# Patient Record
Sex: FEMALE | Race: WHITE | NOT HISPANIC OR LATINO | Employment: OTHER | URBAN - METROPOLITAN AREA
[De-identification: names, ages, dates, MRNs, and addresses within clinical notes are randomized per-mention and may not be internally consistent; named-entity substitution may affect disease eponyms.]

---

## 2017-01-03 ENCOUNTER — OFFICE VISIT (OUTPATIENT)
Dept: NUTRITION | Facility: HOSPITAL | Age: 62
End: 2017-01-03
Attending: ANESTHESIOLOGY

## 2017-01-03 VITALS — HEIGHT: 62 IN | BODY MASS INDEX: 34.6 KG/M2 | WEIGHT: 188 LBS

## 2017-01-03 PROCEDURE — 97803 MED NUTRITION INDIV SUBSEQ: CPT | Performed by: DIETITIAN, REGISTERED

## 2017-01-03 NOTE — PROGRESS NOTES
Adult Outpatient Nutrition  Assessment/PES    Patient Name:  Yi Garcia  YOB: 1955  MRN: [id number]    Assessment Date:  1/3/2017          General Info       01/03/17 3846    Today's Session    Person(s) attending today's session Patient     Services Used Today? No    General Information    How well do you speak English? very well    Do you speak a language other than English at home? no    Are you able to read and write English? Yes    Lives With spouse    Is patient pregnant? no            Physical Findings       01/03/17 1556    Physical Findings/Assessment    Additional Documentation Physical Appearance (Group)    Physical Appearance    Overall Physical Appearance obese              Nutritional Info/Activity       01/03/17 1554    Nutritional Information    Have you had weight changes? Yes    Describe weight changes States that her weight has increased to 194 lbs.    Have you tried to lose weight before? Yes    List programs tried, date, and success smaller portions    Do you have difficulty chewing food? No    Who Prepares Meals self    How often during the day do you find yourself snacking? fruit at night    How often do you eat out and where? couple times per week; fast food    What is the biggest challenge you have with your diet? Portions;Eating out;Poor choices;Food preperation    Enter everything you can remember eating in the last 24 hours (1 day) Breakfast: cereal with milk; Lunch: 6 inch cold cut [employer] sandwich, water, [employer] french fries; Snack: grapes, 2 tangerines; Dinner: 6 inch cold cut [employer] Gerald, [facility]    Eating Environment    Eating environment Family    Physical Activity    Are you currently involved in an activity/exercise program?  No    Reasons for Inactivity Other (comment)   pain    How would you rank exercise as an important health lifestyle practice? 9              Estimated/Assessed Needs       01/03/17 2540    Calculation Measurements  "   Weight Used For Calculations 88 kg (194 lb)    Height Used for Calculations 1.575 m (5' 2\")    Estimated/Assessed Energy Needs    Energy Need Method Liberty Narayanan    Age 61    RMR (HoltSt. Narayanan Equation) 1408.23    Activity Factors (Sarah Casarez)  Confined to bed  1.2    Total estimated needs (Holt St. Jeor) 1408 x 1.2 = 1689 - 500 = 1189 kcalories/day    Estimated Kcal Range  1200 kcalories                Problem/Interventions:        Problem 1       01/03/17 1601    Nutrition Diagnoses Problem 1    Problem 1 Overweight/Obesity    Etiology (related to) --   excessive kcalorie intake    Signs/Symptoms (evidenced by) BMI    BMI 30 - 34.9                    Intervention Goal       01/03/17 1602    Intervention Goal    General Provide information regarding MNT for treatment/condition   ongoing weight loss follow up    PO Meet estimated needs   1200 kcalories/day    Weight Appropriate weight loss              Comments:  Patient present for ongoing weight loss follow up and reassessment.  Attended initial weight loss session in March 2016.  States that she has gained weight since her last nutrition appointment and was up to 194 lbs.  States that she has not been able to exercise on a regular basis except for physical therapy and stretching.  She states that her portion sizes continue to be under control, however, she continues to skip breakfast on a regular basis.  Discussed tracking kcalories especially when eating at restaurants.  Per 24 hour recall, that patient is consuming about 0882-2632 kcalories per day.  Patient only needs 1200 kcalories per day to lose weight.  Demonstrated how to read labels as well as keeping a food diary.  Patient downloaded \"[employer]\" shar during session and able to retrieve restaurant kcalorie information.  Updated goals:    -Eat breakfast daily (apple with peanut butter).  -Add more vegetables.  -Track kcalories using a food journal.  Aim for 1200 " kcalories/day.  -Lose 0.5-1.0# of body weight per week.      Scheduled a continued follow up for 2-6-2017 at 12:45 p.m.  RD contact information given if needed.  Thank you again for this referral.      Electronically signed by:  Nila Blanco RD  01/03/17 4:04 PM

## 2017-01-03 NOTE — MR AVS SNAPSHOT
[facility]  [phone]                    Yi MONTES Radha   1/3/2017 2:45 PM   Appointment    Dept Phone:  [phone]   Encounter #:  [id number]    Provider:  Nila Blanco RD   Department:  [facility]                Your Full Care Plan              Your Updated Medication List          This list is accurate as of: 1/3/17  3:29 PM.  Always use your most recent med list.                ABILIFY 2 MG tablet   Generic drug:  ARIPiprazole       baclofen 10 MG tablet   Commonly known as:  LIORESAL   TAKE AS INSTRUCTED BY YOUR PRESCRIBER       Canagliflozin 300 MG tablet   Commonly known as:  INVOKANA   Take 300 mg by mouth Daily.       chlorthalidone 25 MG tablet   Commonly known as:  HYGROTON   Take 1 tablet by mouth daily.       Diclofenac Potassium 25 MG capsule   Commonly known as:  ZIPSOR   Take 1 capsule by mouth 4 (four) times a day as needed (for moderate to severe pain)       doxazosin 2 MG tablet   Commonly known as:  CARDURA   Take 1 1/2 tablets by mouth every night.       ESTRACE VAGINAL 0.1 MG/GM vaginal cream   Generic drug:  estradiol       FLUZONE QUADRIVALENT 0.5 ML suspension prefilled syringe injection   Generic drug:  influenza vac split quad       JANUVIA 100 MG tablet   Generic drug:  SITagliptin       lidocaine 5 %   Commonly known as:  LIDODERM       PRISTIQ 100 MG 24 hr tablet   Generic drug:  desvenlafaxine       SALONPAS pads       topiramate 50 MG tablet   Commonly known as:  TOPAMAX   TAKE ONE TABLET BY MOUTH TWICE DAILY       traMADol 50 MG tablet   Commonly known as:  ULTRAM   Take 1 tablet by mouth 2 (Two) Times a Day As Needed for moderate pain (4-6).       traZODone 50 MG tablet   Commonly known as:  DESYREL   TAKE 1 TO 2 TABLETS AT BEDTIME AS NEEDED       TYLENOL 500 MG tablet   Generic drug:  acetaminophen               Instructions     None    Patient Instructions History      Upcoming Appointments     Visit Type Date Time  Department    FOLLOW UP RD VISIT, 30 MIN 1/3/2017  2:45 PM [facility]    OFFICE VISIT 2017  1:15 PM [facility]    PHYSICAL 2017  9:45 AM [facility]    FOLLOW UP RD VISIT, 30 MIN 2017 12:45 PM [facility]      [employer] Signup     [facility] allows you to send messages to your doctor, view your test results, renew your prescriptions, schedule appointments, and more. To sign up, go to Enlighted and click on the Sign Up Now link in the New User? box. Enter your [facility] Activation Code exactly as it appears below along with the last four digits of your Social Security Number and your Date of Birth () to complete the sign-up process. If you do not sign up before the expiration date, you must request a new code.    [facility] Activation Code: [email or web address]  Expires: 2017  5:27 AM    If you have questions, you can email [email or web address] or call [phone] to talk to our [facility] staff. Remember, [facility] is NOT to be used for urgent needs. For medical emergencies, dial 911.               Other Info from Your Visit           Your Appointments     2017  1:15 PM EST   Office Visit with Rafael Velasquez MD   [facility] (--)    [address],  [address]  [facility] [address] [phone]           Arrive 15 minutes prior to appointment.            2017  9:45 AM EST   Physical with Charleen Carbajal MD   [facility] (--)    [address]  [facility] [address] [phone]           Arrive 15 minutes prior to appointment.            2017 12:45 PM EST   Follow up RD visit with Nila Blanco RD   [facility] ([place])    [address]  [facility] [address]   [phone]              Allergies     Ace Inhibitors  Cough    Amlodipine      Beta Adrenergic Blockers       Cyclobenzaprine      Hydrochlorothiazide      Hyzaar  [Losartan Potassium-hctz]      Latex      Losartan      Metformin      Penicillins      Pioglitazone      Spironolactone        Vital Signs     Smoking Status                   Never Smoker

## 2017-01-24 ENCOUNTER — OFFICE VISIT (OUTPATIENT)
Dept: PAIN MEDICINE | Facility: CLINIC | Age: 62
End: 2017-01-24

## 2017-01-24 VITALS
WEIGHT: 190 LBS | BODY MASS INDEX: 34.96 KG/M2 | DIASTOLIC BLOOD PRESSURE: 94 MMHG | HEART RATE: 102 BPM | HEIGHT: 62 IN | RESPIRATION RATE: 18 BRPM | TEMPERATURE: 96.7 F | SYSTOLIC BLOOD PRESSURE: 144 MMHG | OXYGEN SATURATION: 96 %

## 2017-01-24 DIAGNOSIS — G89.4 CHRONIC PAIN SYNDROME: ICD-10-CM

## 2017-01-24 DIAGNOSIS — M96.1 POSTLAMINECTOMY SYNDROME OF LUMBAR REGION: ICD-10-CM

## 2017-01-24 DIAGNOSIS — M79.18 MYOFASCIAL PAIN SYNDROME: ICD-10-CM

## 2017-01-24 DIAGNOSIS — E66.9 OBESITY WITH SLEEP APNEA: ICD-10-CM

## 2017-01-24 DIAGNOSIS — R53.81 PHYSICAL DECONDITIONING: ICD-10-CM

## 2017-01-24 DIAGNOSIS — Z86.69 HISTORY OF MIGRAINE HEADACHES: ICD-10-CM

## 2017-01-24 DIAGNOSIS — G47.30 OBESITY WITH SLEEP APNEA: ICD-10-CM

## 2017-01-24 DIAGNOSIS — M53.3 SACROILIAC JOINT DYSFUNCTION OF LEFT SIDE: ICD-10-CM

## 2017-01-24 DIAGNOSIS — M47.816 LUMBAR FACET ARTHROPATHY: ICD-10-CM

## 2017-01-24 DIAGNOSIS — F41.1 GENERALIZED ANXIETY DISORDER: ICD-10-CM

## 2017-01-24 DIAGNOSIS — G96.12: ICD-10-CM

## 2017-01-24 DIAGNOSIS — F32.89 OTHER DEPRESSION: ICD-10-CM

## 2017-01-24 DIAGNOSIS — E66.8 MODERATE OBESITY: ICD-10-CM

## 2017-01-24 DIAGNOSIS — G47.00 INSOMNIA, UNSPECIFIED TYPE: ICD-10-CM

## 2017-01-24 PROCEDURE — 99213 OFFICE O/P EST LOW 20 MIN: CPT | Performed by: ANESTHESIOLOGY

## 2017-01-24 PROCEDURE — 95972 ALYS CPLX SP/PN NPGT W/PRGRM: CPT | Performed by: ANESTHESIOLOGY

## 2017-01-24 RX ORDER — LURASIDONE HYDROCHLORIDE 20 MG/1
20 TABLET, FILM COATED ORAL DAILY
COMMUNITY
End: 2017-02-06

## 2017-01-24 NOTE — PROGRESS NOTES
"Chief Complain: \"I have been doing better with the stimulator.\"        Brief History: Ms. Garcia returns to the clinic for evaluation of left buttocks and left lower extremity pain down to the foot, and possible reprogramming of her [place] spinal cord stimulator device. Patient reports significant improvement of her chronic pain after her last reprogramming. A few weeks later, she reports that she felt that the pain was overriding the stimulation.  Pain level: 4/10  Average pain level last week: 4/10 (significantly decreased in comparison to last visit in December)   Pain level ranges from 1/10 to 5/10 (with the spinal cord stimulator on)  Quality of pain: Throbbing and burning in the lower extremities, and \"it just hurts across my lower back\"   Radiation of pain: Pain radiates from her lower back down to the buttocks, the posterior aspect of her thighs, lateral aspect of the left leg and into the left foot, the 2nd and 3rd toes. The right sided pain is intermittent and less frequent and radiates up to the heel.   Comorbid factors:   Depression \"seems to be getting better.\" She continues under the care of Dr. Oliveira. Patient continues on Pristiq, which was increased before her last visit, and now Latuda . She continues CBT with Savita Minor for counseling.   Deconditioning: She has resumed water therapy. Patient is not currently participating in physical therapy due to insurance issues.   Sleep disturbance: Patient has decided to move forward with a sleep study.   Obesity: She has been released by [facility].   Current analgesics: She uses ice and heat in the areas of her chronic pain. In terms of analgesics, she uses SalonPas, and baclofen, Tramadol, Zipsor, Lidocaine Patches and Tylenol without side effects. [facility] #[id number], is consistent with medication reconciliation.      Review of new diagnostic studies   UDS  on 12/06/2016, appropriate      Diagnostic Studies:  X-rays of the sacroiliac " "joints from 04/19/2016 revealed lumbosacral and SI arthropathy.  MRI of the lumbar spine from June 2013 revealed lumbar facet hypertrophy from L2-L3 through L5-S1. L4-L5 moderate disc bulge lateralizes towards the right. Moderate right and mild left neuroforaminal stenosis. L5-S1 moderate left foraminal disc protrusion combined with facet hypertrophy creating moderate left neuroforaminal stenosis with no significant canal stenosis.  X-ray of the pelvis on 10/07/2014, revealed mild sclerosis about the right sacroiliac joint.  X-ray of the thoracic spine on 09/09/2014, revealed spinal cord stimulator electrodes positioned at T7. The thoracic spine otherwise demonstrates mild degenerative change without acute abnormality.    X-rays of the sacroiliac joints from 04/19/2016 revealed lumbosacral and SI arthropathy.    The following portions of the patient's history were reviewed and updated as appropriate: problem list, past medical history, past surgery history, social history, family history, medications, and allergies     Review of Systems   Constitutional: Positive for fatigue.   Musculoskeletal: Positive for back pain.   All other systems reviewed and are negative.     Visit Vitals   • /94 (BP Location: Left arm, Patient Position: Sitting)   • Pulse 102   • Temp 96.7 °F (35.9 °C) (Temporal Artery )   • Resp 18   • Ht 62\" (157.5 cm)   • Wt 190 lb (86.2 kg)   • SpO2 96%   • BMI 34.75 kg/m2      Physical Exam   Neurologic Exam  Constitutional General appearance: No acute distress, well appearing and well nourished. Morbidly obese and well hydrated.   Head and Face Normal. Palpation of the face and sinuses: No sinus tenderness.   Eyes Conjunctiva and lids: No swelling, erythema or discharge. Pupils and irises: Equal, round, reactive to light.   Pulmonary Respiratory effort: No increased work of breathing or signs of respiratory distress. Auscultation of lungs: Clear to auscultation.   Cardiovascular Auscultation of " heart: Normal rate and rhythm, normal S1 and S2, no murmurs. Peripheral vascular exam: Normal. Examination of extremities for edema and/or varicosities: Normal.   Abdomen Non-tender, no masses.   Musculoskeletal Gait and station: Normal. Digits and nails: Normal without clubbing or cyanosis. The range of motion of the lumbar spine is improved. Lumbar facet joint loading maneuvers are negative. Krish and Gaenslen's tests are negative. Palpation of the left gluteal bursa does not reproduce pain at this time. The range of motion of the hip joints is full and without pain. Muscle tone is normal. Left piriformis maneuvers are negative at this time.   Muscle strength/tone: Normal.   Skin Skin and subcutaneous tissue: Normal without rashes or lesions. Her surgical wounds are well-healed without redness, drainage, or fluid accumulation. There is complete epithelization of her surgical wounds.   Neurologic   Cranial nerves: Cranial nerves II-XII intact.   Cortical function: Normal mental status.   Reflexes: 2+ and symmetric. Deep tendon reflexes: 2+ right biceps, 2+ left biceps, 2+ right patella, 2+ left patella, 2+ right ankle jerk and 2+ left ankle jerk. Superficial/Primitive Reflexes: primitive reflexes were absent. Straight leg raising test is positive on the left, with a positive contralateral SLR. Femoral stretch sign is negative. No clonus or Babinski. Romberg's is negative.   Sensation: No sensory loss. Sensory exam: intact to light touch, intact pain and temperature sensation, intact vibration sensation and normal proprioception.   Coordination: Normal finger to nose and heel to shin. Coordination: normal balance and negative Romberg's sign.   Psychiatric   Judgment and insight: Normal.   Orientation to person, place, and time: Normal.   Recent and remote memory: Intact.   Mood and affect: Normal.       PROCEDURE: Analysis of the spinal cord stimulator device with complex spinal cord stimulator reprogramming    Analysis of the spinal cord stimulator device reveals that the patient has used her stimulator device for a total of 1105 hours since last reprogramming, and 8792 lifetime hours (23 hours per day).   Analysis of impedance reveals normal impedance for all contacts.   Spinal cord stimulator device was reprogrammed under my supervision by adjusting electrode polarities, pulse width, pulse rate, amplitudes, BurstDR, by creating one program, as follows;  Program ONE  Electrode polarities: 1+, 2-, 3+, 4+, 5-, 6-  Amplitude: 1 to 1.5 mA   Pulse width: 1000 mcs  Pulse Rate: 40  Hz  IntraBurst rate: 500 Hz  Micro-dosing ratio: 5:25  Time spent reprogramming: 15 minutes.   A copy of the telemetry report will be scanned in the patient's chart      ASSESSMENT:   1. Postlaminectomy syndrome of lumbar region    2. Meningeal adhesions/lumbar arachnoiditis    3. Lumbar facet arthropathy/lumbar spondylosis without myelopathy    4. Sacroiliac joint dysfunction of left side    5. Myofascial pain syndrome    6. History of migraine headaches    7. Obesity with sleep apnea    8. Moderate obesity    9. Insomnia, unspecified type    10. Generalized anxiety disorder    11. Other depression    12. Chronic pain syndrome    13. Physical deconditioning      PLAN: Patient's chronic pain condition is improved. We will continue current management and any additional workup, referrals, and treatments as outlined in the following plan:  1. Follow-up in three weeks to assess response to new spinal cord stimulator program. If patient continues to struggle with pain, then, we will proceed with diagnostic/therapeutic right L4-L5 and left L5-S1 transforaminal epidural steroid injection. We may repeat another epidural depending on patient’s outcome.  2. Long-term rehabilitation efforts:   a. Resume comprehensive physical therapy program  b. Resume water therapy   c. [facility]up at [facility]  d. Follow-up with Dr. Berry Bradshaw for  cognitive behavioral therapy and biofeedback  e. Follow-up with Dr. Richmond for her sleep apnea  f.  Follow-up with Dr. Oliveira for her unrelenting anxiety, depression and insomnia  g. The patient does not have a history of falls. I did complete a risk assessment for falls. Patient has risks factors.   Fall precautions: Patient has been instructed regarding universal fall precautions, such as;   · Removing all area rugs and coffee tables to create a safe environment at home  · Ensure clean, dry floors  · Wearing supportive footwear and properly fitting clothing  · Ensure bed/chair is appropriate height and patient's feet can touch the floor  · Using a shower transfer bench  · Using walk-in shower and having shower safety bars installed  · Ensure proper lighting, minimize glare  · Have nightlights operational and in use  · Participation in an exercise program for gait training, balance training and strength  · Avoid carrying laundry up and down steps  · Ensure proper compliance and organization of medications to avoid errors   · Avoid use of over the counter sedatives and alcohol consumption  3. Pharmacological measures, as follows:  a. Continue topiramate 50 mg twice daily  b. Continue trazodone  mg each bedtime for insomnia  c. Continue Zipsor 25 mg 4 times a day when necessary for mild to moderate acute breakthrough pain. I have instructed the patient not to take any other NSAIDs  d. Continue lidocaine patches  e. Continue baclofen 10 mg 1/2 to 1 tablet 1-2 times a day as needed muscle spasms  f.  Continue Pristiq and Latuda as prescribed by Dr. Oliveira  g. Tramadol 50 mg twice a day as needed for breakthrough pain. Patient has completed a SOAPP questionnaire and ORT questionnaire (revealing low risk). Bernabe reports have been reviewed and appropriate.  4. The patient has been instructed to contact my office with any questions or difficulties. The patient understands the plan and agrees to proceed  accordingly.    Patient Care Team:  Charleen Carbajal MD as PCP - General  Rafael Velasquez MD as Consulting Physician (Pain Medicine)  Von Croft MD as Surgeon (Neurosurgery)  Emmie Stallworth MD as Consulting Physician (Internal Medicine)     New Medications Ordered This Visit   Medications   • Lurasidone HCl (LATUDA) 20 MG tablet tablet     Sig: Take 20 mg by mouth Daily.         Future Appointments  Date Time Provider Department Center   2/6/2017 9:45 AM Charleen Carbajal MD [facility] None   2/6/2017 12:45 PM Nila Blanco RD [facility]         Rafael Velasquez MD

## 2017-01-24 NOTE — LETTER
"January 24, 2017     Charleen Carbajal MD  [address]  [facility] [address]    Patient: Yi Garcia   YOB: 1955   Date of Visit: 1/24/2017       Dear Dr. Raven MD:    Thank you for referring Yi Garcia to me for evaluation. Below are the relevant portions of my assessment and plan of care.    If you have questions, please do not hesitate to call me. I look forward to following Yi along with you.         Sincerely,        Rafael Velasquez MD        CC: MD Emmie Jordan MD Luis A. Vascello, MD  1/24/2017  3:09 PM  Signed  Chief Complain: \"I have been doing better with the stimulator.\"        Brief History: Ms. Garcia returns to the clinic for evaluation of left buttocks and left lower extremity pain down to the foot, and possible reprogramming of her [place] spinal cord stimulator device. Patient reports significant improvement of her chronic pain after her last reprogramming. A few weeks later, she reports that she felt that the pain was overriding the stimulation.  Pain level: 4/10  Average pain level last week: 4/10 (significantly decreased in comparison to last visit in December)   Pain level ranges from 1/10 to 5/10 (with the spinal cord stimulator on)  Quality of pain: Throbbing and burning in the lower extremities, and \"it just hurts across my lower back\"   Radiation of pain: Pain radiates from her lower back down to the buttocks, the posterior aspect of her thighs, lateral aspect of the left leg and into the left foot, the 2nd and 3rd toes. The right sided pain is intermittent and less frequent and radiates up to the heel.   Comorbid factors:   Depression \"seems to be getting better.\" She continues under the care of Dr. Oliveira. Patient continues on Pristiq, which was increased before her last visit, and now Latuda . She continues CBT with Savita Minor for counseling.   Deconditioning: She has resumed water therapy. Patient is not currently " "participating in physical therapy due to insurance issues.   Sleep disturbance: Patient has decided to move forward with a sleep study.   Obesity: She has been released by [facility].   Current analgesics: She uses ice and heat in the areas of her chronic pain. In terms of analgesics, she uses SalonPas, and baclofen, Tramadol, Zipsor, Lidocaine Patches and Tylenol without side effects. JEAN-CLAUDE #[id number], is consistent with medication reconciliation.      Review of new diagnostic studies   UDS  on 12/06/2016, appropriate      Diagnostic Studies:  X-rays of the sacroiliac joints from 04/19/2016 revealed lumbosacral and SI arthropathy.  MRI of the lumbar spine from June 2013 revealed lumbar facet hypertrophy from L2-L3 through L5-S1. L4-L5 moderate disc bulge lateralizes towards the right. Moderate right and mild left neuroforaminal stenosis. L5-S1 moderate left foraminal disc protrusion combined with facet hypertrophy creating moderate left neuroforaminal stenosis with no significant canal stenosis.  X-ray of the pelvis on 10/07/2014, revealed mild sclerosis about the right sacroiliac joint.  X-ray of the thoracic spine on 09/09/2014, revealed spinal cord stimulator electrodes positioned at T7. The thoracic spine otherwise demonstrates mild degenerative change without acute abnormality.    X-rays of the sacroiliac joints from 04/19/2016 revealed lumbosacral and SI arthropathy.    The following portions of the patient's history were reviewed and updated as appropriate: problem list, past medical history, past surgery history, social history, family history, medications, and allergies     Review of Systems   Constitutional: Positive for fatigue.   Musculoskeletal: Positive for back pain.   All other systems reviewed and are negative.     Visit Vitals   • /94 (BP Location: Left arm, Patient Position: Sitting)   • Pulse 102   • Temp 96.7 °F (35.9 °C) (Temporal Artery )   • Resp 18   • Ht 62\" (157.5 " cm)   • Wt 190 lb (86.2 kg)   • SpO2 96%   • BMI 34.75 kg/m2      Physical Exam   Neurologic Exam  Constitutional General appearance: No acute distress, well appearing and well nourished. Morbidly obese and well hydrated.   Head and Face Normal. Palpation of the face and sinuses: No sinus tenderness.   Eyes Conjunctiva and lids: No swelling, erythema or discharge. Pupils and irises: Equal, round, reactive to light.   Pulmonary Respiratory effort: No increased work of breathing or signs of respiratory distress. Auscultation of lungs: Clear to auscultation.   Cardiovascular Auscultation of heart: Normal rate and rhythm, normal S1 and S2, no murmurs. Peripheral vascular exam: Normal. Examination of extremities for edema and/or varicosities: Normal.   Abdomen Non-tender, no masses.   Musculoskeletal Gait and station: Normal. Digits and nails: Normal without clubbing or cyanosis. The range of motion of the lumbar spine is improved. Lumbar facet joint loading maneuvers are negative. Krish and Gaenslen's tests are negative. Palpation of the left gluteal bursa does not reproduce pain at this time. The range of motion of the hip joints is full and without pain. Muscle tone is normal. Left piriformis maneuvers are negative at this time.   Muscle strength/tone: Normal.   Skin Skin and subcutaneous tissue: Normal without rashes or lesions. Her surgical wounds are well-healed without redness, drainage, or fluid accumulation. There is complete epithelization of her surgical wounds.   Neurologic   Cranial nerves: Cranial nerves II-XII intact.   Cortical function: Normal mental status.   Reflexes: 2+ and symmetric. Deep tendon reflexes: 2+ right biceps, 2+ left biceps, 2+ right patella, 2+ left patella, 2+ right ankle jerk and 2+ left ankle jerk. Superficial/Primitive Reflexes: primitive reflexes were absent. Straight leg raising test is positive on the left, with a positive contralateral SLR. Femoral stretch sign is negative. No  clonus or Babinski. Romberg's is negative.   Sensation: No sensory loss. Sensory exam: intact to light touch, intact pain and temperature sensation, intact vibration sensation and normal proprioception.   Coordination: Normal finger to nose and heel to shin. Coordination: normal balance and negative Romberg's sign.   Psychiatric   Judgment and insight: Normal.   Orientation to person, place, and time: Normal.   Recent and remote memory: Intact.   Mood and affect: Normal.       PROCEDURE: Analysis of the spinal cord stimulator device with complex spinal cord stimulator reprogramming   Analysis of the spinal cord stimulator device reveals that the patient has used her stimulator device for a total of 1105 hours since last reprogramming, and 8792 lifetime hours (23 hours per day).   Analysis of impedance reveals normal impedance for all contacts.   Spinal cord stimulator device was reprogrammed under my supervision by adjusting electrode polarities, pulse width, pulse rate, amplitudes, BurstDR, by creating one program, as follows;  Program ONE  Electrode polarities: 1+, 2-, 3+, 4+, 5-, 6-  Amplitude: 1 to 1.5 mA   Pulse width: 1000 mcs  Pulse Rate: 40  Hz  IntraBurst rate: 500 Hz  Micro-dosing ratio: 5:25  Time spent reprogramming: 15 minutes.   A copy of the telemetry report will be scanned in the patient's chart      ASSESSMENT:   1. Postlaminectomy syndrome of lumbar region    2. Meningeal adhesions/lumbar arachnoiditis    3. Lumbar facet arthropathy/lumbar spondylosis without myelopathy    4. Sacroiliac joint dysfunction of left side    5. Myofascial pain syndrome    6. History of migraine headaches    7. Obesity with sleep apnea    8. Moderate obesity    9. Insomnia, unspecified type    10. Generalized anxiety disorder    11. Other depression    12. Chronic pain syndrome    13. Physical deconditioning      PLAN: Patient's chronic pain condition is improved. We will continue current management and any additional  workup, referrals, and treatments as outlined in the following plan:  1. Follow-up in three weeks to assess response to new spinal cord stimulator program. If patient continues to struggle with pain, then, we will proceed with diagnostic/therapeutic right L4-L5 and left L5-S1 transforaminal epidural steroid injection. We may repeat another epidural depending on patient’s outcome.  2. Long-term rehabilitation efforts:   a. Resume comprehensive physical therapy program  b. Resume water therapy   c. Follow-up at [facility]  d. Follow-up with Dr. Berry Bradshaw for cognitive behavioral therapy and biofeedback  e. Follow-up with Dr. Richmond for her sleep apnea  f.  Follow-up with Dr. Oliveira for her unrelenting anxiety, depression and insomnia  g. The patient does not have a history of falls. I did complete a risk assessment for falls. Patient has risks factors.   Fall precautions: Patient has been instructed regarding universal fall precautions, such as;   · Removing all area rugs and coffee tables to create a safe environment at home  · Ensure clean, dry floors  · Wearing supportive footwear and properly fitting clothing  · Ensure bed/chair is appropriate height and patient's feet can touch the floor  · Using a shower transfer bench  · Using walk-in shower and having shower safety bars installed  · Ensure proper lighting, minimize glare  · Have nightlights operational and in use  · Participation in an exercise program for gait training, balance training and strength  · Avoid carrying laundry up and down steps  · Ensure proper compliance and organization of medications to avoid errors   · Avoid use of over the counter sedatives and alcohol consumption  3. Pharmacological measures, as follows:  a. Continue topiramate 50 mg twice daily  b. Continue trazodone  mg each bedtime for insomnia  c. Continue Zipsor 25 mg 4 times a day when necessary for mild to moderate acute breakthrough pain. I have  instructed the patient not to take any other NSAIDs  d. Continue lidocaine patches  e. Continue baclofen 10 mg 1/2 to 1 tablet 1-2 times a day as needed muscle spasms  f.  Continue Pristiq and Latuda as prescribed by Dr. Oliveira  g. Tramadol 50 mg twice a day as needed for breakthrough pain. Patient has completed a SOAPP questionnaire and ORT questionnaire (revealing low risk). Bernabe reports have been reviewed and appropriate.  4. The patient has been instructed to contact my office with any questions or difficulties. The patient understands the plan and agrees to proceed accordingly.    Patient Care Team:  Charleen Carbajal MD as PCP - General  Rafael Velasquez MD as Consulting Physician (Pain Medicine)  Von Croft MD as Surgeon (Neurosurgery)  Emmie Stallworth MD as Consulting Physician (Internal Medicine)     New Medications Ordered This Visit   Medications   • Lurasidone HCl (LATUDA) 20 MG tablet tablet     Sig: Take 20 mg by mouth Daily.         Future Appointments  Date Time Provider Department Center   2/6/2017 9:45 AM Charleen Carbajal MD E  [facility] None   2/6/2017 12:45 PM Nila Blanco RD [facility]         Rafael Velasquez MD

## 2017-01-24 NOTE — MR AVS SNAPSHOT
Yi Garcia   1/24/2017 1:15 PM   Office Visit    Dept Phone:  [phone]   Encounter #:  [id number]    Provider:  Rafael Velasquez MD   Department:  [facility] PA[facility]NAGEMENT                Your Full Care Plan              Today's Medication Changes          These changes are accurate as of: 1/24/17  3:16 PM.  If you have any questions, ask your nurse or doctor.               Stop taking medication(s)listed here:     ABILIFY 2 MG tablet   Generic drug:  ARIPiprazole   Stopped by:  Rafael Velasquez MD           JANUVIA 100 MG tablet   Generic drug:  SITagliptin   Stopped by:  Rafael Velasquez MD                      Your Updated Medication List          This list is accurate as of: 1/24/17  3:16 PM.  Always use your most recent med list.                baclofen 10 MG tablet   Commonly known as:  LIORESAL   TAKE AS INSTRUCTED BY YOUR PRESCRIBER       Canagliflozin 300 MG tablet   Commonly known as:  INVOKANA   Take 300 mg by mouth Daily.       chlorthalidone 25 MG tablet   Commonly known as:  HYGROTON   Take 1 tablet by mouth daily.       Diclofenac Potassium 25 MG capsule   Commonly known as:  ZIPSOR   Take 1 capsule by mouth 4 (four) times a day as needed (for moderate to severe pain)       doxazosin 2 MG tablet   Commonly known as:  CARDURA   Take 1 1/2 tablets by mouth every night.       ESTRACE VAGINAL 0.1 MG/GM vaginal cream   Generic drug:  estradiol       FLUZONE QUADRIVALENT 0.5 ML suspension prefilled syringe injection   Generic drug:  influenza vac split quad       LATUDA 20 MG tablet tablet   Generic drug:  Lurasidone HCl       lidocaine 5 %   Commonly known as:  LIDODERM       PRISTIQ 100 MG 24 hr tablet   Generic drug:  desvenlafaxine       SALONPAS pads       topiramate 50 MG tablet   Commonly known as:  TOPAMAX   TAKE ONE TABLET BY MOUTH TWICE DAILY       traMADol 50 MG tablet   Commonly known as:  ULTRAM   Take 1 tablet by mouth 2 (Two) Times a Day As  Needed for moderate pain (4-6).       traZODone 50 MG tablet   Commonly known as:  DESYREL   TAKE 1 TO 2 TABLETS AT BEDTIME AS NEEDED       TYLENOL 500 MG tablet   Generic drug:  acetaminophen               We Performed the Following     SCANNED - TELEMETRY         You Were Diagnosed With        Codes Comments    Postlaminectomy syndrome of lumbar region     ICD-10-CM: M96.1  ICD-9-CM: 722.83     Meningeal adhesions     ICD-10-CM: G96.12  ICD-9-CM: 349.2     Lumbar facet arthropathy     ICD-10-CM: M12.88  ICD-9-CM: 721.3     Sacroiliac joint dysfunction of left side     ICD-10-CM: M53.3  ICD-9-CM: 724.6     Myofascial pain syndrome     ICD-10-CM: M79.1  ICD-9-CM: 729.1     History of migraine headaches     ICD-10-CM: Z86.69  ICD-9-CM: V12.49     Obesity with sleep apnea     ICD-10-CM: G47.30, E66.9  ICD-9-CM: 780.57, 278.00     Moderate obesity     ICD-10-CM: E66.8  ICD-9-CM: 278.00     Insomnia, unspecified type     ICD-10-CM: G47.00  ICD-9-CM: 780.52     Generalized anxiety disorder     ICD-10-CM: F41.1  ICD-9-CM: 300.02     Other depression     ICD-10-CM: F32.89     Chronic pain syndrome     ICD-10-CM: G89.4  ICD-9-CM: 338.4     Physical deconditioning     ICD-10-CM: R53.81  ICD-9-CM: 799.3       Instructions     None    Patient Instructions History      Upcoming Appointments     Visit Type Date Time Department    OFFICE VISIT 1/24/2017  1:15 PM [facility]    PHYSICAL 2/6/2017  9:45 AM [facility]    FOLLOW UP RD VISIT, 30 MIN 2/6/2017 12:45 PM [facility]    OFFICE VISIT 2/14/2017 11:00 AM [facility]      MyChart Signup     [facility] [employer] allows you to send messages to your doctor, view your test results, renew your prescriptions, schedule appointments, and more. To sign up, go to [employer] and click on the Sign Up Now link in the New User? box. Enter your [employer] Activation Code exactly as it appears below along with the last four digits of your Social Security  "Number and your Date of Birth () to complete the sign-up process. If you do not sign up before the expiration date, you must request a new code.    [email or web address] Activation Code: [email or web address]  Expires: 2017  3:16 PM    If you have questions, you can email [email or web address] or call [phone] to talk to our [employer] staff. Remember, [employer] is NOT to be used for urgent needs. For medical emergencies, dial 911.               Other Info from Your Visit           Your Appointments     2017  9:45 AM EST   Physical with Charleen Carbajal MD   [facility] [place] (--)    [address]  [facility] [address]   [phone]           Arrive 15 minutes prior to appointment.            2017 12:45 PM EST   Follow up RD visit with Nila Blanco RD   [facility]    [address]  [address]  [place] [id number]   [phone]            2017 11:00 AM EST   Office Visit with Rafael Velasquez MD   [facility] (--)    [phone]  [facility] [[email or web address] [address]  [facility] [address] [phone]           Arrive 15 minutes prior to appointment.              Allergies     Ace Inhibitors  Cough    Amlodipine      Beta Adrenergic Blockers      Cyclobenzaprine      Hydrochlorothiazide      Hyzaar  [Losartan Potassium-hctz]      Latex      Losartan      Metformin      Penicillins      Pioglitazone      Spironolactone        Reason for Visit     Follow-up [facility]    Back Pain           Vital Signs     Blood Pressure Pulse Temperature Respirations Height Weight    144/94 (BP Location: Left arm, Patient Position: Sitting) 102 96.7 °F (35.9 °C) (Temporal Artery ) 18 62\" (157.5 cm) 190 lb (86.2 kg)    Oxygen Saturation Body Mass Index Smoking Status             96% 34.75 kg/m2 Never Smoker         Problems and Diagnoses Noted     Chronic pain    Depression    Generalized anxiety " disorder    History of migraine headaches    Difficulty falling or staying asleep    Joint disorder    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

## 2017-02-06 ENCOUNTER — OFFICE VISIT (OUTPATIENT)
Dept: INTERNAL MEDICINE | Facility: CLINIC | Age: 62
End: 2017-02-06

## 2017-02-06 VITALS
DIASTOLIC BLOOD PRESSURE: 92 MMHG | TEMPERATURE: 98.9 F | RESPIRATION RATE: 18 BRPM | HEART RATE: 98 BPM | OXYGEN SATURATION: 99 % | BODY MASS INDEX: 35.01 KG/M2 | WEIGHT: 191.4 LBS | SYSTOLIC BLOOD PRESSURE: 144 MMHG

## 2017-02-06 DIAGNOSIS — E11.9 TYPE 2 DIABETES MELLITUS WITHOUT COMPLICATION, WITHOUT LONG-TERM CURRENT USE OF INSULIN (HCC): Primary | ICD-10-CM

## 2017-02-06 DIAGNOSIS — E11.9 TYPE 2 DIABETES MELLITUS WITHOUT COMPLICATION (HCC): ICD-10-CM

## 2017-02-06 DIAGNOSIS — N95.2 VAGINAL ATROPHY: ICD-10-CM

## 2017-02-06 DIAGNOSIS — E78.2 MIXED HYPERLIPIDEMIA: Primary | ICD-10-CM

## 2017-02-06 DIAGNOSIS — Z15.89 MTHFR MUTATION (METHYLENETETRAHYDROFOLATE REDUCTASE): ICD-10-CM

## 2017-02-06 DIAGNOSIS — K59.04 CHRONIC IDIOPATHIC CONSTIPATION: ICD-10-CM

## 2017-02-06 LAB
ALBUMIN SERPL-MCNC: 4.4 G/DL (ref 3.2–4.8)
ALBUMIN/GLOB SERPL: 1.5 G/DL (ref 1.5–2.5)
ALP SERPL-CCNC: 115 U/L (ref 25–100)
ALT SERPL W P-5'-P-CCNC: 67 U/L (ref 7–40)
ANION GAP SERPL CALCULATED.3IONS-SCNC: 7 MMOL/L (ref 3–11)
ARTICHOKE IGE QN: 200 MG/DL (ref 0–130)
AST SERPL-CCNC: 38 U/L (ref 0–33)
BILIRUB SERPL-MCNC: 0.5 MG/DL (ref 0.3–1.2)
BUN BLD-MCNC: 18 MG/DL (ref 9–23)
BUN/CREAT SERPL: 30 (ref 7–25)
CALCIUM SPEC-SCNC: 10.5 MG/DL (ref 8.7–10.4)
CHLORIDE SERPL-SCNC: 100 MMOL/L (ref 99–109)
CHOLEST SERPL-MCNC: 290 MG/DL (ref 0–200)
CO2 SERPL-SCNC: 35 MMOL/L (ref 20–31)
CREAT BLD-MCNC: 0.6 MG/DL (ref 0.6–1.3)
DEPRECATED RDW RBC AUTO: 44.9 FL (ref 37–54)
ERYTHROCYTE [DISTWIDTH] IN BLOOD BY AUTOMATED COUNT: 13.3 % (ref 11.3–14.5)
FOLATE SERPL-MCNC: >24 NG/ML (ref 3.2–20)
GFR SERPL CREATININE-BSD FRML MDRD: 102 ML/MIN/1.73
GLOBULIN UR ELPH-MCNC: 2.9 GM/DL
GLUCOSE BLD-MCNC: 129 MG/DL (ref 70–100)
HCT VFR BLD AUTO: 47 % (ref 34.5–44)
HCYS SERPL-MCNC: 10.4 UMOL/L (ref 5–13.9)
HDLC SERPL-MCNC: 60 MG/DL (ref 40–60)
HGB BLD-MCNC: 15.5 G/DL (ref 11.5–15.5)
MCH RBC QN AUTO: 30.3 PG (ref 27–31)
MCHC RBC AUTO-ENTMCNC: 33 G/DL (ref 32–36)
MCV RBC AUTO: 92 FL (ref 80–99)
PLATELET # BLD AUTO: 229 10*3/MM3 (ref 150–450)
PMV BLD AUTO: 10.9 FL (ref 6–12)
POTASSIUM BLD-SCNC: 4.1 MMOL/L (ref 3.5–5.5)
PROT SERPL-MCNC: 7.3 G/DL (ref 5.7–8.2)
RBC # BLD AUTO: 5.11 10*6/MM3 (ref 3.89–5.14)
SODIUM BLD-SCNC: 142 MMOL/L (ref 132–146)
TRIGL SERPL-MCNC: 232 MG/DL (ref 0–150)
TSH SERPL DL<=0.05 MIU/L-ACNC: 2.51 MIU/ML (ref 0.35–5.35)
WBC NRBC COR # BLD: 5.09 10*3/MM3 (ref 3.5–10.8)

## 2017-02-06 PROCEDURE — 90471 IMMUNIZATION ADMIN: CPT | Performed by: HOSPITALIST

## 2017-02-06 PROCEDURE — 82746 ASSAY OF FOLIC ACID SERUM: CPT | Performed by: HOSPITALIST

## 2017-02-06 PROCEDURE — 85027 COMPLETE CBC AUTOMATED: CPT | Performed by: HOSPITALIST

## 2017-02-06 PROCEDURE — 99213 OFFICE O/P EST LOW 20 MIN: CPT | Performed by: HOSPITALIST

## 2017-02-06 PROCEDURE — 90732 PPSV23 VACC 2 YRS+ SUBQ/IM: CPT | Performed by: HOSPITALIST

## 2017-02-06 PROCEDURE — 83090 ASSAY OF HOMOCYSTEINE: CPT | Performed by: HOSPITALIST

## 2017-02-06 PROCEDURE — 80053 COMPREHEN METABOLIC PANEL: CPT | Performed by: HOSPITALIST

## 2017-02-06 PROCEDURE — 80061 LIPID PANEL: CPT | Performed by: HOSPITALIST

## 2017-02-06 PROCEDURE — 99396 PREV VISIT EST AGE 40-64: CPT | Performed by: HOSPITALIST

## 2017-02-06 PROCEDURE — 84443 ASSAY THYROID STIM HORMONE: CPT | Performed by: HOSPITALIST

## 2017-02-06 RX ORDER — ICOSAPENT ETHYL 1000 MG/1
2 CAPSULE ORAL 2 TIMES DAILY WITH MEALS
Qty: 360 CAPSULE | Refills: 0 | Status: SHIPPED | OUTPATIENT
Start: 2017-02-06 | End: 2017-05-10

## 2017-02-06 RX ORDER — ESTRADIOL 0.1 MG/G
2 CREAM VAGINAL WEEKLY
Qty: 42.5 G | Refills: 5 | Status: SHIPPED | OUTPATIENT
Start: 2017-02-06 | End: 2017-02-09 | Stop reason: SDUPTHER

## 2017-02-06 RX ORDER — NATEGLINIDE 120 MG/1
120 TABLET ORAL
Qty: 90 TABLET | Refills: 3 | Status: SHIPPED | OUTPATIENT
Start: 2017-02-06 | End: 2017-02-10 | Stop reason: SDUPTHER

## 2017-02-06 RX ORDER — ZIPRASIDONE HYDROCHLORIDE 40 MG/1
CAPSULE ORAL
COMMUNITY
Start: 2017-01-26 | End: 2017-05-10

## 2017-02-06 NOTE — PROGRESS NOTES
Patient Care Team:  Charleen Carbajal MD as PCP - General  Rafael Velasquez MD as Consulting Physician (Pain Medicine)  Von Croft MD as Surgeon (Neurosurgery)  Emmie Stallworth MD as Consulting Physician (Internal Medicine)    Yi Garcia 61 y.o. female who presents for an Annual Physical.  Yi Garcia has a history of   Patient Active Problem List   Diagnosis   • Chronic pain   • Depression   • Diabetes mellitus   • Fatigue   • Hyperlipidemia   • Herniated lumbar intervertebral disc   • Leukocytes in urine   • Vaginal atrophy   • Postlaminectomy syndrome of lumbar region   • Meningeal adhesions/lumbar arachnoiditis   • Lumbar facet arthropathy/lumbar spondylosis without myelopathy   • Sacroiliac joint dysfunction of left side   • Obesity with sleep apnea   • Physical deconditioning   • Generalized anxiety disorder   • Moderate obesity   • Insomnia   • History of migraine headaches   • Benign essential hypertension   • Migraine headache   • Myofascial pain syndrome   • Palpitations   .  Yi Garcia has been doing well without new interval problems. Plan to update vaccines if needed today. She was seen By Dr. Oliveira who dis some genetic testing , She pos for MTHFR gene mutation.  He prescribed Deplin which is a folic acid supplement but it is too expensive. She has had a mammo  [facility].     Subjective   She constipated and has been trying colace and miralax and probiotics and she still has a bowel movement every three days and they are not well formed.    Review of Systems   Pertinent items are noted in HPI, all other systems reviewed and negative    History  Past Medical History   Diagnosis Date   • Anxiety    • Benign essential hypertension    • Cervical disc disorder    • Chronic pain disorder    • Colon polyps    • Complication of device    • Decreased libido    • Dizziness    • Encounter for long-term (current) use of medications    • Extremity pain    • Fatigue    • Hip pain     • History of alopecia    • History of backache    • History of colonoscopy      Fiberoptic   • History of diabetes mellitus    • History of insomnia    • History of mastoiditis    • History of migraine headaches    • History of urinary stone    • Infection of kidney    • Insomnia    • Joint pain    • Kidney infection    • Low back pain    • Lumbar radiculopathy    • Lumbar radiculopathy    • Lumbosacral disc disease    • Migraine    • Myofascial pain syndrome    • Neck pain    • Palpitations    • Spinal cord stimulator status    • Stress reaction, emotional    • Urinary incontinence    • Urinary tract infection    • Visit for screening mammogram      Description: 08/28/2009   • Vitamin D deficiency        Objective     Vital Signs  Vitals:    02/06/17 1017   BP: 144/92   Pulse: 98   Resp: 18   Temp: 98.9 °F (37.2 °C)   SpO2: 99%         Physical Exam:      General Appearance:    Alert, cooperative, in no acute distress   Head:    Normocephalic, without obvious abnormality, atraumatic   Eyes:            Lids and lashes normal, conjunctivae and sclerae normal, no   icterus, no pallor, corneas clear, PERRLA   Ears:    Ears appear intact with no abnormalities noted   Throat:   No oral lesions, no thrush, oral mucosa moist   Neck:   No adenopathy, supple, trachea midline, no thyromegaly, no   carotid bruit, no JVD   Back:     No kyphosis present, no scoliosis present, no skin lesions,      erythema or scars, no tenderness to percussion or                   palpation,   range of motion normal   Lungs:     Clear to auscultation,respirations regular, even and                  unlabored    Heart:    Regular rhythm and normal rate, normal S1 and S2, no            murmur, no gallop, no rub, no click   Chest Wall:    No abnormalities observed   Abdomen:     Normal bowel sounds, no masses, no organomegaly, soft        non-tender, non-distended, no guarding, no rebound                tenderness   Rectal:     Deferred    Extremities:   Moves all extremities well, no edema, no cyanosis, no             redness   Pulses:   Pulses palpable and equal bilaterally   Skin:   No bleeding, bruising or rash   Lymph nodes:   No palpable adenopathy   Neurologic:   Cranial nerves 2 - 12 grossly intact, sensation intact, DTR       present and equal bilaterally     Current Outpatient Prescriptions:   •  acetaminophen (TYLENOL) 500 MG tablet, Take 1 tablet by mouth. Every 4 to 6 hours as needed, Disp: , Rfl:   •  baclofen (LIORESAL) 10 MG tablet, TAKE AS INSTRUCTED BY YOUR PRESCRIBER, Disp: 270 tablet, Rfl: 1  •  Canagliflozin (INVOKANA) 300 MG tablet, Take 300 mg by mouth Daily., Disp: 90 tablet, Rfl: 1  •  chlorthalidone (HYGROTEN) 25 MG tablet, Take 1 tablet by mouth daily., Disp: 90 tablet, Rfl: 1  •  desvenlafaxine (PRISTIQ) 100 MG 24 hr tablet, Take 1 tablet by mouth daily., Disp: , Rfl:   •  Diclofenac Potassium (ZIPSOR) 25 MG capsule, Take 1 capsule by mouth 4 (four) times a day as needed (for moderate to severe pain), Disp: 120 capsule, Rfl: 2  •  doxazosin (CARDURA) 2 MG tablet, Take 1 1/2 tablets by mouth every night., Disp: 60 tablet, Rfl: 2  •  estradiol (ESTRACE VAGINAL) 0.1 MG/GM vaginal cream, Insert  into the vagina. 1 Applicatorfull at bedtime for one week, then 1 applicator 3 nights a week for a week, then once weekly, Disp: , Rfl:   •  lidocaine (LIDODERM) 5 %, Apply 1 patch topically. To the affected area and leave in place for 12 hours, then remove and leave off for 12 hours, Disp: , Rfl:   •  Liniments (SALONPAS) pads, Apply  topically. prn, Disp: , Rfl:   •  topiramate (TOPAMAX) 50 MG tablet, TAKE ONE TABLET BY MOUTH TWICE DAILY, Disp: 180 tablet, Rfl: 0  •  traMADol (ULTRAM) 50 MG tablet, Take 1 tablet by mouth 2 (Two) Times a Day As Needed for moderate pain (4-6)., Disp: 60 tablet, Rfl: 1  •  traZODone (DESYREL) 50 MG tablet, TAKE 1 TO 2 TABLETS AT BEDTIME AS NEEDED, Disp: 180 tablet, Rfl: 2  •  ziprasidone (GEODON) 40 MG  capsule, , Disp: , Rfl:      Results Review:    I reviewed the patient's new clinical results:  Lab Results (most recent)     None            Yi was seen today for follow-up and annual exam.    Diagnoses and all orders for this visit:    Type 2 diabetes mellitus without complication, without long-term current use of insulin  -     nateglinide (STARLIX) 120 MG tablet; Take 1 tablet by mouth 3 (Three) Times a Day Before Meals.  -     pneumococcal polysaccharide 23-valent (PNEUMOVAX-23) vaccine 0.5 mL; Inject 0.5 mL into the shoulder, thigh, or buttocks During Hospitalization for immunization.  -     Cancel: Comprehensive Metabolic Panel; Future  -     Lipid Panel  -     Comprehensive Metabolic Panel    MTHFR mutation (methylenetetrahydrofolate reductase)  -     Homocysteine, serum  -     Folate  -     Cancel: CBC (No Diff)  -     CBC (No Diff)    Vaginal atrophy  -     estradiol (ESTRACE VAGINAL) 0.1 MG/GM vaginal cream; Insert 2 g into the vagina 1 (One) Time Per Week.    Chronic idiopathic constipation  -     TSH  -     Linaclotide 145 MCG capsule; Take 145 mcg by mouth Daily.            I discussed the patients findings and my recommendations with patient.

## 2017-02-09 DIAGNOSIS — N95.2 VAGINAL ATROPHY: ICD-10-CM

## 2017-02-09 DIAGNOSIS — E11.9 TYPE 2 DIABETES MELLITUS WITHOUT COMPLICATION, WITHOUT LONG-TERM CURRENT USE OF INSULIN (HCC): ICD-10-CM

## 2017-02-09 RX ORDER — NATEGLINIDE 120 MG/1
120 TABLET ORAL
Qty: 90 TABLET | Refills: 3 | Status: CANCELLED | OUTPATIENT
Start: 2017-02-09

## 2017-02-10 ENCOUNTER — TELEPHONE (OUTPATIENT)
Dept: INTERNAL MEDICINE | Facility: CLINIC | Age: 62
End: 2017-02-10

## 2017-02-10 DIAGNOSIS — E11.9 TYPE 2 DIABETES MELLITUS WITHOUT COMPLICATION, WITHOUT LONG-TERM CURRENT USE OF INSULIN (HCC): ICD-10-CM

## 2017-02-10 DIAGNOSIS — K59.04 CHRONIC IDIOPATHIC CONSTIPATION: ICD-10-CM

## 2017-02-10 RX ORDER — ESTRADIOL 0.1 MG/G
2 CREAM VAGINAL WEEKLY
Qty: 42.5 G | Refills: 5 | Status: SHIPPED | OUTPATIENT
Start: 2017-02-10 | End: 2020-01-21 | Stop reason: SDUPTHER

## 2017-02-10 RX ORDER — NATEGLINIDE 120 MG/1
120 TABLET ORAL
Qty: 270 TABLET | Refills: 1 | Status: SHIPPED | OUTPATIENT
Start: 2017-02-10 | End: 2017-06-12 | Stop reason: SINTOL

## 2017-02-10 NOTE — TELEPHONE ENCOUNTER
Received call form Reina with [facility] pharmacy requesting a 90 day supply sent in for Starlix 120 MCG and Linaclotide 145 MCG, sent in a 90 day supply electronically pt is within protocol.

## 2017-02-14 ENCOUNTER — OFFICE VISIT (OUTPATIENT)
Dept: PAIN MEDICINE | Facility: CLINIC | Age: 62
End: 2017-02-14

## 2017-02-14 VITALS
HEIGHT: 62 IN | HEART RATE: 111 BPM | TEMPERATURE: 96.8 F | WEIGHT: 188 LBS | BODY MASS INDEX: 34.6 KG/M2 | RESPIRATION RATE: 18 BRPM | OXYGEN SATURATION: 97 % | SYSTOLIC BLOOD PRESSURE: 150 MMHG | DIASTOLIC BLOOD PRESSURE: 93 MMHG

## 2017-02-14 DIAGNOSIS — R53.81 PHYSICAL DECONDITIONING: ICD-10-CM

## 2017-02-14 DIAGNOSIS — F41.1 GENERALIZED ANXIETY DISORDER: ICD-10-CM

## 2017-02-14 DIAGNOSIS — G96.12: ICD-10-CM

## 2017-02-14 DIAGNOSIS — E66.9 OBESITY WITH SLEEP APNEA: ICD-10-CM

## 2017-02-14 DIAGNOSIS — M79.18 MYOFASCIAL PAIN SYNDROME: ICD-10-CM

## 2017-02-14 DIAGNOSIS — G47.00 INSOMNIA, UNSPECIFIED TYPE: ICD-10-CM

## 2017-02-14 DIAGNOSIS — M51.26 HERNIATED LUMBAR INTERVERTEBRAL DISC: ICD-10-CM

## 2017-02-14 DIAGNOSIS — Z86.69 HISTORY OF MIGRAINE HEADACHES: ICD-10-CM

## 2017-02-14 DIAGNOSIS — M96.1 POSTLAMINECTOMY SYNDROME OF LUMBAR REGION: ICD-10-CM

## 2017-02-14 DIAGNOSIS — M47.816 LUMBAR FACET ARTHROPATHY: ICD-10-CM

## 2017-02-14 DIAGNOSIS — E66.8 MODERATE OBESITY: ICD-10-CM

## 2017-02-14 DIAGNOSIS — F32.89 OTHER DEPRESSION: ICD-10-CM

## 2017-02-14 DIAGNOSIS — M53.3 SACROILIAC JOINT DYSFUNCTION OF LEFT SIDE: ICD-10-CM

## 2017-02-14 DIAGNOSIS — G47.30 OBESITY WITH SLEEP APNEA: ICD-10-CM

## 2017-02-14 PROCEDURE — 95972 ALYS CPLX SP/PN NPGT W/PRGRM: CPT | Performed by: ANESTHESIOLOGY

## 2017-02-14 PROCEDURE — 99214 OFFICE O/P EST MOD 30 MIN: CPT | Performed by: ANESTHESIOLOGY

## 2017-02-14 RX ORDER — TRAMADOL HYDROCHLORIDE 50 MG/1
50 TABLET ORAL 2 TIMES DAILY PRN
Qty: 60 TABLET | Refills: 3 | Status: SHIPPED | OUTPATIENT
Start: 2017-02-14 | End: 2017-04-18 | Stop reason: SDUPTHER

## 2017-02-14 RX ORDER — QUETIAPINE FUMARATE 50 MG/1
TABLET, FILM COATED ORAL AS NEEDED
COMMUNITY
Start: 2017-02-09 | End: 2018-02-20 | Stop reason: ALTCHOICE

## 2017-02-14 NOTE — PROGRESS NOTES
"Chief Complain: \"I have been doing much better with the stimulator since my last reprogramming.\"        Brief History: Ms. Garcia returns to the clinic for evaluation of her chronic pain, particularly involving her left gluteal region and left lower extremity down to the foot. Patient reports significant improvement of these pains after her last reprogramming.   Spinal cord stimulator device system: Patient underwent implantation of her spinal cord stimulator device system on October 1, 2015 with Dr. Von Croft.  [employer] Penta lead with the top electrodes projecting at the level of the superior endplate of the T7 vertebral body.  IPG: Protégé  Pain level: 3/10 (decreased)  Average pain level last week: 3/10 (decreased in comparison to last visit)   Pain level ranges from 1/10 to 3/10 with the spinal cord stimulator on (decreased in comparison to last visit)   Quality of pain: Throbbing and burning in the left lower extremity  Radiation of pain: Pain radiates from her lower back down to the left gluteal region, the posterior aspect of her thigh, the lateral aspect of the left leg and into the left foot, the 2nd and 3rd toes. The right sided pain is intermittent and less frequent and radiates up to the heel.   Comorbid factors:   Depression: Patient continues struggling. She continues under the care of Dr. Oliveira. Patient continues on Pristiq, which has been increased. She was prescribed Latuda, but due to high copays has been change to a different medication. She continues CBT with Savita Minor for counseling.   Deconditioning: She has resumed water therapy. Patient does not participate in physical therapy due to insurance issues.   Sleep disturbance: Patient has agreed to proceed with a sleep study.   Obesity: Patient will follow up with [facility].   Diabetes: NIDDM Diet controlled  Current analgesics: ice and heat in the areas of her chronic pain, SalonPas, baclofen, Tramadol, Zipsor, " "Lidocaine Patches and Tylenol without side effects.   I have reviewed [facility] #[id number], is consistent with medication reconciliation.      Review of diagnostic studies   UDS  on 12/06/2016, appropriate  X-rays of the sacroiliac joints from 04/19/2016 revealed lumbosacral and SI arthropathy.  MRI of the lumbar spine from June 2013 revealed lumbar facet hypertrophy from L2-L3 through L5-S1. L4-L5 moderate disc bulge lateralizes towards the right. Moderate right and mild left neuroforaminal stenosis. L5-S1 moderate left foraminal disc protrusion combined with facet hypertrophy creating moderate left neuroforaminal stenosis with no significant canal stenosis.  X-ray of the pelvis on 10/07/2014, revealed mild sclerosis about the right sacroiliac joint.  X-ray of the thoracic spine on 09/09/2014, revealed spinal cord stimulator electrodes positioned at T7. The thoracic spine otherwise demonstrates mild degenerative change without acute abnormality.    X-rays of the sacroiliac joints from 04/19/2016 revealed lumbosacral and SI arthropathy.    The following portions of the patient's history were reviewed and updated as appropriate: problem list, past medical history, past surgery history, social history, family history, medications, and allergies     Review of Systems   Constitutional: Positive for fatigue.   HENT: Positive for postnasal drip.    Psychiatric/Behavioral: The patient is nervous/anxious.    All other systems reviewed and are negative.     Visit Vitals   • /93 (BP Location: Left arm, Patient Position: Sitting)   • Pulse 111   • Temp 96.8 °F (36 °C) (Temporal Artery )   • Resp 18   • Ht 62\" (157.5 cm)   • Wt 188 lb (85.3 kg)   • SpO2 97%   • BMI 34.39 kg/m2      Physical Exam   Neurologic Exam  Constitutional General appearance: No acute distress, well appearing. Obese and well hydrated.   Head and Face Normal.   Eyes: Conjunctiva and lids: No swelling, erythema or discharge. Pupils: Equal, round, reactive " to light.   Pulmonary Respiratory effort: No increased work of breathing or signs of respiratory distress. Auscultation of lungs: Clear to auscultation.   Cardiovascular Auscultation of heart: Normal rate and rhythm, normal S1 and S2, no murmurs. Peripheral vascular exam: Normal. Examination of extremities for edema and/or varicosities: Normal.   Digits and nails: Normal without clubbing or cyanosis.  Musculoskeletal Gait and station: Normal.  The range of motion of the lumbar spine is improved. Lumbar facet joint loading maneuvers are negative. Krish and Gaenslen's tests are negative. Palpation of the left gluteal bursa does not reproduce pain at this time. The range of motion of the hip joints is full and without pain. Muscle tone is normal. Left piriformis maneuvers are negative at this time.   Muscle strength/tone: Normal.   Skin and subcutaneous tissue: Normal without rashes or lesions. There is complete epithelization of her surgical wounds, without erythema, drainage, or fluid accumulation.   Neurologic   Cranial nerves: Cranial nerves II-XII intact.   Cortical function: Normal mental status.   Reflexes: 2+ and symmetric. Deep tendon reflexes: 2+ right biceps, 2+ left biceps, 2+ right patella, 2+ left patella, 2+ right ankle jerk and 2+ left ankle jerk. Superficial/Primitive Reflexes: primitive reflexes were absent. Straight leg raising test is positive on the left, with a positive contralateral SLR. Femoral stretch sign is negative. No clonus or Babinski. Romberg's is negative.   Sensation: No sensory loss. Sensory exam: intact to light touch, intact pain and temperature sensation, intact vibration sensation and normal proprioception.   Coordination: Normal finger to nose and heel to shin. Coordination: normal balance and negative Romberg's sign.   Psychiatric   Judgment and insight: Normal.   Orientation to person, place, and time: Normal.   Recent and remote memory: Intact.   Mood and affect: Normal.        PROCEDURE: Analysis of the spinal cord stimulator device with complex spinal cord stimulator reprogramming   Analysis of the spinal cord stimulator device reveals that the patient has used her stimulator device for a total of 156 hours since last reprogramming, and 8948 lifetime hours (24 hours per day).   Analysis of impedance reveals normal impedance for all contacts.   Spinal cord stimulator device was reprogrammed under my supervision by adjusting electrode polarities, pulse width, pulse rate, amplitudes, BurstDR, by recreating one program, as follows;  Program ONE  Electrode polarities: 1+, 2-, 4+, 6-  Amplitude: 1.15 to 1.5 mA   Pulse width: 1000 mcs  Pulse Rate: 40 Hz  IntraBurst rate: 500 Hz  Micro-dosing ratio: 5:25    Time spent reprogrammin minutes.   A copy of the telemetry report will be scanned in the patient's chart      ASSESSMENT:   1. Postlaminectomy syndrome of lumbar region    2. Meningeal adhesions/lumbar arachnoiditis    3. Lumbar facet arthropathy/lumbar spondylosis without myelopathy    4. Herniated lumbar intervertebral disc    5. Sacroiliac joint dysfunction of left side    6. Myofascial pain syndrome    7. History of migraine headaches    8. Moderate obesity    9. Obesity with sleep apnea    10. Insomnia, unspecified type    11. Generalized anxiety disorder    12. Other depression    13. Physical deconditioning         PLAN: Patient's chronic pain condition continues to improve with adjustments of her spinal cord stimulator device. We will continue current management and any additional workup, referrals, and treatments as outlined in the following plan:  1. Follow-up in eight weeks to assess response to new spinal cord stimulator program. If patient continues to struggle with pain, then, we may proceed with diagnostic and therapeutic right L4-L5 and left L5-S1 transforaminal epidural steroid injection. We may repeat another epidural depending on patient’s outcome.  2. Long-term  rehabilitation efforts:   a. Resume comprehensive physical therapy program  b. Continue water therapy   c. [facility]  d. Follow-up with Dr. Berry Bradshaw for cognitive behavioral therapy and biofeedback  e. Follow-up with Dr. Richmond for her sleep apnea  f.  Follow-up with Dr. Oliveira for her unrelenting anxiety, depression and insomnia  g. The patient does not have a history of falls. I did complete a risk assessment for falls. Patient has risks factors.   Fall precautions: Patient has been instructed regarding universal fall precautions, such as;   · Removing all area rugs and coffee tables to create a safe environment at home  · Ensure clean, dry floors  · Wearing supportive footwear and properly fitting clothing  · Ensure bed/chair is appropriate height and patient's feet can touch the floor  · Using a shower transfer bench  · Using walk-in shower and having shower safety bars installed  · Ensure proper lighting, minimize glare  · Have nightlights operational and in use  · Participation in an exercise program for gait training, balance training and strength  · Avoid carrying laundry up and down steps  · Ensure proper compliance and organization of medications to avoid errors   · Avoid use of over the counter sedatives and alcohol consumption  3. Pharmacological measures, as follows:  a. Continue topiramate 50 mg twice daily  b. Continue trazodone  mg each bedtime for insomnia  c. Continue Zipsor 25 mg 4 times a day when necessary for mild to moderate acute breakthrough pain. I have instructed the patient not to take any other NSAIDs  d. Continue lidocaine patches  e. Continue baclofen 10 mg 1/2 to 1 tablet 1-2 times a day as needed muscle spasms  f.  Continue Pristiq, as prescribed by Dr. Oliveira  g. Tramadol 50 mg twice a day as needed for breakthrough pain. Patient has completed a SOAPP questionnaire and ORT questionnaire (revealing low risk). Bernabe reports have been  reviewed and appropriate.  4. The patient has been instructed to contact my office with any questions or difficulties. The patient understands the plan and agrees to proceed accordingly.    Patient Care Team:  Charleen Carbajal MD as PCP - General  Rafael Velasquez MD as Consulting Physician (Pain Medicine)  Von Croft MD as Surgeon (Neurosurgery)  Emmie Stallworth MD as Consulting Physician (Internal Medicine)     No orders of the defined types were placed in this encounter.        Future Appointments  Date Time Provider Department Center   4/13/2017 11:45 AM MD MILTON Rae [facility] None   5/4/2017 10:30 AM Charleen Carbajal MD [facility] None         Rafael Velasquez MD

## 2017-02-21 ENCOUNTER — OFFICE VISIT (OUTPATIENT)
Dept: PAIN MEDICINE | Facility: CLINIC | Age: 62
End: 2017-02-21

## 2017-02-21 VITALS
WEIGHT: 188 LBS | OXYGEN SATURATION: 96 % | TEMPERATURE: 97 F | SYSTOLIC BLOOD PRESSURE: 181 MMHG | HEIGHT: 62 IN | DIASTOLIC BLOOD PRESSURE: 99 MMHG | RESPIRATION RATE: 20 BRPM | BODY MASS INDEX: 34.6 KG/M2 | HEART RATE: 98 BPM

## 2017-02-21 DIAGNOSIS — G47.00 INSOMNIA, UNSPECIFIED TYPE: ICD-10-CM

## 2017-02-21 DIAGNOSIS — Z86.69 HISTORY OF MIGRAINE HEADACHES: ICD-10-CM

## 2017-02-21 DIAGNOSIS — G96.12: ICD-10-CM

## 2017-02-21 DIAGNOSIS — E66.9 DIABETES MELLITUS TYPE 2 IN OBESE (HCC): ICD-10-CM

## 2017-02-21 DIAGNOSIS — M47.816 LUMBAR FACET ARTHROPATHY: ICD-10-CM

## 2017-02-21 DIAGNOSIS — M96.1 POSTLAMINECTOMY SYNDROME OF LUMBAR REGION: ICD-10-CM

## 2017-02-21 DIAGNOSIS — R53.81 PHYSICAL DECONDITIONING: ICD-10-CM

## 2017-02-21 DIAGNOSIS — M79.18 MYOFASCIAL PAIN SYNDROME: ICD-10-CM

## 2017-02-21 DIAGNOSIS — M53.3 SACROILIAC JOINT DYSFUNCTION OF LEFT SIDE: ICD-10-CM

## 2017-02-21 DIAGNOSIS — G47.30 OBESITY WITH SLEEP APNEA: ICD-10-CM

## 2017-02-21 DIAGNOSIS — I10 BENIGN ESSENTIAL HYPERTENSION: ICD-10-CM

## 2017-02-21 DIAGNOSIS — F41.1 GENERALIZED ANXIETY DISORDER: ICD-10-CM

## 2017-02-21 DIAGNOSIS — M51.26 HERNIATED LUMBAR INTERVERTEBRAL DISC: ICD-10-CM

## 2017-02-21 DIAGNOSIS — F41.9 ANXIETY AND DEPRESSION: ICD-10-CM

## 2017-02-21 DIAGNOSIS — E66.8 MODERATE OBESITY: ICD-10-CM

## 2017-02-21 DIAGNOSIS — F32.A ANXIETY AND DEPRESSION: ICD-10-CM

## 2017-02-21 DIAGNOSIS — E66.9 OBESITY WITH SLEEP APNEA: ICD-10-CM

## 2017-02-21 DIAGNOSIS — E11.69 DIABETES MELLITUS TYPE 2 IN OBESE (HCC): ICD-10-CM

## 2017-02-21 PROCEDURE — 99214 OFFICE O/P EST MOD 30 MIN: CPT | Performed by: ANESTHESIOLOGY

## 2017-02-21 PROCEDURE — 95972 ALYS CPLX SP/PN NPGT W/PRGRM: CPT | Performed by: ANESTHESIOLOGY

## 2017-02-21 NOTE — PROGRESS NOTES
"Chief Complain: \"I did well with the stimulator after my last reprogramming. I started having pain two days ago.\"        Brief History: Ms. Garcia returns to the clinic for evaluation of her chronic pain and possible reprogramming.   Spinal cord stimulator device system: Patient underwent implantation of her spinal cord stimulator device system on October 1, 2015 with Dr. Von Croft.  [employer] Penta lead with the top electrodes projecting at the level of the superior endplate of the T7 vertebral body.  IPG: Protégé  Pain level: 3/10   Pain level ranges from 1/10 to 8/10 with the spinal cord stimulator on.  Pain location: Left gluteal region and posterior left lower extremity down to the plantar aspect of the left foot.   Quality of pain: Throbbing and burning   Radiation of pain: The left gluteal pain radiates down to the posterior aspect of her thigh, the left calf and into the plantar aspect of the left foot, the 2nd and 3rd toes. The lower back pain and right sided lower extremity pain have resolved since last reprogramming.   Current analgesics: ice and heat in the areas of her chronic pain, SalonPas, baclofen, Tramadol, Zipsor, Lidocaine Patches and Tylenol without side effects. I have reviewed [facility] #[id number], is consistent with medication reconciliation.      Comorbid factors:   Depression and anxiety: Patient continues struggling with depression and anxiety. She continues under the care of Dr. Oliveira. Patient continues on Pristiq, Geodon, trazodone, Seroquel. She continues CBT with Savita Minor.   Physical deconditioning: She has not resumed water therapy or physical therapy   Sleep disturbance: Patient has agreed to proceed with a sleep study.   Obesity: Patient will follow up with [facility].   Diabetes: NIDDM, not controlled. No meds.    Review of diagnostic studies   UDS  on 12/06/2016, appropriate  X-rays of the sacroiliac joints from 04/19/2016 revealed lumbosacral and SI " "arthropathy.  MRI of the lumbar spine from June 2013 revealed lumbar facet hypertrophy from L2-L3 through L5-S1. L4-L5 moderate disc bulge lateralizes towards the right. Moderate right and mild left neuroforaminal stenosis. L5-S1 moderate left foraminal disc protrusion combined with facet hypertrophy creating moderate left neuroforaminal stenosis with no significant canal stenosis.  X-ray of the pelvis on 10/07/2014, revealed mild sclerosis about the right sacroiliac joint.  X-ray of the thoracic spine on 09/09/2014, revealed spinal cord stimulator electrodes positioned at T7. The thoracic spine otherwise demonstrates mild degenerative change without acute abnormality.    X-rays of the sacroiliac joints from 04/19/2016 revealed lumbosacral and SI arthropathy.    The following portions of the patient's history were reviewed and updated as appropriate: problem list, past medical history, past surgery history, social history, family history, medications, and allergies     Review of Systems   Musculoskeletal: Positive for back pain.   Psychiatric/Behavioral: Positive for sleep disturbance. The patient is nervous/anxious.    All other systems reviewed and are negative.     Visit Vitals   • BP (!) 181/99   • Pulse 98   • Temp 97 °F (36.1 °C) (Temporal Artery )   • Resp 20   • Ht 62\" (157.5 cm)   • Wt 188 lb (85.3 kg)   • SpO2 96%   • BMI 34.39 kg/m2      Physical Exam   Neurologic Exam  Constitutional General appearance: No acute distress, well appearing. Obese and well hydrated.   Head and Face Normal.   Eyes: Conjunctiva and lids: No swelling, erythema or discharge. Pupils: Equal, round, reactive to light.   Pulmonary Respiratory effort: No increased work of breathing or signs of respiratory distress. Auscultation of lungs: Clear to auscultation.   Cardiovascular Auscultation of heart: Normal rate and rhythm, normal S1 and S2, no murmurs. Peripheral vascular exam: Normal. Examination of extremities for edema and/or " varicosities: Normal.   Digits and nails: Normal without clubbing or cyanosis.  Musculoskeletal Gait and station: Normal.  The range of motion of the lumbar spine is improved. Lumbar facet joint loading maneuvers are negative. Krish and Gaenslen's tests are negative. Palpation of the left gluteal bursa does not reproduce pain at this time. The range of motion of the hip joints is full and without pain. Muscle tone is normal. Left piriformis maneuvers are negative at this time.   Muscle strength/tone: Normal.   Skin and subcutaneous tissue: Normal without rashes or lesions. There is complete epithelization of her surgical wounds, without erythema, drainage, or fluid accumulation.   Neurologic   Cranial nerves: Cranial nerves II-XII intact.   Cortical function: Normal mental status.   Reflexes: 2+ and symmetric. Deep tendon reflexes: 2+ right biceps, 2+ left biceps, 2+ right patella, 2+ left patella, 2+ right ankle jerk and 2+ left ankle jerk. Superficial/Primitive Reflexes: primitive reflexes were absent. Straight leg raising test is positive on the left, with a positive contralateral SLR. Femoral stretch sign is negative. No clonus or Babinski. Romberg's is negative.   Sensation: No sensory loss. Sensory exam: intact to light touch, intact pain and temperature sensation, intact vibration sensation and normal proprioception.   Coordination: Normal finger to nose and heel to shin. Coordination: normal balance and negative Romberg's sign.   Psychiatric   Judgment and insight: Normal.   Orientation to person, place, and time: Normal.   Recent and remote memory: Intact.   Mood and affect: Normal.       PROCEDURE: Analysis of the spinal cord stimulator device with complex spinal cord stimulator reprogramming   Analysis of the spinal cord stimulator device reveals that the patient has used her stimulator device for a total of 49 hours since last reprogramming, and 8997 lifetime hours (24 hours per day). Analysis of  impedance reveals normal impedance for all contacts.   Spinal cord stimulator device was reprogrammed under my supervision by adjusting electrode polarities, pulse width, pulse rate, amplitudes, BurstDR, by recreating one program and adding a second program, as follows;  Program TWO (best program)  Electrode polarities: 1+, 2-, 6-  Amplitude: 1.2 to 1.5 mA   Pulse width: 1000 mcs  Pulse Rate: 40 Hz  IntraBurst rate: 500 Hz  Micro-dosing ratio: 5:25    Time spent reprogrammin minutes.   A copy of the telemetry report will be scanned in the patient's chart      ASSESSMENT:   1. Postlaminectomy syndrome of lumbar region    2. Meningeal adhesions/lumbar arachnoiditis    3. Lumbar facet arthropathy/lumbar spondylosis without myelopathy    4. Herniated lumbar intervertebral disc    5. Sacroiliac joint dysfunction of left side    6. Myofascial pain syndrome    7. History of migraine headaches    8. Moderate obesity    9. Obesity with sleep apnea    10. Benign essential hypertension    11. Diabetes mellitus type 2 in obese    12. Generalized anxiety disorder    13. Insomnia, unspecified type    14. Anxiety and depression    15. Physical deconditioning         PLAN: Patient's chronic pain condition continues to improve with adjustments of her spinal cord stimulator device. Patient continues to struggle with anxiety, depression and insomnia. Continue current management and any additional workup, referrals, and treatments as outlined in the following plan:  1. Follow-up in eight weeks to assess response to new spinal cord stimulator programs. If patient continues to struggle with pain, then, we may proceed with diagnostic and therapeutic left L5-S1 transforaminal epidural steroid injection. We may repeat another epidural depending on patient’s outcome.  2. Long-term rehabilitation efforts:   a. Resume comprehensive physical therapy program  b. Resume water therapy   c. Follow-up at [facility]  d.  Follow-up with Dr. Berry Bradshaw for cognitive behavioral therapy and biofeedback  e. Follow-up with Dr. Richmond for her sleep apnea  f.  Follow-up with Dr. Oliveira for her unrelenting anxiety, depression and insomnia  g. The patient does not have a history of falls. I did complete a risk assessment for falls. Patient has risks factors.   Fall precautions: Patient has been instructed regarding universal fall precautions, such as;   · Removing all area rugs and coffee tables to create a safe environment at home  · Ensure clean, dry floors  · Wearing supportive footwear and properly fitting clothing  · Ensure bed/chair is appropriate height and patient's feet can touch the floor  · Using a shower transfer bench  · Using walk-in shower and having shower safety bars installed  · Ensure proper lighting, minimize glare  · Have nightlights operational and in use  · Participation in an exercise program for gait training, balance training and strength  · Avoid carrying laundry up and down steps  · Ensure proper compliance and organization of medications to avoid errors   · Avoid use of over the counter sedatives and alcohol consumption  3. Pharmacological measures, as follows:  a. Continue topiramate 50 mg twice daily  b. Continue trazodone  mg each bedtime for insomnia  c. Trial with diclofenac 50 mg two times a day when necessary for mild to moderate acute breakthrough pain. I have instructed the patient not to take any other NSAIDs  d. Continue lidocaine patches  e. Continue baclofen 10 mg 1/2 to 1 tablet 1-2 times a day as needed muscle spasms  f.  Tramadol 50 mg twice a day as needed for breakthrough pain. Patient has completed a SOAPP questionnaire and ORT questionnaire (revealing low risk). Bernabe reports have been reviewed and appropriate.  4. The patient has been instructed to contact my office with any questions or difficulties. The patient understands the plan and agrees to proceed accordingly.    Patient Care  Team:  Charleen Carbajal MD as PCP - General  Rafael Velasquez MD as Consulting Physician (Pain Medicine)  Von Croft MD as Surgeon (Neurosurgery)  Emmie Stallworth MD as Consulting Physician (Internal Medicine)     No orders of the defined types were placed in this encounter.        Future Appointments  Date Time Provider Department Center   5/4/2017 10:30 AM [facility]al [facility]         Rafael Velasquez MD

## 2017-03-24 RX ORDER — CHLORTHALIDONE 25 MG/1
TABLET ORAL
Qty: 90 TABLET | Refills: 0 | Status: SHIPPED | OUTPATIENT
Start: 2017-03-24 | End: 2017-06-22 | Stop reason: SDUPTHER

## 2017-04-18 ENCOUNTER — OFFICE VISIT (OUTPATIENT)
Dept: PAIN MEDICINE | Facility: CLINIC | Age: 62
End: 2017-04-18

## 2017-04-18 ENCOUNTER — APPOINTMENT (OUTPATIENT)
Dept: LAB | Facility: HOSPITAL | Age: 62
End: 2017-04-18

## 2017-04-18 VITALS
BODY MASS INDEX: 34.41 KG/M2 | HEIGHT: 62 IN | WEIGHT: 187 LBS | RESPIRATION RATE: 18 BRPM | DIASTOLIC BLOOD PRESSURE: 95 MMHG | HEART RATE: 113 BPM | OXYGEN SATURATION: 96 % | SYSTOLIC BLOOD PRESSURE: 161 MMHG | TEMPERATURE: 96.8 F

## 2017-04-18 DIAGNOSIS — Z86.69 HISTORY OF MIGRAINE HEADACHES: ICD-10-CM

## 2017-04-18 DIAGNOSIS — M96.1 POSTLAMINECTOMY SYNDROME OF LUMBAR REGION: ICD-10-CM

## 2017-04-18 DIAGNOSIS — M47.816 LUMBAR FACET ARTHROPATHY: ICD-10-CM

## 2017-04-18 DIAGNOSIS — E66.9 DIABETES MELLITUS TYPE 2 IN OBESE (HCC): ICD-10-CM

## 2017-04-18 DIAGNOSIS — G47.30 OBESITY WITH SLEEP APNEA: ICD-10-CM

## 2017-04-18 DIAGNOSIS — F32.A ANXIETY AND DEPRESSION: ICD-10-CM

## 2017-04-18 DIAGNOSIS — E66.9 OBESITY WITH SLEEP APNEA: ICD-10-CM

## 2017-04-18 DIAGNOSIS — F41.9 ANXIETY AND DEPRESSION: ICD-10-CM

## 2017-04-18 DIAGNOSIS — G47.00 INSOMNIA, UNSPECIFIED TYPE: ICD-10-CM

## 2017-04-18 DIAGNOSIS — E66.8 MODERATE OBESITY: ICD-10-CM

## 2017-04-18 DIAGNOSIS — M53.3 SACROILIAC JOINT DYSFUNCTION OF LEFT SIDE: ICD-10-CM

## 2017-04-18 DIAGNOSIS — I10 BENIGN ESSENTIAL HYPERTENSION: ICD-10-CM

## 2017-04-18 DIAGNOSIS — Z02.89 PAIN MEDICATION AGREEMENT: Primary | ICD-10-CM

## 2017-04-18 DIAGNOSIS — R53.81 PHYSICAL DECONDITIONING: ICD-10-CM

## 2017-04-18 DIAGNOSIS — G96.12: ICD-10-CM

## 2017-04-18 DIAGNOSIS — M51.26 HERNIATED LUMBAR INTERVERTEBRAL DISC: ICD-10-CM

## 2017-04-18 DIAGNOSIS — E11.69 DIABETES MELLITUS TYPE 2 IN OBESE (HCC): ICD-10-CM

## 2017-04-18 DIAGNOSIS — M79.18 MYOFASCIAL PAIN SYNDROME: ICD-10-CM

## 2017-04-18 LAB
AMPHET+METHAMPHET UR QL: NEGATIVE
AMPHETAMINES UR QL: NEGATIVE
BARBITURATES UR QL SCN: NEGATIVE
BENZODIAZ UR QL SCN: NEGATIVE
BUPRENORPHINE SERPL-MCNC: NEGATIVE NG/ML
CANNABINOIDS SERPL QL: NEGATIVE
COCAINE UR QL: NEGATIVE
METHADONE UR QL SCN: NEGATIVE
OPIATES UR QL: NEGATIVE
OXYCODONE UR QL SCN: NEGATIVE
PCP UR QL SCN: NEGATIVE
PROPOXYPH UR QL: NEGATIVE
TRICYCLICS UR QL SCN: NEGATIVE

## 2017-04-18 PROCEDURE — 80306 DRUG TEST PRSMV INSTRMNT: CPT | Performed by: ANESTHESIOLOGY

## 2017-04-18 PROCEDURE — 99214 OFFICE O/P EST MOD 30 MIN: CPT | Performed by: ANESTHESIOLOGY

## 2017-04-18 PROCEDURE — 95972 ALYS CPLX SP/PN NPGT W/PRGRM: CPT | Performed by: ANESTHESIOLOGY

## 2017-04-18 RX ORDER — TRAMADOL HYDROCHLORIDE 50 MG/1
TABLET ORAL
Qty: 90 TABLET | Refills: 3 | Status: SHIPPED | OUTPATIENT
Start: 2017-04-18 | End: 2017-06-05 | Stop reason: SDUPTHER

## 2017-04-18 NOTE — PROGRESS NOTES
"Chief Complain: \"I did very well after the last reprogramming with my SCS. I started having pain in my legs two weeks ago and swelling in my ankles.\"        Brief History: Ms. Garcia returns to the clinic for evaluation of her chronic pain and possible reprogramming.   Spinal cord stimulator device system: Patient underwent implantation of her spinal cord stimulator device system on October 1, 2015 with Dr. Von Croft.  [employer] Penta lead with the top electrodes projecting at the level of the superior endplate of the T7 vertebral body.  IPG: Protégé  Pain level: 4/10   Pain level ranges from 1/10 to 8/10 with the spinal cord stimulator on with certain activities and during the night.  Pain location: both legs from knees down.   Quality of pain: Throbbing and burning   Radiation of pain: There is some gluteal pain that radiates down to the posterior aspect of her thigh, the calves and into the plantar aspect of her feet.   Current analgesics: ice and heat in the areas of her chronic pain, SalonPas, baclofen, tramadol, diclofenac, Lidocaine Patches and Tylenol without side effects.   I have reviewed [facility] #[id number], is consistent with medication reconciliation.      Comorbid factors:   Depression and anxiety: Patient continues struggling with depression and anxiety. She continues under the care of Dr. Oliveira. Patient continues on Pristiq, Geodon, trazodone, Seroquel. She continues CBT with Savita Minor.   Physical deconditioning and noncompliance: She has not resumed water therapy or physical therapy   Sleep disturbance: Patient has been scheduled for a sleep study 04/29/2017.   Obesity: Patient will follow up with [facility].   Diabetes: NIDDM, not controlled. No meds. No compliance.    Diagnostic studies   UDS on 12/06/2016, appropriate  X-rays of the sacroiliac joints from 04/19/2016 revealed lumbosacral and SI arthropathy.  MRI of the lumbar spine from June 2013 revealed lumbar facet " "hypertrophy from L2-L3 through L5-S1. L4-L5 moderate disc bulge lateralizes towards the right. Moderate right and mild left neuroforaminal stenosis. L5-S1 moderate left foraminal disc protrusion combined with facet hypertrophy creating moderate left neuroforaminal stenosis with no significant canal stenosis.  X-ray of the pelvis on 10/07/2014, revealed mild sclerosis about the right sacroiliac joint.  X-ray of the thoracic spine on 09/09/2014, revealed spinal cord stimulator electrodes positioned at T7. The thoracic spine otherwise demonstrates mild degenerative change without acute abnormality.    X-rays of the sacroiliac joints from 04/19/2016 revealed lumbosacral and SI arthropathy.    The following portions of the patient's history were reviewed and updated as appropriate: problem list, past medical history, past surgery history, social history, family history, medications, and allergies     Review of Systems   Musculoskeletal: Positive for back pain.   Psychiatric/Behavioral: Positive for sleep disturbance. The patient is nervous/anxious.    All other systems reviewed and are negative.     /95 (BP Location: Left arm, Patient Position: Sitting)  Pulse 113  Temp 96.8 °F (36 °C) (Temporal Artery )   Resp 18  Ht 62\" (157.5 cm)  Wt 187 lb (84.8 kg)  SpO2 96%  BMI 34.2 kg/m2     Physical Exam   Neurologic Exam  Constitutional General appearance: No acute distress, well appearing. Obese and well hydrated.   Head and Face Normal.   Eyes: Conjunctiva and lids: No swelling, erythema or discharge. Pupils: Equal, round, reactive to light.   Pulmonary Respiratory effort: No increased work of breathing or signs of respiratory distress. Auscultation of lungs: Clear to auscultation.   Cardiovascular Auscultation of heart: Normal rate and rhythm, normal S1 and S2, no murmurs. Peripheral vascular exam: Normal. Examination of extremities for edema and/or varicosities: Normal.   Digits and nails: Normal without " clubbing or cyanosis.  Musculoskeletal Gait and station: Normal.  The range of motion of the lumbar spine is improved. Lumbar facet joint loading maneuvers are negative. Krish and Gaenslen's tests are negative. Palpation of the left gluteal bursa does not reproduce pain at this time. The range of motion of the hip joints is full and without pain. Muscle tone is normal. Left piriformis maneuvers are negative at this time.   Muscle strength/tone: Normal.   Skin and subcutaneous tissue: Normal without rashes or lesions. There is complete epithelization of her surgical wounds, without erythema, drainage, or fluid accumulation.   Neurologic   Cranial nerves: Cranial nerves II-XII intact.   Cortical function: Normal mental status.   Reflexes: 2+ and symmetric. Deep tendon reflexes: 2+ right biceps, 2+ left biceps, 2+ right patella, 2+ left patella, 2+ right ankle jerk and 2+ left ankle jerk. Superficial/Primitive Reflexes: primitive reflexes were absent. Straight leg raising test is positive on the left, with a positive contralateral SLR. Femoral stretch sign is negative. No clonus or Babinski. Romberg's is negative.   Sensation: No sensory loss. Sensory exam: intact to light touch, intact pain and temperature sensation, intact vibration sensation and normal proprioception.   Coordination: Normal finger to nose and heel to shin. Coordination: normal balance and negative Romberg's sign.   Psychiatric   Judgment and insight: Normal.   Orientation to person, place, and time: Normal.   Recent and remote memory: Intact.   Mood and affect: Normal.       PROCEDURE: Analysis of the spinal cord stimulator device with complex spinal cord stimulator reprogramming   Analysis of the spinal cord stimulator device reveals that the patient has used her stimulator device for a total of 433 hours since last reprogramming, and 9430 lifetime hours (24 hours per day). Analysis of impedance reveals normal impedance for all contacts. The  spinal cord stimulator device was reprogrammed under my supervision by adjusting electrode polarities, pulse width, pulse rate, amplitudes, BurstDR, by recreating two programs and adding two new programs for a total of four, as follows;  Program FOUR (best program)  Electrode polarities: 1+, 2-, 5-, 6-  Amplitude: 1.2-2 mA   Pulse width: 1000 mcs  Pulse Rate: 40 Hz  IntraBurst rate: 500 Hz  Micro-dosing ratio: 5:25    Time spent reprogrammin minutes.   A copy of the telemetry report will be scanned in the patient's chart      ASSESSMENT:   1. Postlaminectomy syndrome of lumbar region    2. Meningeal adhesions/lumbar arachnoiditis    3. Lumbar facet arthropathy/lumbar spondylosis without myelopathy    4. Herniated lumbar intervertebral disc    5. Sacroiliac joint dysfunction of left side    6. Myofascial pain syndrome    7. History of migraine headaches    8. Moderate obesity    9. Obesity with sleep apnea    10. Benign essential hypertension    11. Diabetes mellitus type 2 in obese    12. Insomnia, unspecified type    13. Anxiety and depression    14. Physical deconditioning       PLAN: Patient's chronic pain condition continues to improve with adjustments of her spinal cord stimulator device. Patient continues to struggle with anxiety, depression and insomnia. Continue current management and any additional workup, referrals, and treatments as outlined in the following plan:  1. Follow-up in twenty weeks to assess response to new spinal cord stimulator programs. If patient continues to struggle with pain, then, we may proceed with diagnostic and therapeutic bilateral L5-S1 transforaminal epidural steroid injection.   2. Long-term rehabilitation efforts:   a. Resume comprehensive physical therapy program  b. Resume water therapy   c. Follow-up at [facility]  d. Follow-up with Dr. Berry Bradshaw for cognitive behavioral therapy and biofeedback  e. Follow-up with Dr. Richmond for her sleep  apnea  f.  Follow-up with Dr. Oliveira for her unrelenting anxiety, depression and insomnia  g. The patient does not have a history of falls. I did complete a risk assessment for falls. Patient has risks factors.   Fall precautions: Patient has been instructed regarding universal fall precautions, such as;   · Removing all area rugs and coffee tables to create a safe environment at home  · Ensure clean, dry floors  · Wearing supportive footwear and properly fitting clothing  · Ensure bed/chair is appropriate height and patient's feet can touch the floor  · Using a shower transfer bench  · Using walk-in shower and having shower safety bars installed  · Ensure proper lighting, minimize glare  · Have nightlights operational and in use  · Participation in an exercise program for gait training, balance training and strength  · Avoid carrying laundry up and down steps  · Ensure proper compliance and organization of medications to avoid errors   · Avoid use of over the counter sedatives and alcohol consumption  3. Pharmacological measures, as follows:  a. Continue topiramate 50 mg twice daily  b. Continue trazodone  mg each bedtime for insomnia  c. Trial with diclofenac 50 mg two times a day when necessary for mild to moderate acute breakthrough pain. I have instructed the patient not to take any other NSAIDs  d. Continue lidocaine patches  e. Continue baclofen 10 mg 1/2 to 1 tablet 1-2 times a day as needed muscle spasms  f.  Tramadol 50 mg 2-3 times a day as needed for severe breakthrough pain.   4. Screening and compliance with controlled substances: Patient has completed a SOAPP questionnaire and ORT questionnaires (revealing low risk).  Bernabe reports have been reviewed at each visit, and at least every three months and appropriate. Random urine drug screenings have been obtained and appropriate. Patient has signed a consent for treatment with controlled substances after reviewing the document and verbalizing full  understanding of the risks, benefits, alternatives, and consequences of compliance and adherence with treatment and comprehensive plan of care. Patient received in hand a copy of this document.  5. Diagnostics: Random UDS  6. The patient has been instructed to contact my office with any questions or difficulties. The patient understands the plan and agrees to proceed accordingly.    Patient Care Team:  Cahrleen Carbajal MD as PCP - General  Rafael Velasquez MD as Consulting Physician (Pain Medicine)  Von Croft MD as Surgeon (Neurosurgery)  Emmie Stallworth MD as Consulting Physician (Internal Medicine)     No orders of the defined types were placed in this encounter.        Future Appointments  Date Time Provider Department Center   4/27/2017 1:30 PM MD [place] [place] [facility] None   5/4/2017 10:30 AM MD [facility] None         Rafael Velasquez MD

## 2017-04-27 ENCOUNTER — OFFICE VISIT (OUTPATIENT)
Dept: SLEEP MEDICINE | Facility: HOSPITAL | Age: 62
End: 2017-04-27

## 2017-04-27 ENCOUNTER — DOCUMENTATION (OUTPATIENT)
Dept: SLEEP MEDICINE | Facility: HOSPITAL | Age: 62
End: 2017-04-27

## 2017-04-27 VITALS
SYSTOLIC BLOOD PRESSURE: 198 MMHG | DIASTOLIC BLOOD PRESSURE: 92 MMHG | WEIGHT: 190 LBS | HEART RATE: 108 BPM | BODY MASS INDEX: 34.96 KG/M2 | HEIGHT: 62 IN | OXYGEN SATURATION: 94 %

## 2017-04-27 DIAGNOSIS — G47.33 OBSTRUCTIVE SLEEP APNEA, ADULT: Primary | ICD-10-CM

## 2017-04-27 PROCEDURE — 99214 OFFICE O/P EST MOD 30 MIN: CPT | Performed by: INTERNAL MEDICINE

## 2017-04-27 NOTE — PROGRESS NOTES
"Subjective   Yi Garcia is a 61 y.o. female is here today for follow-up.  She is followed here with obstructive sleep apnea.  Her primary care physician is Dr. Carbajal.  She saw also seen by Dr. Velasquez    History of Present Illness  Patient was last seen November 10, 2015.  She has mild obstructive sleep apnea seen on her previous sleep study with an AHI of 14.7.  She showed significant positional effect.  She has hypertension chronic pain and obesity.  When we saw her last she was to work on weight loss and lateral position sleep.  She has not been able to lose weight and has actually gained slightly.  She says she's not resting well she awakens with pain in her legs and lower back.  She says she sleeps mostly on her side.  If she sleeps on her back she has problems with nightmares.  She is willing now to consider using CPAP.  The following portions of the patient's history were reviewed and updated as appropriate: allergies, current medications and problem list.    Review of Systems   Constitutional: Positive for diaphoresis.   HENT: Positive for sinus pressure.    Eyes: Positive for visual disturbance.   Respiratory: Positive for cough.    Cardiovascular: Negative.    Gastrointestinal: Positive for constipation.   Endocrine: Positive for polydipsia and polyuria.   Genitourinary: Negative.    Musculoskeletal: Positive for arthralgias, back pain and joint swelling.   Skin: Negative.    Allergic/Immunologic: Positive for environmental allergies.   Neurological: Negative.    Hematological: Negative.    Psychiatric/Behavioral: Positive for dysphoric mood.   [place] scores only 2/24    Objective  BP (!) 198/92  Pulse 108  Ht 62\" (157.5 cm)  Wt 190 lb (86.2 kg)  SpO2 94%  BMI 34.75 kg/m2    Physical Exam   Constitutional: She is oriented to person, place, and time. She appears well-developed and well-nourished.   She is obese.   HENT:   Head: Normocephalic and atraumatic.   She has nasal airway narrowing and " Mallampati class III anatomy   Eyes: EOM are normal. Pupils are equal, round, and reactive to light.   Neck: Normal range of motion. Neck supple.   Cardiovascular: Normal rate, regular rhythm and normal heart sounds.    Pulmonary/Chest: Effort normal and breath sounds normal.   Abdominal: Soft. Bowel sounds are normal.   Musculoskeletal: Normal range of motion. She exhibits no edema.   Neurological: She is alert and oriented to person, place, and time.   Skin: Skin is warm and dry.   Psychiatric: She has a normal mood and affect. Her behavior is normal.         Assessment/Plan   Yi was seen today for follow-up.    Diagnoses and all orders for this visit:    Obstructive sleep apnea, adult  -     Detailed AutoPAP Order    Patient previously seen to have mild obstructive sleep apnea.  She did show significant positional effect.  She is willing now to consider CPAP.  We will try her on an auto bilevel.  She is to try nasal mask.  She is continued encouraged to lose weight and to practice lateral position sleep.  We will see her in 2 months and download her machine see if it's being effective in controlling her respiratory events.  She is to contact us earlier symptoms worsen.    Chris Richmond MD [facility]  Sleep Medicine  Pulmonary and Critical Care Medicine

## 2017-04-27 NOTE — PROGRESS NOTES
Patient was in office and Dr. Richmond wrote an order for her to be set up on pap therapy. Patient filled out DME preference sheet and would like to be sent to [facility]. Complete fax to [facility].

## 2017-04-27 NOTE — PATIENT INSTRUCTIONS
Sleep Apnea  Sleep apnea is a condition in which breathing pauses or becomes shallow during sleep. Episodes of sleep apnea usually last 10 seconds or longer, and they may occur as many as 20 times an hour. Sleep apnea disrupts your sleep and keeps your body from getting the rest that it needs. This condition can increase your risk of certain health problems, including:  · Heart attack.  · Stroke.  · Obesity.  · Diabetes.  · Heart failure.  · Irregular heartbeat.  There are three kinds of sleep apnea:  · Obstructive sleep apnea. This kind is caused by a blocked or collapsed airway.  · Central sleep apnea. This kind happens when the part of the brain that controls breathing does not send the correct signals to the muscles that control breathing.  · Mixed sleep apnea. This is a combination of obstructive and central sleep apnea.  CAUSES  The most common cause of this condition is a collapsed or blocked airway. An airway can collapse or become blocked if:  · Your throat muscles are abnormally relaxed.  · Your tongue and tonsils are larger than normal.  · You are overweight.  · Your airway is smaller than normal.  RISK FACTORS  This condition is more likely to develop in people who:  · Are overweight.  · Smoke.  · Have a smaller than normal airway.  · Are elderly.  · Are male.  · Drink alcohol.  · Take sedatives or tranquilizers.  · Have a family history of sleep apnea.  SYMPTOMS  Symptoms of this condition include:  · Trouble staying asleep.  · Daytime sleepiness and tiredness.  · Irritability.  · Loud snoring.  · Morning headaches.  · Trouble concentrating.  · Forgetfulness.  · Decreased interest in sex.  · Unexplained sleepiness.  · Mood swings.  · Personality changes.  · Feelings of depression.  · Waking up often during the night to urinate.  · Dry mouth.  · Sore throat.  DIAGNOSIS  This condition may be diagnosed with:  · A medical history.  · A physical exam.  · A series of tests that are done while you are  sleeping (sleep study). These tests are usually done in a sleep lab, but they may also be done at home.  TREATMENT  Treatment for this condition aims to restore normal breathing and to ease symptoms during sleep. It may involve managing health issues that can affect breathing, such as high blood pressure or obesity. Treatment may include:  · Sleeping on your side.  · Using a decongestant if you have nasal congestion.  · Avoiding the use of depressants, including alcohol, sedatives, and narcotics.  · Losing weight if you are overweight.  · Making changes to your diet.  · Quitting smoking.  · Using a device to open your airway while you sleep, such as:    An oral appliance. This is a custom-made mouthpiece that shifts your lower jaw forward.    A continuous positive airway pressure (CPAP) device. This device delivers oxygen to your airway through a mask.    A nasal expiratory positive airway pressure (EPAP) device. This device has valves that you put into each nostril.    A bi-level positive airway pressure (BPAP) device. This device delivers oxygen to your airway through a mask.  · Surgery if other treatments do not work. During surgery, excess tissue is removed to create a wider airway.  It is important to get treatment for sleep apnea. Without treatment, this condition can lead to:  · High blood pressure.  · Coronary artery disease.  · (Men) An inability to achieve or maintain an erection (impotence).  · Reduced thinking abilities.  HOME CARE INSTRUCTIONS  · Make any lifestyle changes that your health care provider recommends.  · Eat a healthy, well-balanced diet.  · Take over-the-counter and prescription medicines only as told by your health care provider.  · Avoid using depressants, including alcohol, sedatives, and narcotics.  · Take steps to lose weight if you are overweight.  · If you were given a device to open your airway while you sleep, use it only as told by your health care provider.  · Do not use any  tobacco products, such as cigarettes, chewing tobacco, and e-cigarettes. If you need help quitting, ask your health care provider.  · Keep all follow-up visits as told by your health care provider. This is important.  SEEK MEDICAL CARE IF:  · The device that you received to open your airway during sleep is uncomfortable or does not seem to be working.  · Your symptoms do not improve.  · Your symptoms get worse.  SEEK IMMEDIATE MEDICAL CARE IF:  · You develop chest pain.  · You develop shortness of breath.  · You develop discomfort in your back, arms, or stomach.  · You have trouble speaking.  · You have weakness on one side of your body.  · You have drooping in your face.  These symptoms may represent a serious problem that is an emergency. Do not wait to see if the symptoms will go away. Get medical help right away. Call your local emergency services (911 in the [place] Do not drive yourself to the hospital.     This information is not intended to replace advice given to you by your health care provider. Make sure you discuss any questions you have with your health care provider.     Document Released: 12/08/2003 Document Revised: 01/13/2017 Document Reviewed: 09/26/2016  [employer] Interactive Patient Education ©2016 [employer]

## 2017-05-10 ENCOUNTER — OFFICE VISIT (OUTPATIENT)
Dept: INTERNAL MEDICINE | Facility: CLINIC | Age: 62
End: 2017-05-10

## 2017-05-10 VITALS
BODY MASS INDEX: 34.41 KG/M2 | WEIGHT: 187 LBS | HEIGHT: 62 IN | SYSTOLIC BLOOD PRESSURE: 162 MMHG | HEART RATE: 109 BPM | OXYGEN SATURATION: 98 % | DIASTOLIC BLOOD PRESSURE: 82 MMHG

## 2017-05-10 DIAGNOSIS — E66.9 DIABETES MELLITUS TYPE 2 IN OBESE (HCC): Primary | ICD-10-CM

## 2017-05-10 DIAGNOSIS — I10 ESSENTIAL HYPERTENSION: ICD-10-CM

## 2017-05-10 DIAGNOSIS — E11.69 DIABETES MELLITUS TYPE 2 IN OBESE (HCC): Primary | ICD-10-CM

## 2017-05-10 LAB
GLUCOSE BLDC GLUCOMTR-MCNC: 261 MG/DL (ref 70–130)
HBA1C MFR BLD: 7.9 %

## 2017-05-10 PROCEDURE — 83036 HEMOGLOBIN GLYCOSYLATED A1C: CPT | Performed by: HOSPITALIST

## 2017-05-10 PROCEDURE — 82947 ASSAY GLUCOSE BLOOD QUANT: CPT | Performed by: HOSPITALIST

## 2017-05-10 PROCEDURE — 99214 OFFICE O/P EST MOD 30 MIN: CPT | Performed by: HOSPITALIST

## 2017-05-10 RX ORDER — DESVENLAFAXINE SUCCINATE 50 MG/1
1 TABLET, EXTENDED RELEASE ORAL DAILY
COMMUNITY
Start: 2017-04-21 | End: 2019-02-11 | Stop reason: HOSPADM

## 2017-05-10 RX ORDER — HYDRALAZINE HYDROCHLORIDE 10 MG/1
10 TABLET, FILM COATED ORAL 3 TIMES DAILY
Qty: 90 TABLET | Refills: 1 | Status: SHIPPED | OUTPATIENT
Start: 2017-05-10 | End: 2017-08-21 | Stop reason: DRUGHIGH

## 2017-05-18 ENCOUNTER — OFFICE VISIT (OUTPATIENT)
Dept: PAIN MEDICINE | Facility: CLINIC | Age: 62
End: 2017-05-18

## 2017-05-18 VITALS
BODY MASS INDEX: 33.86 KG/M2 | HEIGHT: 62 IN | SYSTOLIC BLOOD PRESSURE: 154 MMHG | HEART RATE: 99 BPM | OXYGEN SATURATION: 98 % | TEMPERATURE: 97.8 F | DIASTOLIC BLOOD PRESSURE: 89 MMHG | RESPIRATION RATE: 20 BRPM | WEIGHT: 184 LBS

## 2017-05-18 DIAGNOSIS — M53.3 SACROILIAC JOINT DYSFUNCTION OF LEFT SIDE: ICD-10-CM

## 2017-05-18 DIAGNOSIS — E66.9 DIABETES MELLITUS TYPE 2 IN OBESE (HCC): ICD-10-CM

## 2017-05-18 DIAGNOSIS — Z86.69 HISTORY OF MIGRAINE HEADACHES: ICD-10-CM

## 2017-05-18 DIAGNOSIS — G47.00 INSOMNIA, UNSPECIFIED TYPE: ICD-10-CM

## 2017-05-18 DIAGNOSIS — M47.816 LUMBAR FACET ARTHROPATHY: ICD-10-CM

## 2017-05-18 DIAGNOSIS — E66.8 MODERATE OBESITY: ICD-10-CM

## 2017-05-18 DIAGNOSIS — M51.26 HERNIATED LUMBAR INTERVERTEBRAL DISC: ICD-10-CM

## 2017-05-18 DIAGNOSIS — I10 BENIGN ESSENTIAL HYPERTENSION: ICD-10-CM

## 2017-05-18 DIAGNOSIS — G96.12: ICD-10-CM

## 2017-05-18 DIAGNOSIS — E11.69 DIABETES MELLITUS TYPE 2 IN OBESE (HCC): ICD-10-CM

## 2017-05-18 DIAGNOSIS — M96.1 POSTLAMINECTOMY SYNDROME OF LUMBAR REGION: ICD-10-CM

## 2017-05-18 DIAGNOSIS — M79.18 MYOFASCIAL PAIN SYNDROME: ICD-10-CM

## 2017-05-18 DIAGNOSIS — G47.33 OBSTRUCTIVE SLEEP APNEA, ADULT: ICD-10-CM

## 2017-05-18 DIAGNOSIS — F41.1 GENERALIZED ANXIETY DISORDER: ICD-10-CM

## 2017-05-18 DIAGNOSIS — R53.81 PHYSICAL DECONDITIONING: ICD-10-CM

## 2017-05-18 PROCEDURE — 95972 ALYS CPLX SP/PN NPGT W/PRGRM: CPT | Performed by: ANESTHESIOLOGY

## 2017-05-18 PROCEDURE — 99213 OFFICE O/P EST LOW 20 MIN: CPT | Performed by: ANESTHESIOLOGY

## 2017-06-05 RX ORDER — TRAMADOL HYDROCHLORIDE 50 MG/1
TABLET ORAL
Qty: 90 TABLET | Refills: 0 | OUTPATIENT
Start: 2017-06-05 | End: 2017-07-10 | Stop reason: SDUPTHER

## 2017-06-12 ENCOUNTER — OFFICE VISIT (OUTPATIENT)
Dept: INTERNAL MEDICINE | Facility: CLINIC | Age: 62
End: 2017-06-12

## 2017-06-12 VITALS
BODY MASS INDEX: 34.41 KG/M2 | SYSTOLIC BLOOD PRESSURE: 154 MMHG | WEIGHT: 187 LBS | HEIGHT: 62 IN | DIASTOLIC BLOOD PRESSURE: 72 MMHG | OXYGEN SATURATION: 97 % | HEART RATE: 74 BPM

## 2017-06-12 DIAGNOSIS — L91.8 SKIN TAGS, MULTIPLE ACQUIRED: ICD-10-CM

## 2017-06-12 DIAGNOSIS — E11.9 TYPE 2 DIABETES MELLITUS WITHOUT COMPLICATION, WITHOUT LONG-TERM CURRENT USE OF INSULIN (HCC): Primary | ICD-10-CM

## 2017-06-12 PROCEDURE — 11200 RMVL SKIN TAGS UP TO&INC 15: CPT | Performed by: HOSPITALIST

## 2017-06-12 PROCEDURE — 99213 OFFICE O/P EST LOW 20 MIN: CPT | Performed by: HOSPITALIST

## 2017-06-22 RX ORDER — CHLORTHALIDONE 25 MG/1
25 TABLET ORAL DAILY
Qty: 90 TABLET | Refills: 0 | Status: SHIPPED | OUTPATIENT
Start: 2017-06-22 | End: 2017-12-21

## 2017-07-13 RX ORDER — TRAMADOL HYDROCHLORIDE 50 MG/1
TABLET ORAL
Qty: 90 TABLET | Refills: 0 | OUTPATIENT
Start: 2017-07-13 | End: 2017-08-14 | Stop reason: SDUPTHER

## 2017-07-19 ENCOUNTER — OFFICE VISIT (OUTPATIENT)
Dept: SLEEP MEDICINE | Facility: HOSPITAL | Age: 62
End: 2017-07-19

## 2017-07-19 VITALS
BODY MASS INDEX: 34.41 KG/M2 | HEIGHT: 62 IN | DIASTOLIC BLOOD PRESSURE: 70 MMHG | SYSTOLIC BLOOD PRESSURE: 146 MMHG | HEART RATE: 105 BPM | WEIGHT: 187 LBS | OXYGEN SATURATION: 93 %

## 2017-07-19 DIAGNOSIS — G47.33 OBSTRUCTIVE SLEEP APNEA, ADULT: Primary | ICD-10-CM

## 2017-07-19 PROCEDURE — 99214 OFFICE O/P EST MOD 30 MIN: CPT | Performed by: INTERNAL MEDICINE

## 2017-07-19 NOTE — PATIENT INSTRUCTIONS
Sleep Apnea  Sleep apnea is a condition in which breathing pauses or becomes shallow during sleep. Episodes of sleep apnea usually last 10 seconds or longer, and they may occur as many as 20 times an hour. Sleep apnea disrupts your sleep and keeps your body from getting the rest that it needs. This condition can increase your risk of certain health problems, including:  · Heart attack.  · Stroke.  · Obesity.  · Diabetes.  · Heart failure.  · Irregular heartbeat.  There are three kinds of sleep apnea:  · Obstructive sleep apnea. This kind is caused by a blocked or collapsed airway.  · Central sleep apnea. This kind happens when the part of the brain that controls breathing does not send the correct signals to the muscles that control breathing.  · Mixed sleep apnea. This is a combination of obstructive and central sleep apnea.  CAUSES  The most common cause of this condition is a collapsed or blocked airway. An airway can collapse or become blocked if:  · Your throat muscles are abnormally relaxed.  · Your tongue and tonsils are larger than normal.  · You are overweight.  · Your airway is smaller than normal.  RISK FACTORS  This condition is more likely to develop in people who:  · Are overweight.  · Smoke.  · Have a smaller than normal airway.  · Are elderly.  · Are male.  · Drink alcohol.  · Take sedatives or tranquilizers.  · Have a family history of sleep apnea.  SYMPTOMS  Symptoms of this condition include:  · Trouble staying asleep.  · Daytime sleepiness and tiredness.  · Irritability.  · Loud snoring.  · Morning headaches.  · Trouble concentrating.  · Forgetfulness.  · Decreased interest in sex.  · Unexplained sleepiness.  · Mood swings.  · Personality changes.  · Feelings of depression.  · Waking up often during the night to urinate.  · Dry mouth.  · Sore throat.  DIAGNOSIS  This condition may be diagnosed with:  · A medical history.  · A physical exam.  · A series of tests that are done while you are  sleeping (sleep study). These tests are usually done in a sleep lab, but they may also be done at home.  TREATMENT  Treatment for this condition aims to restore normal breathing and to ease symptoms during sleep. It may involve managing health issues that can affect breathing, such as high blood pressure or obesity. Treatment may include:  · Sleeping on your side.  · Using a decongestant if you have nasal congestion.  · Avoiding the use of depressants, including alcohol, sedatives, and narcotics.  · Losing weight if you are overweight.  · Making changes to your diet.  · Quitting smoking.  · Using a device to open your airway while you sleep, such as:    An oral appliance. This is a custom-made mouthpiece that shifts your lower jaw forward.    A continuous positive airway pressure (CPAP) device. This device delivers oxygen to your airway through a mask.    A nasal expiratory positive airway pressure (EPAP) device. This device has valves that you put into each nostril.    A bi-level positive airway pressure (BPAP) device. This device delivers oxygen to your airway through a mask.  · Surgery if other treatments do not work. During surgery, excess tissue is removed to create a wider airway.  It is important to get treatment for sleep apnea. Without treatment, this condition can lead to:  · High blood pressure.  · Coronary artery disease.  · (Men) An inability to achieve or maintain an erection (impotence).  · Reduced thinking abilities.  HOME CARE INSTRUCTIONS  · Make any lifestyle changes that your health care provider recommends.  · Eat a healthy, well-balanced diet.  · Take over-the-counter and prescription medicines only as told by your health care provider.  · Avoid using depressants, including alcohol, sedatives, and narcotics.  · Take steps to lose weight if you are overweight.  · If you were given a device to open your airway while you sleep, use it only as told by your health care provider.  · Do not use any  tobacco products, such as cigarettes, chewing tobacco, and e-cigarettes. If you need help quitting, ask your health care provider.  · Keep all follow-up visits as told by your health care provider. This is important.  SEEK MEDICAL CARE IF:  · The device that you received to open your airway during sleep is uncomfortable or does not seem to be working.  · Your symptoms do not improve.  · Your symptoms get worse.  SEEK IMMEDIATE MEDICAL CARE IF:  · You develop chest pain.  · You develop shortness of breath.  · You develop discomfort in your back, arms, or stomach.  · You have trouble speaking.  · You have weakness on one side of your body.  · You have drooping in your face.  These symptoms may represent a serious problem that is an emergency. Do not wait to see if the symptoms will go away. Get medical help right away. Call your local emergency services (911 in the [place] Do not drive yourself to the hospital.     This information is not intended to replace advice given to you by your health care provider. Make sure you discuss any questions you have with your health care provider.     Document Released: 12/08/2003 Document Revised: 04/10/2017 Document Reviewed: 09/26/2016  [employer] Interactive Patient Education ©2017 [employer]

## 2017-07-19 NOTE — PROGRESS NOTES
"Subjective   Yi Garcia is a 61 y.o. female is here today for follow-up.  She is followed here with obstructive sleep apnea.  Her primary care physician is Dr. Carbajal.  She is also seen by Dr. Velasquez.    History of Present Illness  Patient was last seen April 27.  She has mild obstructive sleep apnea with a AHI of 14.7.  She has history of hypertension chronic pain and obesity.  She's been trying to use CPAP.  She's had problems using it.  She complains occasionally wakens sensation of \"smothering\".  She is had the nasal pillows and is had issues with leaking.  She is also complaining have her pain bothering her.  She sometimes has her nose stopped up.  SHe is interested in trying to continue with the CPAP.  She was referred for follow-up because her insurance company was going to stop paying for her CPAP machine because of her limited usage  Past Medical History:   Diagnosis Date   • Anxiety    • Benign essential hypertension    • Cervical disc disorder    • Chronic pain disorder    • Colon polyps    • Complication of device    • Decreased libido    • Dizziness    • Encounter for long-term (current) use of medications    • Extremity pain    • Fatigue    • Hip pain    • History of alopecia    • History of backache    • History of colonoscopy     Fiberoptic   • History of diabetes mellitus    • History of insomnia    • History of mastoiditis    • History of migraine headaches    • History of urinary stone    • Infection of kidney    • Insomnia    • Joint pain    • Kidney infection    • Low back pain    • Lumbar radiculopathy    • Lumbar radiculopathy    • Lumbosacral disc disease    • Migraine    • Myofascial pain syndrome    • Neck pain    • Palpitations    • Sleep apnea    • Spinal cord stimulator status    • Stress reaction, emotional    • Urinary incontinence    • Urinary tract infection    • Visit for screening mammogram     Description: 08/28/2009   • Vitamin D deficiency        Past Surgical History: "   Procedure Laterality Date   • BACK SURGERY      Lower Back   • BLADDER SURGERY     • COLONOSCOPY      Complete Colonoscopy   • HYSTERECTOMY      Total Abdominal Hysterectomy (Description: BSO)   • OTHER SURGICAL HISTORY      Elbow Surgery   • OTHER SURGICAL HISTORY      Spinal Sterotaxis Stimulation Of Cord   • SPINAL CORD STIMULATOR IMPLANT  2015    replacement           Current Outpatient Prescriptions:   •  acetaminophen (TYLENOL) 500 MG tablet, Take 1 tablet by mouth. Every 4 to 6 hours as needed, Disp: , Rfl:   •  baclofen (LIORESAL) 10 MG tablet, TAKE AS INSTRUCTED BY YOUR PRESCRIBER, Disp: 270 tablet, Rfl: 1  •  chlorthalidone (HYGROTON) 25 MG tablet, Take 1 tablet by mouth Daily. 1 po qd-APPT NEEDED FOR FURTHER REFILLS, THANK YOU, Disp: 90 tablet, Rfl: 0  •  desvenlafaxine (PRISTIQ) 50 MG 24 hr tablet, Take 1 tablet by mouth Daily., Disp: , Rfl:   •  diclofenac (VOLTAREN) 50 MG EC tablet, Take 1 tablet two times a day when necessary for mild to moderate acute breakthrough pain. Do not to take any other NSAIDs, Disp: 60 tablet, Rfl: 3  •  estradiol (ESTRACE VAGINAL) 0.1 MG/GM vaginal cream, Insert 2 g into the vagina 1 (One) Time Per Week., Disp: 42.5 g, Rfl: 5  •  hydrALAZINE (APRESOLINE) 10 MG tablet, Take 1 tablet by mouth 3 (Three) Times a Day., Disp: 90 tablet, Rfl: 1  •  lidocaine (LIDODERM) 5 %, Apply 1 patch topically. To the affected area and leave in place for 12 hours, then remove and leave off for 12 hours, Disp: , Rfl:   •  Linaclotide 145 MCG capsule, Take 145 mcg by mouth Daily., Disp: 90 capsule, Rfl: 1  •  Liniments (SALONPAS) pads, Apply  topically. prn, Disp: , Rfl:   •  QUEtiapine (SEROquel) 50 MG tablet, , Disp: , Rfl:   •  SITagliptin (JANUVIA) 100 MG tablet, Take 1 tablet by mouth Daily., Disp: 90 tablet, Rfl: 1  •  topiramate (TOPAMAX) 50 MG tablet, TAKE ONE TABLET BY MOUTH TWICE DAILY, Disp: 180 tablet, Rfl: 0  •  traMADol (ULTRAM) 50 MG tablet, TAKE ONE TABLET BY MOUTH TWICE DAILY  "TO THREE TIMES DAILY AS NEEDED FOR  SEVERE  BREAKTHROUGH  PAIN, Disp: 90 tablet, Rfl: 0  •  traZODone (DESYREL) 50 MG tablet, TAKE 1 TO 2 TABLETS AT BEDTIME AS NEEDED, Disp: 180 tablet, Rfl: 2    Allergies   Allergen Reactions   • Ace Inhibitors Cough   • Amlodipine    • Beta Adrenergic Blockers    • Cyclobenzaprine    • Hydrochlorothiazide    • Hyzaar  [Losartan Potassium-Hctz]    • Latex    • Losartan    • Metformin    • Penicillins    • Pioglitazone    • Spironolactone        The following portions of the patient's history were reviewed and updated as appropriate: allergies, current medications and problem list.    Review of Systems   Constitutional: Negative.    HENT: Positive for congestion, postnasal drip and sinus pressure.    Eyes: Negative.    Respiratory: Negative.    Cardiovascular: Positive for palpitations.   Gastrointestinal: Negative.    Endocrine: Positive for polydipsia and polyuria.   Genitourinary: Negative.    Musculoskeletal: Positive for arthralgias, back pain and joint swelling.   Skin: Negative.    Allergic/Immunologic: Positive for environmental allergies.   Neurological: Positive for headaches.   Hematological: Negative.    Psychiatric/Behavioral: Positive for dysphoric mood. The patient is nervous/anxious.    [place] scores 12/24    Objective     /70  Pulse 105  Ht 62\" (157.5 cm)  Wt 187 lb (84.8 kg)  SpO2 93%  BMI 34.2 kg/m2    Physical Exam   Constitutional: She is oriented to person, place, and time. She appears well-developed and well-nourished.   She is obese.   HENT:   Head: Normocephalic and atraumatic.   She hasn't nasal airway narrowing and Mallampati class II anatomy   Eyes: EOM are normal. Pupils are equal, round, and reactive to light.   Neck: Normal range of motion. Neck supple.   Cardiovascular: Normal rate, regular rhythm and normal heart sounds.    Pulmonary/Chest: Effort normal and breath sounds normal.   Abdominal: Soft. Bowel sounds are normal. "   Musculoskeletal: Normal range of motion. She exhibits no edema.   Neurological: She is alert and oriented to person, place, and time.   Skin: Skin is warm and dry.   Psychiatric: She has a normal mood and affect. Her behavior is normal.     Download from her machine shows for the past 79 days she is only used it 46% the days.  She is averaging 4 hours 13 minutes per day.  Her 90% pressure is 9.7.  Her AHI is 2.7 which is normal.    Assessment/Plan   Yi was seen today for follow-up.    Diagnoses and all orders for this visit:    Obstructive sleep apnea, adult  -     CPAP Therapy    Patient has not been able to use machine very frequently.  She isn't she didn't having continuing.  We will write new orders for supplies.  We will continue on her current pressure settings.  I think she may well benefit her she could try a fullface mask instead of just the nasal pillows.  She is encouraged to lose weight.  She is encouraged to avoid alcohol and sedatives close to bedtime.  She is encouraged practice lateral position sleep.  We will plan to see her back in 3 months.  She is to contact us earlier symptoms worsen.             Chris Richmond MD [facility]  Sleep Medicine  Pulmonary and Critical Care Medicine      07/19/17  2:01 PM

## 2017-08-01 ENCOUNTER — OFFICE VISIT (OUTPATIENT)
Dept: PAIN MEDICINE | Facility: CLINIC | Age: 62
End: 2017-08-01

## 2017-08-01 VITALS
BODY MASS INDEX: 34.41 KG/M2 | TEMPERATURE: 97.2 F | HEIGHT: 62 IN | DIASTOLIC BLOOD PRESSURE: 110 MMHG | SYSTOLIC BLOOD PRESSURE: 177 MMHG | RESPIRATION RATE: 18 BRPM | OXYGEN SATURATION: 97 % | WEIGHT: 187 LBS | HEART RATE: 85 BPM

## 2017-08-01 DIAGNOSIS — G96.12: ICD-10-CM

## 2017-08-01 DIAGNOSIS — M79.18 MYOFASCIAL PAIN SYNDROME: ICD-10-CM

## 2017-08-01 DIAGNOSIS — G47.33 OBSTRUCTIVE SLEEP APNEA, ADULT: ICD-10-CM

## 2017-08-01 DIAGNOSIS — M53.3 SACROILIAC JOINT DYSFUNCTION OF LEFT SIDE: ICD-10-CM

## 2017-08-01 DIAGNOSIS — E11.69 DIABETES MELLITUS TYPE 2 IN OBESE (HCC): ICD-10-CM

## 2017-08-01 DIAGNOSIS — E66.8 MODERATE OBESITY: ICD-10-CM

## 2017-08-01 DIAGNOSIS — M51.26 HERNIATED LUMBAR INTERVERTEBRAL DISC: ICD-10-CM

## 2017-08-01 DIAGNOSIS — G47.00 INSOMNIA, UNSPECIFIED TYPE: ICD-10-CM

## 2017-08-01 DIAGNOSIS — Z86.69 HISTORY OF MIGRAINE HEADACHES: ICD-10-CM

## 2017-08-01 DIAGNOSIS — E66.9 DIABETES MELLITUS TYPE 2 IN OBESE (HCC): ICD-10-CM

## 2017-08-01 DIAGNOSIS — M96.1 POSTLAMINECTOMY SYNDROME OF LUMBAR REGION: ICD-10-CM

## 2017-08-01 DIAGNOSIS — R53.81 PHYSICAL DECONDITIONING: ICD-10-CM

## 2017-08-01 DIAGNOSIS — M47.816 LUMBAR FACET ARTHROPATHY: ICD-10-CM

## 2017-08-01 DIAGNOSIS — F41.1 GENERALIZED ANXIETY DISORDER: ICD-10-CM

## 2017-08-01 PROCEDURE — 99213 OFFICE O/P EST LOW 20 MIN: CPT | Performed by: ANESTHESIOLOGY

## 2017-08-01 PROCEDURE — 95972 ALYS CPLX SP/PN NPGT W/PRGRM: CPT | Performed by: ANESTHESIOLOGY

## 2017-08-01 NOTE — PROGRESS NOTES
"Chief Complain: \"I did well after the last reprogramming until about 1-2 weeks ago.\"      Brief History: Ms. Garcia returns to the clinic for evaluation of her chronic lower back pain and possible SCS reprogramming.   Spinal cord stimulator device system: Patient underwent implantation of her spinal cord stimulator device system on October 1, 2015 with Dr. Von Croft.    [place] Penta lead with the top electrodes projecting at the level of the superior endplate of the T7 vertebral body.    IPG: Protégé  Pain level: 6/10 (before reprogramming) down to 1-2/10 (after reprogramming)  Pain level ranges from 1/10 to 6/10 (down in comparison to last visit) with the spinal cord stimulator on with certain activities and during the night.  Pain location: Left lower back and left lower extremity to her left foot.  Quality of pain: Aching  Radiation of pain: From the left gluteal region to the posterior aspect of her left thigh, left calf and into the plantar aspect of her left foot  Current analgesics: ice and heat in the areas of her chronic pain, SalonPas, baclofen, tramadol, diclofenac, Lidocaine Patches and Tylenol without side effects.   I have reviewed [facility] #[id number], is consistent with medication reconciliation.      Comorbid factors:   Depression and anxiety: Patient reports worsening of her depression and anxiety. She continues under the care of Dr. Oliveira. Patient continues on Pristiq, Geodon, trazodone, Seroquel. She continues CBT with Savita Minor.   Physical deconditioning and noncompliance: She has not yet resumed water therapy or physical therapy   Sleep disturbance: Patient underwent a sleep study 04/29/2017. Patient is using a CPAP machine.  Obesity: Patient has been scheduled for follow up with [facility].   Diabetes: NIDDM, not controlled. No meds. No compliance.     Diagnostic studies:  UDS on 04/27/2017, appropriate  UDS on 12/06/2016, appropriate  X-rays of the sacroiliac joints " "from 04/19/2016 revealed lumbosacral and SI arthropathy.  MRI of the lumbar spine from June 2013 revealed lumbar facet hypertrophy from L2-L3 through L5-S1. L4-L5 moderate disc bulge lateralizes towards the right. Moderate right and mild left neuroforaminal stenosis. L5-S1 moderate left foraminal disc protrusion combined with facet hypertrophy creating moderate left neuroforaminal stenosis with no significant canal stenosis.  X-ray of the pelvis on 10/07/2014, revealed mild sclerosis about the right sacroiliac joint.  X-ray of the thoracic spine on 09/09/2014, revealed spinal cord stimulator electrodes positioned at T7. The thoracic spine otherwise demonstrates mild degenerative change without acute abnormality.    X-rays of the sacroiliac joints from 04/19/2016 revealed lumbosacral and SI arthropathy.    The following portions of the patient's history were reviewed and updated as appropriate: problem list, past medical history, past surgery history, social history, family history, medications, and allergies     Review of Systems   Respiratory: Positive for apnea.    Endocrine: Positive for cold intolerance and heat intolerance.   Genitourinary: Positive for pelvic pain.   Musculoskeletal: Positive for arthralgias, back pain and myalgias.   Neurological: Positive for headaches.   Psychiatric/Behavioral: Positive for agitation, decreased concentration and sleep disturbance. The patient is nervous/anxious.    All other systems reviewed and are negative.     BP (!) 177/110 (BP Location: Left arm, Patient Position: Sitting)  Pulse 85  Temp 97.2 °F (36.2 °C) (Temporal Artery )   Resp 18  Ht 62\" (157.5 cm)  Wt 187 lb (84.8 kg)  SpO2 97%  BMI 34.2 kg/m2     Physical Exam   Neurologic Exam  Constitutional General appearance: No acute distress, well appearing. Obese and well hydrated.   Head and Face Normal.   Eyes: Conjunctiva and lids: No swelling, erythema or discharge. Pupils: Equal, round, reactive to light. "   Pulmonary Respiratory effort: No increased work of breathing or signs of respiratory distress. Auscultation of lungs: Clear to auscultation.   Cardiovascular Auscultation of heart: Normal rate and rhythm, normal S1 and S2, no murmurs. Peripheral vascular exam: Normal. Examination of extremities for edema and/or varicosities: Normal.   Digits and nails: Normal without clubbing or cyanosis.  Musculoskeletal Gait and station: Normal.  The range of motion of the lumbar spine is improved. Lumbar facet joint loading maneuvers are negative. Krish and Gaenslen's tests are negative. Palpation of the left gluteal bursa does not reproduce pain at this time. The range of motion of the hip joints is full and without pain. Muscle tone is normal. Left piriformis maneuvers are negative at this time.   Muscle strength/tone: Normal.   Skin and subcutaneous tissue: Normal without rashes or lesions. There is complete epithelization of her surgical wounds, without erythema, drainage, or fluid accumulation.   Neurologic   Cranial nerves: Cranial nerves II-XII intact.   Cortical function: Normal mental status.   Reflexes: 2+ and symmetric. Deep tendon reflexes: 2+ right biceps, 2+ left biceps, 2+ right patella, 2+ left patella, 2+ right ankle jerk and 2+ left ankle jerk. Superficial/Primitive Reflexes: primitive reflexes were absent. Straight leg raising test is positive on the left, with a positive contralateral SLR. Femoral stretch sign is negative. No clonus or Babinski. Romberg's is negative.   Sensation: No sensory loss. Sensory exam: intact to light touch, intact pain and temperature sensation, intact vibration sensation and normal proprioception.   Coordination: Normal finger to nose and heel to shin. Coordination: normal balance and negative Romberg's sign.   Psychiatric: Judgment and insight: Normal. Orientation to person, place, and time: Normal. Recent and remote memory: Intact. Mood and affect: Normal.       PROCEDURE:  Analysis of the spinal cord stimulator device with complex spinal cord stimulator reprogramming   Analysis of the spinal cord stimulator device reveals that the patient has used her stimulator device for a total of 1538 hours since last reprogramming, and 11,188 lifetime hours (24 hours per day). Analysis of impedance reveals normal impedance for all contacts.   The spinal cord stimulator device was reprogrammed under my supervision by adjusting electrode polarities, pulse width, pulse rate, amplitudes, BurstDR, by recreating programs 3 and 4, for a total of five programs, as follows;  Program FOUR (best program)  Electrode polarities: 1+, 2-, 3+, 4-  Amplitude: 1.1-1.5 mA   Pulse width: 1000 mcs  Intraburst rate: 500 Hz  Pulse Rate: 40 Hz  Micro-dosing: 15:90  Time spent reprogrammin minutes.   A copy of the telemetry report will be scanned in the patient's chart      ASSESSMENT:   1. Postlaminectomy syndrome of lumbar region    2. Meningeal adhesions/lumbar arachnoiditis    3. Lumbar facet arthropathy/lumbar spondylosis without myelopathy    4. Herniated lumbar intervertebral disc    5. Sacroiliac joint dysfunction of left side    6. Myofascial pain syndrome    7. History of migraine headaches    8. Moderate obesity    9. Obstructive sleep apnea, adult    10. Diabetes mellitus type 2 in obese    11. Insomnia, unspecified type    12. Generalized anxiety disorder    13. Physical deconditioning       PLAN: Patient's chronic pain condition has improved with the use of her spinal cord stimulator device. Patient continues to struggle with anxiety, depression, insomnia, and JOHN (no compliance with CPAP). Continue current management and additional workups, referrals, and treatments as outlined in the following plan:  1. Follow-up on an as needed basis. If patient continues to struggle with pain, then, we may proceed with diagnostic and therapeutic left L5-S1 and left S1 transforaminal epidural steroid injection.   2.  Long-term rehabilitation efforts:   a. Resume comprehensive physical therapy program  b. Resume water therapy   c. Follow-up at [facility]  d. Follow-up with Dr. Berry Bradshaw for cognitive behavioral therapy, biofeedback, and CPAP compliance  e. Follow-up with Dr. Richmond for her sleep apnea  f.  Follow-up with Dr. Oliveira for her unrelenting anxiety, depression and insomnia  g. Referral to Dr. Maury Douglas for mouth appliance for Tx of sleep apnea (patient does not tolerate CPAP).  h. The patient does not have a history of falls. I did complete a risk assessment for falls. Patient has risks factors.   Fall precautions: Patient has been instructed regarding universal fall precautions, such as;   · Removing all area rugs and coffee tables to create a safe environment at home  · Ensure clean, dry floors  · Wearing supportive footwear and properly fitting clothing  · Ensure bed/chair is appropriate height and patient's feet can touch the floor  · Using a shower transfer bench  · Using walk-in shower and having shower safety bars installed  · Ensure proper lighting, minimize glare  · Have nightlights operational and in use  · Participation in an exercise program for gait training, balance training and strength  · Avoid carrying laundry up and down steps  · Ensure proper compliance and organization of medications to avoid errors   · Avoid use of over the counter sedatives and alcohol consumption  3. Pharmacological measures, as follows:  a. Continue topiramate 50 mg twice daily  b. Continue trazodone  mg each bedtime for insomnia  c. Trial with diclofenac 50 mg two times a day when necessary for mild to moderate acute breakthrough pain. I have instructed the patient not to take any other NSAIDs  d. Continue lidocaine patches  e. Continue baclofen 10 mg 1/2 to 1 tablet 1-2 times a day as needed muscle spasms  f.  Tramadol 50 mg 2-3 times a day as needed for severe breakthrough pain. Screening and  compliance with controlled substances: Patient has completed a SOAPP questionnaire and ORT questionnaires (revealing low risk).  Bernabe reports have been reviewed at each visit, and at least every three months and appropriate. Random urine drug screenings have been obtained and appropriate. Patient has signed a consent for treatment with controlled substances after reviewing the document and verbalizing full understanding of the risks, benefits, alternatives, and consequences of compliance and adherence with treatment and comprehensive plan of care. Patient received in hand a copy of this document.  4. The patient has been instructed to contact my office with any questions or difficulties. The patient understands the plan and agrees to proceed accordingly.      Patient Care Team:  Charleen Carbajal MD as PCP - General  Rafael Velasquez MD as Consulting Physician (Pain Medicine)  Von Croft MD as Surgeon (Neurosurgery)  Emmie Stallworth MD as Consulting Physician (Internal Medicine)     No orders of the defined types were placed in this encounter.        Future Appointments  Date Time Provider Department Center   9/5/2017 11:00 AM OCTAVIANO [facility] None   10/20/2017 2:45 PM MD MILTON [facility] [facility] None         Rafael Velasquez MD

## 2017-08-16 RX ORDER — TRAMADOL HYDROCHLORIDE 50 MG/1
TABLET ORAL
Qty: 90 TABLET | Refills: 0 | OUTPATIENT
Start: 2017-08-16 | End: 2018-02-05 | Stop reason: SDUPTHER

## 2017-08-21 ENCOUNTER — OFFICE VISIT (OUTPATIENT)
Dept: INTERNAL MEDICINE | Facility: CLINIC | Age: 62
End: 2017-08-21

## 2017-08-21 VITALS
DIASTOLIC BLOOD PRESSURE: 98 MMHG | SYSTOLIC BLOOD PRESSURE: 170 MMHG | HEART RATE: 82 BPM | OXYGEN SATURATION: 98 % | TEMPERATURE: 97.8 F

## 2017-08-21 DIAGNOSIS — I10 BENIGN ESSENTIAL HYPERTENSION: Primary | ICD-10-CM

## 2017-08-21 DIAGNOSIS — R10.2 PELVIC PRESSURE IN FEMALE: ICD-10-CM

## 2017-08-21 PROCEDURE — 99213 OFFICE O/P EST LOW 20 MIN: CPT | Performed by: NURSE PRACTITIONER

## 2017-08-21 RX ORDER — HYDRALAZINE HYDROCHLORIDE 25 MG/1
25 TABLET, FILM COATED ORAL 3 TIMES DAILY
Qty: 270 TABLET | Refills: 3 | Status: SHIPPED | OUTPATIENT
Start: 2017-08-21 | End: 2017-12-21

## 2017-08-21 NOTE — ASSESSMENT & PLAN NOTE
Hypertension is unchanged.  Medication changes per orders.  Blood pressure will be reassessed in 4 weeks.

## 2017-08-21 NOTE — PROGRESS NOTES
Subjective  Hypertension        Yi Garcia is a 61 y.o. female.   Allergies   Allergen Reactions   • Ace Inhibitors Cough   • Amlodipine    • Beta Adrenergic Blockers    • Cyclobenzaprine    • Hydrochlorothiazide    • Hyzaar  [Losartan Potassium-Hctz]    • Latex    • Losartan    • Metformin    • Penicillins    • Pioglitazone    • Spironolactone      History of Present Illness      Has been checking b/p at home usually 170's/aoo's, no cp, no soa     2) had mesh implant in 2012 , having bottom pressure and along pelvis   The following portions of the patient's history were reviewed and updated as appropriate: allergies, current medications, past family history, past medical history, past social history, past surgical history and problem list.    Review of Systems   Genitourinary:        Groin pressure   All other systems reviewed and are negative.      Objective   Physical Exam  /98 (BP Location: Left arm)  Pulse 82  Temp 97.8 °F (36.6 °C)  LMP  (LMP Unknown)  SpO2 98%    Assessment/Plan     Problem List Items Addressed This Visit        Cardiovascular and Mediastinum    Benign essential hypertension - Primary     Hypertension is unchanged.  Medication changes per orders.  Blood pressure will be reassessed in 4 weeks.         Relevant Medications    hydrALAZINE (APRESOLINE) 25 MG tablet      Other Visit Diagnoses     Pelvic pressure in female        Relevant Orders    Ambulatory Referral to Urology      rtc 4 weeks

## 2017-08-22 ENCOUNTER — TELEPHONE (OUTPATIENT)
Dept: INTERNAL MEDICINE | Facility: CLINIC | Age: 62
End: 2017-08-22

## 2017-08-23 ENCOUNTER — TELEPHONE (OUTPATIENT)
Dept: INTERNAL MEDICINE | Facility: CLINIC | Age: 62
End: 2017-08-23

## 2017-08-23 NOTE — TELEPHONE ENCOUNTER
PHARMACY IS NEEDING CLARIFICATION ON THE DOSAGE AND DIRECTIONS OF PATIENTS FARXIGA MEDICATION. YOU CAN REACH THEM BACK [phone]. REFERENCE NUMBER [id number]

## 2017-08-23 NOTE — TELEPHONE ENCOUNTER
Was supposed to go to [facility] in [place].     Escribed to [facility], canceled at [facility].

## 2017-08-24 ENCOUNTER — TELEPHONE (OUTPATIENT)
Dept: INTERNAL MEDICINE | Facility: CLINIC | Age: 62
End: 2017-08-24

## 2017-08-24 NOTE — TELEPHONE ENCOUNTER
FARXIGA  10 MG TABLET  NEEDS USAGE DIRECTIONS CLARIFIED    [facility] PHARMACIST FROM [facility]  [phone]   REFERENCE NUMBER [id number]

## 2017-08-24 NOTE — TELEPHONE ENCOUNTER
See telephone encounter dated 8/23/17.     Farxiga was sent to [facility] by mistake. It was supposed to go to [facility].

## 2017-09-08 ENCOUNTER — OFFICE VISIT (OUTPATIENT)
Dept: INTERNAL MEDICINE | Facility: CLINIC | Age: 62
End: 2017-09-08

## 2017-09-08 VITALS
HEART RATE: 95 BPM | WEIGHT: 189.6 LBS | SYSTOLIC BLOOD PRESSURE: 168 MMHG | HEIGHT: 62 IN | OXYGEN SATURATION: 97 % | BODY MASS INDEX: 34.89 KG/M2 | DIASTOLIC BLOOD PRESSURE: 88 MMHG | RESPIRATION RATE: 16 BRPM

## 2017-09-08 DIAGNOSIS — M54.6 CHRONIC RIGHT-SIDED THORACIC BACK PAIN: Primary | ICD-10-CM

## 2017-09-08 DIAGNOSIS — G89.29 CHRONIC RIGHT-SIDED THORACIC BACK PAIN: Primary | ICD-10-CM

## 2017-09-08 DIAGNOSIS — I10 ESSENTIAL HYPERTENSION: ICD-10-CM

## 2017-09-08 PROCEDURE — 99213 OFFICE O/P EST LOW 20 MIN: CPT | Performed by: NURSE PRACTITIONER

## 2017-09-08 PROCEDURE — 90471 IMMUNIZATION ADMIN: CPT | Performed by: NURSE PRACTITIONER

## 2017-09-08 PROCEDURE — 90656 IIV3 VACC NO PRSV 0.5 ML IM: CPT | Performed by: NURSE PRACTITIONER

## 2017-09-08 RX ORDER — CLONIDINE HYDROCHLORIDE 0.1 MG/1
0.1 TABLET ORAL 2 TIMES DAILY
Qty: 60 TABLET | Refills: 1 | Status: SHIPPED | OUTPATIENT
Start: 2017-09-08 | End: 2017-12-21

## 2017-09-08 RX ORDER — CLONIDINE HYDROCHLORIDE 0.1 MG/1
0.1 TABLET ORAL 2 TIMES DAILY
Qty: 60 TABLET | Refills: 1 | Status: SHIPPED | OUTPATIENT
Start: 2017-09-08 | End: 2017-09-08 | Stop reason: SDUPTHER

## 2017-09-08 NOTE — PROGRESS NOTES
"Subjective  Fall (pt fell last Thursday, has back stimulator and needs referral for CT scan to check on it.)      Yi Garcia is a 61 y.o. female.   Allergies   Allergen Reactions   • Ace Inhibitors Cough   • Amlodipine    • Beta Adrenergic Blockers    • Cyclobenzaprine    • Hydrochlorothiazide    • Hyzaar  [Losartan Potassium-Hctz]    • Latex    • Losartan    • Metformin    • Penicillins    • Pioglitazone    • Spironolactone      History of Present Illness      Called her pain mg't DR Velasquez who reportedly told her she needs ct scan pain is , pain is right midback at bra line, is constant, worse if lays on side or back     Pt has not had dm med for 8 weeks , needs called in today, is not exercising but trying to eat healthier    No cp, no soa, no vision problems, has noticed b/p has been high at home \" bottom number usually 88\"  The following portions of the patient's history were reviewed and updated as appropriate: allergies, current medications, past family history, past medical history, past social history, past surgical history and problem list.    Review of Systems   Constitutional: Negative for appetite change, fatigue, fever and unexpected weight change.   HENT: Negative.    Eyes: Negative for pain and visual disturbance.   Respiratory: Negative for apnea, chest tightness, shortness of breath and wheezing.    Cardiovascular: Negative for chest pain, palpitations and leg swelling.   Gastrointestinal: Negative for abdominal pain, blood in stool, diarrhea, nausea and vomiting.   Endocrine: Negative.    Genitourinary: Negative for difficulty urinating, flank pain, frequency and urgency.   Musculoskeletal: Positive for back pain. Negative for joint swelling.   Skin: Negative for color change and rash.   Neurological: Negative for tremors and weakness.   Hematological: Negative for adenopathy.   Psychiatric/Behavioral: Negative for confusion and decreased concentration. The patient is not nervous/anxious.  " "  All other systems reviewed and are negative.      Objective   Physical Exam   Constitutional: She is oriented to person, place, and time. She appears well-developed.   HENT:   Head: Normocephalic.   Eyes: Conjunctivae are normal.   Neck: Neck supple. No thyromegaly present.   Cardiovascular: Normal rate and regular rhythm.    Pulmonary/Chest: Effort normal and breath sounds normal.   Musculoskeletal:        Thoracic back: She exhibits tenderness, pain and spasm.   Lymphadenopathy:     She has no cervical adenopathy.   Neurological: She is alert and oriented to person, place, and time.   Skin: Skin is warm and dry.   Psychiatric: She has a normal mood and affect. Her behavior is normal. Judgment and thought content normal.     /88  Pulse 95  Resp 16  Ht 62\" (157.5 cm)  Wt 189 lb 9.5 oz (86 kg)  LMP  (LMP Unknown)  SpO2 97%  BMI 34.68 kg/m2    Assessment/Plan     Problem List Items Addressed This Visit     None      Visit Diagnoses     Chronic right-sided thoracic back pain    -  Primary    Relevant Orders    CT thoracic spine wo contrast    Essential hypertension        Relevant Medications    CloNIDine (CATAPRES) 0.1 MG tablet        She will get her meds from [facility] for her dm, I will see her in 4 mos then to check       "

## 2017-09-12 ENCOUNTER — HOSPITAL ENCOUNTER (EMERGENCY)
Dept: HOSPITAL 22 - ER | Age: 62
Discharge: STILL ADMITTED OR ADMINISTRATIVE | End: 2017-09-12
Payer: COMMERCIAL

## 2017-09-12 VITALS — WEIGHT: 185 LBS | BODY MASS INDEX: 32.78 KG/M2 | HEIGHT: 63 IN

## 2017-09-12 VITALS — DIASTOLIC BLOOD PRESSURE: 85 MMHG | SYSTOLIC BLOOD PRESSURE: 170 MMHG

## 2017-09-12 DIAGNOSIS — Z79.82: ICD-10-CM

## 2017-09-12 DIAGNOSIS — Z91.040: ICD-10-CM

## 2017-09-12 DIAGNOSIS — I10: Primary | ICD-10-CM

## 2017-09-12 DIAGNOSIS — Z88.8: ICD-10-CM

## 2017-09-12 DIAGNOSIS — Z79.899: ICD-10-CM

## 2017-09-12 DIAGNOSIS — E11.9: ICD-10-CM

## 2017-09-12 DIAGNOSIS — E78.5: ICD-10-CM

## 2017-09-12 DIAGNOSIS — Z88.0: ICD-10-CM

## 2017-09-12 LAB
ARTERIAL ABE: 1.1 MMOL/L
ARTERIAL PATENCY WRIST A: (no result)
ARTERIAL PO2: 73 MMHG (ref 80–100)
ARTERIAL TCO2: 25.1 MMOL/L (ref 23–27)
BASOPHILS # BLD AUTO: 1.4 K/MM3 (ref 0.7–4.5)
BUN: 15 MG/DL (ref 7–18)
EOSINOPHIL NFR BLD AUTO: 24.8 % (ref 10–50)
GFR SERPLBLD BASED ON 1.73 SQ M-ARVRAT: 86 ML/MIN (ref 59–?)
HCT VFR BLD CALC: 15.4 G/DL (ref 12.2–16.2)

## 2017-09-12 NOTE — CONSULT NOTE
Standard Demographics
 
Patient Demo
Date of Consultation: 09/12/17
Referring Provider: Dr. Gutierrez
Reason for Consultation: HTN
PRIMARY DIAGNOSIS: SOA, HTN
 
Problem list
Problem list:
1. HTN
2. Multiple medication allergies/intolerances
3. DM
4. HLD
 
History of present illness:
History of present illness:
60 yo WF with hypertension was seen in the ER today for SOA after starting new 
medication (clonidine)  yesterday. Pt with intermittent headaches and SOA. 
Recently seen by her PCP with addition of clonidine which she started yesterday.
Due to SOA she came into ER today for evaluation. She did not take her 
hydralazine this AM but was given an increased dose in ER today. EKG is sinus 
without acute changes.
 
Past Medical History:
General:
   Hypertension Yes
   CVA No
   Seizures No
   TB No
   COPD No
   Asthma No
   Diabetes Yes
   Insulin Dependent No
   Insulin Pump No
   Angina No
   MI No
   Hyperlipidemia Yes
   Cancer No
   MRSA No
   GB Disease No
Past Surgical HX:
Previous Surgery?Y  Hysterectomy-Total   ELBOW SURGERY   Back Surgery   
              
            
 
Allergies
Coded Allergies:
Ace Inhibitors (Intermediate, 09/12/17)
Penicillins (Intermediate, 09/12/17)
amlodipine (Intermediate, 09/12/17)
cyclobenzaprine (Intermediate, 09/12/17)
hydrochlorothiazide (Intermediate, 09/12/17)
latex (Intermediate, 09/12/17)
losartan (Intermediate, 09/12/17)
metformin (Intermediate, loss of vision 09/12/17)
pioglitazone (From ACTOS) (Intermediate, itching 09/12/17)
spironolactone (Intermediate, 09/12/17)
Uncoded Allergies:
beta adrenergic blockers (Intermediate, 09/12/17)
beta blockers (Intermediate, 09/12/17)
 
Home medications:
Reported Medications
CLONIDINE HCL (Clonidine 0.1MG) 0.1 MG PO BID 
     #120 
Tramadol Hcl (Tramadol 50MG*****) 50 MG PO TID 
     #90 
Aspirin (Aspirin EC) 81 MG PO DAILY 
HYDRALAZINE HCL (Hydralazine HCl) 25 MG PO TID 
     #90 
Topiramate 50 MG PO BID 
     #60 
Baclofen 5 MG PO QID 
     #270 
Chlorthalidone 25 MG PO DAILY 
     #90 
Diclofenac Sodium 50 MG PO BID 
     #60 
Trazodone Hcl (Trazodone HCl) 50 MG PO QHS 
     #180 
 
 
Immunization HX
   DT/Tetanus Unknown
 
Family history
Family HX
   Family Hx Insignificant No
 
Social Hx:
Smoking HX
   Tobacco No
Alcohol
   Alcohol: No
Hx of Drug Use
   Drug Use? No
 
Review of systems:
Constitutional
No: no symptoms reported. 
Respiratory
shortness of breath. 
Cardiovascular
No no symptoms reported
Gastrointestinal/Abdominal
No no symptoms reported
Genitourinary
No: no symptoms reported. 
Musculoskeletal
No: no symptoms reported. 
Neurological
Yes: see HPI. 
 
Exam:
Admission Vital Signs:
1ST Vital Signs
 
 
 Result Date Time
 
Pulse Ox 97 09/12 1054
 
B/P 192/90 09/12 1054
 
Temp 98.6 09/12 1054
 
Pulse 109 09/12 1054
 
Resp 22 09/12 1054
 
 
 
Last Vital Signs:
Vital Signs
 
 
 Result Date Time
 
Pulse Ox 93 09/12 1155
 
B/P 176/105 09/12 1155
 
Pulse 96 09/12 1155
 
Resp 16 09/12 1155
 
Temp 98.6 09/12 1054
 
 
 
Exam
   General appearance: alert, awake, no acute distress
   Neck: no carotid bruit, no JVD
   Cardiovascular: regular rate & rhythm, no murmur
   Respiratory: clear to auscultation
   Extremities: moves all, no peripheral edema
   Neuro: alert, intact, oriented
 
Plan:
Assessment:
1. HTN, uncontrolled
2. DM
3. Multiple medication allergies/intolerances 
Recommendations:
1. Recommend discontinue clonidine, hydralazine, chlorthalidone
2. Start Tekturna 300 mg daily, RX and coupon given for reduced cost. Pt to get 
a dose today before leaving ER. 
3. Start Diltiazem  mg daily, RX written. Pt to get a dose today before 
leaving ER.
4. Follow up in our clinic in 1 wk or with PCP
5. Pt seen with Dr. Juares
 
Electronically Signed by Adeel Astorga on 09/12/17 at 1220

## 2017-09-12 NOTE — EMERGENCY ROOM REPORT
History of Present Illness
Time Seen by MD 105Erna
Presenting Problem in Triage
Pt arrived:Walked    
Presenting Problem:patient reports that she was started on a new medication 
yesterday,  clonidine, and since then has felt short of breath since. also 
states  that when sleeping last night, she felt like she was going so deep into 
her sleep that she felt like she would stop breathing. 
Onset of symptoms date/time:09/11/17/0830 or onset unknown for:
Treatment Prior to Arrival:    PTA Provided by:
 
Sepsis Risk Assessment: 
Temp: 98.6 B/P: 192/90 MAP: 124 Pulse: 109 Resp: 22
Recent fever? N Clinical Suspician of Infection? N 
Mental Status: 1 - Regular (Normal Baseline)   Sepsis Risk:Possible Sepsis Risk 
 
Have you (or family members/close friends) recently traveled outside the [place]? N
If Yes, where/when: 
Have you had exposure to infectious disease within the past month?  TB? 
Other?  Specify: 
 
 
61 years old white female who presented to the ED with a chief complaint of 
shortness of breath since he started clonidine 0.1 mg yesterday for blood 
pressure. 
 
This is 61 years old white female with multiple drug ALLERGIES beta blockers 
that was being used for her hypertension and migraine. She was seen by her 
primary care physician earlier last week and her blood pressure was 180/80mmhg. 
so she started clonidine that she fell on 09/8/17. She started the medicine on 
09/11/17 and since then she has been experiencing slowly shallow breathing and 
drowziness. Her  has to wake her up frequently to check on her. She 
called her primary care physician this morning with multiple to the ED and be 
checked. Upon arrival she was alert and oriented 3 looks in no respiratory 
distress, she provided the above history in details. 
 
There was a strong smoke smell in the room and her BP is 180/108 mmhg, she 
denied having headache neck rigidity chest pain palpitations nausea or vomiting.
She denies wheezing fever or chills. 
 
She admits for congestion and lack of energy to breathe. She received a flu shot
during her visit last week.
 
I obtained her records from [facility] reviewed her past 
medical history including her list of ALLERGY. 
 
After seeing her list of ALLERGIES I contacted the on-call cardiologist Dr. Cody Juares who presented to the ED for recommendations to hep with Ms. Sharma ongoing BP problem and extensive list of allergies and side effects to BP
medications. 
Source patient, RN notes reviewed, family, old records, obtained records from 
[facility] primary care
Exam Limitations no limitations
ALLERGIES
Coded Allergies:
Ace Inhibitors (Intermediate, 09/12/17)
Penicillins (Intermediate, 09/12/17)
amlodipine (Intermediate, 09/12/17)
cyclobenzaprine (Intermediate, 09/12/17)
hydrochlorothiazide (Intermediate, 09/12/17)
latex (Intermediate, 09/12/17)
losartan (Intermediate, 09/12/17)
metformin (Intermediate, loss of vision 09/12/17)
pioglitazone (From ACTOS) (Intermediate, itching 09/12/17)
spironolactone (Intermediate, 09/12/17)
Uncoded Allergies:
beta adrenergic blockers (Intermediate, 09/12/17)
beta blockers (Intermediate, 09/12/17)
 
Home Medications
Reported Medications
CLONIDINE HCL (Clonidine 0.1MG) 0.1 MG PO BID 
     #120 
Tramadol Hcl (Tramadol 50MG*****) 50 MG PO TID 
     #90 
Aspirin (Aspirin EC) 81 MG PO DAILY 
HYDRALAZINE HCL (Hydralazine HCl) 25 MG PO TID 
     #90 
Topiramate 50 MG PO BID 
     #60 
Baclofen 5 MG PO QID 
     #270 
Chlorthalidone 25 MG PO DAILY 
     #90 
Diclofenac Sodium 50 MG PO BID 
     #60 
Trazodone Hcl (Trazodone HCl) 50 MG PO QHS 
     #180 
 
 
 
History
 
Medical History
General
   CAD? No
   Angina: No
   MI: No
   Hypertension? Yes
   Hyperlipidemia? Yes
   CHF? No
   DVT? No
   PE? No
   COPD? No
   Asthma? No
   Anemia? No
   GERD? No
   Gastric ulcers? No
   GI Bleed? No
   Hernia? No
   Thyroid Problems? No
   Hypothyroidism? No
   CVA? No
   Seizures? No
   Diabetes? Yes
   Insulin Dependent: No
   Insulin Pump: No
   Home FSBS? Yes
   Renal Insuffiency? No
   End Stage Renal Disease? No
   UTI? No
   Stones? No
   BPH? No
   GB Disease: No
   Nephritic Syndrome? No
   Asplenia? No
   Hepatitis? No
   Sickle Cell Disease? No
   Arthritis? No
   Migraines? No
   Cataracts? No
   Glaucoma? No
   MRSA? No
   HIV? No
   TB? No
   Anxiety? No
   Depression? No
   Cancer? No
Immunization Hx
   DT/Tetanus Unknown
Surgical Hx
Previous Surgery?Y  Hysterectomy-Total   ELBOW SURGERY   Back Surgery   
              
            
 
GYN Hx
   LMP N/A
 
Social History
Smoking Hx
   Smoker: Never Smoker
   Tobacco: No
Alcohol
   Alcohol: No
 
Review of Systems
All Other Systems Reviewed and Negative
Constitutional
no symptoms reported
Eyes
no symptoms reported
ENT
no symptoms reported. 
Respiratory
see HPI, shortness of breath
Cardiovascular
no symptoms reported
Gastrointestinal
no symptoms reported
Genitourinary
no symptoms reported. 
Musculoskeletal
no symptoms reported
Skin
no symptoms reported
Psychiatric/Neurological
no symptoms reported
 
Physical Exam
Vital Signs
 Vital Signs
 
 
Date Time Temp Pulse Resp B/P Pulse O2 O2 Flow FiO2
 
     Ox Delivery Rate 
 
09/12 1236 97.8 107 20 179/107 96   
 
09/12 1155  96 16 176/105 93   
 
09/12 1054 98.6 109 22 192/90 97   
 
 
General Appearance normal appearance, WD/WN
Eye Exam
   -
   bilateral eye normal exam, bilateral eye PERRL, bilateral eye EOMI
Ear, Nose, Throat hearing grossly normal, normal ENT inspection
Neck normal inspection, non-tender, supple, full range of motion
Respiratory Status
Yes: trachea midline, chest symmetrical, non tender chest.  No: respiratory 
distress. 
Lung Sounds
bilateral: normal breath sounds, lungs clear. 
Cardiovascular normal exam, regular rate/rhythm, no peripheral edema, no gallop,
no JVD, no murmur, no rub, normal peripheral pulses
Peripheral Pulses
   Pulses normal Yes
Gastrointestinal normal bowel sounds, normal exam, non tender, soft, no 
organomegaly
Back normal inspection, no CVA tenderness, no vertebral tenderness
Neurologic alert, CNs II-XII nml as tested, normal exam, oriented x 3
Reflexes
   Reflexes normal Yes
Skin intact, normal color, warm/dry
Lymphatic no adenopathy
 
Medical Decision Making
 
LABS/Meds/Orders
Pt receiving controlled substance in ED? No
Results/Orders
 Laboratory Tests
 
 
09/12/17 1222:
Sodium 140, Potassium 4.0, Chloride 102, Carbon Dioxide 33  H, BUN 15, 
Creatinine 0.7, Estimated Creat Clear 115, Estimated GFR (MDRD) 86, Glucose 215 
H, Calcium 10.1, Total Bilirubin 0.4, AST 53  H, ALT 97  H, Alkaline Phosphatase
143  H, Creatine Kinase 82, CK-MB (CK-2) Rel Index 0.6, CK and CKMB Interp 0.5, 
Troponin I < 0.02, B-Natriuretic Peptide < 5, Total Protein 7.9, Albumin 3.8, 
Globulin 4.1  H, Albumin/Globulin Ratio 0.9  L, D-Dimer Pending, WBC 5.4, RBC 
5.12, Hgb 15.4, Hct 45.7, MCV 89.1, RDW 13.0, Plt Count 229, MPV 8.8, Gran % 
67.5, Gran # 3.7, Lymphocytes % 24.8, Monocytes % 4.3, Eosinophils % 2.6, 
Basophils % 0.8, Lymphocytes # 1.4, Monocytes # 0.2, Eosinophils # 0.1, 
Basophils # 0.0, PUBS MCHC 33.6, MCH 30.0
 
09/12/17 1215:
ABG pH 7.41, ABG pCO2 (Temp Corrct 36.8, ABG pO2 (Temp Correct 73.0  L, ABG HCO3
26.2  H, ABG Total CO2 25.1, ABG O2 Sat (Calculated) 94.9, ABG Base Excess 1.1, 
Rob Test ACCEPTABLE, Blood Gas Comments LEFT BRACHIAL
 Orders
 
 
Procedure Date/time Status
 
ELECTROCARDIOGRAM REQUEST 09/12 1129 Active
 
ARTERIAL BLOOD GAS REQUEST 09/12 1129 Active
 
D-DIMER 09/12 1129 Active
 
CBC WITH AUTO DIFF 09/12 1129 Complete
 
CARDIAC ENZYMES 09/12 1129 Complete
 
CHEM 12 PROFILE 09/12 1129 Complete
 
BRAIN NATRIURETIC PEPTIDE 09/12 1129 Complete
 
12 LEAD EKG-ZULEMA (INITIAL) 09/12  UNK Active
 
 
 
CM/EKG
CM/EKG
   EKG normal sinus is 96/m LEFT axis deviation and LEFT atrial enlargement and 
nonspecific ST segment changes due to baseline artifact. No acute findings 
normal [place] and QRS durations.
 
XRAY/CT/US
XRAY/CT/US
   XRAY chest
   XR interpretation by reviewed by me, discussed w/radiologist
   Xray Results normal/NAD
   Comment
no acute findings on the chest x ray. 
 
Departure
 
Departure
Time of Disposition 1123
Disposition Still a Patient
Clinical Impression
Primary Impression: Hypertension
Secondary Impressions: Chronic hypertension, Hypertension, diastolic
Condition STABLE
Referrals
Cody Juares MD
 
Additional Instructions
 
The patient remained asymptomatic without any chest pain or palpitations  She 
was given her morning medication, her blood pressure was decreased to 170 mmhg/
107. I consulted Dr. Cody Juares who presented to the ED and reviewed her 
records from the primary care physician including her list of extensive 
ALLERGIES.
 
Dr. Juares suggested that she start some Cardizem  and pectoris 300 mg 
daily, and he'll provide her with coupons for financial assistance, so she can 
start her medications immediately and feel better. 
 
Dr. Juares will see her in the office for follow-up. 
Discharge Counseling
   Counseled pt/family regarding diagnosis, test results, medications/RX, home 
care, follow up needs
ED Critical Care
   Critical Care No
**
** If Critical Care minutes are documented, the time involved in the performance
of seperately reportable procedures was not counted toward critical care time 
documented. 
 
   I directly delivered medical care to this critically ill and/or injured 
patient. Timely evaluation and treatment was necessary to address the 
significant organ system(s) dysfunction present in this patient. 
 
 
Electronically Signed by Jelena Gutierrez MD on 09/12/17 at 0845

## 2017-09-12 NOTE — EMERGENCY ROOM REPORT
History of Present Illness
Time Seen by MD 105Erna
Presenting Problem in Triage
Pt arrived:Walked    
Presenting Problem:patient reports that she was started on a new medication 
yesterday,  clonidine, and since then has felt short of breath since. also 
states  that when sleeping last night, she felt like she was going so deep into 
her sleep that she felt like she would stop breathing. 
Onset of symptoms date/time:09/11/17/0830 or onset unknown for:
Treatment Prior to Arrival:    PTA Provided by:
 
Sepsis Risk Assessment: 
Temp: 98.6 B/P: 192/90 MAP: 124 Pulse: 109 Resp: 22
Recent fever? N Clinical Suspician of Infection? N 
Mental Status: 1 - Regular (Normal Baseline)   Sepsis Risk:Possible Sepsis Risk 
 
Have you (or family members/close friends) recently traveled outside the [place]? N
If Yes, where/when: 
Have you had exposure to infectious disease within the past month?  TB? 
Other?  Specify: 
 
 
61 years old white female who presented to the ED with a chief complaint of 
shortness of breath since he started clonidine 0.1 mg yesterday for blood 
pressure. 
 
This is 61 years old white female with multiple drug ALLERGIES beta blockers 
that was being used for her hypertension and migraine. She was seen by her 
primary care physician earlier last week and her blood pressure was 180/80mmhg. 
so she started clonidine that she fell on 09/8/17. She started the medicine on 
09/11/17 and since then she has been experiencing slowly shallow breathing and 
drowziness. Her  has to wake her up frequently to check on her. She 
called her primary care physician this morning with multiple to the ED and be 
checked. Upon arrival she was alert and oriented 3 looks in no respiratory 
distress, she provided the above history in details. 
 
There was a strong smoke smell in the room and her BP is 180/108 mmhg, she 
denied having headache neck rigidity chest pain palpitations nausea or vomiting.
She denies wheezing fever or chills. 
 
She admits for congestion and lack of energy to breathe. She received a flu shot
during her visit last week.
 
I obtained her records from [facility] reviewed her past 
medical history including her list of ALLERGY. 
 
After seeing her list of ALLERGIES I contacted the on-call cardiologist Dr. Cody Juares who presented to the ED for recommendations to hep with Ms. Sharma ongoing BP problem and extensive list of allergies and side effects to BP
medications. 
Source patient, RN notes reviewed, family, old records, obtained records from 
[facility] primary care
Exam Limitations no limitations
ALLERGIES
Coded Allergies:
Ace Inhibitors (Intermediate, 09/12/17)
Penicillins (Intermediate, 09/12/17)
amlodipine (Intermediate, 09/12/17)
cyclobenzaprine (Intermediate, 09/12/17)
hydrochlorothiazide (Intermediate, 09/12/17)
latex (Intermediate, 09/12/17)
losartan (Intermediate, 09/12/17)
metformin (Intermediate, loss of vision 09/12/17)
pioglitazone (From ACTOS) (Intermediate, itching 09/12/17)
spironolactone (Intermediate, 09/12/17)
Uncoded Allergies:
beta adrenergic blockers (Intermediate, 09/12/17)
beta blockers (Intermediate, 09/12/17)
 
Home Medications
Reported Medications
CLONIDINE HCL (Clonidine 0.1MG) 0.1 MG PO BID 
     #120 
Tramadol Hcl (Tramadol 50MG*****) 50 MG PO TID 
     #90 
Aspirin (Aspirin EC) 81 MG PO DAILY 
HYDRALAZINE HCL (Hydralazine HCl) 25 MG PO TID 
     #90 
Topiramate 50 MG PO BID 
     #60 
Baclofen 5 MG PO QID 
     #270 
Chlorthalidone 25 MG PO DAILY 
     #90 
Diclofenac Sodium 50 MG PO BID 
     #60 
Trazodone Hcl (Trazodone HCl) 50 MG PO QHS 
     #180 
 
 
 
History
 
Medical History
General
   CAD? No
   Angina: No
   MI: No
   Hypertension? Yes
   Hyperlipidemia? Yes
   CHF? No
   DVT? No
   PE? No
   COPD? No
   Asthma? No
   Anemia? No
   GERD? No
   Gastric ulcers? No
   GI Bleed? No
   Hernia? No
   Thyroid Problems? No
   Hypothyroidism? No
   CVA? No
   Seizures? No
   Diabetes? Yes
   Insulin Dependent: No
   Insulin Pump: No
   Home FSBS? Yes
   Renal Insuffiency? No
   End Stage Renal Disease? No
   UTI? No
   Stones? No
   BPH? No
   GB Disease: No
   Nephritic Syndrome? No
   Asplenia? No
   Hepatitis? No
   Sickle Cell Disease? No
   Arthritis? No
   Migraines? No
   Cataracts? No
   Glaucoma? No
   MRSA? No
   HIV? No
   TB? No
   Anxiety? No
   Depression? No
   Cancer? No
Immunization Hx
   DT/Tetanus Unknown
Surgical Hx
Previous Surgery?Y  Hysterectomy-Total   ELBOW SURGERY   Back Surgery   
              
            
 
GYN Hx
   LMP N/A
 
Social History
Smoking Hx
   Smoker: Never Smoker
   Tobacco: No
Alcohol
   Alcohol: No
 
Review of Systems
All Other Systems Reviewed and Negative
Constitutional
no symptoms reported
Eyes
no symptoms reported
ENT
no symptoms reported. 
Respiratory
see HPI, shortness of breath
Cardiovascular
no symptoms reported
Gastrointestinal
no symptoms reported
Genitourinary
no symptoms reported. 
Musculoskeletal
no symptoms reported
Skin
no symptoms reported
Psychiatric/Neurological
no symptoms reported
 
Physical Exam
Vital Signs
 Vital Signs
 
 
Date Time Temp Pulse Resp B/P Pulse O2 O2 Flow FiO2
 
     Ox Delivery Rate 
 
09/12 1236 97.8 107 20 179/107 96   
 
09/12 1155  96 16 176/105 93   
 
09/12 1054 98.6 109 22 192/90 97   
 
 
General Appearance normal appearance, WD/WN
Eye Exam
   -
   bilateral eye normal exam, bilateral eye PERRL, bilateral eye EOMI
Ear, Nose, Throat hearing grossly normal, normal ENT inspection
Neck normal inspection, non-tender, supple, full range of motion
Respiratory Status
Yes: trachea midline, chest symmetrical, non tender chest.  No: respiratory 
distress. 
Lung Sounds
bilateral: normal breath sounds, lungs clear. 
Cardiovascular normal exam, regular rate/rhythm, no peripheral edema, no gallop,
no JVD, no murmur, no rub, normal peripheral pulses
Peripheral Pulses
   Pulses normal Yes
Gastrointestinal normal bowel sounds, normal exam, non tender, soft, no 
organomegaly
Back normal inspection, no CVA tenderness, no vertebral tenderness
Neurologic alert, CNs II-XII nml as tested, normal exam, oriented x 3
Reflexes
   Reflexes normal Yes
Skin intact, normal color, warm/dry
Lymphatic no adenopathy
 
Medical Decision Making
 
LABS/Meds/Orders
Pt receiving controlled substance in ED? No
Results/Orders
 Laboratory Tests
 
 
09/12/17 1222:
Sodium 140, Potassium 4.0, Chloride 102, Carbon Dioxide 33  H, BUN 15, 
Creatinine 0.7, Estimated Creat Clear 115, Estimated GFR (MDRD) 86, Glucose 215 
H, Calcium 10.1, Total Bilirubin 0.4, AST 53  H, ALT 97  H, Alkaline Phosphatase
143  H, Creatine Kinase 82, CK-MB (CK-2) Rel Index 0.6, CK and CKMB Interp 0.5, 
Troponin I < 0.02, B-Natriuretic Peptide < 5, Total Protein 7.9, Albumin 3.8, 
Globulin 4.1  H, Albumin/Globulin Ratio 0.9  L, D-Dimer Pending, WBC 5.4, RBC 
5.12, Hgb 15.4, Hct 45.7, MCV 89.1, RDW 13.0, Plt Count 229, MPV 8.8, Gran % 
67.5, Gran # 3.7, Lymphocytes % 24.8, Monocytes % 4.3, Eosinophils % 2.6, 
Basophils % 0.8, Lymphocytes # 1.4, Monocytes # 0.2, Eosinophils # 0.1, 
Basophils # 0.0, PUBS MCHC 33.6, MCH 30.0
 
09/12/17 1215:
ABG pH 7.41, ABG pCO2 (Temp Corrct 36.8, ABG pO2 (Temp Correct 73.0  L, ABG HCO3
26.2  H, ABG Total CO2 25.1, ABG O2 Sat (Calculated) 94.9, ABG Base Excess 1.1, 
Rob Test ACCEPTABLE, Blood Gas Comments LEFT BRACHIAL
 Orders
 
 
Procedure Date/time Status
 
ELECTROCARDIOGRAM REQUEST 09/12 1129 Active
 
ARTERIAL BLOOD GAS REQUEST 09/12 1129 Active
 
D-DIMER 09/12 1129 Active
 
CBC WITH AUTO DIFF 09/12 1129 Complete
 
CARDIAC ENZYMES 09/12 1129 Complete
 
CHEM 12 PROFILE 09/12 1129 Complete
 
BRAIN NATRIURETIC PEPTIDE 09/12 1129 Complete
 
12 LEAD EKG-ZULEMA (INITIAL) 09/12  UNK Active
 
 
 
CM/EKG
CM/EKG
   EKG normal sinus is 96/m LEFT axis deviation and LEFT atrial enlargement and 
nonspecific ST segment changes due to baseline artifact. No acute findings 
normal [place] and QRS durations.
 
XRAY/CT/US
XRAY/CT/US
   XRAY chest
   XR interpretation by reviewed by me, discussed w/radiologist
   Xray Results normal/NAD
   Comment
no acute findings on the chest x ray. 
 
Departure
 
Departure
Time of Disposition 1123
Disposition Still a Patient
Clinical Impression
Primary Impression: Hypertension
Secondary Impressions: Chronic hypertension, Hypertension, diastolic
Condition STABLE
Referrals
Cody Juares MD
 
Additional Instructions
 
The patient remained asymptomatic without any chest pain or palpitations  She 
was given her morning medication, her blood pressure was decreased to 170 mmhg/
107. I consulted Dr. Cody Juares who presented to the ED and reviewed her 
records from the primary care physician including her list of extensive 
ALLERGIES.
 
Dr. Juares suggested that she start some Cardizem  and pectoris 300 mg 
daily, and he'll provide her with coupons for financial assistance, so she can 
start her medications immediately and feel better. 
 
Dr. Juares will see her in the office for follow-up. 
Discharge Counseling
   Counseled pt/family regarding diagnosis, test results, medications/RX, home 
care, follow up needs
ED Critical Care
   Critical Care No
**
** If Critical Care minutes are documented, the time involved in the performance
of seperately reportable procedures was not counted toward critical care time 
documented. 
 
   I directly delivered medical care to this critically ill and/or injured 
patient. Timely evaluation and treatment was necessary to address the 
significant organ system(s) dysfunction present in this patient. 
 
 
Electronically Signed by Jelena Gutierrez MD on 09/12/17 at 1061

## 2017-09-12 NOTE — RADIOLOGY REPORT PS360
CHEST(2 VIEWS-NOT PORTABLE)***
 
HISTORY: Shortness of air
soa
ORDERING PHYSICIAN: Jelena Gutierrez MD
PATIENT AGE:  61 years
 
 
COMPARISON: None available
 
FINDINGS:
The cardiomediastinal silhouette and pulmonary vascularity are within normal
limits. 
The lungs are clear without infiltrates, suspicious nodules, or pleural
effusions. 
No acute bony abnormalities. 
Intrathecal stimulation device noted overlying the mid thoracic spine
 
IMPRESSION:
 
No acute finding

## 2017-09-13 ENCOUNTER — HOSPITAL ENCOUNTER (OUTPATIENT)
Dept: CT IMAGING | Facility: HOSPITAL | Age: 62
Discharge: HOME | End: 2017-09-13
Admitting: NURSE PRACTITIONER

## 2017-09-13 DIAGNOSIS — G89.29 CHRONIC RIGHT-SIDED THORACIC BACK PAIN: ICD-10-CM

## 2017-09-13 DIAGNOSIS — M54.6 CHRONIC RIGHT-SIDED THORACIC BACK PAIN: ICD-10-CM

## 2017-09-13 PROCEDURE — 72128 CT CHEST SPINE W/O DYE: CPT

## 2017-09-20 ENCOUNTER — TELEPHONE (OUTPATIENT)
Dept: INTERNAL MEDICINE | Facility: CLINIC | Age: 62
End: 2017-09-20

## 2017-09-20 NOTE — TELEPHONE ENCOUNTER
PATIENT WOULD LIKE A COPY OF THE MEDS LIST THAT SHE IS ALLERGIC TO.  I DO NOT HAVE ACCESS TO PRINT THIS OFF.  PATIENT ASKED FOR THIS TO BE MAILED.  SHE IS GOING TO COME INTO OUR OFFICE AND SIGN A RELEASE OF INFORMATION.

## 2017-10-05 ENCOUNTER — OFFICE VISIT (OUTPATIENT)
Dept: INTERNAL MEDICINE | Facility: CLINIC | Age: 62
End: 2017-10-05

## 2017-10-05 VITALS
OXYGEN SATURATION: 98 % | HEART RATE: 79 BPM | BODY MASS INDEX: 34.02 KG/M2 | SYSTOLIC BLOOD PRESSURE: 146 MMHG | WEIGHT: 186 LBS | DIASTOLIC BLOOD PRESSURE: 84 MMHG

## 2017-10-05 DIAGNOSIS — G25.81 RLS (RESTLESS LEGS SYNDROME): ICD-10-CM

## 2017-10-05 DIAGNOSIS — E66.9 DIABETES MELLITUS TYPE 2 IN OBESE (HCC): ICD-10-CM

## 2017-10-05 DIAGNOSIS — E11.69 DIABETES MELLITUS TYPE 2 IN OBESE (HCC): ICD-10-CM

## 2017-10-05 DIAGNOSIS — R25.2 NOCTURNAL MUSCLE CRAMPS: ICD-10-CM

## 2017-10-05 DIAGNOSIS — I10 BENIGN ESSENTIAL HYPERTENSION: Primary | ICD-10-CM

## 2017-10-05 LAB
ANION GAP SERPL CALCULATED.3IONS-SCNC: 8 MMOL/L (ref 3–11)
BUN BLD-MCNC: 21 MG/DL (ref 9–23)
BUN/CREAT SERPL: 35 (ref 7–25)
CALCIUM SPEC-SCNC: 9.7 MG/DL (ref 8.7–10.4)
CHLORIDE SERPL-SCNC: 104 MMOL/L (ref 99–109)
CO2 SERPL-SCNC: 27 MMOL/L (ref 20–31)
CREAT BLD-MCNC: 0.6 MG/DL (ref 0.6–1.3)
GFR SERPL CREATININE-BSD FRML MDRD: 102 ML/MIN/1.73
GLUCOSE BLD-MCNC: 158 MG/DL (ref 70–100)
GLUCOSE BLDC GLUCOMTR-MCNC: 140 MG/DL (ref 70–130)
HBA1C MFR BLD: 7.9 %
MAGNESIUM SERPL-MCNC: 2.2 MG/DL (ref 1.3–2.7)
POTASSIUM BLD-SCNC: 4.1 MMOL/L (ref 3.5–5.5)
SODIUM BLD-SCNC: 139 MMOL/L (ref 132–146)

## 2017-10-05 PROCEDURE — 83036 HEMOGLOBIN GLYCOSYLATED A1C: CPT | Performed by: NURSE PRACTITIONER

## 2017-10-05 PROCEDURE — 99214 OFFICE O/P EST MOD 30 MIN: CPT | Performed by: NURSE PRACTITIONER

## 2017-10-05 PROCEDURE — 82947 ASSAY GLUCOSE BLOOD QUANT: CPT | Performed by: NURSE PRACTITIONER

## 2017-10-05 PROCEDURE — 80048 BASIC METABOLIC PNL TOTAL CA: CPT | Performed by: NURSE PRACTITIONER

## 2017-10-05 PROCEDURE — 83735 ASSAY OF MAGNESIUM: CPT | Performed by: NURSE PRACTITIONER

## 2017-10-05 RX ORDER — DILTIAZEM HYDROCHLORIDE 240 MG/1
1 CAPSULE, EXTENDED RELEASE ORAL DAILY
COMMUNITY
Start: 2017-09-12 | End: 2018-02-07 | Stop reason: ALTCHOICE

## 2017-10-05 RX ORDER — ROPINIROLE 0.25 MG/1
0.25 TABLET, FILM COATED ORAL NIGHTLY
Qty: 30 TABLET | Refills: 3 | Status: SHIPPED | OUTPATIENT
Start: 2017-10-05 | End: 2019-01-12 | Stop reason: SDUPTHER

## 2017-10-05 NOTE — PROGRESS NOTES
Subjective  Follow-up (discuss diabetes meds, elevated blood pressure)      Yi Garcia is a 62 y.o. female.   Allergies   Allergen Reactions   • Ace Inhibitors Cough   • Amlodipine    • Beta Adrenergic Blockers    • Cyclobenzaprine    • Hydrochlorothiazide    • Hyzaar  [Losartan Potassium-Hctz]    • Latex    • Losartan    • Metformin    • Penicillins    • Pioglitazone    • Spironolactone      History of Present Illness      B/p has been running high, went to cardiologist and he changed her medications, he is doing an echo this week     Glucose has been 157-171, has been taking meds as ordered , checking glucose at home once a day    Pt c/o halie horses , estevan if stretches her feet , goes up to calves of legs, brings her out of the bed every night, does take K w/o relief , x 1 month , has tried muscle relaxers w/o relief , legs jerk on the inside and won't settle down   The following portions of the patient's history were reviewed and updated as appropriate: allergies, current medications and past social history.    Review of Systems   Constitutional: Negative for fatigue.   Respiratory: Negative for apnea, chest tightness and shortness of breath.    Cardiovascular: Negative for chest pain, palpitations and leg swelling.   Musculoskeletal: Positive for myalgias.   Skin: Negative for color change.   Psychiatric/Behavioral: The patient is not nervous/anxious.    All other systems reviewed and are negative.      Objective   Physical Exam   Constitutional: She is oriented to person, place, and time. She appears well-developed and well-nourished.   HENT:   Head: Normocephalic and atraumatic.   Eyes: Conjunctivae are normal.   Cardiovascular: Normal rate and regular rhythm.    Pulmonary/Chest: Effort normal and breath sounds normal.   Neurological: She is alert and oriented to person, place, and time.   Skin: Skin is warm and dry.   Psychiatric: She has a normal mood and affect. Her behavior is normal. Judgment and  thought content normal.   Nursing note and vitals reviewed.    /84  Pulse 79  Wt 186 lb (84.4 kg)  LMP  (LMP Unknown)  SpO2 98%  BMI 34.02 kg/m2    Assessment/Plan     Problem List Items Addressed This Visit        Cardiovascular and Mediastinum    Benign essential hypertension - Primary    Relevant Medications    CARTIA  MG 24 hr capsule       Endocrine    Diabetes mellitus type 2 in obese    Relevant Orders    POC Glycosylated Hemoglobin (Hb A1C) (Completed)    POCT Glucose (Completed)      Other Visit Diagnoses     RLS (restless legs syndrome)        Relevant Medications    rOPINIRole (REQUIP) 0.25 MG tablet    Nocturnal muscle cramps        Relevant Orders    Magnesium    Basic metabolic panel          Results for orders placed or performed in visit on 10/05/17   POC Glycosylated Hemoglobin (Hb A1C)   Result Value Ref Range    Hemoglobin A1C 7.9 %   POCT Glucose   Result Value Ref Range    Glucose 140 (A) 70 - 130 mg/dL        labs today, id all normal we will do trial of requip, keep all appts with cardiology for htn and echo

## 2017-10-06 ENCOUNTER — HOSPITAL ENCOUNTER (OUTPATIENT)
Dept: HOSPITAL 22 - RT | Age: 62
End: 2017-10-06
Attending: INTERNAL MEDICINE
Payer: COMMERCIAL

## 2017-10-06 DIAGNOSIS — I10: ICD-10-CM

## 2017-10-06 DIAGNOSIS — E11.9: ICD-10-CM

## 2017-10-06 DIAGNOSIS — R06.02: Primary | ICD-10-CM

## 2017-10-06 NOTE — RADIOLOGY REPORT PS360
PROCEDURE: 2-D M-mode and color Doppler study
 
INDICATIONS FOR THE TEST:
Chest pain    COPD    Heart Murmur    Tobacco Smoking    Palpitations   
Fatigue    Syncope    Edema    Hypertension+Diabetes Mellitus+
Rheumatic Fever    SOB   [place]   Obesity   Hyperlipidemia+
Family History HD   
 
Additional History    
 
PATIENT INFORMATION
HEIGHT: 64                                                           WEIGHT:186
GENDER:  Female                                B/P:189/107
 
2-D/M-MODE INTERPRETATION:
              
              2-D MEASUREMENTS                                   OBSERVED       
                                                                                
                              
                                                                                
         VALUES IN CMS          
                                                                                
               
Right Ventricular Dimension (RVDd)                              2.1             
                                                                                
                           
Interventricular Septum (Thickness)(IVsd)                      1.1              
                               
Left Ventricular Internal Dimensions(LVIDd)                   4.9               
                           
Left Ventricular Posterior Wall (Thickness)(LVPWd)      1.0                     
          
Aortic Root                                                                     
  2.7                                                                           
      
Aortic Cusp Separation                                                     1.6  
                                                                            
Left Atrial Dimensions (LAD)                                            4.0     
                                                            
 
 
2D
1. Left atrium is mildly enlarged, left ventricle is normal size, there is mild
concentric left ventricular hypertrophy present, visually estimated ejection
fraction 55% with no obvious regional wall motion abnormality.
2. The right atrium and right ventricle are normal size and contractility.
3. The aortic valve is minimally thickened and fibrosed.
4. The mitral and tricuspid valve leaflets are minimally thickened.
5. Pulmonic valve is poorly visualized.
6. No significant pericardial effusion noted.
DOPPLER INTERROGATION:
Doppler interrogation of the aortic, mitral and tricuspid valvular presence of
mild mitral and tricuspid regurgitation, tricuspid and jet velocity is
insufficient for calculation of the right ventricular systolic pressure, grade 1
diastolic dysfunction seen with tissue Doppler evidence of raised left atrial
pressure.
 
CONCLUSION:
1. Mildly enlarged left atrium, normal left ventricular size, mild concentric
left ventricular hypertrophy, visually estimated ejection fraction 55% with no
obvious regional wall motion abnormality, grade 1 diastolic dysfunction seen
with tissue Doppler evidence of raised left atrial pressure.
2. Mild mitral and tricuspid regurgitation.
3. No significant pericardial effusion noted.

## 2017-10-25 ENCOUNTER — HOSPITAL ENCOUNTER (OUTPATIENT)
Dept: HOSPITAL 22 - RAD | Age: 62
End: 2017-10-25
Attending: INTERNAL MEDICINE
Payer: COMMERCIAL

## 2017-10-25 DIAGNOSIS — I10: ICD-10-CM

## 2017-10-25 DIAGNOSIS — G47.33: ICD-10-CM

## 2017-10-25 DIAGNOSIS — E78.5: ICD-10-CM

## 2017-10-25 DIAGNOSIS — R06.00: Primary | ICD-10-CM

## 2017-10-25 DIAGNOSIS — E11.9: ICD-10-CM

## 2017-10-25 PROCEDURE — A9502 TC99M TETROFOSMIN: HCPCS

## 2017-10-26 NOTE — RADIOLOGY REPORT PS360
History and Indications:  Hypertension, diabetes, hyperlipidemia, shortness of
breath, syncope and fatigue
 
Procedure:  Patient received a 0.4 mg of Lexiscan, resting heart rate was 66
bpm, resting blood pressure 1 72/87, with Lexiscan maximum heart rate achieved
was 103 bpm which is less than 85% of the maximum predicted heart rate, and the
blood pressure was 192/101. With Lexiscan patient complained of shortness of
breath and discomfort.
 
Electrocardiogram: Resting electrocardiogram showed sinus rhythm atrial
abnormality, nonspecific ST-T changes, with Lexiscan there is less than 1.5 mm
ST segment depression noted from the baseline EKG. The EKG portion of the
Lexiscan Myoview is nondiagnostic. 
 
 
Cardiac stress and resting SPECT images: Cardiac stress and rest SPECT images
were obtained using technetium 99 Myoview 10.8 mCi at rest and 31.3 mCi at
stress. Gated SPECT further analysis of segmental wall motion and calculation of
the ejection fraction also done. Cardiac stress and rest SPECT images show
uniform myocardial activity without any segmental perfusion abnormality,
computer derived ejection fraction is over 65% with no obvious regional wall
motion abnormality. Right ventricle is mildly enlarged with normal
contractility.
 
Conclusion:  
1. The EKG portion of the Lexiscan Myoview is nondiagnostic.
2. No obvious scintigraphic evidence of reversible ischemia seen, visually there
are ejection fraction is over 65% with no obvious regional wall motion
abnormality, right ventricle is mildly enlarged with normal contractility.

## 2017-11-06 NOTE — TELEPHONE ENCOUNTER
RECEIVED REFILL REQUEST FOR JANUVIA 100MG TABS TAKE 1 DAILY ORAL FROM [employer].   MED LIST NOTED TO HAVE JANUVIA 50 MG TAKE 1 DAILY.  PLEASE ADVISE CORRECT DOSE.

## 2017-11-07 NOTE — TELEPHONE ENCOUNTER
PT SAYS CURRENTLY TAKING SAMPLES OF INVOKANA BUT INSURANCE WILL NOT COVER.  BEFORE WAS ON JANUVIA 50MG. REQUESTING JANUVIA 100MG DAILY.

## 2017-11-27 RX ORDER — TRAZODONE HYDROCHLORIDE 50 MG/1
TABLET ORAL
Qty: 180 TABLET | Refills: 2 | Status: SHIPPED | OUTPATIENT
Start: 2017-11-27 | End: 2018-10-31 | Stop reason: SDUPTHER

## 2017-12-13 RX ORDER — BACLOFEN 10 MG/1
TABLET ORAL
Qty: 270 TABLET | Refills: 3 | Status: SHIPPED | OUTPATIENT
Start: 2017-12-13 | End: 2018-10-31 | Stop reason: SDUPTHER

## 2017-12-20 ENCOUNTER — TELEPHONE (OUTPATIENT)
Dept: PAIN MEDICINE | Facility: CLINIC | Age: 62
End: 2017-12-20

## 2017-12-21 ENCOUNTER — HOSPITAL ENCOUNTER (OUTPATIENT)
Dept: GENERAL RADIOLOGY | Facility: HOSPITAL | Age: 62
Discharge: HOME | End: 2017-12-21
Attending: ANESTHESIOLOGY | Admitting: ANESTHESIOLOGY

## 2017-12-21 ENCOUNTER — OFFICE VISIT (OUTPATIENT)
Dept: PAIN MEDICINE | Facility: CLINIC | Age: 62
End: 2017-12-21

## 2017-12-21 VITALS
RESPIRATION RATE: 16 BRPM | HEIGHT: 62 IN | TEMPERATURE: 97.2 F | HEART RATE: 79 BPM | OXYGEN SATURATION: 95 % | DIASTOLIC BLOOD PRESSURE: 86 MMHG | WEIGHT: 188 LBS | SYSTOLIC BLOOD PRESSURE: 135 MMHG | BODY MASS INDEX: 34.6 KG/M2

## 2017-12-21 DIAGNOSIS — G96.12: ICD-10-CM

## 2017-12-21 DIAGNOSIS — M47.816 LUMBAR FACET ARTHROPATHY: ICD-10-CM

## 2017-12-21 DIAGNOSIS — G47.33 OBSTRUCTIVE SLEEP APNEA, ADULT: ICD-10-CM

## 2017-12-21 DIAGNOSIS — F32.A ANXIETY AND DEPRESSION: ICD-10-CM

## 2017-12-21 DIAGNOSIS — Z96.89 SPINAL CORD STIMULATOR STATUS: Primary | ICD-10-CM

## 2017-12-21 DIAGNOSIS — G47.00 INSOMNIA, UNSPECIFIED TYPE: ICD-10-CM

## 2017-12-21 DIAGNOSIS — M53.3 SACROILIAC JOINT DYSFUNCTION OF LEFT SIDE: ICD-10-CM

## 2017-12-21 DIAGNOSIS — Z86.69 HISTORY OF MIGRAINE HEADACHES: ICD-10-CM

## 2017-12-21 DIAGNOSIS — M96.1 POSTLAMINECTOMY SYNDROME OF LUMBAR REGION: ICD-10-CM

## 2017-12-21 DIAGNOSIS — E11.69 DIABETES MELLITUS TYPE 2 IN OBESE (HCC): ICD-10-CM

## 2017-12-21 DIAGNOSIS — E66.8 MODERATE OBESITY: ICD-10-CM

## 2017-12-21 DIAGNOSIS — M79.18 MYOFASCIAL PAIN SYNDROME: ICD-10-CM

## 2017-12-21 DIAGNOSIS — E66.9 DIABETES MELLITUS TYPE 2 IN OBESE (HCC): ICD-10-CM

## 2017-12-21 DIAGNOSIS — M51.26 HERNIATED LUMBAR INTERVERTEBRAL DISC: ICD-10-CM

## 2017-12-21 DIAGNOSIS — F41.9 ANXIETY AND DEPRESSION: ICD-10-CM

## 2017-12-21 DIAGNOSIS — R53.81 PHYSICAL DECONDITIONING: ICD-10-CM

## 2017-12-21 PROCEDURE — 95972 ALYS CPLX SP/PN NPGT W/PRGRM: CPT | Performed by: ANESTHESIOLOGY

## 2017-12-21 PROCEDURE — 99214 OFFICE O/P EST MOD 30 MIN: CPT | Performed by: ANESTHESIOLOGY

## 2017-12-21 PROCEDURE — 72020 X-RAY EXAM OF SPINE 1 VIEW: CPT

## 2017-12-21 RX ORDER — NIFEDIPINE 30 MG/1
60 TABLET, EXTENDED RELEASE ORAL DAILY
COMMUNITY
End: 2018-10-31 | Stop reason: SDUPTHER

## 2017-12-21 NOTE — PROGRESS NOTES
"Chief Complain: \"I have pain in my buttocks and into my left leg down to my left foot.\"      Brief History: Ms. Garcia returns to the clinic for evaluation of her chronic lower back and left lower extremity pain and possible SCS reprogramming.   Spinal cord stimulator device system: Patient underwent implantation of her spinal cord stimulator device system on October 1, 2015 with Dr. Von Croft.    [place] Penta lead with the top electrodes projecting at the level of the superior endplate of the T7 vertebral body.    IPG: Protégé  Pain level: 4/10 (before reprogramming) down to 0-1/10 (after reprogramming)  Pain level ranges from 1/10 to 10/10 with the use of her spinal cord stimulator with certain activities and during the night.  Pain location: Left lower back, buttocks, and left lower extremity to her left foot (ankle and dorsal aspect of foot).  Quality of pain: deep ache and burning  Radiation of pain: From the left gluteal region to the posterior aspect of her left thigh, left calf and into the dorsal aspect of her left foot  Current analgesics: ice and heat in the areas of her chronic pain, SalonPas, baclofen, tramadol, diclofenac, Lidocaine Patches and Tylenol without side effects.   I have reviewed [facility] #[id number] consistent with medication reconciliation.      Comorbid factors:   Depression and anxiety: Patient reports improvement of her depression and anxiety. She is dealing with more anxiety.  She continues under the psychiatric care of Dr. Lon Oliveira.   Patient continues on Pristiq, Geodon, trazodone, Seroquel.   She continues CBT with Savita Minor.   Physical deconditioning and noncompliance: She has not yet resumed water therapy or physical therapy   Sleep disturbance: Patient underwent a sleep study 04/29/2017. Patient stopped using her CPAP machine. She uses a mouth guard  Obesity: Patient has not followed up with [facility] despite medical advice.   Diabetes: NIDDM, not " controlled. No meds. No compliance.     Review of New Diagnostic Studies:  CT SCAN OF THE THORACIC SPINE WO CONTRAST-09/13/2017: CT scan of the thoracic spine was performed in the axial plane at 2 mm intervals. Images were acquired in the axial plane with images displayed in the axial as well as reconstructed sagittal and coronal projections. There are findings of laminectomy at T9 and T10 where there is insertion of a spinal cord stimulator which extends to the level of T7. There is no spinal stenosis. There are  degenerative changes primarily manifest by anterior osteophytes in the mid and lower thoracic region. There is no paraspinous mass. There is no compression fracture. There is no blastic or lytic process.  IMPRESSION: There is evidence of laminectomy at T9 and T10 where there  is insertion of a spinal cord stimulator which is placed at the level of T7. There are mild diffuse osteoarthritic changes. There is no compression fracture or subluxation. There is no spinal stenosis.      Previous Diagnostic studies:  UDS on 04/27/2017, appropriate  UDS on 12/06/2016, appropriate  X-rays of the sacroiliac joints from 04/19/2016 revealed lumbosacral and SI arthropathy.  MRI of the lumbar spine from June 2013 revealed lumbar facet hypertrophy from L2-L3 through L5-S1. L4-L5 moderate disc bulge lateralizes towards the right. Moderate right and mild left neuroforaminal stenosis. L5-S1 moderate left foraminal disc protrusion combined with facet hypertrophy creating moderate left neuroforaminal stenosis with no significant canal stenosis.  X-ray of the pelvis on 10/07/2014, revealed mild sclerosis about the right sacroiliac joint.  X-ray of the thoracic spine on 09/09/2014, revealed spinal cord stimulator electrodes positioned at T7. The thoracic spine otherwise demonstrates mild degenerative change without acute abnormality.    X-rays of the sacroiliac joints from 04/19/2016 revealed lumbosacral and SI  "arthropathy.    The following portions of the patient's history were reviewed and updated as appropriate: problem list, past medical history, past surgery history, social history, family history, medications, and allergies     Review of Systems   Respiratory: Positive for apnea.    Genitourinary: Positive for pelvic pain.   Musculoskeletal: Positive for back pain, myalgias, neck pain and neck stiffness.   Neurological: Positive for headaches.   Psychiatric/Behavioral: Positive for agitation, decreased concentration and sleep disturbance. The patient is nervous/anxious.    All other systems reviewed and are negative.     /86 (BP Location: Right arm, Patient Position: Sitting, Cuff Size: Large Adult)  Pulse 79  Temp 97.2 °F (36.2 °C) (Temporal Artery )   Resp 16  Ht 157.5 cm (62\")  Wt 85.3 kg (188 lb)  LMP  (LMP Unknown)  SpO2 95%  BMI 34.39 kg/m2     Physical Exam   Neurologic Exam  Constitutional General appearance: No acute distress, well appearing. Obese and well hydrated.   Head and Face Normal.   Eyes: Conjunctiva and lids: No swelling, erythema or discharge. Pupils: Equal, round, reactive to light.   Pulmonary Respiratory effort: No increased work of breathing or signs of respiratory distress. Auscultation of lungs: Clear to auscultation.   Cardiovascular Auscultation of heart: Normal rate and rhythm, normal S1 and S2, no murmurs. Peripheral vascular exam: Normal. Examination of extremities for edema and/or varicosities: Normal.   Digits and nails: Normal without clubbing or cyanosis.  Musculoskeletal Gait and station: Normal.  The range of motion of the lumbar spine is improved. Lumbar facet joint loading maneuvers are negative. Krish and Gaenslen's tests are negative. Palpation of the left gluteal bursa does not reproduce pain at this time. The range of motion of the hip joints is full and without pain. Muscle tone is normal. Left piriformis maneuvers are negative at this time.   Muscle " strength/tone: Normal.   Skin and subcutaneous tissue: Normal without rashes or lesions. There is complete epithelization of her surgical wounds, without erythema, drainage, or fluid accumulation.   Neurologic   Cranial nerves: Cranial nerves II-XII intact.   Cortical function: Normal mental status.   Reflexes: 2+ and symmetric. Deep tendon reflexes: 2+ right biceps, 2+ left biceps, 2+ right patella, 2+ left patella, 2+ right ankle jerk and 2+ left ankle jerk. Superficial/Primitive Reflexes: primitive reflexes were absent. Straight leg raising test is positive on the left, with a positive contralateral SLR. Femoral stretch sign is negative. No clonus or Babinski. Romberg's is negative.   Sensation: No sensory loss. Sensory exam: intact to light touch, intact pain and temperature sensation, intact vibration sensation and normal proprioception.   Coordination: Normal finger to nose and heel to shin. Coordination: normal balance and negative Romberg's sign.   Psychiatric: Judgment and insight: Normal. Orientation to person, place, and time: Normal. Recent and remote memory: Intact. Mood and affect: Normal.       PROCEDURE: Analysis of the spinal cord stimulator device with complex spinal cord stimulator reprogramming   Analysis of the spinal cord stimulator device reveals that the patient has used her stimulator device for a total of 1033 hours since last reprogramming, and 12,221 lifetime hours (24 hours per day). Analysis of impedance reveals normal impedance for all contacts.   The spinal cord stimulator device was reprogrammed under my supervision by adjusting electrode polarities, pulse width, pulse rate, amplitudes, BurstDR, and all five programs were adjusted, as follows;  Program THREE (best program)  Electrode polarities: 1+, 2+, 6-  Amplitude: 1.2-2 mA   Pulse width: 1000 mcs  Intraburst rate: 500 Hz  Pulse Rate: 40 Hz  Micro-dosin:90  Time spent reprogrammin minutes.   A copy of the telemetry  report will be scanned in the patient's chart      ASSESSMENT:   1. Meningeal adhesions/lumbar arachnoiditis    2. Postlaminectomy syndrome of lumbar region    3. Herniated lumbar intervertebral disc    4. Lumbar facet arthropathy/lumbar spondylosis without myelopathy    5. Sacroiliac joint dysfunction of left side    6. Myofascial pain syndrome    7. Obstructive sleep apnea, adult    8. Moderate obesity    9. Diabetes mellitus type 2 in obese    10. History of migraine headaches    11. Anxiety and depression    12. Insomnia, unspecified type    13. Physical deconditioning       PLAN: Patient's chronic pain condition has improved with the use of her spinal cord stimulator device. Patient continues to struggle with anxiety, depression, insomnia, JOHN (no compliance with CPAP), and reoccurring left lower extremity pain. Therefore, I have proposed the following plan:  1. Follow-up on an as needed basis. If patient continues to struggle with pain, then, we may proceed with diagnostic and therapeutic left L4-L5 and left L5-S1 transforaminal epidural steroid injection. Also, we could consider revision of her SCS lead versus adding a percutaneous lead on the left side of the posterior epidural space depending on X-ray findings.  2. Diagnostics: AP X-ray thoracic spine for lead assessment  3. Long-term rehabilitation efforts:   a. Resume comprehensive physical therapy program  b. Resume water therapy   c. Follow-up at [facility]  d. Follow-up with Dr. Berry Bradshaw for cognitive behavioral therapy, biofeedback, and CPAP compliance  e. Follow-up with Dr. Oliveira for her unrelenting anxiety, depression and insomnia  f.  The patient does not have a history of falls. I did complete a risk assessment for falls. Patient has risks factors. Fall precautions: Patient has been instructed regarding universal fall precautions, such as;   · Removing all area rugs and coffee tables to create a safe environment at  home  · Ensure clean, dry floors  · Wearing supportive footwear and properly fitting clothing  · Ensure bed/chair is appropriate height and patient's feet can touch the floor  · Using a shower transfer bench  · Using walk-in shower and having shower safety bars installed  · Ensure proper lighting, minimize glare  · Have nightlights operational and in use  · Participation in an exercise program for gait training, balance training and strength  · Avoid carrying laundry up and down steps  · Ensure proper compliance and organization of medications to avoid errors   · Avoid use of over the counter sedatives and alcohol consumption  4. Pharmacological measures, as follows:  a. Continue topiramate 50 mg twice daily  b. Continue trazodone  mg each bedtime for insomnia  c. Continue diclofenac 50 mg two times a day when necessary for mild to moderate acute breakthrough pain. I have instructed the patient not to take any other NSAIDs  d. Continue lidocaine patches  e. Continue baclofen 10 mg 1/2 to 1 tablet 1-2 times a day as needed muscle spasms  f.  Tramadol 50 mg 2-3 times a day as needed for severe breakthrough pain. Screening and compliance with controlled substances: Patient has completed a SOAPP questionnaire and ORT questionnaires (revealing low risk).  Bernabe reports have been reviewed at each visit, and at least every three months and appropriate. Random urine drug screenings have been obtained and appropriate. Patient has signed a consent for treatment with controlled substances after reviewing the document and verbalizing full understanding of the risks, benefits, alternatives, and consequences of compliance and adherence with treatment and comprehensive plan of care. Patient received in hand a copy of this document.  5. The patient has been instructed to contact my office with any questions or difficulties. The patient understands the plan and agrees to proceed accordingly.    I spent 30  minutes  face-to-face with the patient, of which 20 minutes were spent counseling regarding evaluation, diagnosis, prognosis, diagnostic testing, potential referrals, treatment options for chronic pain condition and overall rehabilitation, implications of compliance with medical care, coordination of care with other providers involved in patient's care, long-term management of concurrent comorbidities affecting effective pain control, risk and benefits of different interventions, alternative therapies, a comprehensive plan of care to address physical deconditioning, strategies to prevent fall injuries due to high risk for falls and long-term management and functional goals of spinal cord stimulation     Patient Care Team:  OCTAVIANO Swartz as PCP - General (Internal Medicine)  Rafael Velasquez MD as Consulting Physician (Pain Medicine)  Von Croft MD as Surgeon (Neurosurgery)  Emmie Stallworth MD as Consulting Physician (Internal Medicine)     New Medications Ordered This Visit   Medications   • NIFEdipine XL (PROCARDIA XL) 30 MG 24 hr tablet     Sig: Take 30 mg by mouth Daily.         Future Appointments  Date Time Provider Department Center   1/5/2018 11:00 AM OCTAVIANO Swartz [facility] None         Rafael Velasquez MD

## 2017-12-27 ENCOUNTER — OFFICE VISIT (OUTPATIENT)
Dept: INTERNAL MEDICINE | Facility: CLINIC | Age: 62
End: 2017-12-27

## 2017-12-27 VITALS
HEART RATE: 81 BPM | DIASTOLIC BLOOD PRESSURE: 74 MMHG | WEIGHT: 191 LBS | HEIGHT: 62 IN | SYSTOLIC BLOOD PRESSURE: 148 MMHG | TEMPERATURE: 98.5 F | BODY MASS INDEX: 35.15 KG/M2 | OXYGEN SATURATION: 98 %

## 2017-12-27 DIAGNOSIS — J40 BRONCHITIS: ICD-10-CM

## 2017-12-27 DIAGNOSIS — J01.10 ACUTE FRONTAL SINUSITIS, RECURRENCE NOT SPECIFIED: Primary | ICD-10-CM

## 2017-12-27 PROCEDURE — 99213 OFFICE O/P EST LOW 20 MIN: CPT | Performed by: NURSE PRACTITIONER

## 2017-12-27 RX ORDER — DOXYCYCLINE HYCLATE 100 MG/1
100 CAPSULE ORAL 2 TIMES DAILY
Qty: 14 CAPSULE | Refills: 0 | Status: SHIPPED | OUTPATIENT
Start: 2017-12-27 | End: 2018-01-03

## 2017-12-27 RX ORDER — PREDNISONE 20 MG/1
20 TABLET ORAL 2 TIMES DAILY
Qty: 8 TABLET | Refills: 0 | Status: SHIPPED | OUTPATIENT
Start: 2017-12-27 | End: 2017-12-31

## 2017-12-27 RX ORDER — ALBUTEROL SULFATE 90 UG/1
2 AEROSOL, METERED RESPIRATORY (INHALATION) EVERY 6 HOURS PRN
Qty: 1 INHALER | Refills: 0 | Status: SHIPPED | OUTPATIENT
Start: 2017-12-27 | End: 2020-01-21 | Stop reason: SDUPTHER

## 2017-12-27 RX ORDER — DEXTROMETHORPHAN HYDROBROMIDE AND PROMETHAZINE HYDROCHLORIDE 15; 6.25 MG/5ML; MG/5ML
5 SYRUP ORAL 4 TIMES DAILY PRN
Qty: 118 ML | Refills: 0 | Status: SHIPPED | OUTPATIENT
Start: 2017-12-27 | End: 2018-06-13

## 2017-12-27 NOTE — PATIENT INSTRUCTIONS
Acute Bronchitis  Bronchitis is inflammation of the airways that extend from the windpipe into the lungs (bronchi). The inflammation often causes mucus to develop. This leads to a cough, which is the most common symptom of bronchitis.   In acute bronchitis, the condition usually develops suddenly and goes away over time, usually in a couple weeks. Smoking, allergies, and asthma can make bronchitis worse. Repeated episodes of bronchitis may cause further lung problems.   CAUSES  Acute bronchitis is most often caused by the same virus that causes a cold. The virus can spread from person to person (contagious) through coughing, sneezing, and touching contaminated objects.  SIGNS AND SYMPTOMS   · Cough.    · Fever.    · Coughing up mucus.    · Body aches.    · Chest congestion.    · Chills.    · Shortness of breath.    · Sore throat.    DIAGNOSIS   Acute bronchitis is usually diagnosed through a physical exam. Your health care provider will also ask you questions about your medical history. Tests, such as chest X-rays, are sometimes done to rule out other conditions.   TREATMENT   Acute bronchitis usually goes away in a couple weeks. Oftentimes, no medical treatment is necessary. Medicines are sometimes given for relief of fever or cough. Antibiotic medicines are usually not needed but may be prescribed in certain situations. In some cases, an inhaler may be recommended to help reduce shortness of breath and control the cough. A cool mist vaporizer may also be used to help thin bronchial secretions and make it easier to clear the chest.   HOME CARE INSTRUCTIONS  · Get plenty of rest.    · Drink enough fluids to keep your urine clear or pale yellow (unless you have a medical condition that requires fluid restriction). Increasing fluids may help thin your respiratory secretions (sputum) and reduce chest congestion, and it will prevent dehydration.    · Take medicines only as directed by your health care provider.  · If  you were prescribed an antibiotic medicine, finish it all even if you start to feel better.  · Avoid smoking and secondhand smoke. Exposure to cigarette smoke or irritating chemicals will make bronchitis worse. If you are a smoker, consider using nicotine gum or skin patches to help control withdrawal symptoms. Quitting smoking will help your lungs heal faster.    · Reduce the chances of another bout of acute bronchitis by washing your hands frequently, avoiding people with cold symptoms, and trying not to touch your hands to your mouth, nose, or eyes.    · Keep all follow-up visits as directed by your health care provider.    SEEK MEDICAL CARE IF:  Your symptoms do not improve after 1 week of treatment.   SEEK IMMEDIATE MEDICAL CARE IF:  · You develop an increased fever or chills.    · You have chest pain.    · You have severe shortness of breath.  · You have bloody sputum.    · You develop dehydration.  · You faint or repeatedly feel like you are going to pass out.  · You develop repeated vomiting.  · You develop a severe headache.  MAKE SURE YOU:   · Understand these instructions.  · Will watch your condition.  · Will get help right away if you are not doing well or get worse.     This information is not intended to replace advice given to you by your health care provider. Make sure you discuss any questions you have with your health care provider.     Document Released: 01/25/2006 Document Revised: 01/08/2016 Document Reviewed: 06/10/2014  [employer] Interactive Patient Education ©2017 [employer]  Sinusitis, Adult  Sinusitis is soreness and inflammation of your sinuses. Sinuses are hollow spaces in the bones around your face. They are located:  · Around your eyes.  · In the middle of your forehead.  · Behind your nose.  · In your cheekbones.  Your sinuses and nasal passages are lined with a stringy fluid (mucus). Mucus normally drains out of your sinuses. When your nasal tissues get inflamed or swollen, the  mucus can get trapped or blocked so air cannot flow through your sinuses. This lets bacteria, viruses, and funguses grow, and that leads to infection.  HOME CARE  Medicines  · Take, use, or apply over-the-counter and prescription medicines only as told by your doctor. These may include nasal sprays.  · If you were prescribed an antibiotic medicine, take it as told by your doctor. Do not stop taking the antibiotic even if you start to feel better.  Hydrate and Humidify  · Drink enough water to keep your pee (urine) clear or pale yellow.  · Use a cool mist humidifier to keep the humidity level in your home above 50%.  · Breathe in steam for 10-15 minutes, 3-4 times a day or as told by your doctor. You can do this in the bathroom while a hot shower is running.  · Try not to spend time in cool or dry air.  Rest  · Rest as much as possible.    · Sleep with your head raised (elevated).  · Make sure to get enough sleep each night.  General Instructions  · Put a warm, moist washcloth on your face 3-4 times a day or as told by your doctor. This will help with discomfort.  · Wash your hands often with soap and water. If there is no soap and water, use hand .  · Do not smoke. Avoid being around people who are smoking (secondhand smoke).  · Keep all follow-up visits as told by your doctor. This is important.  GET HELP IF:  · You have a fever.  · Your symptoms get worse.  · Your symptoms do not get better within 10 days.  GET HELP RIGHT AWAY IF:  · You have a very bad headache.  · You cannot stop throwing up (vomiting).  · You have pain or swelling around your face or eyes.  · You have trouble seeing.  · You feel confused.  · Your neck is stiff.  · You have trouble breathing.     This information is not intended to replace advice given to you by your health care provider. Make sure you discuss any questions you have with your health care provider.     Document Released: 06/05/2009 Document Revised: 04/10/2017 Document  Reviewed: 10/12/2016  [employer] Interactive Patient Education ©2017 [employer]

## 2017-12-27 NOTE — PROGRESS NOTES
Chief Complaint   Patient presents with   • URI     Cough, fever. sx started 3 days.        HPI  Yi Garcia is a 62 y.o. female who presents with left sinus, ear, neck pain: fever, and deep barky cough for 3 days. Wheezing at night  Has taken mucinex and mucinex DM    PMSFH  The following portions of the patient's history were reviewed and updated as appropriate: allergies, current medications, past family history, past medical history, past social history, past surgical history and problem list.    Past Medical History:   Diagnosis Date   • Anxiety    • Benign essential hypertension    • Cervical disc disorder    • Chronic pain disorder    • Colon polyps    • Complication of device    • Decreased libido    • Dizziness    • Encounter for long-term (current) use of medications    • Extremity pain    • Fatigue    • Hip pain    • History of alopecia    • History of backache    • History of colonoscopy     Fiberoptic   • History of diabetes mellitus    • History of insomnia    • History of mastoiditis    • History of migraine headaches    • History of urinary stone    • Infection of kidney    • Insomnia    • Joint pain    • Kidney infection    • Low back pain    • Lumbar radiculopathy    • Lumbar radiculopathy    • Lumbosacral disc disease    • Migraine    • Myofascial pain syndrome    • Neck pain    • Palpitations    • Sleep apnea    • Spinal cord stimulator status    • Stress reaction, emotional    • Urinary incontinence    • Urinary tract infection    • Visit for screening mammogram     Description: 08/28/2009   • Vitamin D deficiency      Past Surgical History:   Procedure Laterality Date   • BACK SURGERY      Lower Back   • BLADDER SURGERY     • COLONOSCOPY      Complete Colonoscopy   • HYSTERECTOMY      Total Abdominal Hysterectomy (Description: BSO)   • OTHER SURGICAL HISTORY      Elbow Surgery   • OTHER SURGICAL HISTORY      Spinal Sterotaxis Stimulation Of Cord   • SPINAL CORD STIMULATOR IMPLANT  2015     replacement     Patient Active Problem List    Diagnosis   • Obstructive sleep apnea, adult [G47.33]   • Diabetes mellitus type 2 in obese [E11.69, E66.9]   • Benign essential hypertension [I10]   • Myofascial pain syndrome [M79.1]   • Postlaminectomy syndrome of lumbar region [M96.1]   • 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]   • Lumbar facet arthropathy/lumbar spondylosis without myelopathy [M12.88]   • Sacroiliac joint dysfunction of left side [M53.3]   • Obesity with sleep apnea [G47.30, E66.9]   • Physical deconditioning [R53.81]   • Generalized anxiety disorder [F41.1]   • Moderate obesity [E66.8]   • Insomnia [G47.00]   • History of migraine headaches [Z86.69]   • Anxiety and depression [F41.8]   • Fatigue [R53.83]   • Hyperlipidemia [E78.5]   • Herniated lumbar intervertebral disc [M51.26]   • Leukocytes in urine [R82.99]   • Vaginal atrophy [N95.2]     Social History   Substance Use Topics   • Smoking status: Never Smoker   • Smokeless tobacco: Never Used   • Alcohol use No     Current Outpatient Prescriptions on File Prior to Visit   Medication Sig Dispense Refill   • acetaminophen (TYLENOL) 500 MG tablet Take 1 tablet by mouth 2 (Two) Times a Day. Every 4 to 6 hours as needed     • baclofen (LIORESAL) 10 MG tablet TAKE AS INSTRUCTED BY YOUR PRESCRIBER 270 tablet 3   • CARTIA  MG 24 hr capsule Take 1 tablet by mouth Daily.     • Dapagliflozin Propanediol 10 MG tablet Take 1 tablet by mouth Daily. 30 tablet 5   • desvenlafaxine (PRISTIQ) 50 MG 24 hr tablet Take 1 tablet by mouth Daily.     • diclofenac (VOLTAREN) 50 MG EC tablet Take 1 tablet two times a day when necessary for mild to moderate acute breakthrough pain. Do not to take any other NSAIDs 60 tablet 3   • estradiol (ESTRACE VAGINAL) 0.1 MG/GM vaginal cream Insert 2 g into the vagina 1 (One) Time Per Week. 42.5 g 5   • lidocaine (LIDODERM) 5 % Apply 1 patch topically. To the affected area and leave in place for 12 hours, then  "remove and leave off for 12 hours     • Liniments (SALONPAS) pads Apply  topically. prn     • NIFEdipine XL (PROCARDIA XL) 30 MG 24 hr tablet Take 30 mg by mouth Daily.     • QUEtiapine (SEROquel) 50 MG tablet As Needed.     • rOPINIRole (REQUIP) 0.25 MG tablet Take 1 tablet by mouth Every Night. Take 1 hour before bedtime. (Patient taking differently: Take 0.5 mg by mouth Every Night. Take 1 hour before bedtime.) 30 tablet 3   • SITagliptin (JANUVIA) 100 MG tablet Take 1 tablet by mouth Daily. 90 tablet 3   • topiramate (TOPAMAX) 50 MG tablet TAKE ONE TABLET BY MOUTH TWICE DAILY 180 tablet 0   • traMADol (ULTRAM) 50 MG tablet TAKE ONE TABLET BY MOUTH TWICE TO THREE TIMES DAILY AS NEEDED FOR SEVERE BREAKTHROUGH PAIN 90 tablet 0   • traZODone (DESYREL) 50 MG tablet TAKE 1 TO 2 TABLETS AT BEDTIME AS NEEDED 180 tablet 2     No current facility-administered medications on file prior to visit.          Review of Systems   Constitutional: Positive for fatigue and fever.   HENT: Positive for congestion, ear pain (pressure), facial swelling, postnasal drip, rhinorrhea, sinus pressure and sore throat.    Respiratory: Positive for cough. Negative for shortness of breath and wheezing.    Cardiovascular: Negative.    Gastrointestinal: Negative.    Skin: Negative.    Neurological: Negative.    All other systems reviewed and are negative.          Objective   Blood pressure 148/74, pulse 81, temperature 98.5 °F (36.9 °C), temperature source Oral, height 157.5 cm (62\"), weight 86.6 kg (191 lb), SpO2 98 %.  Body mass index is 34.93 kg/(m^2).      Physical Exam   Constitutional: She is oriented to person, place, and time. She appears well-developed and well-nourished. She appears ill (mildly). No distress.   HENT:   Head: Normocephalic and atraumatic.   Right Ear: Tympanic membrane and ear canal normal. Tympanic membrane is not erythematous.   Left Ear: Tympanic membrane and ear canal normal. Tympanic membrane is not erythematous. "   Nose: Mucosal edema and sinus tenderness present. Right sinus exhibits maxillary sinus tenderness. Left sinus exhibits maxillary sinus tenderness.   Mouth/Throat: Uvula is midline and mucous membranes are normal. Posterior oropharyngeal erythema present. No oropharyngeal exudate or posterior oropharyngeal edema.   Eyes: Conjunctivae are normal. Pupils are equal, round, and reactive to light.   Neck: Normal range of motion.   Cardiovascular: Normal rate, regular rhythm and normal heart sounds.    Pulmonary/Chest: Effort normal. No respiratory distress. She has no wheezes. She has rhonchi.   Lymphadenopathy:     She has cervical adenopathy.   Neurological: She is alert and oriented to person, place, and time.   Skin: Skin is warm and dry. No rash noted.   Psychiatric: She has a normal mood and affect. Her speech is normal and behavior is normal. Thought content normal. Cognition and memory are normal.             ASSESSMENT/PLAN  1. Acute frontal sinusitis, recurrence not specified    - doxycycline (VIBRAMYCIN) 100 MG capsule; Take 1 capsule by mouth 2 (Two) Times a Day for 7 days.  Dispense: 14 capsule; Refill: 0    2. Bronchitis    - predniSONE (DELTASONE) 20 MG tablet; Take 1 tablet by mouth 2 (Two) Times a Day for 4 days.  Dispense: 8 tablet; Refill: 0  - albuterol (PROVENTIL HFA;VENTOLIN HFA) 108 (90 Base) MCG/ACT inhaler; Inhale 2 puffs Every 6 (Six) Hours As Needed for Wheezing.  Dispense: 1 inhaler; Refill: 0  - promethazine-dextromethorphan (PROMETHAZINE-DM) 6.25-15 MG/5ML syrup; Take 5 mL by mouth 4 (Four) Times a Day As Needed for Cough.  Dispense: 118 mL; Refill: 0        Plan of care reviewed with patient at the conclusion of today's visit. Education was provided in regards to diagnosis, management and any prescribed or recommended OTC medications.  Patient verbalizes Understanding of and agreement with management plan.      FOLLOW-UP  Return if symptoms worsen or fail to improve.      Future  Appointments  Date Time Provider Department Center   1/12/2018 1:00 PM OCTAVIANO Swartz None         Electronically signed by:  OCTAVIANO Salgado  12/27/2017

## 2017-12-28 ENCOUNTER — TELEPHONE (OUTPATIENT)
Dept: INTERNAL MEDICINE | Facility: CLINIC | Age: 62
End: 2017-12-28

## 2018-02-05 ENCOUNTER — LAB (OUTPATIENT)
Dept: LAB | Facility: HOSPITAL | Age: 63
End: 2018-02-05
Attending: ANESTHESIOLOGY

## 2018-02-05 ENCOUNTER — OFFICE VISIT (OUTPATIENT)
Dept: PAIN MEDICINE | Facility: CLINIC | Age: 63
End: 2018-02-05

## 2018-02-05 VITALS
DIASTOLIC BLOOD PRESSURE: 103 MMHG | BODY MASS INDEX: 34.6 KG/M2 | HEART RATE: 96 BPM | HEIGHT: 62 IN | WEIGHT: 188 LBS | RESPIRATION RATE: 18 BRPM | TEMPERATURE: 97.8 F | OXYGEN SATURATION: 96 % | SYSTOLIC BLOOD PRESSURE: 185 MMHG

## 2018-02-05 DIAGNOSIS — F32.A ANXIETY AND DEPRESSION: ICD-10-CM

## 2018-02-05 DIAGNOSIS — G47.30 OBESITY WITH SLEEP APNEA: ICD-10-CM

## 2018-02-05 DIAGNOSIS — F41.1 GENERALIZED ANXIETY DISORDER: ICD-10-CM

## 2018-02-05 DIAGNOSIS — E66.9 OBESITY WITH SLEEP APNEA: ICD-10-CM

## 2018-02-05 DIAGNOSIS — R53.81 PHYSICAL DECONDITIONING: ICD-10-CM

## 2018-02-05 DIAGNOSIS — E66.8 MODERATE OBESITY: ICD-10-CM

## 2018-02-05 DIAGNOSIS — G47.00 INSOMNIA, UNSPECIFIED TYPE: ICD-10-CM

## 2018-02-05 DIAGNOSIS — M53.3 SACROILIAC JOINT DYSFUNCTION OF LEFT SIDE: ICD-10-CM

## 2018-02-05 DIAGNOSIS — M79.18 MYOFASCIAL PAIN SYNDROME: ICD-10-CM

## 2018-02-05 DIAGNOSIS — M96.1 POSTLAMINECTOMY SYNDROME OF LUMBAR REGION: ICD-10-CM

## 2018-02-05 DIAGNOSIS — G96.12: ICD-10-CM

## 2018-02-05 DIAGNOSIS — G89.29 OTHER CHRONIC PAIN: ICD-10-CM

## 2018-02-05 DIAGNOSIS — M47.816 LUMBAR FACET ARTHROPATHY: ICD-10-CM

## 2018-02-05 DIAGNOSIS — Z86.69 HISTORY OF MIGRAINE HEADACHES: ICD-10-CM

## 2018-02-05 DIAGNOSIS — F41.9 ANXIETY AND DEPRESSION: ICD-10-CM

## 2018-02-05 DIAGNOSIS — M51.26 HERNIATED LUMBAR INTERVERTEBRAL DISC: ICD-10-CM

## 2018-02-05 DIAGNOSIS — E11.69 DIABETES MELLITUS TYPE 2 IN OBESE (HCC): ICD-10-CM

## 2018-02-05 DIAGNOSIS — G89.29 OTHER CHRONIC PAIN: Primary | ICD-10-CM

## 2018-02-05 DIAGNOSIS — E66.9 DIABETES MELLITUS TYPE 2 IN OBESE (HCC): ICD-10-CM

## 2018-02-05 PROCEDURE — 80307 DRUG TEST PRSMV CHEM ANLYZR: CPT

## 2018-02-05 PROCEDURE — 99214 OFFICE O/P EST MOD 30 MIN: CPT | Performed by: ANESTHESIOLOGY

## 2018-02-05 PROCEDURE — 95972 ALYS CPLX SP/PN NPGT W/PRGRM: CPT | Performed by: ANESTHESIOLOGY

## 2018-02-05 RX ORDER — TRAMADOL HYDROCHLORIDE 50 MG/1
TABLET ORAL
Qty: 90 TABLET | Refills: 0 | Status: SHIPPED | OUTPATIENT
Start: 2018-02-05 | End: 2018-02-19 | Stop reason: SDUPTHER

## 2018-02-05 NOTE — PROGRESS NOTES
"Chief Complain: \"I have pain in my lower back, buttocks and into my left leg down to my left foot.\"      Brief History: Ms. Garcia returns to the clinic for evaluation of unrelenting chronic lower back and left lower extremity pain and possible SCS reprogramming. Patient was last seen on 12/21/2017 with similar complaints. Today, she appears more depressed. She reports that she experienced relief after her last reprogramming. An X-ray of her thoracic spine was obtained on her last visit and we will use it today to facilitate reprogramming. More recently, she reports that changing her programs did not control her pain. She has been working with Savita Sepulveda for coping skills. She feels like some of her medications for her depression need to be adjusted.  Spinal cord stimulator device system: Patient underwent implantation of her spinal cord stimulator device system on October 1, 2015 with Dr. Von Croft.    [employer] [place] lead with the top electrodes projecting at the level of the superior endplate of the T7 vertebral body.    IPG: Protégé  Pain level ranges from 1/10 to 10/10 with the use of her spinal cord stimulator with certain activities and during the night.  Pain level: 5-6/10 (before reprogramming) down to 2/10 (after reprogramming)  Pain location: Left lower back, buttocks, and left lower extremity to her left foot (ankle and dorsal aspect of foot).  Quality of pain: deep ache and burning  Radiation of pain: From the left gluteal region to the posterior aspect of her left thigh, left calf and into the dorsal aspect of her left foot  Current analgesics: ice and heat in the areas of her chronic pain, SalonPas, baclofen, tramadol, diclofenac, Lidocaine Patches and Tylenol without side effects.   I have reviewed [facility] #[id number] consistent with medication reconciliation.      Comorbid factors:   Depression and anxiety: Patient reports increasing depression and anxiety.   She continues under the psychiatric " care of Dr. Lon Oliveira.   Patient continues on Pristiq, Geodon, trazodone. She has stopped Seroquel as indicated by Dr. Oliveira because she had been sleeping most of the time.   She continues CBT with Savita Minor.   Physical deconditioning and noncompliance: She has not yet resumed water therapy or physical therapy   Sleep disturbance: Patient underwent a sleep study 04/29/2017. Patient stopped using her CPAP machine. She uses a mouth guard  Obesity: Patient has not followed up with [facility] despite medical advice.   Diabetes: NIDDM, not controlled. No meds. No compliance.     Review of New Diagnostic Studies:  XR SPINE, THORACIC, 1 VW-12/21/2017: No evidence for acute fracture, subluxation or dislocation of the thoracic spine with spinal cord stimulator terminating at the T7 superior endplate level.          Diagnostic Studies:  CT SCAN OF THE THORACIC SPINE WO CONTRAST-09/13/2017: CT scan of the thoracic spine was performed in the axial plane at 2 mm intervals. Images were acquired in the axial plane with images displayed in the axial as well as reconstructed sagittal and coronal projections. There are findings of laminectomy at T9 and T10 where there is insertion of a spinal cord stimulator which extends to the level of T7. There is no spinal stenosis. There are  degenerative changes primarily manifest by anterior osteophytes in the mid and lower thoracic region. There is no paraspinous mass. There is no compression fracture. There is no blastic or lytic process.  IMPRESSION: There is evidence of laminectomy at T9 and T10 where there  is insertion of a spinal cord stimulator which is placed at the level of T7. There are mild diffuse osteoarthritic changes. There is no compression fracture or subluxation. There is no spinal stenosis.  UDS on 04/27/2017, appropriate  UDS on 12/06/2016, appropriate  X-rays of the sacroiliac joints from 04/19/2016 revealed lumbosacral and SI arthropathy.  MRI of  "the lumbar spine from June 2013 revealed lumbar facet hypertrophy from L2-L3 through L5-S1. L4-L5 moderate disc bulge lateralizes towards the right. Moderate right and mild left neuroforaminal stenosis. L5-S1 moderate left foraminal disc protrusion combined with facet hypertrophy creating moderate left neuroforaminal stenosis with no significant canal stenosis.  X-ray of the pelvis on 10/07/2014, revealed mild sclerosis about the right sacroiliac joint.  X-ray of the thoracic spine on 09/09/2014, revealed spinal cord stimulator electrodes positioned at T7. The thoracic spine otherwise demonstrates mild degenerative change without acute abnormality.    X-rays of the sacroiliac joints from 04/19/2016 revealed lumbosacral and SI arthropathy.    The following portions of the patient's history were reviewed and updated as appropriate: problem list, past medical history, past surgery history, social history, family history, medications, and allergies     Review of Systems   Respiratory: Positive for apnea.    Genitourinary: Positive for pelvic pain.   Musculoskeletal: Positive for back pain, myalgias, neck pain and neck stiffness.   Neurological: Positive for headaches.   Psychiatric/Behavioral: Positive for agitation, decreased concentration and sleep disturbance. The patient is nervous/anxious.    All other systems reviewed and are negative.     BP (!) 185/103 (BP Location: Left arm, Patient Position: Sitting)  Pulse 96  Temp 97.8 °F (36.6 °C) (Temporal Artery )   Resp 18  Ht 157.5 cm (62\")  Wt 85.3 kg (188 lb)  LMP  (LMP Unknown)  SpO2 96%  BMI 34.39 kg/m2     Physical Exam   Neurologic Exam  Constitutional General appearance: No acute distress, well appearing. Obese and well hydrated.   Head and Face Normal.   Eyes: Conjunctiva and lids: No swelling, erythema or discharge. Pupils: Equal, round, reactive to light.   Pulmonary Respiratory effort: No increased work of breathing or signs of respiratory distress. " Auscultation of lungs: Clear to auscultation.   Cardiovascular Auscultation of heart: Normal rate and rhythm, normal S1 and S2, no murmurs. Peripheral vascular exam: Normal. Examination of extremities for edema and/or varicosities: Normal.   Digits and nails: Normal without clubbing or cyanosis.  Musculoskeletal Gait and station: Normal.  The range of motion of the lumbar spine is improved. Lumbar facet joint loading maneuvers are negative. Krish and Gaenslen's tests are negative. Palpation of the left gluteal bursa does not reproduce pain at this time. The range of motion of the hip joints is full and without pain. Muscle tone is normal. Left piriformis maneuvers are negative at this time.   Muscle strength/tone: Normal.   Skin and subcutaneous tissue: Normal without rashes or lesions. There is complete epithelization of her surgical wounds, without erythema, drainage, or fluid accumulation.   Neurologic   Cranial nerves: Cranial nerves II-XII intact.   Cortical function: Normal mental status.   Reflexes: 2+ and symmetric. Deep tendon reflexes: 2+ right biceps, 2+ left biceps, 2+ right patella, 2+ left patella, 2+ right ankle jerk and 2+ left ankle jerk. Superficial/Primitive Reflexes: primitive reflexes were absent. Straight leg raising test is positive on the left, with a positive contralateral SLR. Femoral stretch sign is negative. No clonus or Babinski. Romberg's is negative.   Sensation: No sensory loss. Sensory exam: intact to light touch, intact pain and temperature sensation, intact vibration sensation and normal proprioception.   Coordination: Normal finger to nose and heel to shin. Coordination: normal balance and negative Romberg's sign.   Psychiatric: Judgment and insight: Normal. Orientation to person, place, and time: Normal. Recent and remote memory: Intact. Mood and affect: Normal.       PROCEDURE: Analysis of the spinal cord stimulator device with complex spinal cord stimulator reprogramming    Analysis of the spinal cord stimulator device reveals that the patient has used her stimulator device for a total of 196 hours since last reprogramming, and 12,617 lifetime hours (24 hours per day). Analysis of impedance reveals normal impedance for all contacts.   The spinal cord stimulator device was reprogrammed under my supervision by adjusting electrode polarities, pulse width, pulse rate, amplitudes, BurstDR, and all eleven programs were adjusted, as follows;  Program TEN (best program)  Electrode polarities: 1+, 2+, 4-, 6-  Amplitude: 1.4-3 mA   Pulse width: 1000 mcs  Intraburst rate: 500 Hz  Pulse Rate: 40 Hz  Micro-dosin:90  Time spent reprogrammin minutes.   A copy of the telemetry report will be scanned in the patient's chart      ASSESSMENT:   1. Meningeal adhesions/lumbar arachnoiditis    2. Postlaminectomy syndrome of lumbar region    3. Herniated lumbar intervertebral disc    4. Lumbar facet arthropathy/lumbar spondylosis without myelopathy    5. Sacroiliac joint dysfunction of left side    6. Myofascial pain syndrome    7. History of migraine headaches    8. Diabetes mellitus type 2 in obese    9. Moderate obesity    10. Obesity with sleep apnea    11. Insomnia, unspecified type    12. Anxiety and depression    13. Generalized anxiety disorder    14. Physical deconditioning       PLAN: Patient continues to struggle with her left lower extremity pain, which had been well controlled with her SCS device until recently. Patient continues to struggle with anxiety, depression, insomnia, JOHN (no compliance with CPAP), and recurring left lower extremity pain. Therefore, I have proposed the following plan:  1. Patient will be scheduled for diagnostic and therapeutic left L4-L5 and left L5-S1 transforaminal epidural steroid injection. If she continues to struggle, then, I will refer her back to Dr. Croft for possible revision of her SCS lead (with movement of the lead towards the left side of the  epidural space and moving the lead down to the level of the superior aspect of the T8 vertebral body.  2. Diagnostics: Random UDS   3. Long-term rehabilitation efforts:   a. Resume comprehensive physical therapy program  b. Resume water therapy   c. Follow-up at [facility]  d. Follow-up with Dr. Berry Bradshaw for cognitive behavioral therapy, biofeedback, and CPAP compliance  e. Follow-up with Dr. Oliveira for unrelenting anxiety, depression and insomnia  f.  The patient does not have a history of falls. I did complete a risk assessment for falls. Patient has risks factors. Fall precautions: Patient has been instructed regarding universal fall precautions, such as;   · Removing all area rugs and coffee tables to create a safe environment at home  · Ensure clean, dry floors  · Wearing supportive footwear and properly fitting clothing  · Ensure bed/chair is appropriate height and patient's feet can touch the floor  · Using a shower transfer bench  · Using walk-in shower and having shower safety bars installed  · Ensure proper lighting, minimize glare  · Have nightlights operational and in use  · Participation in an exercise program for gait training, balance training and strength  · Avoid carrying laundry up and down steps  · Ensure proper compliance and organization of medications to avoid errors   · Avoid use of over the counter sedatives and alcohol consumption  4. Pharmacological measures, as follows:  a. Continue topiramate 50 mg twice daily  b. Continue trazodone  mg each bedtime for insomnia  c. Continue diclofenac 50 mg two times a day when necessary for mild to moderate acute breakthrough pain. I have instructed the patient not to take any other NSAIDs  d. Continue lidocaine patches  e. Continue baclofen 10 mg 1/2 to 1 tablet 1-2 times a day as needed muscle spasms  f.  Tramadol 50 mg 2-3 times a day as needed for severe breakthrough pain. Screening and compliance with controlled  substances: Patient has completed a SOAPP questionnaire and ORT questionnaires (revealing low risk).  Bernabe reports have been reviewed at each visit, and at least every three months and appropriate. Random urine drug screenings have been obtained and appropriate. Patient has signed a consent for treatment with controlled substances after reviewing the document and verbalizing full understanding of the risks, benefits, alternatives, and consequences of compliance and adherence with treatment and comprehensive plan of care. Patient received in hand a copy of this document.  5. The patient has been instructed to contact my office with any questions or difficulties. The patient understands the plan and agrees to proceed accordingly.    I spent 30 minutes face-to-face with the patient, of which 20 minutes were spent counseling regarding evaluation, diagnosis, prognosis, diagnostic testing, potential referrals, treatment options for chronic pain condition and overall rehabilitation, implications of compliance with medical care, coordination of care with other providers involved in patient's care, long-term management of concurrent comorbidities affecting effective pain control, risk and benefits of different interventions, alternative therapies, a comprehensive plan of care to address physical deconditioning, strategies to prevent fall injuries due to high risk for falls and long-term management and functional goals of spinal cord stimulation     Patient Care Team:  OCTAVIANO Swartz as PCP - General (Internal Medicine)  Rafael Velasquez MD as Consulting Physician (Pain Medicine)  Von Croft MD as Surgeon (Neurosurgery)  Emmie Stallworth MD as Consulting Physician (Internal Medicine)     New Medications Ordered This Visit   Medications   • traMADol (ULTRAM) 50 MG tablet     Sig: A tablet PO TID PRN severe breakthrough pain     Dispense:  90 tablet     Refill:  0         Future Appointments  Date Time Provider  Department Center   2/7/2018 11:00 AM OCTAVIANO Swartz [facility] None         Rafael Velasquez MD

## 2018-02-07 ENCOUNTER — OFFICE VISIT (OUTPATIENT)
Dept: INTERNAL MEDICINE | Facility: CLINIC | Age: 63
End: 2018-02-07

## 2018-02-07 VITALS
WEIGHT: 186 LBS | HEART RATE: 93 BPM | DIASTOLIC BLOOD PRESSURE: 98 MMHG | OXYGEN SATURATION: 98 % | BODY MASS INDEX: 34.02 KG/M2 | SYSTOLIC BLOOD PRESSURE: 148 MMHG

## 2018-02-07 DIAGNOSIS — E66.9 DIABETES MELLITUS TYPE 2 IN OBESE (HCC): Primary | ICD-10-CM

## 2018-02-07 DIAGNOSIS — E66.9 OBESITY WITH SLEEP APNEA: ICD-10-CM

## 2018-02-07 DIAGNOSIS — G47.30 OBESITY WITH SLEEP APNEA: ICD-10-CM

## 2018-02-07 DIAGNOSIS — E11.69 DIABETES MELLITUS TYPE 2 IN OBESE (HCC): Primary | ICD-10-CM

## 2018-02-07 DIAGNOSIS — I10 ESSENTIAL HYPERTENSION: ICD-10-CM

## 2018-02-07 LAB
GLUCOSE BLDC GLUCOMTR-MCNC: 174 MG/DL (ref 70–130)
HBA1C MFR BLD: 8.9 %

## 2018-02-07 PROCEDURE — 83036 HEMOGLOBIN GLYCOSYLATED A1C: CPT | Performed by: NURSE PRACTITIONER

## 2018-02-07 PROCEDURE — 99214 OFFICE O/P EST MOD 30 MIN: CPT | Performed by: NURSE PRACTITIONER

## 2018-02-07 PROCEDURE — 82947 ASSAY GLUCOSE BLOOD QUANT: CPT | Performed by: NURSE PRACTITIONER

## 2018-02-07 RX ORDER — ACARBOSE 100 MG/1
100 TABLET ORAL
Qty: 90 TABLET | Refills: 2 | Status: SHIPPED | OUTPATIENT
Start: 2018-02-07 | End: 2018-09-14

## 2018-02-07 RX ORDER — HYDRALAZINE HYDROCHLORIDE 25 MG/1
25 TABLET, FILM COATED ORAL 3 TIMES DAILY
Qty: 90 TABLET | Refills: 3 | Status: SHIPPED | OUTPATIENT
Start: 2018-02-07 | End: 2018-06-13 | Stop reason: ALTCHOICE

## 2018-02-07 NOTE — ASSESSMENT & PLAN NOTE
Does not wear mask, does wear bite guard she got from the dentist. We have talked about the relationship between untreated apnea and high b/p

## 2018-02-07 NOTE — PROGRESS NOTES
Subjective  Follow-up ($ week Follow up )      Yi Garcia is a 62 y.o. female.   Allergies   Allergen Reactions   • Ace Inhibitors Cough   • Amlodipine    • Beta Adrenergic Blockers    • Cyclobenzaprine    • Hydrochlorothiazide    • Hyzaar  [Losartan Potassium-Hctz]    • Latex    • Losartan    • Metformin    • Penicillins    • Pioglitazone    • Spironolactone      History of Present Illness      B/p continues to be high, pt taking meds as ordered, still dealing with pain mg't for back pain, nothing has worked so far so this is increasing b/p and she can not exercise with the pain , she does try to do low sodium diet , no open sores/lesions  The following portions of the patient's history were reviewed and updated as appropriate: allergies, past family history, past surgical history and problem list.    Review of Systems   Constitutional: Negative.    HENT: Negative.    Eyes: Negative.    Respiratory: Negative.    Cardiovascular: Negative.    Genitourinary: Negative.    Musculoskeletal: Positive for arthralgias.        Sees pain mg't   All other systems reviewed and are negative.      Objective   Physical Exam   Constitutional: She is oriented to person, place, and time. She appears well-developed and well-nourished.   HENT:   Head: Normocephalic and atraumatic.   Eyes: Conjunctivae are normal.   Cardiovascular: Normal rate and regular rhythm.    Pulmonary/Chest: Effort normal and breath sounds normal.   Neurological: She is alert and oriented to person, place, and time.   Skin: Skin is warm and dry.   Psychiatric: She has a normal mood and affect. Her behavior is normal. Judgment and thought content normal.   Nursing note and vitals reviewed.    /98  Pulse 93  Wt 84.4 kg (186 lb)  LMP  (LMP Unknown)  SpO2 98%  BMI 34.02 kg/m2    Assessment/Plan     Problem List Items Addressed This Visit        Digestive    Obesity with sleep apnea     Does not wear mask, does wear bite guard she got from the  dentist. We have talked about the relationship between untreated apnea and high b/p            Endocrine    Diabetes mellitus type 2 in obese - Primary    Relevant Medications    acarbose (PRECOSE) 100 MG tablet    Other Relevant Orders    POC Glycosylated Hemoglobin (Hb A1C) (Completed)    POCT Glucose (Completed)    Ambulatory Referral to Endocrinology      Other Visit Diagnoses     Essential hypertension        Relevant Medications    hydrALAZINE (APRESOLINE) 25 MG tablet        Results for orders placed or performed in visit on 02/07/18   POC Glycosylated Hemoglobin (Hb A1C)   Result Value Ref Range    Hemoglobin A1C 8.9 %   POCT Glucose   Result Value Ref Range    Glucose 174 (A) 70 - 130 mg/dL       Discussed better eating habits, low sodium diet, exercise    recheck b/p 5 weeks

## 2018-02-09 ENCOUNTER — TELEPHONE (OUTPATIENT)
Dept: INTERNAL MEDICINE | Facility: CLINIC | Age: 63
End: 2018-02-09

## 2018-02-09 NOTE — TELEPHONE ENCOUNTER
169/98  THE BLOOD PRESSURE MEDICATION PRESCRIBED IS CAUSING PT TO LOOSE BREATHE. SHE WAS ADVISED TO GO TO THE ER

## 2018-02-14 LAB
AMPHETAMINES UR QL SCN: NEGATIVE NG/ML
BARBITURATES UR QL SCN: NEGATIVE NG/ML
BENZODIAZ UR QL SCN: NEGATIVE NG/ML
BZE UR QL SCN: NEGATIVE NG/ML
CANNABINOIDS UR QL SCN: NEGATIVE NG/ML
CREAT 24H UR-MCNC: 85 MG/DL (ref 20–300)
FENTANYL+NORFENTANYL UR QL SCN: NEGATIVE PG/ML
Lab: ABNORMAL
MEPERIDINE UR CFM-MCNC: NEGATIVE NG/ML
METHADONE UR QL SCN: NEGATIVE NG/ML
OPIATES TESTED UR SCN: NEGATIVE NG/ML
OXYCODONE/OXYMORPHONE, URINE: NEGATIVE NG/ML
PCP UR QL: NEGATIVE NG/ML
PH UR STRIP.AUTO: 5.4 [PH] (ref 4.5–8.9)
PROPOXYPH UR QL SCN: NEGATIVE NG/ML
SP GR UR: 1.01
TRAMADOL UR QL SCN: POSITIVE

## 2018-02-19 DIAGNOSIS — G96.12: ICD-10-CM

## 2018-02-19 DIAGNOSIS — M96.1 POSTLAMINECTOMY SYNDROME OF LUMBAR REGION: ICD-10-CM

## 2018-02-19 DIAGNOSIS — M51.26 HERNIATED LUMBAR INTERVERTEBRAL DISC: ICD-10-CM

## 2018-02-19 RX ORDER — TRAMADOL HYDROCHLORIDE 50 MG/1
TABLET ORAL
Qty: 270 TABLET | Refills: 0 | OUTPATIENT
Start: 2018-02-19 | End: 2018-11-05 | Stop reason: SDUPTHER

## 2018-02-20 ENCOUNTER — OFFICE VISIT (OUTPATIENT)
Dept: ENDOCRINOLOGY | Facility: CLINIC | Age: 63
End: 2018-02-20

## 2018-02-20 VITALS
DIASTOLIC BLOOD PRESSURE: 92 MMHG | SYSTOLIC BLOOD PRESSURE: 150 MMHG | BODY MASS INDEX: 34.12 KG/M2 | OXYGEN SATURATION: 98 % | HEART RATE: 86 BPM | WEIGHT: 185.4 LBS | HEIGHT: 62 IN

## 2018-02-20 DIAGNOSIS — E11.65 UNCONTROLLED TYPE 2 DIABETES MELLITUS WITH HYPERGLYCEMIA, WITHOUT LONG-TERM CURRENT USE OF INSULIN (HCC): Primary | ICD-10-CM

## 2018-02-20 LAB
GLUCOSE BLDC GLUCOMTR-MCNC: 209 MG/DL (ref 70–130)
T4 FREE SERPL-MCNC: 1.3 NG/DL (ref 0.89–1.76)
TSH SERPL DL<=0.05 MIU/L-ACNC: 1.47 MIU/ML (ref 0.35–5.35)

## 2018-02-20 PROCEDURE — 99214 OFFICE O/P EST MOD 30 MIN: CPT | Performed by: PHYSICIAN ASSISTANT

## 2018-02-20 PROCEDURE — 82947 ASSAY GLUCOSE BLOOD QUANT: CPT | Performed by: PHYSICIAN ASSISTANT

## 2018-02-20 PROCEDURE — 84443 ASSAY THYROID STIM HORMONE: CPT | Performed by: PHYSICIAN ASSISTANT

## 2018-02-20 PROCEDURE — 82043 UR ALBUMIN QUANTITATIVE: CPT | Performed by: PHYSICIAN ASSISTANT

## 2018-02-20 PROCEDURE — 82570 ASSAY OF URINE CREATININE: CPT | Performed by: PHYSICIAN ASSISTANT

## 2018-02-20 PROCEDURE — 84439 ASSAY OF FREE THYROXINE: CPT | Performed by: PHYSICIAN ASSISTANT

## 2018-02-20 RX ORDER — LOSARTAN POTASSIUM AND HYDROCHLOROTHIAZIDE 12.5; 5 MG/1; MG/1
TABLET ORAL
COMMUNITY
Start: 2018-02-16 | End: 2018-06-13

## 2018-02-20 RX ORDER — NIFEDIPINE 90 MG/1
60 TABLET, FILM COATED, EXTENDED RELEASE ORAL DAILY
COMMUNITY
Start: 2018-02-16 | End: 2021-09-29 | Stop reason: DRUGHIGH

## 2018-02-20 NOTE — PROGRESS NOTES
Chief Complaint  Establish care for Diabetes Mellitus.    HPI   Yi Garcia is a 62 y.o. female with chronic back pain and HTN- on multiple antihypertensives followed by cardiology in [place], who is here today for evaluation of Diabetes Mellitus type 2. Patient was referred by Rupinder Mason APRN. The initial diagnosis of diabetes was made approximately 2016.    Today,   C/o fatigue, hair loss and thinning, feels like eyes protruding out more than usual. Shakiness/jittery if skips a meal. Palpitations, worse lately.   Will need a steroid shot soon.   Labs reviewed: 2/7/18- A1c 8.9; 10/2017- , A1c 7.9; 2/2017- TSH 2.5, ,     Diabetic complications: none  Eye exam current (within one year): yes- no retinopathy  Foot care and dental care: discussed    Current diabetic medications include:  Januvia 100mg qd  Acarbose 100mg TID- causing a lot of flatulence   Farxiga 10mg qd  ACEI/ARB: losartan-hctz  Statin: intolerant to statins    Past medications: metformin (???caused blurred vision, went away when stopped taking metformin), invokana (insurance didn't cover but thinks this controlled glucose better)    Diabetic Monitoring  - started checking sugar last 3-4 days once a day  Glucose is averaging- morning >200, mid 250s during the day  Hypoglycemia- no  Home blood sugar records: did not bring    Nutrition:     Current diet: improving diet last 2 weeks. Eats out on Sunday, but cooks rest of week. Breakfast is egg sandwich on flatbread. Likes fruit. No sugary drinks. Got rid of candy in the house. No cakes. Weakness is break, pasta, potatoes. Likes vegetables.  Current exercise: none  Seen RD in past: yes    The following portions of the patient's history were reviewed and updated by me as appropriate: allergies, current medications, past family history, past social history, past surgical history and problem list.    Past Medical History:   Diagnosis Date   • Anxiety    • Benign essential  hypertension    • Cervical disc disorder    • Chronic pain disorder    • Colon polyps    • Complication of device    • Decreased libido    • Dizziness    • Encounter for long-term (current) use of medications    • Extremity pain    • Fatigue    • Hip pain    • History of alopecia    • History of backache    • History of colonoscopy     Fiberoptic   • History of diabetes mellitus    • History of insomnia    • History of mastoiditis    • History of migraine headaches    • History of urinary stone    • Infection of kidney    • Insomnia    • Joint pain    • Kidney infection    • Low back pain    • Lumbar radiculopathy    • Lumbar radiculopathy    • Lumbosacral disc disease    • Migraine    • Myofascial pain syndrome    • Neck pain    • Palpitations    • Sleep apnea    • Spinal cord stimulator status    • Stress reaction, emotional    • Urinary incontinence    • Urinary tract infection    • Visit for screening mammogram     Description: 08/28/2009   • Vitamin D deficiency        Medications    Current Outpatient Prescriptions:   •  acarbose (PRECOSE) 100 MG tablet, Take 1 tablet by mouth 3 (Three) Times a Day With Meals., Disp: 90 tablet, Rfl: 2  •  acetaminophen (TYLENOL) 500 MG tablet, Take 1 tablet by mouth 2 (Two) Times a Day. Every 4 to 6 hours as needed, Disp: , Rfl:   •  albuterol (PROVENTIL HFA;VENTOLIN HFA) 108 (90 Base) MCG/ACT inhaler, Inhale 2 puffs Every 6 (Six) Hours As Needed for Wheezing., Disp: 1 inhaler, Rfl: 0  •  baclofen (LIORESAL) 10 MG tablet, TAKE AS INSTRUCTED BY YOUR PRESCRIBER, Disp: 270 tablet, Rfl: 3  •  desvenlafaxine (PRISTIQ) 50 MG 24 hr tablet, Take 1 tablet by mouth Daily., Disp: , Rfl:   •  diclofenac (VOLTAREN) 50 MG EC tablet, Take 1 tablet two times a day when necessary for mild to moderate acute breakthrough pain. Do not to take any other NSAIDs, Disp: 60 tablet, Rfl: 3  •  estradiol (ESTRACE VAGINAL) 0.1 MG/GM vaginal cream, Insert 2 g into the vagina 1 (One) Time Per Week., Disp:  42.5 g, Rfl: 5  •  hydrALAZINE (APRESOLINE) 25 MG tablet, Take 1 tablet by mouth 3 (Three) Times a Day., Disp: 90 tablet, Rfl: 3  •  lidocaine (LIDODERM) 5 %, Apply 1 patch topically. To the affected area and leave in place for 12 hours, then remove and leave off for 12 hours, Disp: , Rfl:   •  Liniments (SALONPAS) pads, Apply  topically. prn, Disp: , Rfl:   •  losartan-hydrochlorothiazide (HYZAAR) 50-12.5 MG per tablet, , Disp: , Rfl:   •  NIFEdipine CC (ADALAT CC) 90 MG 24 hr tablet, , Disp: , Rfl:   •  NIFEdipine XL (PROCARDIA XL) 30 MG 24 hr tablet, Take 60 mg by mouth Daily., Disp: , Rfl:   •  promethazine-dextromethorphan (PROMETHAZINE-DM) 6.25-15 MG/5ML syrup, Take 5 mL by mouth 4 (Four) Times a Day As Needed for Cough., Disp: 118 mL, Rfl: 0  •  rOPINIRole (REQUIP) 0.25 MG tablet, Take 1 tablet by mouth Every Night. Take 1 hour before bedtime. (Patient taking differently: Take 0.5 mg by mouth Every Night. Take 1 hour before bedtime.), Disp: 30 tablet, Rfl: 3  •  SITagliptin (JANUVIA) 100 MG tablet, Take 1 tablet by mouth Daily., Disp: 90 tablet, Rfl: 3  •  topiramate (TOPAMAX) 50 MG tablet, TAKE ONE TABLET BY MOUTH TWICE DAILY, Disp: 180 tablet, Rfl: 0  •  traMADol (ULTRAM) 50 MG tablet, A tablet PO TID PRN severe breakthrough pain, Disp: 270 tablet, Rfl: 0  •  traZODone (DESYREL) 50 MG tablet, TAKE 1 TO 2 TABLETS AT BEDTIME AS NEEDED, Disp: 180 tablet, Rfl: 2  •  Canagliflozin (INVOKANA) 300 MG tablet, Take 300 mg by mouth Daily., Disp: 30 tablet, Rfl: 3  •  exenatide er (BYDUREON BCISE) 2 MG/0.85ML auto-injector injection, Inject 0.85 mL under the skin 1 (One) Time Per Week., Disp: 4 pen, Rfl: 3    Review of Systems  Review of Systems   Constitutional: Positive for fatigue. Negative for chills, fever and unexpected weight change.   HENT: Negative for ear pain, hearing loss, nosebleeds, rhinorrhea and sore throat.    Eyes: Positive for visual disturbance. Negative for pain, discharge, redness and itching.  "  Respiratory: Negative for cough, shortness of breath and wheezing.    Cardiovascular: Negative for chest pain, palpitations and leg swelling.   Gastrointestinal: Negative for abdominal pain, blood in stool, constipation and diarrhea.   Endocrine: Negative for cold intolerance, heat intolerance and polydipsia.   Genitourinary: Negative for dysuria, frequency, hematuria, pelvic pain, vaginal bleeding and vaginal discharge.   Musculoskeletal: Positive for back pain. Negative for arthralgias, gait problem, joint swelling and myalgias.   Skin: Negative for rash.   Allergic/Immunologic: Negative.    Neurological: Negative for dizziness, syncope, weakness, numbness and headaches.   Hematological: Negative for adenopathy. Does not bruise/bleed easily.   Psychiatric/Behavioral: Negative for sleep disturbance and suicidal ideas. The patient is not nervous/anxious.         Physical Exam    /92  Pulse 86  Ht 157.5 cm (62\")  Wt 84.1 kg (185 lb 6.4 oz)  LMP  (LMP Unknown)  SpO2 98%  BMI 33.91 kg/m2Body mass index is 33.91 kg/(m^2).  Physical Exam   Constitutional: She is oriented to person, place, and time. She appears well-developed. No distress.   HENT:   Head: Normocephalic.   Right Ear: External ear normal.   Left Ear: External ear normal.   Mouth/Throat: No oropharyngeal exudate.   Eyes: Conjunctivae and lids are normal. Right eye exhibits no discharge. Left eye exhibits no discharge. Right pupil is reactive. Left pupil is reactive.   Neck: No JVD present. No tracheal deviation present. No thyroid mass and no thyromegaly present.   Cardiovascular: Normal rate, regular rhythm, normal heart sounds and intact distal pulses.    No murmur heard.  Pulmonary/Chest: Effort normal and breath sounds normal. No respiratory distress. She has no wheezes.   Abdominal: Soft. Bowel sounds are normal. There is no tenderness.   Musculoskeletal: She exhibits no edema or tenderness.    Yi had a diabetic foot exam performed (no " ulcers or calluses) today.   During the foot exam she had a monofilament test performed (intact sensation to monofilament bilaterally).    Vascular Status -  Her exam exhibits right foot vasculature normal. Her exam exhibits no right foot edema. Her exam exhibits left foot vasculature normal. Her exam exhibits no left foot edema.   Skin Integrity  -  Her right foot skin is intact.     Yi 's left foot skin is intact. .  Lymphadenopathy:     She has no cervical adenopathy.   Neurological: She is alert and oriented to person, place, and time.   Skin: Skin is warm, dry and intact. No rash noted. She is not diaphoretic. No erythema.   Psychiatric: She has a normal mood and affect. Her speech is normal and behavior is normal. Thought content normal.       Labs and Imaging   Lab Results   Component Value Date    HGBA1C 8.9 02/07/2018    HGBA1C 7.9 10/05/2017    HGBA1C 7.9 05/10/2017     Office Visit on 02/07/2018   Component Date Value Ref Range Status   • Hemoglobin A1C 02/07/2018 8.9  % Final   • Glucose 02/07/2018 174* 70 - 130 mg/dL Final   Lab on 02/05/2018   Component Date Value Ref Range Status   • Amphetamine, Urine Qual 02/05/2018 Negative  Zwuhhe=3413 ng/mL Final   • Barbiturates Screen, Urine 02/05/2018 Negative  Smnuzj=689 ng/mL Final   • Benzodiazepine Screen, Urine 02/05/2018 Negative  Bkupyo=436 ng/mL Final   • THC Screen, Urine 02/05/2018 Negative  Cutoff=20 ng/mL Final   • Cocaine Screen, Urine 02/05/2018 Negative  Vvlmqs=729 ng/mL Final   • Opiate Screen, Urine 02/05/2018 Negative  Uucseh=303 ng/mL Final    Opiate test includes Codeine, Morphine, Hydromorphone, Hydrocodone.   • Oxycodone/Oxymorphone, Urine 02/05/2018 Negative  Zmlmyt=181 ng/mL Final    Test includes Oxycodone and Oxymorphone   • Phencyclidine (PCP), Urine 02/05/2018 Negative  Cutoff=25 ng/mL Final   • Methadone Screen, Urine 02/05/2018 Negative  Riyssm=223 ng/mL Final   • Propoxyphene Screen 02/05/2018 Negative  Mojmrw=448 ng/mL Final    • Meperidine, Urine 02/05/2018 Negative  Ihrwme=639 ng/mL Final    This test was developed and its performance characteristics  determined by [facility]. It has not been cleared or approved  by the Food and Drug Administration.   • Fentanyl, Urine 02/05/2018 Negative  Hfsbsh=7110 pg/mL Final    Test includes Fentanyl and Norfentanyl  This test was developed and its performance characteristics  determined by [facility]. It has not been cleared or approved  by the Food and Drug Administration.   • Tramadol Screen, Urine 02/05/2018 Positive* Ubfkmf=975 Final       Tramadol (GC/MS)         >3000              ng/mL Prgzjp=731       01  Tramadol detected; this finding can be consistent with use of  medications that include Ultram, Topalgic, Tradol, Zydol, or generic  formulations. Drugs listed are representative of common sources of the  compound detected and are not intended to include all possible  sources.   • Creatinine, Urine 02/05/2018 85.0  20.0 - 300.0 mg/dL Final   • Specific Gravity, UA 02/05/2018 1.012   Final   • pH, UA 02/05/2018 5.4  4.5 - 8.9 Final   • Please note 02/05/2018 Comment   Final    Drug-test results should be interpreted in the context of clinical  information. Patient metabolic variables, specific drug chemistry, and  specimen characteristics can affect test outcome. Technical  consultation is available if a test result is inconsistent with an  expected outcome. ([email or web address] or call toll-free  [phone])  Drug brands, if listed herein, are trademarks of their respective  owners.     No images are attached to the encounter or orders placed in the encounter.  Xr Spine Thoracic 1 View    Result Date: 12/21/2017  Spinal cord stimulator terminating at the T7 superior endplate level.  D:  12/21/2017 E:  12/21/2017  This report was finalized on 12/21/2017 5:12 PM by Dr. Kulwinder Cesar.        Assessment / Plan   Yi was seen today for diabetes.    Diagnoses and all orders for this  visit:    Uncontrolled type 2 diabetes mellitus with hyperglycemia, without long-term current use of insulin  -     Microalbumin / Creatinine Urine Ratio - Urine, Clean Catch  -     TSH  -     T4, Free  -     Canagliflozin (INVOKANA) 300 MG tablet; Take 300 mg by mouth Daily.  -     exenatide er (BYDUREON BCISE) 2 MG/0.85ML auto-injector injection; Inject 0.85 mL under the skin 1 (One) Time Per Week.        Diabetes Mellitus 2 is under inadequate control.  -A1c 8.9, average sugar 210  -dc acarbose secondary to flatulence  -change farxiga to invokana- feels like this helped her sugars better. Insurance may improve since she has now tried farxiga.  -start bydureon bcise 2mg sc weekly. No hx of pancreatitis. No personal or fam hx of thyroid ca or MEN2.  -continue farxiga, januvia  -intolerant to metformin  -intolerant to statins  -bring meter to f/u- check sugar fasting and 2h pp  -she was instructed on how to use bydureon. She administered her first dose during visit.  -provided coupon for invokana/bydureon and samples of invokana.    1.  Diet: 3-4 carb servings per meal for females, 4-5 carb servings per meal for males  Spread carb intake throughout the day  Increase lean protein and vegetable intake  Avoid sugary drinks and processed carbs including crackers, cookies, cakes  2.  Exercise: Recommend at least 30 minutes of exercise daily, at least 5 days per week. Increase exercise gradually.   3.  Blood Glucose Goal: Blood glucose goal <150 fasting, <180 2 hr postprandial  4.  Microalbumin due  5.  Education performed during this visit: long term diabetic complications discussed. , annual eye examinations at Ophthalmology discussed, dental hygiene discussed  and foot care reviewed., home glucose monitoring emphasized, all medications, side effects and compliance discussed carefully and Hypoglycemia management and prevention reviewed. Reviewed ‘ABCs’ of diabetes management (respective goals in parentheses):  A1C (<7),  blood pressure (<130/80), and cholesterol (LDL <100, if CVD <70).    Patient Instructions   Diabetes Mellitus and Food  It is important for you to manage your blood sugar (glucose) level. Your blood glucose level can be greatly affected by what you eat. Eating healthier foods in the appropriate amounts throughout the day at about the same time each day will help you control your blood glucose level. It can also help slow or prevent worsening of your diabetes mellitus. Healthy eating may even help you improve the level of your blood pressure and reach or maintain a healthy weight.  General recommendations for healthful eating and cooking habits include:  · Eating meals and snacks regularly. Avoid going long periods of time without eating to lose weight.  · Eating a diet that consists mainly of plant-based foods, such as fruits, vegetables, nuts, legumes, and whole grains.  · Using low-heat cooking methods, such as baking, instead of high-heat cooking methods, such as deep frying.  Work with your dietitian to make sure you understand how to use the Nutrition Facts information on food labels.  How can food affect me?  Carbohydrates   Carbohydrates affect your blood glucose level more than any other type of food. Your dietitian will help you determine how many carbohydrates to eat at each meal and teach you how to count carbohydrates. Counting carbohydrates is important to keep your blood glucose at a healthy level, especially if you are using insulin or taking certain medicines for diabetes mellitus.  Alcohol   Alcohol can cause sudden decreases in blood glucose (hypoglycemia), especially if you use insulin or take certain medicines for diabetes mellitus. Hypoglycemia can be a life-threatening condition. Symptoms of hypoglycemia (sleepiness, dizziness, and disorientation) are similar to symptoms of having too much alcohol.  If your health care provider has given you approval to drink alcohol, do so in moderation and  use the following guidelines:  · Women should not have more than one drink per day, and men should not have more than two drinks per day. One drink is equal to:  ¨ 12 oz of beer.  ¨ 5 oz of wine.  ¨ 1½ oz of hard liquor.  · Do not drink on an empty stomach.  · Keep yourself hydrated. Have water, diet soda, or unsweetened iced tea.  · Regular soda, juice, and other mixers might contain a lot of carbohydrates and should be counted.  What foods are not recommended?  As you make food choices, it is important to remember that all foods are not the same. Some foods have fewer nutrients per serving than other foods, even though they might have the same number of calories or carbohydrates. It is difficult to get your body what it needs when you eat foods with fewer nutrients. Examples of foods that you should avoid that are high in calories and carbohydrates but low in nutrients include:  · Trans fats (most processed foods list trans fats on the Nutrition Facts label).  · Regular soda.  · Juice.  · Candy.  · Sweets, such as cake, pie, doughnuts, and cookies.  · Fried foods.  What foods can I eat?  Eat nutrient-rich foods, which will nourish your body and keep you healthy. The food you should eat also will depend on several factors, including:  · The calories you need.  · The medicines you take.  · Your weight.  · Your blood glucose level.  · Your blood pressure level.  · Your cholesterol level.  You should eat a variety of foods, including:  · Protein.  ¨ Lean cuts of meat.  ¨ Proteins low in saturated fats, such as fish, egg whites, and beans. Avoid processed meats.  · Fruits and vegetables.  ¨ Fruits and vegetables that may help control blood glucose levels, such as apples, mangoes, and yams.  · Dairy products.  ¨ Choose fat-free or low-fat dairy products, such as milk, yogurt, and cheese.  · Grains, bread, pasta, and rice.  ¨ Choose whole grain products, such as multigrain bread, whole oats, and brown rice. These foods  may help control blood pressure.  · Fats.  ¨ Foods containing healthful fats, such as nuts, avocado, olive oil, canola oil, and fish.  Does everyone with diabetes mellitus have the same meal plan?  Because every person with diabetes mellitus is different, there is not one meal plan that works for everyone. It is very important that you meet with a dietitian who will help you create a meal plan that is just right for you.  This information is not intended to replace advice given to you by your health care provider. Make sure you discuss any questions you have with your health care provider.  Document Released: 09/14/2006 Document Revised: 05/25/2017 Document Reviewed: 11/14/2014  [employer] Patient Education © 2017 [employer]        Follow up: Return in about 3 months (around 5/20/2018).    Discussed the nature of the disease including, risks, complications, implications, management, safe and proper use of medications. Encouraged therapeutic lifestyle changes including low calorie diet with plenty of fruits and vegetables, daily exercise, medication compliance, and keeping scheduled follow up appointments with me and any other providers. Encouraged patient to have appointment for complete physical, fasting labs, appropriate screenings, and immunizations on an annual basis.    30 min  of 45 min face-to-face visit time spent for coordination of care and counselling regarding identified problems as outlined in the objective, assessment and discussion portions of the documentation.    Wero Hernandez PA-C

## 2018-02-21 DIAGNOSIS — E11.65 UNCONTROLLED TYPE 2 DIABETES MELLITUS WITH HYPERGLYCEMIA, WITHOUT LONG-TERM CURRENT USE OF INSULIN (HCC): ICD-10-CM

## 2018-02-22 ENCOUNTER — TELEPHONE (OUTPATIENT)
Dept: INTERNAL MEDICINE | Facility: CLINIC | Age: 63
End: 2018-02-22

## 2018-02-22 LAB
CREAT 24H UR-MCNC: 153.7 MG/DL
MICROALBUMIN UR-MCNC: 8 UG/ML
MICROALBUMIN/CREAT UR: 5.2 MG/G{CREAT} (ref 0–30)

## 2018-02-22 NOTE — TELEPHONE ENCOUNTER
PT WAS SEEN ON 2/20 AND WAS GIVEN A DISCOUNT CARD FOR HER INVOKANA.. IT IS STILL GOING TO COST HER AROUND $800 AFTER USING THE CARD AND SHE WANTS TO KNOW IF SHE CAN BE PUT ON SOMETHING ELSE THAT'S NOT AS EXPENSIVE. PLEASE ADVISE. THANKS.

## 2018-02-26 ENCOUNTER — TELEPHONE (OUTPATIENT)
Dept: INTERNAL MEDICINE | Facility: CLINIC | Age: 63
End: 2018-02-26

## 2018-02-26 NOTE — TELEPHONE ENCOUNTER
I contacted Pharmacy and she stated that she has No idea what RX will be covered unless we send it in.  > I then contacted patient and asked her to contact her Insurance and ask them WHAT RX they would cover and if they are requiring a PA to send it to us. I gave her our direct Fax number to give to them..  She stated that they would not cover new RX.  I advised her that that could not be true as as life goes on, there will or might be NEW rx that people will be on and they WILL need to cover them.  She stated she would contact her Insurance and give them the information and let us know if she has any additional ??

## 2018-02-26 NOTE — TELEPHONE ENCOUNTER
PT CALLED IN AND ASKED TO SPEAK WITH HER PHYSICIAN. STATES SHE WAS HERE FOR A RECENT DOCTORS APPT AND DR. PATEL PRESCRIBED HER AN INSURANCE CARD FOR HER MEDICATION THAT WAS PRESCRIBED. STATES THE TOTAL COST IS STILL OVER $500.00 AND CAN'T AFFORD THAT. WANTS TO KNOW IF THERE IS ANOTHER OPTION THAT WOULD COST LESS. RECEIVED A PHONE CALL FROM OUR OFFICE THIS AFTERNOON AND WAS RETURNING CALL.

## 2018-02-27 ENCOUNTER — TELEPHONE (OUTPATIENT)
Dept: INTERNAL MEDICINE | Facility: CLINIC | Age: 63
End: 2018-02-27

## 2018-02-27 NOTE — TELEPHONE ENCOUNTER
PATIENT SPOKE WITH HER INSURANCE COMPANY IN REGARDS TO HER DIABETIC MEDICATION AND WOULD LIKE TO GET A CALL BACK [phone]

## 2018-02-27 NOTE — TELEPHONE ENCOUNTER
Returned patient call, she was at [facility] and forgot her list, stated she would call back once she got home and let us know the names on the Medicaions that her Insurance WOULD cover!

## 2018-02-28 RX ORDER — GLIMEPIRIDE 4 MG/1
TABLET ORAL
Qty: 30 TABLET | Refills: 3 | Status: SHIPPED | OUTPATIENT
Start: 2018-02-28 | End: 2018-06-13 | Stop reason: SDUPTHER

## 2018-02-28 NOTE — TELEPHONE ENCOUNTER
Patient notified, asked that I send to [facility] to make sure she can take and have no issue with it.  E- Scripted with 3 rr Patient will notify when ok to send to Mail Order.

## 2018-02-28 NOTE — TELEPHONE ENCOUNTER
Please see the list of RX that the patient's Insurance will cover that she can afford.  I am happy to send in whatever you decried

## 2018-03-09 ENCOUNTER — HOSPITAL ENCOUNTER (OUTPATIENT)
Age: 63
End: 2018-03-09
Payer: COMMERCIAL

## 2018-03-09 DIAGNOSIS — I11.9: ICD-10-CM

## 2018-03-09 DIAGNOSIS — R00.2: Primary | ICD-10-CM

## 2018-03-09 DIAGNOSIS — G47.33: ICD-10-CM

## 2018-03-09 PROCEDURE — 93226 XTRNL ECG REC<48 HR SCAN A/R: CPT

## 2018-03-09 PROCEDURE — 93225 XTRNL ECG REC<48 HRS REC: CPT

## 2018-04-02 DIAGNOSIS — G96.12: ICD-10-CM

## 2018-04-02 DIAGNOSIS — M51.26 HERNIATED LUMBAR INTERVERTEBRAL DISC: ICD-10-CM

## 2018-04-02 DIAGNOSIS — M96.1 POSTLAMINECTOMY SYNDROME OF LUMBAR REGION: ICD-10-CM

## 2018-04-02 RX ORDER — TRAMADOL HYDROCHLORIDE 50 MG/1
TABLET ORAL
Qty: 90 TABLET | Refills: 0 | OUTPATIENT
Start: 2018-04-02

## 2018-04-02 NOTE — TELEPHONE ENCOUNTER
Patient had 30 day script filled at [facility] on 02/06/2018 and 90 day script filled at [facility] on 02/20/2018

## 2018-05-16 DIAGNOSIS — M96.1 POSTLAMINECTOMY SYNDROME OF LUMBAR REGION: ICD-10-CM

## 2018-05-16 DIAGNOSIS — G96.12: ICD-10-CM

## 2018-05-16 DIAGNOSIS — M51.26 HERNIATED LUMBAR INTERVERTEBRAL DISC: ICD-10-CM

## 2018-05-16 RX ORDER — TRAMADOL HYDROCHLORIDE 50 MG/1
TABLET ORAL
Qty: 90 TABLET | Refills: 0 | OUTPATIENT
Start: 2018-05-16

## 2018-06-13 ENCOUNTER — OFFICE VISIT (OUTPATIENT)
Dept: INTERNAL MEDICINE | Facility: CLINIC | Age: 63
End: 2018-06-13

## 2018-06-13 ENCOUNTER — OFFICE VISIT (OUTPATIENT)
Dept: ENDOCRINOLOGY | Facility: CLINIC | Age: 63
End: 2018-06-13

## 2018-06-13 VITALS
OXYGEN SATURATION: 95 % | HEIGHT: 62 IN | DIASTOLIC BLOOD PRESSURE: 64 MMHG | WEIGHT: 192 LBS | SYSTOLIC BLOOD PRESSURE: 124 MMHG | BODY MASS INDEX: 35.33 KG/M2

## 2018-06-13 VITALS
HEIGHT: 62 IN | DIASTOLIC BLOOD PRESSURE: 64 MMHG | SYSTOLIC BLOOD PRESSURE: 124 MMHG | HEART RATE: 70 BPM | BODY MASS INDEX: 35.33 KG/M2 | OXYGEN SATURATION: 95 % | WEIGHT: 192 LBS

## 2018-06-13 DIAGNOSIS — R25.2 LEG CRAMP: ICD-10-CM

## 2018-06-13 DIAGNOSIS — M96.1 POSTLAMINECTOMY SYNDROME OF LUMBAR REGION: ICD-10-CM

## 2018-06-13 DIAGNOSIS — E11.65 UNCONTROLLED TYPE 2 DIABETES MELLITUS WITH HYPERGLYCEMIA, WITHOUT LONG-TERM CURRENT USE OF INSULIN (HCC): ICD-10-CM

## 2018-06-13 DIAGNOSIS — E78.2 MIXED HYPERLIPIDEMIA: Primary | ICD-10-CM

## 2018-06-13 DIAGNOSIS — R60.9 PERIPHERAL EDEMA: Primary | ICD-10-CM

## 2018-06-13 DIAGNOSIS — F41.1 GENERALIZED ANXIETY DISORDER: ICD-10-CM

## 2018-06-13 DIAGNOSIS — E11.9 TYPE 2 DIABETES MELLITUS WITHOUT COMPLICATION, WITHOUT LONG-TERM CURRENT USE OF INSULIN (HCC): ICD-10-CM

## 2018-06-13 DIAGNOSIS — I10 BENIGN ESSENTIAL HYPERTENSION: ICD-10-CM

## 2018-06-13 LAB
ANION GAP SERPL CALCULATED.3IONS-SCNC: 9 MMOL/L (ref 3–11)
ARTICHOKE IGE QN: 206 MG/DL (ref 0–130)
BUN BLD-MCNC: 26 MG/DL (ref 9–23)
BUN/CREAT SERPL: 32.5 (ref 7–25)
CALCIUM SPEC-SCNC: 9.4 MG/DL (ref 8.7–10.4)
CHLORIDE SERPL-SCNC: 103 MMOL/L (ref 99–109)
CHOLEST SERPL-MCNC: 251 MG/DL (ref 0–200)
CO2 SERPL-SCNC: 28 MMOL/L (ref 20–31)
CREAT BLD-MCNC: 0.8 MG/DL (ref 0.6–1.3)
GFR SERPL CREATININE-BSD FRML MDRD: 73 ML/MIN/1.73
GLUCOSE BLD-MCNC: 202 MG/DL (ref 70–100)
HDLC SERPL-MCNC: 46 MG/DL (ref 40–60)
MAGNESIUM SERPL-MCNC: 2.1 MG/DL (ref 1.3–2.7)
POTASSIUM BLD-SCNC: 4 MMOL/L (ref 3.5–5.5)
SODIUM BLD-SCNC: 140 MMOL/L (ref 132–146)
TRIGL SERPL-MCNC: 288 MG/DL (ref 0–150)

## 2018-06-13 PROCEDURE — 99214 OFFICE O/P EST MOD 30 MIN: CPT | Performed by: PHYSICIAN ASSISTANT

## 2018-06-13 PROCEDURE — 83735 ASSAY OF MAGNESIUM: CPT | Performed by: NURSE PRACTITIONER

## 2018-06-13 PROCEDURE — 80048 BASIC METABOLIC PNL TOTAL CA: CPT | Performed by: NURSE PRACTITIONER

## 2018-06-13 PROCEDURE — 99214 OFFICE O/P EST MOD 30 MIN: CPT | Performed by: NURSE PRACTITIONER

## 2018-06-13 PROCEDURE — 80061 LIPID PANEL: CPT | Performed by: NURSE PRACTITIONER

## 2018-06-13 RX ORDER — HYDROCHLOROTHIAZIDE 25 MG/1
TABLET ORAL
Qty: 30 TABLET | Refills: 1 | Status: SHIPPED | OUTPATIENT
Start: 2018-06-13 | End: 2018-10-31

## 2018-06-13 RX ORDER — HYDROCHLOROTHIAZIDE 25 MG/1
TABLET ORAL
Qty: 30 TABLET | Refills: 1 | Status: SHIPPED | OUTPATIENT
Start: 2018-06-13 | End: 2018-06-13

## 2018-06-13 RX ORDER — GLIMEPIRIDE 4 MG/1
TABLET ORAL
Qty: 30 TABLET | Refills: 0 | Status: SHIPPED | OUTPATIENT
Start: 2018-06-13 | End: 2018-06-13

## 2018-06-13 NOTE — PROGRESS NOTES
CHIEF COMPLAINT  Diabetes (Follow Up, Rupinder pt.); Hypertension; and Med Refill (Pt needs updated Rx, some are under her pain mngt )      YUNIER Garcia is a 62 y.o. female is here today for follow-up of chronic health conditions    T2DM  Uncontrolled, currently taking Precose 100 mg TID w/meals and Bydureon  Bcise 2mg SC weekly; last A1C=8.9 in Feb 2018; does not watch carbs, sugars, and starches very closely; does not check glucose at home; is compliant with medication    HTN  Controlled, currently taking nifedipine XL 30 mg daily, nifedinpine CC 90 mg daily; followed by Dr. DENISE Elias, Cardiology in [place]; next visit 6/21/18; denies chest pain, dizziness, h/a, SOA    Edema  Does have increased swelling in BLE occ up to knee, but mostly in ankles/feet; occ increased SOA; no persistent cough or severe SOA; does not take a diuretic for BP or swelling    Anxiety/depression;HYACINTH  Followed by Dr. Oliveira, psychology, he prescribes Pristiq 50 mg daily and Trazodone 50 mg at bedtime; stable at this time    Chronic pain  Followed by Dr. Velasquez with [facility], prescribes tramadol 50 mg, baclofen 10 mg, and diclofenac 50 mg for her chronic back pain; also receives injections for pain     Leg cramps  C/o leg cramps at night; does have RLS and currently takes ropinirole 0.25 mg at night--this works well; cramps are different that restlessness--sudden cramping in calves and occ thigh muscles      Past Medical History:   Diagnosis Date   • Anxiety    • Benign essential hypertension    • Cervical disc disorder    • Chronic pain disorder    • Colon polyps    • Complication of device    • Decreased libido    • Dizziness    • Encounter for long-term (current) use of medications    • Extremity pain    • Fatigue    • Hip pain    • History of alopecia    • History of backache    • History of colonoscopy     Fiberoptic   • History of diabetes mellitus    • History of insomnia    • History of mastoiditis     • History of migraine headaches    • History of urinary stone    • Infection of kidney    • Insomnia    • Joint pain    • Kidney infection    • Low back pain    • Lumbar radiculopathy    • Lumbar radiculopathy    • Lumbosacral disc disease    • Migraine    • Myofascial pain syndrome    • Neck pain    • Palpitations    • Sleep apnea    • Spinal cord stimulator status    • Stress reaction, emotional    • Urinary incontinence    • Urinary tract infection    • Visit for screening mammogram     Description: 08/28/2009   • Vitamin D deficiency        Past Surgical History:   Procedure Laterality Date   • BACK SURGERY      Lower Back   • BLADDER SURGERY     • COLONOSCOPY      Complete Colonoscopy   • HYSTERECTOMY      Total Abdominal Hysterectomy (Description: BSO)   • OTHER SURGICAL HISTORY      Elbow Surgery   • OTHER SURGICAL HISTORY      Spinal Sterotaxis Stimulation Of Cord   • SPINAL CORD STIMULATOR IMPLANT  2015    replacement       Family History   Problem Relation Age of Onset   • Hypertension Mother    • Peripheral vascular disease Mother    • Lung disease Father    • Diabetes Other         type 2       Social History     Social History   • Marital status:      Spouse name: N/A   • Number of children: N/A   • Years of education: N/A     Occupational History   • Not on file.     Social History Main Topics   • Smoking status: Never Smoker   • Smokeless tobacco: Never Used   • Alcohol use No   • Drug use: No   • Sexual activity: Defer     Other Topics Concern   • Not on file     Social History Narrative   • No narrative on file       The following portions of the patient's history were reviewed and updated as appropriate: allergies, current medications, past family history, past medical history, past social history, past surgical history and problem list.    ROS  Review of Systems   Constitutional: Negative for activity change, appetite change and fatigue.   Respiratory: Negative for chest tightness,  "shortness of breath and wheezing.    Cardiovascular: Positive for leg swelling. Negative for chest pain and palpitations.   Endocrine: Negative for cold intolerance, heat intolerance, polydipsia, polyphagia and polyuria.   Musculoskeletal: Positive for myalgias.   Neurological: Negative for dizziness and headaches.   Psychiatric/Behavioral: The patient is nervous/anxious.    All other systems reviewed and are negative.      /64   Pulse 70   Ht 157.5 cm (62\")   Wt 87.1 kg (192 lb)   LMP  (LMP Unknown)   SpO2 95%   Breastfeeding? No   BMI 35.12 kg/m²     PHYSICAL EXAM  Physical Exam   Constitutional: She is oriented to person, place, and time. She appears well-developed and well-nourished.   HENT:   Head: Normocephalic and atraumatic.   Eyes: Conjunctivae are normal.   Neck: Trachea normal and normal range of motion. Neck supple. No JVD present. No thyromegaly present.   Cardiovascular: Normal rate, regular rhythm and normal heart sounds.    No murmur heard.  1-2+ pitting edema   Pulmonary/Chest: Effort normal and breath sounds normal.   Neurological: She is alert and oriented to person, place, and time.   Skin: Skin is warm, dry and intact.   Vitals reviewed.      ASSESSMENT/PLAN  1. Peripheral edema  -start HCTZ prn swelling in ankles/feet  - hydrochlorothiazide (HYDRODIURIL) 25 MG tablet; 1/2 tablet daily as needed  Dispense: 30 tablet; Refill: 1  - Basic metabolic panel--check electrolytes    2. Leg cramp  -low magnesium can cause muscle cramping  - Magnesium  -continue ropinirole 0.25 mg at bedtime for RLS    3. Uncontrolled type 2 diabetes mellitus with hyperglycemia, without long-term current use of insulin (CMS/[facility])  -continue Precose 100 mg TID with meals  -continue Bydureon Bcise 2 mg weekly  -should check glucose at home at least daily    4. Benign essential hypertension  -continue nifedipine XL 30 mg daily and nifedipine CC 90 mg daily  -keep scheduled f/u with cardiology    5. Generalized " anxiety disorder  -continue regular visits with Dr. Oliveira  -take Pristiq and Trazodone as prescribed    6. Postlaminectomy syndrome of lumbar region  -continue regular visits with Dr. Velasquez for pain management      Plan of care reviewed with patient at the conclusion of today's visit. Education was provided in regards to diagnosis, management and any prescribed or recommended OTC medications.  Patient verbalizes Understanding of and agreement with management plan.    FOLLOW-UP  3 month(s) PE with labs    RTC as needed      Ofelia Boone, APRN  06/13/2018

## 2018-06-13 NOTE — PROGRESS NOTES
Chief Complaint  F/u for Diabetes Mellitus.    HPI   Yi Garcia is a 62 y.o. female with chronic back pain and HTN- on multiple antihypertensives followed by cardiology in [place], who is here today for f/u of Diabetes Mellitus type 2. The initial diagnosis of diabetes was made approximately 2016.    Today,   C/o fatigue, hair loss and thinning, feels like eyes protruding out more than usual. Shakiness/jittery if skips a meal. Palpitations, worse lately.   Will need a steroid shot soon.   Labs reviewed: 2/7/18- A1c 8.9; 10/2017- , A1c 7.9; 2/2017- TSH 2.5, ,     Diabetic complications: none  Eye exam current (within one year): yes- no retinopathy  Foot care and dental care: discussed    Current diabetic medications include:  Januvia 100mg qd- may be causing swelling, didn't not take last night and swelling improved today  Glimepiride 4mg QD  Bydureon- stopped taking about 3 months ago as her sugars were getting too low  ACEI/ARB: losartan-hctz  Statin: intolerant to statins    Past medications: metformin (???caused blurred vision, went away when stopped taking metformin), invokana (insurance didn't cover but thinks this controlled glucose better)    Diabetic Monitoring  - no meter or log    Nutrition:     Current diet: improving diet last 2 weeks. Eats out on Sunday, but cooks rest of week. Breakfast is egg sandwich on flatbread. Likes fruit. No sugary drinks. Got rid of candy in the house. No cakes. Weakness is break, pasta, potatoes. Likes vegetables.  Current exercise: none  Seen RD in past: yes    The following portions of the patient's history were reviewed and updated by me as appropriate: allergies, current medications, past family history, past social history, past surgical history and problem list.    Past Medical History:   Diagnosis Date   • Anxiety    • Benign essential hypertension    • Cervical disc disorder    • Chronic pain disorder    • Colon polyps    • Complication  of device    • Decreased libido    • Dizziness    • Encounter for long-term (current) use of medications    • Extremity pain    • Fatigue    • Hip pain    • History of alopecia    • History of backache    • History of colonoscopy     Fiberoptic   • History of diabetes mellitus    • History of insomnia    • History of mastoiditis    • History of migraine headaches    • History of urinary stone    • Infection of kidney    • Insomnia    • Joint pain    • Kidney infection    • Low back pain    • Lumbar radiculopathy    • Lumbar radiculopathy    • Lumbosacral disc disease    • Migraine    • Myofascial pain syndrome    • Neck pain    • Palpitations    • Sleep apnea    • Spinal cord stimulator status    • Stress reaction, emotional    • Urinary incontinence    • Urinary tract infection    • Visit for screening mammogram     Description: 08/28/2009   • Vitamin D deficiency        Medications    Current Outpatient Prescriptions:   •  acarbose (PRECOSE) 100 MG tablet, Take 1 tablet by mouth 3 (Three) Times a Day With Meals., Disp: 90 tablet, Rfl: 2  •  acetaminophen (TYLENOL) 500 MG tablet, Take 1 tablet by mouth 2 (Two) Times a Day. Every 4 to 6 hours as needed, Disp: , Rfl:   •  albuterol (PROVENTIL HFA;VENTOLIN HFA) 108 (90 Base) MCG/ACT inhaler, Inhale 2 puffs Every 6 (Six) Hours As Needed for Wheezing., Disp: 1 inhaler, Rfl: 0  •  baclofen (LIORESAL) 10 MG tablet, TAKE AS INSTRUCTED BY YOUR PRESCRIBER, Disp: 270 tablet, Rfl: 3  •  desvenlafaxine (PRISTIQ) 50 MG 24 hr tablet, Take 1 tablet by mouth Daily., Disp: , Rfl:   •  diclofenac (VOLTAREN) 50 MG EC tablet, TAKE 1 TABLET TWICE A DAY WHEN NECESSARY FOR MILD TO MODERATE ACUTE BREAKTHROUGH PAIN, DO NOT TAKE ANY OTHER NSAIDS, Disp: 60 tablet, Rfl: 3  •  estradiol (ESTRACE VAGINAL) 0.1 MG/GM vaginal cream, Insert 2 g into the vagina 1 (One) Time Per Week., Disp: 42.5 g, Rfl: 5  •  exenatide er (BYDUREON BCISE) 2 MG/0.85ML auto-injector injection, Inject 0.85 mL under the  skin 1 (One) Time Per Week., Disp: 4 pen, Rfl: 6  •  hydrochlorothiazide (HYDRODIURIL) 25 MG tablet, 1/2 tablet daily as needed, Disp: 30 tablet, Rfl: 1  •  lidocaine (LIDODERM) 5 %, Apply 1 patch topically. To the affected area and leave in place for 12 hours, then remove and leave off for 12 hours, Disp: , Rfl:   •  Liniments (SALONPAS) pads, Apply  topically. prn, Disp: , Rfl:   •  NIFEdipine CC (ADALAT CC) 90 MG 24 hr tablet, , Disp: , Rfl:   •  NIFEdipine XL (PROCARDIA XL) 30 MG 24 hr tablet, Take 60 mg by mouth Daily., Disp: , Rfl:   •  rOPINIRole (REQUIP) 0.25 MG tablet, Take 1 tablet by mouth Every Night. Take 1 hour before bedtime. (Patient taking differently: Take 0.5 mg by mouth Every Night. Take 1 hour before bedtime.), Disp: 30 tablet, Rfl: 3  •  topiramate (TOPAMAX) 50 MG tablet, TAKE ONE TABLET BY MOUTH TWICE DAILY, Disp: 180 tablet, Rfl: 0  •  traMADol (ULTRAM) 50 MG tablet, A tablet PO TID PRN severe breakthrough pain, Disp: 270 tablet, Rfl: 0  •  traZODone (DESYREL) 50 MG tablet, TAKE 1 TO 2 TABLETS AT BEDTIME AS NEEDED, Disp: 180 tablet, Rfl: 2    Review of Systems  Review of Systems   Constitutional: Positive for fatigue. Negative for chills, fever and unexpected weight change.   HENT: Negative for ear pain, hearing loss, nosebleeds, rhinorrhea and sore throat.    Eyes: Positive for visual disturbance. Negative for pain, discharge, redness and itching.   Respiratory: Negative for cough, shortness of breath and wheezing.    Cardiovascular: Positive for leg swelling. Negative for chest pain and palpitations.   Gastrointestinal: Negative for abdominal pain, blood in stool, constipation and diarrhea.   Endocrine: Negative for cold intolerance, heat intolerance and polydipsia.   Genitourinary: Negative for dysuria, frequency, hematuria, pelvic pain, vaginal bleeding and vaginal discharge.   Musculoskeletal: Positive for back pain. Negative for arthralgias, gait problem, joint swelling and myalgias.  "  Skin: Negative for rash.   Allergic/Immunologic: Negative.    Neurological: Negative for dizziness, syncope, weakness, numbness and headaches.   Hematological: Negative for adenopathy. Does not bruise/bleed easily.   Psychiatric/Behavioral: Negative for sleep disturbance and suicidal ideas. The patient is not nervous/anxious.         Physical Exam    /64   Ht 157.5 cm (62\")   Wt 87.1 kg (192 lb)   LMP  (LMP Unknown)   SpO2 95%   BMI 35.12 kg/m² Body mass index is 35.12 kg/m².  Physical Exam   Constitutional: She is oriented to person, place, and time. She appears well-developed. No distress.   HENT:   Head: Normocephalic.   Right Ear: External ear normal.   Left Ear: External ear normal.   Mouth/Throat: No oropharyngeal exudate.   Eyes: Conjunctivae and lids are normal. Right eye exhibits no discharge. Left eye exhibits no discharge. Right pupil is reactive. Left pupil is reactive.   Neck: No JVD present. No tracheal deviation present. No thyroid mass and no thyromegaly present.   Cardiovascular: Normal rate, regular rhythm, normal heart sounds and intact distal pulses.    No murmur heard.  Pulmonary/Chest: Effort normal and breath sounds normal. No respiratory distress. She has no wheezes.   Abdominal: Soft. Bowel sounds are normal. There is no tenderness.   Musculoskeletal: She exhibits no edema or tenderness.     Vascular Status -  Her right foot exhibits no edema. Her left foot exhibits no edema.  Lymphadenopathy:     She has no cervical adenopathy.   Neurological: She is alert and oriented to person, place, and time.   Skin: Skin is warm, dry and intact. No rash noted. She is not diaphoretic. No erythema.   Psychiatric: She has a normal mood and affect. Her speech is normal and behavior is normal. Thought content normal.       Labs and Imaging   Lab Results   Component Value Date    HGBA1C 8.9 02/07/2018    HGBA1C 7.9 10/05/2017    HGBA1C 7.9 05/10/2017     No visits with results within 1 Month(s) " from this visit.   Latest known visit with results is:   Office Visit on 02/20/2018   Component Date Value Ref Range Status   • Creatinine, Urine 02/20/2018 153.7  Not Estab. mg/dL Final   • Microalbumin, Urine 02/20/2018 8.0  Not Estab. ug/mL Final   • Microalbumin/Creatinine Ratio 02/20/2018 5.2  0.0 - 30.0 Final   • TSH 02/20/2018 1.465  0.350 - 5.350 mIU/mL Final   • Free T4 02/20/2018 1.30  0.89 - 1.76 ng/dL Final   • Glucose 02/20/2018 209* 70 - 130 mg/dL Final     No images are attached to the encounter or orders placed in the encounter.  Xr Spine Thoracic 1 View    Result Date: 12/21/2017  Spinal cord stimulator terminating at the T7 superior endplate level.  D:  12/21/2017 E:  12/21/2017  This report was finalized on 12/21/2017 5:12 PM by Dr. Kulwinder Cesar.        Assessment / Plan   Yi was seen today for diabetes.    Diagnoses and all orders for this visit:    Mixed hyperlipidemia  -     Lipid Panel    Type 2 diabetes mellitus without complication, without long-term current use of insulin  -     exenatide er (BYDUREON BCISE) 2 MG/0.85ML auto-injector injection; Inject 0.85 mL under the skin 1 (One) Time Per Week.        Diabetes Mellitus 2 is under inadequate control.  -A1c 6.7, down from 8.9 2/2018  -resume bydureon bcise 2mg sc weekly. Reports hypoglycemia before she stopped taking, but suspect cause was glimepiride, not Bydureon. No hx of pancreatitis. No personal or fam hx of thyroid ca or MEN2.  -dc glimepiride, januvia  -intolerant to metformin  -intolerant to statins  -bring meter to f/u- check sugar fasting and 2h pp  -she was instructed on how to use bydureon. She administered her first dose during visit.    1.  Diet: 3-4 carb servings per meal for females, 4-5 carb servings per meal for males  Spread carb intake throughout the day  Increase lean protein and vegetable intake  Avoid sugary drinks and processed carbs including crackers, cookies, cakes  2.  Exercise: Recommend at least 30 minutes  of exercise daily, at least 5 days per week. Increase exercise gradually.   3.  Blood Glucose Goal: Blood glucose goal <150 fasting, <180 2 hr postprandial  4.  Microalbumin due 2/2019  5.  Education performed during this visit: long term diabetic complications discussed. , annual eye examinations at Ophthalmology discussed, dental hygiene discussed  and foot care reviewed., home glucose monitoring emphasized, all medications, side effects and compliance discussed carefully and Hypoglycemia management and prevention reviewed. Reviewed ‘ABCs’ of diabetes management (respective goals in parentheses):  A1C (<7), blood pressure (<130/80), and cholesterol (LDL <100, if CVD <70).    There are no Patient Instructions on file for this visit.    Follow up: Return in about 3 months (around 9/13/2018).    Discussed the nature of the disease including, risks, complications, implications, management, safe and proper use of medications. Encouraged therapeutic lifestyle changes including low calorie diet with plenty of fruits and vegetables, daily exercise, medication compliance, and keeping scheduled follow up appointments with me and any other providers. Encouraged patient to have appointment for complete physical, fasting labs, appropriate screenings, and immunizations on an annual basis.    20 min  of 30 min face-to-face visit time spent for coordination of care and counselling regarding identified problems as outlined in the objective, assessment and discussion portions of the documentation.    Wero Hernandez PA-C

## 2018-06-15 DIAGNOSIS — E78.01 FAMILIAL HYPERCHOLESTEREMIA: Primary | ICD-10-CM

## 2018-07-25 ENCOUNTER — TELEPHONE (OUTPATIENT)
Dept: INTERNAL MEDICINE | Facility: CLINIC | Age: 63
End: 2018-07-25

## 2018-07-25 NOTE — TELEPHONE ENCOUNTER
Left message to return my call in regards to a request that I got from [facility] that I do not see on her med list.

## 2018-07-26 ENCOUNTER — HOSPITAL ENCOUNTER (OUTPATIENT)
Age: 63
End: 2018-07-26
Payer: MEDICARE

## 2018-07-26 DIAGNOSIS — I10: ICD-10-CM

## 2018-07-26 DIAGNOSIS — E78.2: Primary | ICD-10-CM

## 2018-07-26 DIAGNOSIS — R06.09: ICD-10-CM

## 2018-07-26 LAB
ANION GAP SERPL CALC-SCNC: 11.9 MEQ/L (ref 5–15)
BUN SERPL-MCNC: 18 MG/DL (ref 7–18)
CALCIUM SPEC-MCNC: 9.6 MG/DL (ref 8.5–10.1)
CHLORIDE SPEC-SCNC: 105 MMOL/L (ref 98–107)
CO2 SERPL-SCNC: 30 MMOL/L (ref 21–32)
CREAT BLD-SCNC: 0.77 MG/DL (ref 0.55–1.02)
ESTIMATED GLOMERULAR FILT RATE: 76 ML/MIN (ref 60–?)
GFR (AFRICAN AMERICAN): 93 ML/MIN (ref 60–?)
GLUCOSE: 163 MG/DL (ref 74–106)
POTASSIUM: 4.9 MMOL/L (ref 3.5–5.1)
SODIUM SPEC-SCNC: 142 MMOL/L (ref 136–145)

## 2018-07-26 PROCEDURE — 36415 COLL VENOUS BLD VENIPUNCTURE: CPT

## 2018-07-26 PROCEDURE — 80048 BASIC METABOLIC PNL TOTAL CA: CPT

## 2018-07-26 PROCEDURE — 83880 ASSAY OF NATRIURETIC PEPTIDE: CPT

## 2018-08-07 DIAGNOSIS — E11.9 TYPE 2 DIABETES MELLITUS WITHOUT COMPLICATION, WITHOUT LONG-TERM CURRENT USE OF INSULIN (HCC): ICD-10-CM

## 2018-09-14 ENCOUNTER — OFFICE VISIT (OUTPATIENT)
Dept: ENDOCRINOLOGY | Facility: CLINIC | Age: 63
End: 2018-09-14

## 2018-09-14 ENCOUNTER — OFFICE VISIT (OUTPATIENT)
Dept: INTERNAL MEDICINE | Facility: CLINIC | Age: 63
End: 2018-09-14

## 2018-09-14 VITALS
BODY MASS INDEX: 35.15 KG/M2 | SYSTOLIC BLOOD PRESSURE: 128 MMHG | WEIGHT: 191 LBS | OXYGEN SATURATION: 97 % | HEIGHT: 62 IN | HEART RATE: 82 BPM | DIASTOLIC BLOOD PRESSURE: 72 MMHG

## 2018-09-14 VITALS
DIASTOLIC BLOOD PRESSURE: 72 MMHG | WEIGHT: 191.06 LBS | BODY MASS INDEX: 34.95 KG/M2 | OXYGEN SATURATION: 97 % | SYSTOLIC BLOOD PRESSURE: 132 MMHG | HEART RATE: 80 BPM

## 2018-09-14 DIAGNOSIS — R10.12 LEFT UPPER QUADRANT PAIN: ICD-10-CM

## 2018-09-14 DIAGNOSIS — R11.0 NAUSEA: ICD-10-CM

## 2018-09-14 DIAGNOSIS — E78.2 MIXED HYPERLIPIDEMIA: ICD-10-CM

## 2018-09-14 DIAGNOSIS — E11.65 UNCONTROLLED TYPE 2 DIABETES MELLITUS WITH HYPERGLYCEMIA, WITHOUT LONG-TERM CURRENT USE OF INSULIN (HCC): Primary | ICD-10-CM

## 2018-09-14 DIAGNOSIS — K21.9 GASTROESOPHAGEAL REFLUX DISEASE WITHOUT ESOPHAGITIS: Primary | ICD-10-CM

## 2018-09-14 LAB
ALBUMIN SERPL-MCNC: 4.34 G/DL (ref 3.2–4.8)
ALBUMIN/GLOB SERPL: 1.9 G/DL (ref 1.5–2.5)
ALP SERPL-CCNC: 149 U/L (ref 25–100)
ALT SERPL W P-5'-P-CCNC: 132 U/L (ref 7–40)
AMYLASE SERPL-CCNC: 28 U/L (ref 30–118)
ANION GAP SERPL CALCULATED.3IONS-SCNC: 11 MMOL/L (ref 3–11)
ARTICHOKE IGE QN: 229 MG/DL (ref 0–130)
AST SERPL-CCNC: 125 U/L (ref 0–33)
BILIRUB SERPL-MCNC: 0.4 MG/DL (ref 0.3–1.2)
BUN BLD-MCNC: 18 MG/DL (ref 9–23)
BUN/CREAT SERPL: 24.7 (ref 7–25)
CALCIUM SPEC-SCNC: 9.7 MG/DL (ref 8.7–10.4)
CHLORIDE SERPL-SCNC: 101 MMOL/L (ref 99–109)
CHOLEST SERPL-MCNC: 284 MG/DL (ref 0–200)
CO2 SERPL-SCNC: 32 MMOL/L (ref 20–31)
CREAT BLD-MCNC: 0.73 MG/DL (ref 0.6–1.3)
GFR SERPL CREATININE-BSD FRML MDRD: 81 ML/MIN/1.73
GLOBULIN UR ELPH-MCNC: 2.3 GM/DL
GLUCOSE BLD-MCNC: 192 MG/DL (ref 70–100)
GLUCOSE BLDC GLUCOMTR-MCNC: 281 MG/DL (ref 70–130)
HBA1C MFR BLD: 7.7 %
HDLC SERPL-MCNC: 49 MG/DL (ref 40–60)
LIPASE SERPL-CCNC: 55 U/L (ref 6–51)
POTASSIUM BLD-SCNC: 4.4 MMOL/L (ref 3.5–5.5)
PROT SERPL-MCNC: 6.6 G/DL (ref 5.7–8.2)
SODIUM BLD-SCNC: 144 MMOL/L (ref 132–146)
TRIGL SERPL-MCNC: 452 MG/DL (ref 0–150)

## 2018-09-14 PROCEDURE — 83690 ASSAY OF LIPASE: CPT | Performed by: PHYSICIAN ASSISTANT

## 2018-09-14 PROCEDURE — 99214 OFFICE O/P EST MOD 30 MIN: CPT | Performed by: NURSE PRACTITIONER

## 2018-09-14 PROCEDURE — 82947 ASSAY GLUCOSE BLOOD QUANT: CPT | Performed by: PHYSICIAN ASSISTANT

## 2018-09-14 PROCEDURE — 80053 COMPREHEN METABOLIC PANEL: CPT | Performed by: PHYSICIAN ASSISTANT

## 2018-09-14 PROCEDURE — 80061 LIPID PANEL: CPT | Performed by: PHYSICIAN ASSISTANT

## 2018-09-14 PROCEDURE — 99214 OFFICE O/P EST MOD 30 MIN: CPT | Performed by: PHYSICIAN ASSISTANT

## 2018-09-14 PROCEDURE — 83036 HEMOGLOBIN GLYCOSYLATED A1C: CPT | Performed by: PHYSICIAN ASSISTANT

## 2018-09-14 PROCEDURE — 82150 ASSAY OF AMYLASE: CPT | Performed by: PHYSICIAN ASSISTANT

## 2018-09-14 RX ORDER — ONDANSETRON 4 MG/1
4 TABLET, ORALLY DISINTEGRATING ORAL EVERY 8 HOURS PRN
Qty: 21 TABLET | Refills: 2 | Status: SHIPPED | OUTPATIENT
Start: 2018-09-14 | End: 2018-10-31

## 2018-09-14 RX ORDER — OMEPRAZOLE 20 MG/1
20 CAPSULE, DELAYED RELEASE ORAL DAILY
Qty: 30 CAPSULE | Refills: 5 | Status: SHIPPED | OUTPATIENT
Start: 2018-09-14 | End: 2018-11-09

## 2018-09-14 RX ORDER — GLIMEPIRIDE 2 MG/1
2 TABLET ORAL
Qty: 90 TABLET | Refills: 1 | Status: SHIPPED | OUTPATIENT
Start: 2018-09-14 | End: 2019-01-07 | Stop reason: SINTOL

## 2018-09-14 NOTE — PROGRESS NOTES
Chief Complaint  F/u for Diabetes Mellitus.    HPI   Yi Garcia is a 62 y.o. female with chronic back pain and HTN- on multiple antihypertensives followed by cardiology in [place], who is here today for f/u of Diabetes Mellitus type 2. The initial diagnosis of diabetes was made approximately 2016.    C/o LUQ abdominal pain for 2-3 weeks, gnawing pain, constant, not affected by food, associated with some intermittent nausea. No vomiting. The pain reminds her of when she took a lot of ibuprofen in the past, although not taking any now. No melena or hematochezia.     A1C- 7.7 (9/14/18), 6.7 (6/13/18), 8.9 (2/2018)  Labs reviewed:   6/13/18- GFR 73, ,   2/20/18- TSH 1.465    Diabetic complications: none  Eye exam current (within one year): yes- no retinopathy  Foot care and dental care: discussed    Current diabetic medications include:  Bydureon 2mg sc weekly  ACEI/ARB: losartan-hctz  Statin: intolerant to statins, Repatha was not covered by insurance.     Past medications: metformin (???caused blurred vision, went away when stopped taking metformin), invokana (insurance didn't cover but thinks this controlled glucose better)    Diabetic Monitoring  - no meter or log    Nutrition:     Current diet: improving diet last 2 weeks. Eats out on Sunday, but cooks rest of week. Breakfast is egg sandwich on flatbread. Likes fruit. No sugary drinks. Got rid of candy in the house. No cakes. Weakness is break, pasta, potatoes. Likes vegetables.  Current exercise: none  Seen RD in past: yes    The following portions of the patient's history were reviewed and updated by me as appropriate: allergies, current medications, past family history, past social history, past surgical history and problem list.    Past Medical History:   Diagnosis Date   • Anxiety    • Benign essential hypertension    • Cervical disc disorder    • Chronic pain disorder    • Colon polyps    • Complication of device    • Decreased libido    •  Dizziness    • Encounter for long-term (current) use of medications    • Extremity pain    • Fatigue    • Hip pain    • History of alopecia    • History of backache    • History of colonoscopy     Fiberoptic   • History of diabetes mellitus    • History of insomnia    • History of mastoiditis    • History of migraine headaches    • History of urinary stone    • Infection of kidney    • Insomnia    • Joint pain    • Kidney infection    • Low back pain    • Lumbar radiculopathy    • Lumbar radiculopathy    • Lumbosacral disc disease    • Migraine    • Myofascial pain syndrome    • Neck pain    • Palpitations    • Sleep apnea    • Spinal cord stimulator status    • Stress reaction, emotional    • Urinary incontinence    • Urinary tract infection    • Visit for screening mammogram     Description: 08/28/2009   • Vitamin D deficiency        Medications    Current Outpatient Prescriptions:   •  acetaminophen (TYLENOL) 500 MG tablet, Take 1 tablet by mouth 2 (Two) Times a Day. Every 4 to 6 hours as needed, Disp: , Rfl:   •  albuterol (PROVENTIL HFA;VENTOLIN HFA) 108 (90 Base) MCG/ACT inhaler, Inhale 2 puffs Every 6 (Six) Hours As Needed for Wheezing., Disp: 1 inhaler, Rfl: 0  •  baclofen (LIORESAL) 10 MG tablet, TAKE AS INSTRUCTED BY YOUR PRESCRIBER, Disp: 270 tablet, Rfl: 3  •  desvenlafaxine (PRISTIQ) 50 MG 24 hr tablet, Take 1 tablet by mouth Daily., Disp: , Rfl:   •  diclofenac (VOLTAREN) 50 MG EC tablet, TAKE 1 TABLET TWICE A DAY WHEN NECESSARY FOR MILD TO MODERATE ACUTE BREAKTHROUGH PAIN, DO NOT TAKE ANY OTHER NSAIDS, Disp: 60 tablet, Rfl: 3  •  estradiol (ESTRACE VAGINAL) 0.1 MG/GM vaginal cream, Insert 2 g into the vagina 1 (One) Time Per Week., Disp: 42.5 g, Rfl: 5  •  exenatide er (BYDUREON BCISE) 2 MG/0.85ML auto-injector injection, Inject 0.85 mL under the skin into the appropriate area as directed 1 (One) Time Per Week., Disp: 4 pen, Rfl: 2  •  hydrochlorothiazide (HYDRODIURIL) 25 MG tablet, 1/2 tablet daily  as needed, Disp: 30 tablet, Rfl: 1  •  lidocaine (LIDODERM) 5 %, Apply 1 patch topically. To the affected area and leave in place for 12 hours, then remove and leave off for 12 hours, Disp: , Rfl:   •  Liniments (SALONPAS) pads, Apply  topically. prn, Disp: , Rfl:   •  NIFEdipine CC (ADALAT CC) 90 MG 24 hr tablet, , Disp: , Rfl:   •  rOPINIRole (REQUIP) 0.25 MG tablet, Take 1 tablet by mouth Every Night. Take 1 hour before bedtime. (Patient taking differently: Take 0.5 mg by mouth Every Night. Take 1 hour before bedtime.), Disp: 30 tablet, Rfl: 3  •  topiramate (TOPAMAX) 50 MG tablet, TAKE ONE TABLET BY MOUTH TWICE DAILY, Disp: 180 tablet, Rfl: 0  •  traMADol (ULTRAM) 50 MG tablet, A tablet PO TID PRN severe breakthrough pain, Disp: 270 tablet, Rfl: 0  •  traZODone (DESYREL) 50 MG tablet, TAKE 1 TO 2 TABLETS AT BEDTIME AS NEEDED, Disp: 180 tablet, Rfl: 2  •  acarbose (PRECOSE) 100 MG tablet, Take 1 tablet by mouth 3 (Three) Times a Day With Meals., Disp: 90 tablet, Rfl: 2  •  Alirocumab (PRALUENT) 75 MG/ML solution pen-injector, Inject 75 mg under the skin into the appropriate area as directed Every 14 (Fourteen) Days., Disp: 6 pen, Rfl: 1  •  glimepiride (AMARYL) 2 MG tablet, Take 1 tablet by mouth Every Morning Before Breakfast., Disp: 90 tablet, Rfl: 1  •  NIFEdipine XL (PROCARDIA XL) 30 MG 24 hr tablet, Take 60 mg by mouth Daily., Disp: , Rfl:     Review of Systems  Review of Systems   Constitutional: Positive for fatigue. Negative for chills, fever and unexpected weight change.   HENT: Negative for ear pain, hearing loss, nosebleeds, rhinorrhea and sore throat.    Eyes: Positive for visual disturbance. Negative for pain, discharge, redness and itching.   Respiratory: Negative for cough, shortness of breath and wheezing.    Cardiovascular: Positive for leg swelling. Negative for chest pain and palpitations.   Gastrointestinal: Negative for abdominal pain, blood in stool, constipation and diarrhea.   Endocrine:  Negative for cold intolerance, heat intolerance and polydipsia.   Genitourinary: Negative for dysuria, frequency, hematuria, pelvic pain, vaginal bleeding and vaginal discharge.   Musculoskeletal: Positive for back pain. Negative for arthralgias, gait problem, joint swelling and myalgias.   Skin: Negative for rash.   Allergic/Immunologic: Negative.    Neurological: Negative for dizziness, syncope, weakness, numbness and headaches.   Hematological: Negative for adenopathy. Does not bruise/bleed easily.   Psychiatric/Behavioral: Negative for sleep disturbance and suicidal ideas. The patient is not nervous/anxious.         Physical Exam    /72   Pulse 80   Wt 86.7 kg (191 lb 1 oz)   LMP  (LMP Unknown)   SpO2 97%   BMI 34.95 kg/m² Body mass index is 34.95 kg/m².  Physical Exam   Constitutional: She is oriented to person, place, and time. She appears well-developed. No distress.   HENT:   Head: Normocephalic.   Right Ear: External ear normal.   Left Ear: External ear normal.   Mouth/Throat: No oropharyngeal exudate.   Eyes: Conjunctivae and lids are normal. Right eye exhibits no discharge. Left eye exhibits no discharge. Right pupil is reactive. Left pupil is reactive.   Neck: No JVD present. No tracheal deviation present. No thyroid mass and no thyromegaly present.   Cardiovascular: Normal rate, regular rhythm, normal heart sounds and intact distal pulses.    No murmur heard.  Pulmonary/Chest: Effort normal and breath sounds normal. No respiratory distress. She has no wheezes.   Abdominal: Soft. Bowel sounds are normal. There is no tenderness.   Musculoskeletal: She exhibits no edema or tenderness.     Vascular Status -  Her right foot exhibits no edema. Her left foot exhibits no edema.  Lymphadenopathy:     She has no cervical adenopathy.   Neurological: She is alert and oriented to person, place, and time.   Skin: Skin is warm, dry and intact. No rash noted. She is not diaphoretic. No erythema.    Psychiatric: She has a normal mood and affect. Her speech is normal and behavior is normal. Thought content normal.       Labs and Imaging   Lab Results   Component Value Date    HGBA1C 7.7 09/14/2018    HGBA1C 8.9 02/07/2018    HGBA1C 7.9 10/05/2017     Office Visit on 09/14/2018   Component Date Value Ref Range Status   • Glucose 09/14/2018 281* 70 - 130 mg/dL Final   • Hemoglobin A1C 09/14/2018 7.7  % Final     No images are attached to the encounter or orders placed in the encounter.  Xr Spine Thoracic 1 View    Result Date: 12/21/2017  Spinal cord stimulator terminating at the T7 superior endplate level.  D:  12/21/2017 E:  12/21/2017  This report was finalized on 12/21/2017 5:12 PM by Dr. Kulwinder Cesar.        Assessment / Plan   Yi was seen today for follow-up.    Diagnoses and all orders for this visit:    Uncontrolled type 2 diabetes mellitus with hyperglycemia, without long-term current use of insulin (CMS/[facility])  -     POC Glucose Fingerstick  -     POC Glycosylated Hemoglobin (Hb A1C)  -     glimepiride (AMARYL) 2 MG tablet; Take 1 tablet by mouth Every Morning Before Breakfast.    Mixed hyperlipidemia  -     Alirocumab (PRALUENT) 75 MG/ML solution pen-injector; Inject 75 mg under the skin into the appropriate area as directed Every 14 (Fourteen) Days.  -     Lipid panel    Left upper quadrant pain  -     Amylase  -     Lipase        Diabetes Mellitus 2 is under good control.  -A1c 7.7, up from 6.7 6/2018  -continue bydureon bcise 2mg sc weekly.  -add back glimepiride at lower dose- 2mg ac biggest meal. Discussed risk of hypoglycemia if not taken before meal.    -intolerant to metformin  -intolerant to statins- will see if praluent covered  -bring meter to f/u- check sugar fasting and 2h pp    1.  Diet: 3-4 carb servings per meal for females, 4-5 carb servings per meal for males  Spread carb intake throughout the day  Increase lean protein and vegetable intake  Avoid sugary drinks and processed carbs  including crackers, cookies, cakes  2.  Exercise: Recommend at least 30 minutes of exercise daily, at least 5 days per week. Increase exercise gradually.   3.  Blood Glucose Goal: Blood glucose goal <150 fasting, <180 2 hr postprandial  4.  Microalbumin due 2/2019  5.  Education performed during this visit: long term diabetic complications discussed. , annual eye examinations at Ophthalmology discussed, dental hygiene discussed  and foot care reviewed., home glucose monitoring emphasized, all medications, side effects and compliance discussed carefully and Hypoglycemia management and prevention reviewed. Reviewed ‘ABCs’ of diabetes management (respective goals in parentheses):  A1C (<7), blood pressure (<130/80), and cholesterol (LDL <100, if CVD <70).    Abdominal pain  -will check amylase and lipase since she is on bydureon, but unlikely to be the culprit  -she sees pcp this afternoon and will discuss    There are no Patient Instructions on file for this visit.    Follow up: Return in about 3 months (around 12/14/2018).    Discussed the nature of the disease including, risks, complications, implications, management, safe and proper use of medications. Encouraged therapeutic lifestyle changes including low calorie diet with plenty of fruits and vegetables, daily exercise, medication compliance, and keeping scheduled follow up appointments with me and any other providers. Encouraged patient to have appointment for complete physical, fasting labs, appropriate screenings, and immunizations on an annual basis.    20 min  of 30 min face-to-face visit time spent for coordination of care and counselling regarding identified problems as outlined in the objective, assessment and discussion portions of the documentation.    Wero Hernandez PA-C

## 2018-09-14 NOTE — PROGRESS NOTES
CHIEF COMPLAINT  Abdominal Pain (Left side under breast area) and Diabetes (Seen Vedrana earlier, A1C is 7.7)      HPI  Yi Garcia is a 62 y.o. female is here today for follow-up of abd pain and diabetes    3 wk hx of LUQ pain, increased reflux, heartburn, nausea; neg vomiting, diarrhea;     Visit with yadira today, A1C back up to 7.7; glymepiride 2 mg; also bydureon 2 mg SC weekly;       Past Medical History:   Diagnosis Date   • Anxiety    • Benign essential hypertension    • Cervical disc disorder    • Chronic pain disorder    • Colon polyps    • Complication of device    • Decreased libido    • Dizziness    • Encounter for long-term (current) use of medications    • Extremity pain    • Fatigue    • Hip pain    • History of alopecia    • History of backache    • History of colonoscopy     Fiberoptic   • History of diabetes mellitus    • History of insomnia    • History of mastoiditis    • History of migraine headaches    • History of urinary stone    • Infection of kidney    • Insomnia    • Joint pain    • Kidney infection    • Low back pain    • Lumbar radiculopathy    • Lumbar radiculopathy    • Lumbosacral disc disease    • Migraine    • Myofascial pain syndrome    • Neck pain    • Palpitations    • Sleep apnea    • Spinal cord stimulator status    • Stress reaction, emotional    • Urinary incontinence    • Urinary tract infection    • Visit for screening mammogram     Description: 08/28/2009   • Vitamin D deficiency        Past Surgical History:   Procedure Laterality Date   • BACK SURGERY      Lower Back   • BLADDER SURGERY     • COLONOSCOPY      Complete Colonoscopy   • HYSTERECTOMY      Total Abdominal Hysterectomy (Description: BSO)   • OTHER SURGICAL HISTORY      Elbow Surgery   • OTHER SURGICAL HISTORY      Spinal Sterotaxis Stimulation Of Cord   • SPINAL CORD STIMULATOR IMPLANT  2015    replacement       Family History   Problem Relation Age of Onset   • Hypertension Mother    • Peripheral  "vascular disease Mother    • Lung disease Father    • Diabetes Other         type 2       Social History     Social History   • Marital status:      Spouse name: N/A   • Number of children: N/A   • Years of education: N/A     Occupational History   • Not on file.     Social History Main Topics   • Smoking status: Never Smoker   • Smokeless tobacco: Never Used   • Alcohol use No   • Drug use: No   • Sexual activity: Defer     Other Topics Concern   • Not on file     Social History Narrative   • No narrative on file       The following portions of the patient's history were reviewed and updated as appropriate: allergies, current medications, past family history, past medical history, past social history, past surgical history and problem list.    ROS  Review of Systems   Constitutional: Positive for activity change and appetite change. Negative for fatigue.   Gastrointestinal: Positive for abdominal pain and nausea. Negative for abdominal distention, diarrhea and vomiting.   All other systems reviewed and are negative.      /72   Pulse 82   Ht 157.5 cm (62\")   Wt 86.6 kg (191 lb)   LMP  (LMP Unknown)   SpO2 97%   Breastfeeding? No   BMI 34.93 kg/m²     PHYSICAL EXAM  Physical Exam   Constitutional: She is oriented to person, place, and time. She appears well-developed and well-nourished.   HENT:   Head: Normocephalic and atraumatic.   Eyes: Conjunctivae are normal.   Neck: Trachea normal and normal range of motion. Neck supple. No JVD present. No thyromegaly present.   Cardiovascular: Normal rate, regular rhythm and normal heart sounds.    No murmur heard.  No swelling in BLE   Pulmonary/Chest: Effort normal and breath sounds normal.   Abdominal:   abd soft, RUQ and epigastric tenderness; non-distended; neg Medrano's sign; neg HSM, guarding, or rigidity   Neurological: She is alert and oriented to person, place, and time.   Skin: Skin is warm, dry and intact.   Vitals " reviewed.        ASSESSMENT/PLAN    1. Gastroesophageal reflux disease without esophagitis  -start PPI  - omeprazole (priLOSEC) 20 MG capsule; Take 1 capsule by mouth Daily.  Dispense: 30 capsule; Refill: 5  - US Abdomen Complete; Future    2. Nausea  -antiemetic therapy   - ondansetron ODT (ZOFRAN-ODT) 4 MG disintegrating tablet; Take 1 tablet by mouth Every 8 (Eight) Hours As Needed for Nausea or Vomiting.  Dispense: 21 tablet; Refill: 2  - US Abdomen Complete; Future    3. Left upper quadrant pain  -check LFTs and kidney fxn  - Comprehensive metabolic panel  - US Abdomen Complete; Future      Plan of care reviewed with patient at the conclusion of today's visit. Education was provided in regards to diagnosis, management and any prescribed or recommended OTC medications.  Patient verbalizes Understanding of and agreement with management plan.    FOLLOW-UP  3 month(s) or sooner depending on result of abd u/s    RTC as needed      Ofelia Boone, OCTAVIANO  09/14/2018

## 2018-09-18 DIAGNOSIS — E83.52 HYPERCALCEMIA: ICD-10-CM

## 2018-09-18 DIAGNOSIS — R74.8 ELEVATED LIVER ENZYMES: Primary | ICD-10-CM

## 2018-09-18 DIAGNOSIS — R10.10 PAIN OF UPPER ABDOMEN: ICD-10-CM

## 2018-09-25 ENCOUNTER — TELEPHONE (OUTPATIENT)
Dept: INTERNAL MEDICINE | Facility: CLINIC | Age: 63
End: 2018-09-25

## 2018-09-25 NOTE — TELEPHONE ENCOUNTER
Patient returned call, states she has been seen at this office for long time and any medication should be in her chart. She does not remember the names of the medications.

## 2018-09-25 NOTE — TELEPHONE ENCOUNTER
LM on VM that I went through med list to 2013 and I do not see any statins on the list. Advised her to call with any info she may have to when and where she may have gotten the medication. The PA will not will not be approved if I can not show that she has in fact tried statins and is intolerant.

## 2018-09-25 NOTE — TELEPHONE ENCOUNTER
LM on VM asking for a return call. I need to know what statins she has tried and for how long along with side effects in order to proceed with PA for Praluent.

## 2018-09-26 ENCOUNTER — HOSPITAL ENCOUNTER (OUTPATIENT)
Dept: ULTRASOUND IMAGING | Facility: HOSPITAL | Age: 63
Discharge: HOME | End: 2018-09-26
Admitting: NURSE PRACTITIONER

## 2018-09-26 ENCOUNTER — LAB (OUTPATIENT)
Dept: INTERNAL MEDICINE | Facility: CLINIC | Age: 63
End: 2018-09-26

## 2018-09-26 DIAGNOSIS — R10.12 LEFT UPPER QUADRANT PAIN: ICD-10-CM

## 2018-09-26 DIAGNOSIS — E83.52 HYPERCALCEMIA: ICD-10-CM

## 2018-09-26 DIAGNOSIS — R74.8 ELEVATED LIVER ENZYMES: ICD-10-CM

## 2018-09-26 DIAGNOSIS — R11.0 NAUSEA: ICD-10-CM

## 2018-09-26 DIAGNOSIS — R10.10 PAIN OF UPPER ABDOMEN: ICD-10-CM

## 2018-09-26 DIAGNOSIS — K21.9 GASTROESOPHAGEAL REFLUX DISEASE WITHOUT ESOPHAGITIS: ICD-10-CM

## 2018-09-26 LAB
BASOPHILS # BLD AUTO: 0.04 10*3/MM3 (ref 0–0.2)
BASOPHILS NFR BLD AUTO: 0.6 % (ref 0–1)
DEPRECATED RDW RBC AUTO: 43.3 FL (ref 37–54)
EOSINOPHIL # BLD AUTO: 0.18 10*3/MM3 (ref 0–0.3)
EOSINOPHIL NFR BLD AUTO: 2.5 % (ref 0–3)
ERYTHROCYTE [DISTWIDTH] IN BLOOD BY AUTOMATED COUNT: 13.1 % (ref 11.3–14.5)
GGT SERPL-CCNC: 59 U/L (ref 0–37)
HAV IGM SERPL QL IA: NORMAL
HBV CORE IGM SERPL QL IA: NORMAL
HBV SURFACE AG SERPL QL IA: NORMAL
HCT VFR BLD AUTO: 44.8 % (ref 34.5–44)
HCV AB SER DONR QL: NORMAL
HGB BLD-MCNC: 14.4 G/DL (ref 11.5–15.5)
IMM GRANULOCYTES # BLD: 0.02 10*3/MM3 (ref 0–0.03)
IMM GRANULOCYTES NFR BLD: 0.3 % (ref 0–0.6)
LYMPHOCYTES # BLD AUTO: 2.33 10*3/MM3 (ref 0.6–4.8)
LYMPHOCYTES NFR BLD AUTO: 32.2 % (ref 24–44)
MCH RBC QN AUTO: 29.2 PG (ref 27–31)
MCHC RBC AUTO-ENTMCNC: 32.1 G/DL (ref 32–36)
MCV RBC AUTO: 90.9 FL (ref 80–99)
MONOCYTES # BLD AUTO: 0.41 10*3/MM3 (ref 0–1)
MONOCYTES NFR BLD AUTO: 5.7 % (ref 0–12)
NEUTROPHILS # BLD AUTO: 4.27 10*3/MM3 (ref 1.5–8.3)
NEUTROPHILS NFR BLD AUTO: 59 % (ref 41–71)
PLATELET # BLD AUTO: 253 10*3/MM3 (ref 150–450)
PMV BLD AUTO: 11.5 FL (ref 6–12)
PTH-INTACT SERPL-MCNC: 30.9 PG/ML (ref 11–80)
RBC # BLD AUTO: 4.93 10*6/MM3 (ref 3.89–5.14)
WBC NRBC COR # BLD: 7.23 10*3/MM3 (ref 3.5–10.8)

## 2018-09-26 PROCEDURE — 82977 ASSAY OF GGT: CPT | Performed by: NURSE PRACTITIONER

## 2018-09-26 PROCEDURE — 76700 US EXAM ABDOM COMPLETE: CPT

## 2018-09-26 PROCEDURE — 85025 COMPLETE CBC W/AUTO DIFF WBC: CPT | Performed by: NURSE PRACTITIONER

## 2018-09-26 PROCEDURE — 83970 ASSAY OF PARATHORMONE: CPT | Performed by: NURSE PRACTITIONER

## 2018-09-26 PROCEDURE — 80074 ACUTE HEPATITIS PANEL: CPT | Performed by: NURSE PRACTITIONER

## 2018-09-27 DIAGNOSIS — K76.0 STEATOSIS OF LIVER: ICD-10-CM

## 2018-09-27 DIAGNOSIS — R74.8 ELEVATED LIVER ENZYMES: Primary | ICD-10-CM

## 2018-10-01 ENCOUNTER — TELEPHONE (OUTPATIENT)
Dept: INTERNAL MEDICINE | Facility: CLINIC | Age: 63
End: 2018-10-01

## 2018-10-01 NOTE — TELEPHONE ENCOUNTER
They have been elevated since the last blood draw, that's why I have ordered so many tests--indicative of fatty liver disease--she needs to see Gastro

## 2018-10-01 NOTE — TELEPHONE ENCOUNTER
The labs nor the u/s report are in my In basket, so I have sent result notes on these--why have they not been called to her?    U/s report shows she has a fatty liver which is why liver fxns are elevated--I placed a referral to gastroenterology    This was all in the result note!!!

## 2018-10-01 NOTE — TELEPHONE ENCOUNTER
I did call her with the labs, she wanted to know what the liver elevations meant and didn't want to tell her the wrong thing.

## 2018-10-01 NOTE — TELEPHONE ENCOUNTER
PT WAS RETURNING A CALL SHE STATES THAT SHE HAS RECEIVED FROM OUR OFFICE AND WOULD LIKE TO GET A CALL BACK -[phone]

## 2018-10-01 NOTE — TELEPHONE ENCOUNTER
PATIENT IS RETURNING OUR CALL ABOUT TEST RESULTS AND ABOUT GETTING APPOINTMENT WITH GASTROENTEROLOGIST AS SHE HAS NOT HEARD THE RESULTS OF HER RECENT U/S. YOU CAN REACH HER BACK [phone]

## 2018-10-01 NOTE — TELEPHONE ENCOUNTER
Pt would like to know what she should do next with latest lab results and would also like to know the u/s results.

## 2018-10-02 NOTE — TELEPHONE ENCOUNTER
PATIENT IS CALLING BACK TO GET THE RESULTS OF HER RECENT LIVER AND PANCREAS RESULTS AND U/S REPORT OF PANCREAS. YOU CAN REACH HER BACK [phone]

## 2018-10-26 RX ORDER — TRAZODONE HYDROCHLORIDE 50 MG/1
TABLET ORAL
Qty: 180 TABLET | Refills: 2 | OUTPATIENT
Start: 2018-10-26

## 2018-10-31 ENCOUNTER — OFFICE VISIT (OUTPATIENT)
Dept: PAIN MEDICINE | Facility: CLINIC | Age: 63
End: 2018-10-31

## 2018-10-31 ENCOUNTER — LAB (OUTPATIENT)
Dept: LAB | Facility: HOSPITAL | Age: 63
End: 2018-10-31

## 2018-10-31 VITALS
OXYGEN SATURATION: 92 % | BODY MASS INDEX: 35 KG/M2 | TEMPERATURE: 98 F | HEIGHT: 62 IN | RESPIRATION RATE: 18 BRPM | WEIGHT: 190.2 LBS | SYSTOLIC BLOOD PRESSURE: 112 MMHG | HEART RATE: 67 BPM | DIASTOLIC BLOOD PRESSURE: 70 MMHG

## 2018-10-31 DIAGNOSIS — R53.81 PHYSICAL DECONDITIONING: ICD-10-CM

## 2018-10-31 DIAGNOSIS — M79.18 MYOFASCIAL PAIN SYNDROME: ICD-10-CM

## 2018-10-31 DIAGNOSIS — E66.8 MODERATE OBESITY: ICD-10-CM

## 2018-10-31 DIAGNOSIS — M53.3 SACROILIAC JOINT DYSFUNCTION OF LEFT SIDE: ICD-10-CM

## 2018-10-31 DIAGNOSIS — G47.00 INSOMNIA, UNSPECIFIED TYPE: ICD-10-CM

## 2018-10-31 DIAGNOSIS — G96.12: Primary | ICD-10-CM

## 2018-10-31 DIAGNOSIS — E11.69 DIABETES MELLITUS TYPE 2 IN OBESE (HCC): ICD-10-CM

## 2018-10-31 DIAGNOSIS — Z51.81 THERAPEUTIC DRUG MONITORING: ICD-10-CM

## 2018-10-31 DIAGNOSIS — E66.9 DIABETES MELLITUS TYPE 2 IN OBESE (HCC): ICD-10-CM

## 2018-10-31 DIAGNOSIS — M47.816 LUMBAR FACET ARTHROPATHY: ICD-10-CM

## 2018-10-31 DIAGNOSIS — M96.1 POSTLAMINECTOMY SYNDROME OF LUMBAR REGION: ICD-10-CM

## 2018-10-31 DIAGNOSIS — M51.26 HERNIATED LUMBAR INTERVERTEBRAL DISC: ICD-10-CM

## 2018-10-31 LAB
ALBUMIN SERPL-MCNC: 4.7 G/DL (ref 3.2–4.8)
ALBUMIN/GLOB SERPL: 1.9 G/DL (ref 1.5–2.5)
ALP SERPL-CCNC: 153 U/L (ref 25–100)
ALT SERPL W P-5'-P-CCNC: 114 U/L (ref 7–40)
AMPHET+METHAMPHET UR QL: NEGATIVE
AMPHETAMINES UR QL: NEGATIVE
ANION GAP SERPL CALCULATED.3IONS-SCNC: 8 MMOL/L (ref 3–11)
AST SERPL-CCNC: 74 U/L (ref 0–33)
BARBITURATES UR QL SCN: NEGATIVE
BENZODIAZ UR QL SCN: NEGATIVE
BILIRUB SERPL-MCNC: 0.5 MG/DL (ref 0.3–1.2)
BUN BLD-MCNC: 21 MG/DL (ref 9–23)
BUN/CREAT SERPL: 26.3 (ref 7–25)
BUPRENORPHINE SERPL-MCNC: NEGATIVE NG/ML
CALCIUM SPEC-SCNC: 9.9 MG/DL (ref 8.7–10.4)
CANNABINOIDS SERPL QL: NEGATIVE
CHLORIDE SERPL-SCNC: 103 MMOL/L (ref 99–109)
CO2 SERPL-SCNC: 29 MMOL/L (ref 20–31)
COCAINE UR QL: NEGATIVE
CREAT BLD-MCNC: 0.8 MG/DL (ref 0.6–1.3)
GFR SERPL CREATININE-BSD FRML MDRD: 73 ML/MIN/1.73
GLOBULIN UR ELPH-MCNC: 2.5 GM/DL
GLUCOSE BLD-MCNC: 94 MG/DL (ref 70–100)
METHADONE UR QL SCN: NEGATIVE
OPIATES UR QL: NEGATIVE
OXYCODONE UR QL SCN: NEGATIVE
PCP UR QL SCN: NEGATIVE
POTASSIUM BLD-SCNC: 4.8 MMOL/L (ref 3.5–5.5)
PROPOXYPH UR QL: NEGATIVE
PROT SERPL-MCNC: 7.2 G/DL (ref 5.7–8.2)
SODIUM BLD-SCNC: 140 MMOL/L (ref 132–146)
TRICYCLICS UR QL SCN: NEGATIVE

## 2018-10-31 PROCEDURE — 80306 DRUG TEST PRSMV INSTRMNT: CPT | Performed by: PHYSICIAN ASSISTANT

## 2018-10-31 PROCEDURE — 36415 COLL VENOUS BLD VENIPUNCTURE: CPT

## 2018-10-31 PROCEDURE — 99213 OFFICE O/P EST LOW 20 MIN: CPT | Performed by: PHYSICIAN ASSISTANT

## 2018-10-31 PROCEDURE — 80053 COMPREHEN METABOLIC PANEL: CPT

## 2018-10-31 RX ORDER — BISOPROLOL FUMARATE 5 MG/1
5 TABLET, FILM COATED ORAL DAILY
COMMUNITY
End: 2022-06-06

## 2018-10-31 RX ORDER — LOSARTAN POTASSIUM 100 MG/1
100 TABLET ORAL DAILY
COMMUNITY
End: 2022-08-10 | Stop reason: SDUPTHER

## 2018-10-31 RX ORDER — ASPIRIN 81 MG/1
81 TABLET ORAL DAILY
COMMUNITY
End: 2019-08-13 | Stop reason: ALTCHOICE

## 2018-10-31 RX ORDER — TRAZODONE HYDROCHLORIDE 50 MG/1
TABLET ORAL
Qty: 180 TABLET | Refills: 2 | Status: SHIPPED | OUTPATIENT
Start: 2018-10-31 | End: 2019-03-14 | Stop reason: SDUPTHER

## 2018-10-31 RX ORDER — BISOPROLOL FUMARATE 5 MG/1
2.5 TABLET ORAL DAILY
COMMUNITY
End: 2018-11-09 | Stop reason: ALTCHOICE

## 2018-10-31 RX ORDER — BACLOFEN 10 MG/1
10 TABLET ORAL 2 TIMES DAILY PRN
Qty: 270 TABLET | Refills: 3 | Status: SHIPPED | OUTPATIENT
Start: 2018-10-31 | End: 2018-11-05 | Stop reason: SDUPTHER

## 2018-10-31 RX ORDER — FUROSEMIDE 20 MG/1
20 TABLET ORAL AS NEEDED
COMMUNITY

## 2018-10-31 NOTE — PROGRESS NOTES
"Chief Complain: \"I need my medications refilled.\"     Brief History: Ms. Garcia has a history of chronic lower back and left leg pain.  She presents to the clinic today for medication refills.  She takes baclofen, trazodone, diclofenac, and tramadol for her chronic pain.  Patient reports that she is doing well with the medications, and she denies any adverse effects.  She was last seen on 02/05/2018 for SCS reprogramming.  Patient states that she has not used her SCS since last reprogrammed because she \"feels better without it.\"   Of note, patient states that recent labs (September 2018) ordered by PCP showed elevated liver enzymes.  Patient states that she has a gastroenterology consultation on 11/09/2018.  Pain level ranges from 1/10 to 8/10 without the use of her spinal cord stimulator.  Pain location: Left lower back, buttocks, and left leg.  Quality of pain: deep ache and burning  Radiation of pain: Left gluteal region to the posterior aspect of left thigh, left calf, and into the dorsal aspect of her left foot.  Current analgesics: baclofen, tramadol, diclofenac, Lidocaine Patches and Tylenol    I have reviewed [facility] #[id number] consistent with medication reconciliation.      Global Pain Scale 10-          Pain 13          Feelings 16          Clinical outcomes 17          Activities 15          GPS Total: 61            Diagnostic Studies:   XR SPINE, THORACIC, 1 VW-12/21/2017: No evidence for acute fracture, subluxation or dislocation of the thoracic spine with spinal cord stimulator terminating at the T7 superior endplate level.        CT SCAN OF THE THORACIC SPINE WO CONTRAST-09/13/2017: CT scan of the thoracic spine was performed in the axial plane at 2 mm intervals. Images were acquired in the axial plane with images displayed in the axial as well as reconstructed sagittal and coronal projections. There are findings of laminectomy at T9 and T10 where there is insertion of a spinal cord stimulator " which extends to the level of T7. There is no spinal stenosis. There are  degenerative changes primarily manifest by anterior osteophytes in the mid and lower thoracic region. No paraspinous mass. There is no compression fracture. There is no blastic or lytic process.  IMPRESSION: There is evidence of laminectomy at T9 and T10 where there is insertion of a spinal cord stimulator which is placed at the level of T7. There are mild diffuse osteoarthritic changes. There is no compression fracture or subluxation. There is no spinal stenosis.  UDS on 04/27/2017, appropriate  UDS on 12/06/2016, appropriate  X-rays of the sacroiliac joints from 04/19/2016 revealed lumbosacral and SI arthropathy.  MRI of the lumbar spine from June 2013 revealed lumbar facet hypertrophy from L2-L3 through L5-S1. L4-L5 moderate disc bulge lateralizes towards the right. Moderate right and mild left neuroforaminal stenosis. L5-S1 moderate left foraminal disc protrusion combined with facet hypertrophy creating moderate left neuroforaminal stenosis with no significant canal stenosis.  X-ray of the pelvis on 10/07/2014, revealed mild sclerosis about the right sacroiliac joint.  X-ray of the thoracic spine on 09/09/2014, revealed spinal cord stimulator electrodes positioned at T7. The thoracic spine otherwise demonstrates mild degenerative change without acute abnormality.    X-rays of the sacroiliac joints from 04/19/2016 revealed lumbosacral and SI arthropathy.XR SPINE, THORACIC, 1 VW-12/21/2017: No evidence for acute fracture, subluxation or dislocation of the thoracic spine with spinal cord stimulator terminating at the T7 superior endplate level.          The following portions of the patient's history were reviewed and updated as appropriate: problem list, past medical history, past surgery history, social history, family history, medications, and allergies     Review of Systems   Musculoskeletal: Positive for arthralgias, back pain, joint  "swelling and myalgias.   Neurological: Positive for headaches.   All other systems reviewed and are negative.     /70   Pulse 67   Temp 98 °F (36.7 °C) (Temporal Artery )   Resp 18   Ht 157.5 cm (62\")   Wt 86.3 kg (190 lb 3.2 oz)   LMP  (LMP Unknown)   SpO2 92%   BMI 34.79 kg/m²      Physical Exam   Neurologic Exam  Constitutional: No acute distress, well appearing. Obese.   Head and Face: Normal.   Eyes: Conjunctiva and lids: No swelling, erythema or discharge. Pupils: Equal, round, reactive to light.   Pulmonary Respiratory effort: No increased work of breathing or signs of respiratory distress. Auscultation of lungs: Clear to auscultation bilaterally.   Cardiovascular Auscultation of heart: Normal rate and rhythm, normal S1 and S2, no murmurs.   Musculoskeletal Gait and station: Normal.    The range of motion of the lumbar spine is limited but without significant pain. Lumbar facet joint loading maneuvers are negative.   Krish and Gaenslen's tests are negative.   The range of motion of the hip joints is full and without pain.   Muscle tone is normal.   Muscle strength/tone: Normal.   Skin and subcutaneous tissue: Normal without rashes or lesions.    Neurologic   Cranial nerves: Cranial nerves II-XII intact.   Cortical function: Normal mental status.   Reflexes: 2+ patellar bilaterally, 2+ Achilles bilaterally.   Straight leg raising test is positive on the left.  Femoral stretch sign is negative.   Negative clonus bilaterally.   Sensation: No sensory loss. Sensory exam: intact to light touch, intact pain and temperature sensation, intact vibration sensation and normal proprioception.   Coordination: Normal finger to nose and heel to shin. Coordination: normal balance and negative Romberg's sign.   Psychiatric: Judgment and insight: Normal. Orientation to person, place, and time: Normal. Recent and remote memory: Intact. Mood and affect: Normal.      ASSESSMENT:   1. Meningeal adhesions/lumbar " arachnoiditis    2. Postlaminectomy syndrome of lumbar region    3. Herniated lumbar intervertebral disc    4. Lumbar facet arthropathy/lumbar spondylosis without myelopathy    5. Sacroiliac joint dysfunction of left side    6. Myofascial pain syndrome    7. Diabetes mellitus type 2 in obese (CMS/HCC)    8. Moderate obesity    9. Physical deconditioning    10. Insomnia, unspecified type    11. Therapeutic drug monitoring       PLAN:   I have reviewed the above medical records, and discussed the patient with Dr. Velasquez.  1. Diagnostics: Random UDS and CMP.   2. Pharmacological measures.  Reviewed and discussed.  A. Continue topiramate 50 mg twice daily.  B. Continue trazodone  mg each bedtime for insomnia.  Refilled.  C. Diclofenac: Did not refill today.  Advised patient that we may need to discontinue if abnormal liver enzymes.  She is not currently taking, as she has been out.  Patient has an upcoming GI consult.  D. Continue baclofen 10 mg 1/2 to 1 tablet 1-2 times a day as needed muscle spasms. Refilled.  E.  Tramadol 50 mg 2-3 times a day as needed for severe breakthrough pain. Refill if unremarkable UDS.  Patient advised that she will need medication follow-up in 6 months in order to refill.  Screening and compliance with controlled substances: Patient has completed a SOAPP questionnaire and ORT questionnaires (revealing low risk).  Bernabe reports have been reviewed at each visit, and at least every three months and appropriate.  Patient has signed a consent for treatment with controlled substances after reviewing the document and verbalizing full understanding of the risks, benefits, alternatives, and consequences of compliance and adherence with treatment and comprehensive plan of care. Patient received in hand a copy of this document.  3. Follow-up as needed for SCS reprogramming.  4. The patient has been instructed to contact my office with any questions or difficulties. The patient understands the  plan and agrees to proceed accordingly.      Patient Care Team:  Ofeila Boone APRN as PCP - General (Family Medicine)  Ofelia Boone APRN as PCP - Internal Medicine (Family Medicine)  Rafael Velasquez MD as Consulting Physician (Pain Medicine)  Von Croft MD as Surgeon (Neurosurgery)  Emmie Stallworth MD as Consulting Physician (Internal Medicine)     New Medications Ordered This Visit   Medications   • traZODone (DESYREL) 50 MG tablet     Sig: Take 1 to 2 tabs po hs prn     Dispense:  180 tablet     Refill:  2   • baclofen (LIORESAL) 10 MG tablet     Sig: Take 1 tablet by mouth 2 (Two) Times a Day As Needed for Muscle Spasms. Take 1/2 to 1 tab po Qd to bid prn     Dispense:  270 tablet     Refill:  3         Future Appointments  Date Time Provider Department Center   11/9/2018 9:00 AM Zaira Briggs APRN [facility] None   12/14/2018 2:30 PM Wero Hernandez PA-C [facility] None   12/14/2018 3:15 PM Ofelia Boone APRN [facility] None         Gigi Rojo PA-C

## 2018-11-05 DIAGNOSIS — G96.12: ICD-10-CM

## 2018-11-05 DIAGNOSIS — M96.1 POSTLAMINECTOMY SYNDROME OF LUMBAR REGION: ICD-10-CM

## 2018-11-05 DIAGNOSIS — M51.26 HERNIATED LUMBAR INTERVERTEBRAL DISC: ICD-10-CM

## 2018-11-05 RX ORDER — BACLOFEN 10 MG/1
10 TABLET ORAL 2 TIMES DAILY PRN
Qty: 180 TABLET | Refills: 1 | Status: SHIPPED | OUTPATIENT
Start: 2018-11-05 | End: 2018-11-06 | Stop reason: SDUPTHER

## 2018-11-05 RX ORDER — TRAMADOL HYDROCHLORIDE 50 MG/1
TABLET ORAL
Qty: 270 TABLET | Refills: 0 | OUTPATIENT
Start: 2018-11-05 | End: 2019-03-05 | Stop reason: SDUPTHER

## 2018-11-06 RX ORDER — BACLOFEN 10 MG/1
TABLET ORAL
Qty: 180 TABLET | Refills: 1 | Status: SHIPPED | OUTPATIENT
Start: 2018-11-06 | End: 2019-03-14 | Stop reason: SDUPTHER

## 2018-11-09 ENCOUNTER — OFFICE VISIT (OUTPATIENT)
Dept: GASTROENTEROLOGY | Facility: CLINIC | Age: 63
End: 2018-11-09

## 2018-11-09 VITALS
DIASTOLIC BLOOD PRESSURE: 70 MMHG | HEIGHT: 62 IN | TEMPERATURE: 97.5 F | OXYGEN SATURATION: 91 % | WEIGHT: 191 LBS | SYSTOLIC BLOOD PRESSURE: 118 MMHG | HEART RATE: 85 BPM | BODY MASS INDEX: 35.15 KG/M2

## 2018-11-09 DIAGNOSIS — K21.9 CHRONIC GERD: ICD-10-CM

## 2018-11-09 DIAGNOSIS — K76.0 NAFLD (NONALCOHOLIC FATTY LIVER DISEASE): Primary | ICD-10-CM

## 2018-11-09 DIAGNOSIS — Z79.1 ENCOUNTER FOR LONG-TERM (CURRENT) USE OF NSAIDS: ICD-10-CM

## 2018-11-09 PROCEDURE — 99214 OFFICE O/P EST MOD 30 MIN: CPT | Performed by: NURSE PRACTITIONER

## 2018-11-09 RX ORDER — OMEPRAZOLE 40 MG/1
40 CAPSULE, DELAYED RELEASE ORAL EVERY MORNING
Qty: 30 CAPSULE | Refills: 2 | Status: SHIPPED | OUTPATIENT
Start: 2018-11-09 | End: 2019-03-04 | Stop reason: SDUPTHER

## 2018-11-09 NOTE — PATIENT INSTRUCTIONS
Nonalcoholic Fatty Liver Disease Diet  Nonalcoholic fatty liver disease is a condition that causes fat to accumulate in and around the liver. The disease makes it harder for the liver to work the way that it should. Following a healthy diet can help to keep nonalcoholic fatty liver disease under control. It can also help to prevent or improve conditions that are associated with the disease, such as heart disease, diabetes, high blood pressure, and abnormal cholesterol levels. Along with regular exercise, this diet:  · Promotes weight loss.  · Helps to control blood sugar levels.  · Helps to improve the way that the body uses insulin.    What do I need to know about this diet?  · Use the glycemic index (GI) to plan your meals. The index tells you how quickly a food will raise your blood sugar. Choose low-GI foods. These foods take a longer time to raise blood sugar.  · Keep track of how many calories you take in. Eating the right amount of calories will help you to achieve a healthy weight.  · You may want to follow a Mediterranean diet. This diet includes a lot of vegetables, lean meats or fish, whole grains, fruits, and healthy oils and fats.  What foods can I eat?  Grains  Whole grains, such as whole-wheat or whole-grain breads, crackers, tortillas, cereals, and pasta. Stone-ground whole wheat. Pumpernickel bread. Unsweetened oatmeal. Bulgur. Barley. Quinoa. Brown or wild rice. Corn or whole-wheat flour tortillas.  Vegetables  Lettuce. Spinach. Peas. Beets. Cauliflower. Cabbage. Broccoli. Carrots. Tomatoes. Squash. Eggplant. Herbs. Peppers. Onions. Cucumbers. [place] sprouts. Yams and sweet potatoes. Beans. Lentils.  Fruits  Bananas. Apples. Oranges. Grapes. Papaya. [place]. Pomegranate. Kiwi. Grapefruit. Cherries.  Meats and Other Protein Sources  Seafood and shellfish. Lean meats. Poultry. Tofu.  Dairy  Low-fat or fat-free dairy products, such as yogurt, cottage cheese, and cheese.  Beverages  Water. Sugar-free  drinks. Tea. Coffee. Low-fat or skim milk. Milk alternatives, such as soy or almond milk. Real fruit juice.  Condiments  Mustard. Relish. Low-fat, low-sugar ketchup and barbecue sauce. Low-fat or fat-free mayonnaise.  Sweets and Desserts  Sugar-free sweets.  Fats and Oils  Avocado. Canola or olive oil. Nuts and nut butters. Seeds.  The items listed above may not be a complete list of recommended foods or beverages. Contact your dietitian for more options.  What foods are not recommended?  Palm oil and coconut oil. Processed foods. Fried foods. Sweetened drinks, such as sweet tea, milkshakes, snow cones, iced sweet drinks, and sodas. Alcohol. Sweets. Foods that contain a lot of salt or sodium.  The items listed above may not be a complete list of foods and beverages to avoid. Contact your dietitian for more information.  This information is not intended to replace advice given to you by your health care provider. Make sure you discuss any questions you have with your health care provider.  Document Released: 05/03/2016 Document Revised: 05/25/2017 Document Reviewed: 01/12/2016  [employer] Interactive Patient Education © 2018 [employer]    Fatty Liver  Fatty liver, also called hepatic steatosis or steatohepatitis, is a condition in which too much fat has built up in your liver cells. The liver removes harmful substances from your bloodstream. It produces fluids your body needs. It also helps your body use and store energy from the food you eat.  In many cases, fatty liver does not cause symptoms or problems. It is often diagnosed when tests are being done for other reasons. However, over time, fatty liver can cause inflammation that may lead to more serious liver problems, such as scarring of the liver (cirrhosis).  What are the causes?  Causes of fatty liver may include:  · Drinking too much alcohol.  · Poor nutrition.  · Obesity.  · Cushing syndrome.  · Diabetes.  · Hyperlipidemia.  · Pregnancy.  · Certain  drugs.  · Poisons.  · Some viral infections.    What increases the risk?  You may be more likely to develop fatty liver if you:  · Abuse alcohol.  · Are pregnant.  · Are overweight.  · Have diabetes.  · Have hepatitis.  · Have a high triglyceride level.    What are the signs or symptoms?  Fatty liver often does not cause any symptoms. In cases where symptoms develop, they can include:  · Fatigue.  · Weakness.  · Weight loss.  · Confusion.  · Abdominal pain.  · Yellowing of your skin and the white parts of your eyes (jaundice).  · Nausea and vomiting.    How is this diagnosed?  Fatty liver may be diagnosed by:  · Physical exam and medical history.  · Blood tests.  · Imaging tests, such as an ultrasound, CT scan, or MRI.  · Liver biopsy. A small sample of liver tissue is removed using a needle. The sample is then looked at under a microscope.    How is this treated?  Fatty liver is often caused by other health conditions. Treatment for fatty liver may involve medicines and lifestyle changes to manage conditions such as:  · Alcoholism.  · High cholesterol.  · Diabetes.  · Being overweight or obese.    Follow these instructions at home:  · Eat a healthy diet as directed by your health care provider.  · Exercise regularly. This can help you lose weight and control your cholesterol and diabetes. Talk to your health care provider about an exercise plan and which activities are best for you.  · Do not drink alcohol.  · Take medicines only as directed by your health care provider.  Contact a health care provider if:  You have difficulty controlling your:  · Blood sugar.  · Cholesterol.  · Alcohol consumption.    Get help right away if:  · You have abdominal pain.  · You have jaundice.  · You have nausea and vomiting.  This information is not intended to replace advice given to you by your health care provider. Make sure you discuss any questions you have with your health care provider.  Document Released: 02/02/2007 Document  Revised: 05/25/2017 Document Reviewed: 04/29/2015  [employer] Interactive Patient Education © 2018 [employer]    Avoid NSAIDs (Non-Steroid Anti-Inflammatory Drugs) products. Some examples of these medications are  · Ibuprofen (Advil, Midol, Motrin)  · Naproxen (Aleve)  · BC Powder  · Exedrin  · Diclofenac (voltaren)  · Indocin (indomethacin)  · Mobic (meloxicam)    Talk to your doctor/medical provider for alternative approach for pain management such as physical therapy, exercises, muscle relaxants, topical lidocaine patch (i.e [place]) etc. You can take acetaminophen (i.e. Tylenol) for pain, but at most 4-5 tablets (325 mg tablet) a day.     Take NSAIDS only as directed by your doctor or medical provider.     Should you need to continue any NSAID products on a regular basis, please let me know so I can advise you taking medications to protect your stomach from having ulcer.      Be be aware of long-term and frequent NSAIDS' potential side effects. These include, but limited to, stomach ulcer, bleeding risks, and kidney injury.     Take the medication with food.    Call me if you have vomiting, abdominal pain, or black/bloody stools.

## 2018-11-09 NOTE — PROGRESS NOTES
GASTROENTEROLOGY OUTPATIENT NEW PATIENT NOTE  Patient: YI NAYAK : 1955  Date of Service: 2018  Dear Ofelia Ramsey APRN   Thank you for your referral of this patient for evaluation of Elevated Hepatic Enzymes  CC: Elevated Hepatic Enzymes  Assessment/Plan                                             ASSESSMENT & PLANS     Yi was seen today for elevated hepatic enzymes.    Diagnoses and all orders for this visit:    NAFLD (nonalcoholic fatty liver disease)  -     Ambulatory Referral to Nutrition Services    Chronic GERD  Encounter for long-term (current) use of NSAIDs  -     Esophagogastroduodenoscopy; Future  -     omeprazole (priLOSEC) 40 MG capsule; Take 1 capsule by mouth Every Morning. Take first thing in the morning on an empty stomach.  Wait at least 30 min to 1hr before eating.  · Pt is counseled on avoiding NSAIDS products. I encouraged pt to discuss with PCP to seek alternative approach for mgt such as physical therapy, exercises, muscle relaxants, etc.  Pt is given precaution should pt continue any NSAID product.  Pt was advised that NSAIDS' potential side effects include, but limited to, gastrointestinal irritation including bleeding, thromboycytopenia, and kidney injuries. Pt was asked to take the medication with food and to stop if pt experiences any GI upset. Pt is to call me if experiencing vomit, abdominal pain, or black/bloody stools.     Follow Up:Return in about 3 months (around 2019).      DISCUSSION: The above plan was delineated in details with patient and all questions and concerns were answered.  Patient is also given contact information.  Patient is to return as scheduled or sooner if new problems arise  Subjective                                                     SUBJECTIVE   History of Present Illness  Ms. Yi Nayak is a 63 y.o. female who presents to the clinic today for an evaluation of Elevated Hepatic Enzymes  184 lbs-187 lbs then 197 lbs over  a period of 2 months  Wants to eat healthy, but does not know how.    New meds:   Did not have have abd  Colonoscopy, done in [facility] in [place] about few yrs ago.  Reports of having polyp, but not due for it yet.      LUQ constant.  Onset since starting  Burning  Postprandial bloating  Hx of DM2 and HLP, but pt has difficulty tolerating different meds. Has not been consistent w/ meds for DM2 and HLP    Already a saw a dietician  Hx of back surgeries x 5    ROS:Review of Systems   Constitutional: Positive for fatigue and unexpected weight change (gain). Negative for activity change, appetite change and fever.   HENT: Negative for hearing loss, trouble swallowing and voice change.    Eyes: Negative for visual disturbance.   Respiratory: Negative for cough, choking, chest tightness and shortness of breath.    Cardiovascular: Negative for chest pain.   Gastrointestinal: Positive for abdominal distention (bloating), abdominal pain and nausea. Negative for anal bleeding, blood in stool, constipation, diarrhea, rectal pain and vomiting.        Heartburn   Endocrine: Negative for polydipsia and polyphagia.   Genitourinary: Positive for dysuria (burning) and hematuria.   Musculoskeletal: Positive for joint swelling. Negative for gait problem.   Skin: Negative for color change and rash.   Allergic/Immunologic: Negative for food allergies.   Neurological: Negative for dizziness, seizures and speech difficulty.   Hematological: Negative for adenopathy.   Psychiatric/Behavioral: Negative for confusion.     Subjective   PAST MED HX: Pt  has a past medical history of Anxiety; Benign essential hypertension; Cervical disc disorder; Chronic pain disorder; Colon polyps; Complication of device; Decreased libido; Dizziness; Encounter for long-term (current) use of medications; Extremity pain; Fatigue; Hip pain; History of alopecia; History of backache; History of colonoscopy; History of diabetes mellitus; History of  insomnia; History of mastoiditis; History of migraine headaches; History of urinary stone; Infection of kidney; Insomnia; Joint pain; Kidney infection; Low back pain; Lumbar radiculopathy; Lumbar radiculopathy; Lumbosacral disc disease; Migraine; Myofascial pain syndrome; Neck pain; Palpitations; Sleep apnea; Spinal cord stimulator status; Stress reaction, emotional; Urinary incontinence; Urinary tract infection; Visit for screening mammogram; and Vitamin D deficiency.  PAST SURG HX: Pt  has a past surgical history that includes Bladder surgery; Colonoscopy; Other surgical history; Back surgery; Other surgical history; Hysterectomy; and Spinal cord stimulator implant (2015).  FAM HX: family history includes Diabetes in her other; Hypertension in her mother; Lung disease in her father; Peripheral vascular disease in her mother; Ulcers in her mother.  SOC HX: Pt  reports that she has never smoked. She has never used smokeless tobacco. She reports that she does not drink alcohol or use drugs.  Objective                                                           OBJECTIVE   Allergy: Pt is allergic to ace inhibitors; amlodipine; beta adrenergic blockers; cyclobenzaprine; hydrochlorothiazide; hyzaar  [losartan potassium-hctz]; latex; losartan; metformin; penicillins; pioglitazone; and spironolactone.  MEDS: •  acetaminophen (TYLENOL) 500 MG tablet, Take 1 tablet by mouth 2 (Two) Times a Day. Every 4 to 6 hours as needed, Disp: , Rfl:   •  albuterol (PROVENTIL HFA;VENTOLIN HFA) 108 (90 Base) MCG/ACT inhaler, Inhale 2 puffs Every 6 (Six) Hours As Needed for Wheezing., Disp: 1 inhaler, Rfl: 0  •  Alirocumab (PRALUENT) 75 MG/ML solution pen-injector, Inject 75 mg under the skin into the appropriate area as directed Every 14 (Fourteen) Days., Disp: 6 pen, Rfl: 1  •  aspirin 81 MG EC tablet, Take 81 mg by mouth Daily., Disp: , Rfl:   •  baclofen (LIORESAL) 10 MG tablet, Take 1/2 to 1 tab po Qd to bid prn, Disp: 180 tablet, Rfl:  1  •  bisoprolol (ZEBeta) 5 MG tablet, Take 5 mg by mouth Daily., Disp: , Rfl:   •  desvenlafaxine (PRISTIQ) 50 MG 24 hr tablet, Take 1 tablet by mouth Daily., Disp: , Rfl:   •  diclofenac (VOLTAREN) 50 MG EC tablet, TAKE 1 TABLET TWICE A DAY WHEN NECESSARY FOR MILD TO MODERATE ACUTE BREAKTHROUGH PAIN, DO NOT TAKE ANY OTHER NSAIDS, Disp: 60 tablet, Rfl: 3  •  estradiol (ESTRACE VAGINAL) 0.1 MG/GM vaginal cream, Insert 2 g into the vagina 1 (One) Time Per Week., Disp: 42.5 g, Rfl: 5  •  exenatide er (BYDUREON BCISE) 2 MG/0.85ML auto-injector injection, Inject 0.85 mL under the skin into the appropriate area as directed 1 (One) Time Per Week., Disp: 4 pen, Rfl: 2  •  furosemide (LASIX) 20 MG tablet, Take 20 mg by mouth 2 (Two) Times a Day., Disp: , Rfl:   •  glimepiride (AMARYL) 2 MG tablet, Take 1 tablet by mouth Every Morning Before Breakfast., Disp: 90 tablet, Rfl: 1  •  Liniments (SALONPAS) pads, Apply  topically. prn, Disp: , Rfl:   •  losartan (COZAAR) 100 MG tablet, Take 100 mg by mouth Daily., Disp: , Rfl:   •  NIFEdipine CC (ADALAT CC) 90 MG 24 hr tablet, Take 60 mg by mouth Daily., Disp: , Rfl:   •  omeprazole (priLOSEC) 20 MG capsule, Take 1 capsule by mouth Daily., Disp: 30 capsule, Rfl: 5  •  Praluuent 75 mg injection every 14 days (for cholesterol med(  · rOPINIRole (REQUIP) 0.25 MG tablet, Take 1 tablet by mouth Every Night. Take 1 hour before bedtime. (Patient taking differently: Take 0.5 mg by mouth Every Night. Take 1 hour before bedtime.), Disp: 30 tablet, Rfl: 3  •  topiramate (TOPAMAX) 50 MG tablet, TAKE ONE TABLET BY MOUTH TWICE DAILY (Patient taking differently: TAKE ONE TABLET BY MOUTH as needed), Disp: 180 tablet, Rfl: 0  •  traMADol (ULTRAM) 50 MG tablet, A tablet PO TID PRN severe breakthrough pain, Disp: 270 tablet, Rfl: 0  •  traZODone (DESYREL) 50 MG tablet, Take 1 to 2 tabs po hs prn, Disp: 180 tablet, Rfl: 2  Vitamin B12 and Vitamin D  Fibercorn      Lab Results   Component Value Date     WBC 7.23 09/26/2018    WBC 5.09 02/06/2017    WBC 5.35 02/05/2016    EOSABS 0.18 09/26/2018    EOSABS 0.17 07/03/2014    EOSABS 0.15 06/19/2014     09/26/2018     02/06/2017     02/05/2016     Lab Results   Component Value Date    HGB 14.4 09/26/2018    HGB 15.5 02/06/2017    HGB 14.8 02/05/2016    HCT 44.8 (H) 09/26/2018    HCT 47.0 (H) 02/06/2017    HCT 43.8 02/05/2016     Lab Results   Component Value Date    MCV 90.9 09/26/2018    MCV 92.0 02/06/2017    MCV 88.8 02/05/2016    RDW 13.1 09/26/2018    RDW 13.3 02/06/2017    MCHC 32.1 09/26/2018    MCHC 33.0 02/06/2017    MCHC 33.8 02/05/2016     Lab Results   Component Value Date     10/31/2018    K 4.8 10/31/2018     10/31/2018    CO2 29.0 10/31/2018    BUN 21 10/31/2018    CREATININE 0.80 10/31/2018    GLUCOSE 94 10/31/2018    CALCIUM 9.9 10/31/2018    ANIONGAP 8.0 10/31/2018         Liver Profile     Lab Results   Component Value Date    AST 74 (H) 10/31/2018     (H) 09/14/2018    AST 38 (H) 02/06/2017    AST 27 02/05/2016    AST 28 09/14/2015    AST 37 (H) 01/08/2015     Lab Results   Component Value Date     (H) 10/31/2018     (H) 09/14/2018    ALT 67 (H) 02/06/2017    ALT 38 02/05/2016    ALT 37 09/14/2015    ALT 51 (H) 01/08/2015     Lab Results   Component Value Date    ALKPHOS 153 (H) 10/31/2018    ALKPHOS 149 (H) 09/14/2018    ALKPHOS 115 (H) 02/06/2017    ALKPHOS 127 (H) 02/05/2016    ALKPHOS 120 (H) 09/14/2015    GGT 59 (H) 09/26/2018     Lab Results   Component Value Date    BILITOT 0.5 10/31/2018    BILITOT 0.4 09/14/2018    BILITOT 0.5 02/06/2017    ALBUMIN 4.70 10/31/2018    ALBUMIN 4.34 09/14/2018    ALBUMIN 4.40 02/06/2017    PROTEINTOT 7.2 10/31/2018    INR 0.99 07/03/2014    INR 1.00 06/19/2014    INR 1.02 01/28/2014    PROTIME 10.6 07/03/2014    PROTIME 10.7 06/19/2014    PROTIME 10.9 01/28/2014     09/26/2018     02/06/2017     02/05/2016     Lab Results   Component  Value Date    LIPASE 55 (H) 09/14/2018    AMYLASE 28 (L) 09/14/2018     Co-morbidities   Lab Results   Component Value Date    HGBA1C 7.7 09/14/2018    HGBA1C 8.9 02/07/2018    HGBA1C 7.9 10/05/2017    HGBA1C 7.9 05/10/2017    HGBA1C 6.9 11/02/2016     HLP  Lab Results   Component Value Date    CHOL 284 (H) 09/14/2018    CHOL 251 (H) 06/13/2018    CHOL 290 (H) 02/06/2017    CHLPL 285 (H) 02/05/2016    CHLPL 280 (H) 01/08/2015    TRIG 452 (H) 09/14/2018    TRIG 288 (H) 06/13/2018    TRIG 232 (H) 02/06/2017    TRIG 251 (H) 02/05/2016    TRIG 225 (H) 01/08/2015    HDL 49 09/14/2018    HDL 46 06/13/2018    HDL 60 02/06/2017    HDL 56 02/05/2016    HDL 58 01/08/2015     (H) 09/14/2018     (H) 06/13/2018     (H) 02/06/2017     (H) 02/05/2016     (H) 01/08/2015     Thyroid/Calcium Panel (Normal TSH: 0.350 - 5.350 mIU/mL)  Lab Results   Component Value Date    TSH 1.465 02/20/2018    TSH 2.513 02/06/2017    TSH 1.752 09/14/2015    FREET4 1.30 02/20/2018    PTH 30.9 09/26/2018    CALCIUM 9.9 10/31/2018    CALCIUM 9.7 09/14/2018    CALCIUM 9.4 06/13/2018        Viral Hepatitis  Lab Results   Component Value Date    HEPAIGM Non-Reactive 09/26/2018    HEPBSAG Non-Reactive 09/26/2018    HEPBIGMCORE Non-Reactive 09/26/2018    HEPCVIRUSABY Non-Reactive 09/26/2018      EXAMINATION: US ABDOMEN, COMPLETE-09/26/2018:     FINDINGS:  Limited views of the pancreas are unremarkable.  There is  hepatopetal portal venous flow. Liver is echogenic without focal lesion  identified.  There is no intrahepatic or extrahepatic biliary ductal  dilatation; the common bile duct diameter is 5 mm at the kendall. The  gallbladder is normal in appearance.  There is no shadowing gallstone.   There is no gallbladder wall thickening or pericholecystic  fluid/inflammatory change. The kidneys are grossly unremarkable; no  hydronephrosis or shadowing calculus. Spleen is normal. The imaged  portions of the aorta and IVC are  unremarkable.     IMPRESSION:  1. No cause of left upper quadrant pain identified.  2. Echogenic hepatic parenchyma could be due to fatty infiltration or  other form of parenchymal disease.  Wt Readings from Last 10 Encounters:   11/09/18 86.6 kg (191 lb)   10/31/18 86.3 kg (190 lb 3.2 oz)   09/14/18 86.6 kg (191 lb)   09/14/18 86.7 kg (191 lb 1 oz)   06/13/18 87.1 kg (192 lb)   06/13/18 87.1 kg (192 lb)   02/20/18 84.1 kg (185 lb 6.4 oz)   02/07/18 84.4 kg (186 lb)   02/05/18 85.3 kg (188 lb)   12/27/17 86.6 kg (191 lb)      body mass index is 34.93 kg/m².,Temp: 97.5 °F (36.4 °C),BP: 118/70,Heart Rate: 85   Physical Exam  General Well developed; well nourished; no acute distress.   ENT Oral mucosa pink and moist without thrush or lesions.    Neck Neck supple; trachea midline. No thyromegaly   Resp CTA; no rhonchi, rales, or wheezes.  Respiration effort normal  CV RRR; normal S1, S2; no M/R/G. No lower extremity edema  GI Abd soft, NT, ND, normal active bowel sounds.  No HSM.  No abd hernia  Skin No rash; no lesions; no bruises.  Skin turgor normal  Musc No clubbing; no cyanosis.  Gait steady  Psych Oriented to time, place, and person.  Appropriate affect  Thank you kindly for allowing me to be part of this patient’s care.    Pt care team: Zaira BRUMFIELD & RASHAWN Liang  11/09/189:25 AM  [facility]--Gastroenterology  [phone]    CC: Ofelia Ramsey APRN  [address] [address] / [place] [id number] FAX:[phone]

## 2018-11-14 ENCOUNTER — OFFICE VISIT (OUTPATIENT)
Dept: INTERNAL MEDICINE | Facility: CLINIC | Age: 63
End: 2018-11-14

## 2018-11-14 VITALS
OXYGEN SATURATION: 96 % | BODY MASS INDEX: 34.96 KG/M2 | DIASTOLIC BLOOD PRESSURE: 78 MMHG | WEIGHT: 190 LBS | HEIGHT: 62 IN | SYSTOLIC BLOOD PRESSURE: 110 MMHG | HEART RATE: 95 BPM

## 2018-11-14 DIAGNOSIS — M54.50 ACUTE LEFT-SIDED LOW BACK PAIN WITHOUT SCIATICA: Primary | ICD-10-CM

## 2018-11-14 LAB
BILIRUB BLD-MCNC: NEGATIVE MG/DL
CLARITY, POC: CLEAR
COLOR UR: YELLOW
GLUCOSE UR STRIP-MCNC: NEGATIVE MG/DL
KETONES UR QL: NEGATIVE
LEUKOCYTE EST, POC: NEGATIVE
NITRITE UR-MCNC: NEGATIVE MG/ML
PH UR: 7 [PH] (ref 5–8)
PROT UR STRIP-MCNC: NEGATIVE MG/DL
RBC # UR STRIP: NEGATIVE /UL
SP GR UR: 1 (ref 1–1.03)
UROBILINOGEN UR QL: NORMAL

## 2018-11-14 PROCEDURE — 99213 OFFICE O/P EST LOW 20 MIN: CPT | Performed by: NURSE PRACTITIONER

## 2018-11-14 PROCEDURE — 81003 URINALYSIS AUTO W/O SCOPE: CPT | Performed by: NURSE PRACTITIONER

## 2018-11-14 RX ORDER — PREDNISONE 20 MG/1
20 TABLET ORAL 2 TIMES DAILY
Qty: 10 TABLET | Refills: 0 | Status: SHIPPED | OUTPATIENT
Start: 2018-11-14 | End: 2019-04-15

## 2018-11-14 NOTE — PROGRESS NOTES
Subjective   Yi Garcia is a 63 y.o. female.   Chief Complaint   Patient presents with   • Urinary Tract Infection      History of Present Illness low back pain, particularly to the left side.  Off and on since Oct 15, 2018.  Denies trauma.  No fever or chills.  No abdominal pain.  No nausea vomiting or diarrhea.  No change in bowel or bladder habits.  No hematuria.    The following portions of the patient's history were reviewed and updated as appropriate: allergies, current medications, past family history, past medical history, past social history, past surgical history and problem list.    Current Outpatient Medications:   •  acetaminophen (TYLENOL) 500 MG tablet, Take 1 tablet by mouth 2 (Two) Times a Day. Every 4 to 6 hours as needed, Disp: , Rfl:   •  albuterol (PROVENTIL HFA;VENTOLIN HFA) 108 (90 Base) MCG/ACT inhaler, Inhale 2 puffs Every 6 (Six) Hours As Needed for Wheezing., Disp: 1 inhaler, Rfl: 0  •  Alirocumab (PRALUENT) 75 MG/ML solution pen-injector, Inject 75 mg under the skin into the appropriate area as directed Every 14 (Fourteen) Days., Disp: 6 pen, Rfl: 1  •  aspirin 81 MG EC tablet, Take 81 mg by mouth Daily., Disp: , Rfl:   •  baclofen (LIORESAL) 10 MG tablet, Take 1/2 to 1 tab po Qd to bid prn, Disp: 180 tablet, Rfl: 1  •  bisoprolol (ZEBeta) 5 MG tablet, Take 5 mg by mouth Daily., Disp: , Rfl:   •  desvenlafaxine (PRISTIQ) 50 MG 24 hr tablet, Take 1 tablet by mouth Daily., Disp: , Rfl:   •  estradiol (ESTRACE VAGINAL) 0.1 MG/GM vaginal cream, Insert 2 g into the vagina 1 (One) Time Per Week., Disp: 42.5 g, Rfl: 5  •  exenatide er (BYDUREON BCISE) 2 MG/0.85ML auto-injector injection, Inject 0.85 mL under the skin into the appropriate area as directed 1 (One) Time Per Week., Disp: 4 pen, Rfl: 2  •  furosemide (LASIX) 20 MG tablet, Take 20 mg by mouth 2 (Two) Times a Day., Disp: , Rfl:   •  glimepiride (AMARYL) 2 MG tablet, Take 1 tablet by mouth Every Morning Before Breakfast., Disp: 90  "tablet, Rfl: 1  •  Liniments (SALONPAS) pads, Apply  topically. prn, Disp: , Rfl:   •  losartan (COZAAR) 100 MG tablet, Take 100 mg by mouth Daily., Disp: , Rfl:   •  NIFEdipine CC (ADALAT CC) 90 MG 24 hr tablet, Take 60 mg by mouth Daily., Disp: , Rfl:   •  omeprazole (priLOSEC) 40 MG capsule, Take 1 capsule by mouth Every Morning. Take first thing in the morning on an empty stomach.  Wait at least 30 min to 1hr before eating., Disp: 30 capsule, Rfl: 2  •  rOPINIRole (REQUIP) 0.25 MG tablet, Take 1 tablet by mouth Every Night. Take 1 hour before bedtime. (Patient taking differently: Take 0.5 mg by mouth Every Night. Take 1 hour before bedtime.), Disp: 30 tablet, Rfl: 3  •  topiramate (TOPAMAX) 50 MG tablet, TAKE ONE TABLET BY MOUTH TWICE DAILY (Patient taking differently: TAKE ONE TABLET BY MOUTH as needed), Disp: 180 tablet, Rfl: 0  •  traMADol (ULTRAM) 50 MG tablet, A tablet PO TID PRN severe breakthrough pain, Disp: 270 tablet, Rfl: 0  •  traZODone (DESYREL) 50 MG tablet, Take 1 to 2 tabs po hs prn, Disp: 180 tablet, Rfl: 2  •  predniSONE (DELTASONE) 20 MG tablet, Take 1 tablet by mouth 2 (Two) Times a Day., Disp: 10 tablet, Rfl: 0    Review of Systems Consitutional, HEENT, Respiratory, CV, GI, , Skin, Musculoskeletal, Neuro-mental, Endocrinological, Hematological were reviewed.  Positives were discussed in the HPI, otherwise ROS was negative   /78   Pulse 95   Ht 157.5 cm (62\")   Wt 86.2 kg (190 lb)   LMP  (LMP Unknown)   SpO2 96%   BMI 34.75 kg/m²     Objective   Allergies   Allergen Reactions   • Ace Inhibitors Cough   • Amlodipine    • Beta Adrenergic Blockers    • Cyclobenzaprine    • Hydrochlorothiazide    • Hyzaar  [Losartan Potassium-Hctz]    • Latex    • Losartan    • Metformin    • Penicillins    • Pioglitazone    • Spironolactone        Physical Exam   Constitutional: She is oriented to person, place, and time. She appears well-developed and well-nourished. No distress.   Eyes: Right eye " exhibits no discharge. Left eye exhibits no discharge.   Neck: Neck supple.   Cardiovascular: Normal rate, regular rhythm, normal heart sounds and intact distal pulses. Exam reveals no gallop and no friction rub.   No murmur heard.  Pulmonary/Chest: Effort normal and breath sounds normal.   Abdominal: Soft. Bowel sounds are normal. There is no tenderness.   Musculoskeletal:   She is tender to the lower back especially the left SI area.   Neurological: She is alert and oriented to person, place, and time.   Skin: Skin is warm and dry. Capillary refill takes less than 2 seconds.   Nursing note and vitals reviewed.      Procedures    LABS  Results for orders placed or performed in visit on 11/14/18   POCT urinalysis dipstick, automated   Result Value Ref Range    Color Yellow Yellow, Straw, Dark Yellow, Lavinia    Clarity, UA Clear Clear    Specific Gravity  1.000 (A) 1.005 - 1.030    pH, Urine 7.0 5.0 - 8.0    Leukocytes Negative Negative    Nitrite, UA Negative Negative    Protein, POC Negative Negative mg/dL    Glucose, UA Negative Negative, 1000 mg/dL (3+) mg/dL    Ketones, UA Negative Negative    Urobilinogen, UA Normal Normal    Bilirubin Negative Negative    Blood, UA Negative Negative       Assessment/Plan   Yi was seen today for urinary tract infection.    Diagnoses and all orders for this visit:    Acute left-sided low back pain without sciatica  -     POCT urinalysis dipstick, automated  -     predniSONE (DELTASONE) 20 MG tablet; Take 1 tablet by mouth 2 (Two) Times a Day.        Patient Instructions   Urine was essentially clear.  We will start patient on prednisone for low back discomfort.  Drink plenty of fluids.  Moist heat to sore areas.  Return to the clinic next week for follow-up with Ofelia, sooner if not improving.  Pt verbalizes understanding and agreement with plan of care.     EMR Dragon/transcription disclaimer:  Please note that portions of this note were completed with a voice recognition  program.  Electronic transcription of the voice recognition program may permit erroneous words or phrases to be inadvertently transcribed.  Although I have reviewed the note for such errors, some may still exist in this documentation     Karly Segundo, APRN

## 2018-11-15 NOTE — PATIENT INSTRUCTIONS
Urine was essentially clear.  We will start patient on prednisone for low back discomfort.  Drink plenty of fluids.  Moist heat to sore areas.  Return to the clinic next week for follow-up with Ofelia, sooner if not improving.  Pt verbalizes understanding and agreement with plan of care.

## 2018-11-21 DIAGNOSIS — E11.9 TYPE 2 DIABETES MELLITUS WITHOUT COMPLICATION, WITHOUT LONG-TERM CURRENT USE OF INSULIN (HCC): ICD-10-CM

## 2018-11-21 RX ORDER — EXENATIDE 2 MG/.85ML
INJECTION, SUSPENSION, EXTENDED RELEASE SUBCUTANEOUS
Qty: 3.4 ML | Refills: 2 | Status: SHIPPED | OUTPATIENT
Start: 2018-11-21 | End: 2019-01-02 | Stop reason: SDUPTHER

## 2018-11-26 ENCOUNTER — LAB REQUISITION (OUTPATIENT)
Dept: LAB | Facility: HOSPITAL | Age: 63
End: 2018-11-26

## 2018-11-26 ENCOUNTER — OUTSIDE FACILITY SERVICE (OUTPATIENT)
Dept: GASTROENTEROLOGY | Facility: CLINIC | Age: 63
End: 2018-11-26

## 2018-11-26 DIAGNOSIS — K30 FUNCTIONAL DYSPEPSIA: ICD-10-CM

## 2018-11-26 DIAGNOSIS — K21.9 GASTRO-ESOPHAGEAL REFLUX DISEASE WITHOUT ESOPHAGITIS: ICD-10-CM

## 2018-11-26 PROCEDURE — 43239 EGD BIOPSY SINGLE/MULTIPLE: CPT | Performed by: INTERNAL MEDICINE

## 2018-11-26 PROCEDURE — 88305 TISSUE EXAM BY PATHOLOGIST: CPT | Performed by: INTERNAL MEDICINE

## 2018-11-27 LAB
CYTO UR: NORMAL
LAB AP CASE REPORT: NORMAL
LAB AP CLINICAL INFORMATION: NORMAL
PATH REPORT.FINAL DX SPEC: NORMAL
PATH REPORT.GROSS SPEC: NORMAL

## 2018-12-08 ENCOUNTER — TELEPHONE (OUTPATIENT)
Dept: GASTROENTEROLOGY | Facility: CLINIC | Age: 63
End: 2018-12-08

## 2018-12-08 DIAGNOSIS — R11.0 NAUSEA: ICD-10-CM

## 2018-12-08 DIAGNOSIS — R10.13 DYSPEPSIA: Primary | ICD-10-CM

## 2018-12-08 NOTE — TELEPHONE ENCOUNTER
----- Message from Maurilio Simmnos MD sent at 11/30/2018 11:58 AM EST -----  Zaira:  There was some evidence of reactive gastritis.  Consider 4 hour gastric empty study given the clinical history.

## 2018-12-08 NOTE — TELEPHONE ENCOUNTER
Attempting to call pt. No answer. Left ANKUSH Foreman  If pt calls back, pls let pt know that EGD's bx shows gastritis (inflammation of the stoma), but no H. pylori infection or peptic ulcer.  Dr. Simmons recommends gastric emptying study.  Order placed

## 2018-12-11 ENCOUNTER — HOSPITAL ENCOUNTER (OUTPATIENT)
Dept: NUTRITION | Facility: HOSPITAL | Age: 63
Setting detail: RECURRING SERIES
Discharge: HOME | End: 2018-12-11

## 2018-12-11 VITALS — WEIGHT: 190 LBS | HEIGHT: 62 IN | BODY MASS INDEX: 34.96 KG/M2

## 2018-12-11 PROCEDURE — 97802 MEDICAL NUTRITION INDIV IN: CPT | Performed by: DIETITIAN, REGISTERED

## 2018-12-28 DIAGNOSIS — E11.9 TYPE 2 DIABETES MELLITUS WITHOUT COMPLICATION, WITHOUT LONG-TERM CURRENT USE OF INSULIN (HCC): ICD-10-CM

## 2019-01-02 DIAGNOSIS — E11.9 TYPE 2 DIABETES MELLITUS WITHOUT COMPLICATION, WITHOUT LONG-TERM CURRENT USE OF INSULIN (HCC): ICD-10-CM

## 2019-01-04 ENCOUNTER — TELEPHONE (OUTPATIENT)
Dept: INTERNAL MEDICINE | Facility: CLINIC | Age: 64
End: 2019-01-04

## 2019-01-04 NOTE — TELEPHONE ENCOUNTER
PATIENT STATES THAT THEY NEED A PRIOR AUTH ON THE PRALUENT 75MG MEDICATION AND SHE STATES THAT HER PHARM SENT OVER THE DENIAL LETTER. COULD YOU PLEASE CHECK ON THIS .AND GIVE THE PATIENT A CALL BACK -[phone]

## 2019-01-04 NOTE — TELEPHONE ENCOUNTER
I have received the PA but pt was informed that we will need blood work to get it approved. PT hs already missed one does and unable to get meds right now so Wero feels like the blood work will be compromised. Offered an earlier appt so she could talk to Wero but she is not available due to other appts.

## 2019-01-07 ENCOUNTER — OFFICE VISIT (OUTPATIENT)
Dept: ENDOCRINOLOGY | Facility: CLINIC | Age: 64
End: 2019-01-07

## 2019-01-07 VITALS
DIASTOLIC BLOOD PRESSURE: 82 MMHG | BODY MASS INDEX: 35.12 KG/M2 | WEIGHT: 192 LBS | OXYGEN SATURATION: 98 % | SYSTOLIC BLOOD PRESSURE: 122 MMHG | HEART RATE: 91 BPM

## 2019-01-07 DIAGNOSIS — E11.65 UNCONTROLLED TYPE 2 DIABETES MELLITUS WITH HYPERGLYCEMIA, WITHOUT LONG-TERM CURRENT USE OF INSULIN (HCC): Primary | ICD-10-CM

## 2019-01-07 DIAGNOSIS — R79.89 LFT ELEVATION: ICD-10-CM

## 2019-01-07 DIAGNOSIS — E78.2 MIXED HYPERLIPIDEMIA: ICD-10-CM

## 2019-01-07 DIAGNOSIS — Z11.59 ENCOUNTER FOR SCREENING FOR OTHER VIRAL DISEASES: ICD-10-CM

## 2019-01-07 LAB
ALBUMIN SERPL-MCNC: 4.41 G/DL (ref 3.2–4.8)
ALBUMIN/GLOB SERPL: 2.2 G/DL (ref 1.5–2.5)
ALP SERPL-CCNC: 147 U/L (ref 25–100)
ALT SERPL W P-5'-P-CCNC: 69 U/L (ref 7–40)
ANION GAP SERPL CALCULATED.3IONS-SCNC: 9 MMOL/L (ref 3–11)
ARTICHOKE IGE QN: 100 MG/DL (ref 0–130)
AST SERPL-CCNC: 53 U/L (ref 0–33)
BILIRUB SERPL-MCNC: 0.3 MG/DL (ref 0.3–1.2)
BUN BLD-MCNC: 21 MG/DL (ref 9–23)
BUN/CREAT SERPL: 24.7 (ref 7–25)
CALCIUM SPEC-SCNC: 9.6 MG/DL (ref 8.7–10.4)
CHLORIDE SERPL-SCNC: 103 MMOL/L (ref 99–109)
CHOLEST SERPL-MCNC: 170 MG/DL (ref 0–200)
CO2 SERPL-SCNC: 26 MMOL/L (ref 20–31)
CREAT BLD-MCNC: 0.85 MG/DL (ref 0.6–1.3)
GFR SERPL CREATININE-BSD FRML MDRD: 68 ML/MIN/1.73
GLOBULIN UR ELPH-MCNC: 2 GM/DL
GLUCOSE BLD-MCNC: 168 MG/DL (ref 70–100)
GLUCOSE BLDC GLUCOMTR-MCNC: 248 MG/DL (ref 70–130)
HBA1C MFR BLD: 7.2 %
HDLC SERPL-MCNC: 51 MG/DL (ref 40–60)
POTASSIUM BLD-SCNC: 4.7 MMOL/L (ref 3.5–5.5)
PROT SERPL-MCNC: 6.4 G/DL (ref 5.7–8.2)
SODIUM BLD-SCNC: 138 MMOL/L (ref 132–146)
T4 FREE SERPL-MCNC: 1.16 NG/DL (ref 0.89–1.76)
TRIGL SERPL-MCNC: 385 MG/DL (ref 0–150)
TSH SERPL DL<=0.05 MIU/L-ACNC: 2.46 MIU/ML (ref 0.35–5.35)

## 2019-01-07 PROCEDURE — 84443 ASSAY THYROID STIM HORMONE: CPT | Performed by: PHYSICIAN ASSISTANT

## 2019-01-07 PROCEDURE — 82947 ASSAY GLUCOSE BLOOD QUANT: CPT | Performed by: PHYSICIAN ASSISTANT

## 2019-01-07 PROCEDURE — 80061 LIPID PANEL: CPT | Performed by: PHYSICIAN ASSISTANT

## 2019-01-07 PROCEDURE — 83036 HEMOGLOBIN GLYCOSYLATED A1C: CPT | Performed by: PHYSICIAN ASSISTANT

## 2019-01-07 PROCEDURE — 86708 HEPATITIS A ANTIBODY: CPT | Performed by: NURSE PRACTITIONER

## 2019-01-07 PROCEDURE — 80053 COMPREHEN METABOLIC PANEL: CPT | Performed by: PHYSICIAN ASSISTANT

## 2019-01-07 PROCEDURE — 84439 ASSAY OF FREE THYROXINE: CPT | Performed by: PHYSICIAN ASSISTANT

## 2019-01-07 PROCEDURE — 82977 ASSAY OF GGT: CPT | Performed by: PHYSICIAN ASSISTANT

## 2019-01-07 PROCEDURE — 99214 OFFICE O/P EST MOD 30 MIN: CPT | Performed by: PHYSICIAN ASSISTANT

## 2019-01-07 PROCEDURE — 86706 HEP B SURFACE ANTIBODY: CPT | Performed by: PHYSICIAN ASSISTANT

## 2019-01-07 RX ORDER — ROSUVASTATIN CALCIUM 20 MG/1
20 TABLET, COATED ORAL NIGHTLY
Qty: 30 TABLET | Refills: 3 | Status: SHIPPED | OUTPATIENT
Start: 2019-01-07 | End: 2019-02-11 | Stop reason: HOSPADM

## 2019-01-07 NOTE — PROGRESS NOTES
"Chief Complaint  F/u for Diabetes Mellitus.    HPI   Yi Garcia is a 63 y.o. female with chronic back pain and HTN- on multiple antihypertensives followed by cardiology in [place], who is here today for f/u of Diabetes Mellitus type 2. The initial diagnosis of diabetes was made approximately 2016.    She has seen RD. Found education helpful.   Praluent needs PA- she did not tolerate lipitor. Mother had CAD, dx in her 60s.     A1C- 7.2 (1/7/19), 7.7 (9/14/18), 6.7 (6/13/18), 8.9 (2/2018)  Labs reviewed:   6/13/18- GFR 73, ,   2/20/18- TSH 1.465    Diabetic complications: none  Eye exam current (within one year): yes- no retinopathy  Foot care and dental care: discussed    Current diabetic medications include:  Bydureon 2mg sc weekly  Glimepiride 2mg qd- reports it makes her feel \"weird\" right after taking  ACEI/ARB: losartan-hctz  Statin: intolerant to statins, Repatha was not covered by insurance.     Past medications: metformin (???caused blurred vision, went away when stopped taking metformin), invokana (insurance didn't cover but thinks this controlled glucose better)    Diabetic Monitoring  - no meter or log, reports not checking    Nutrition:     Current diet: improving diet last 2 weeks. Eats out on Sunday, but cooks rest of week. Breakfast is egg sandwich on flatbread. Likes fruit. No sugary drinks. Got rid of candy in the house. No cakes. Weakness is break, pasta, potatoes. Likes vegetables.  Current exercise: none  Seen RD in past: yes    The following portions of the patient's history were reviewed and updated by me as appropriate: allergies, current medications, past family history, past social history, past surgical history and problem list.    Past Medical History:   Diagnosis Date   • Anxiety    • Benign essential hypertension    • Cervical disc disorder    • Chronic pain disorder    • Colon polyps    • Complication of device    • Decreased libido    • Dizziness    • Encounter for " long-term (current) use of medications    • Extremity pain    • Fatigue    • Hip pain    • History of alopecia    • History of backache    • History of colonoscopy     Fiberoptic   • History of diabetes mellitus    • History of insomnia    • History of mastoiditis    • History of migraine headaches    • History of urinary stone    • Infection of kidney    • Insomnia    • Joint pain    • Kidney infection    • Low back pain    • Lumbar radiculopathy    • Lumbar radiculopathy    • Lumbosacral disc disease    • Migraine    • Myofascial pain syndrome    • Neck pain    • Palpitations    • Sleep apnea    • Spinal cord stimulator status    • Stress reaction, emotional    • Urinary incontinence    • Urinary tract infection    • Visit for screening mammogram     Description: 08/28/2009   • Vitamin D deficiency        Medications    Current Outpatient Medications:   •  acetaminophen (TYLENOL) 500 MG tablet, Take 1 tablet by mouth 2 (Two) Times a Day. Every 4 to 6 hours as needed, Disp: , Rfl:   •  albuterol (PROVENTIL HFA;VENTOLIN HFA) 108 (90 Base) MCG/ACT inhaler, Inhale 2 puffs Every 6 (Six) Hours As Needed for Wheezing., Disp: 1 inhaler, Rfl: 0  •  Alirocumab (PRALUENT) 75 MG/ML solution pen-injector, Inject 75 mg under the skin into the appropriate area as directed Every 14 (Fourteen) Days., Disp: 6 pen, Rfl: 1  •  aspirin 81 MG EC tablet, Take 81 mg by mouth Daily., Disp: , Rfl:   •  baclofen (LIORESAL) 10 MG tablet, Take 1/2 to 1 tab po Qd to bid prn, Disp: 180 tablet, Rfl: 1  •  bisoprolol (ZEBeta) 5 MG tablet, Take 5 mg by mouth Daily., Disp: , Rfl:   •  desvenlafaxine (PRISTIQ) 50 MG 24 hr tablet, Take 1 tablet by mouth Daily., Disp: , Rfl:   •  estradiol (ESTRACE VAGINAL) 0.1 MG/GM vaginal cream, Insert 2 g into the vagina 1 (One) Time Per Week., Disp: 42.5 g, Rfl: 5  •  exenatide er (BYDUREON BCISE) 2 MG/0.85ML auto-injector injection, Inject 0.85 units in to the appropriate area one time weekly, Disp: 12 pen, Rfl:  3  •  Liniments (SALONPAS) pads, Apply  topically. prn, Disp: , Rfl:   •  losartan (COZAAR) 100 MG tablet, Take 100 mg by mouth Daily., Disp: , Rfl:   •  NIFEdipine CC (ADALAT CC) 90 MG 24 hr tablet, Take 60 mg by mouth Daily., Disp: , Rfl:   •  omeprazole (priLOSEC) 40 MG capsule, Take 1 capsule by mouth Every Morning. Take first thing in the morning on an empty stomach.  Wait at least 30 min to 1hr before eating., Disp: 30 capsule, Rfl: 2  •  predniSONE (DELTASONE) 20 MG tablet, Take 1 tablet by mouth 2 (Two) Times a Day., Disp: 10 tablet, Rfl: 0  •  rOPINIRole (REQUIP) 0.25 MG tablet, Take 1 tablet by mouth Every Night. Take 1 hour before bedtime. (Patient taking differently: Take 0.5 mg by mouth Every Night. Take 1 hour before bedtime.), Disp: 30 tablet, Rfl: 3  •  topiramate (TOPAMAX) 50 MG tablet, TAKE ONE TABLET BY MOUTH TWICE DAILY (Patient taking differently: TAKE ONE TABLET BY MOUTH as needed), Disp: 180 tablet, Rfl: 0  •  traMADol (ULTRAM) 50 MG tablet, A tablet PO TID PRN severe breakthrough pain, Disp: 270 tablet, Rfl: 0  •  traZODone (DESYREL) 50 MG tablet, Take 1 to 2 tabs po hs prn, Disp: 180 tablet, Rfl: 2  •  Empagliflozin (JARDIANCE) 10 MG tablet, Take 10 mg by mouth Daily., Disp: 90 tablet, Rfl: 1  •  furosemide (LASIX) 20 MG tablet, Take 20 mg by mouth 2 (Two) Times a Day., Disp: , Rfl:   •  rosuvastatin (CRESTOR) 20 MG tablet, Take 1 tablet by mouth Every Night., Disp: 30 tablet, Rfl: 3    Review of Systems  Review of Systems   Constitutional: Positive for fatigue. Negative for chills, fever and unexpected weight change.   HENT: Negative for ear pain, hearing loss, nosebleeds, rhinorrhea and sore throat.    Eyes: Positive for visual disturbance. Negative for pain, discharge, redness and itching.   Respiratory: Negative for cough, shortness of breath and wheezing.    Cardiovascular: Positive for leg swelling. Negative for chest pain and palpitations.   Gastrointestinal: Negative for abdominal  pain, blood in stool, constipation and diarrhea.   Endocrine: Negative for cold intolerance, heat intolerance and polydipsia.   Genitourinary: Negative for dysuria, frequency, hematuria, pelvic pain, vaginal bleeding and vaginal discharge.   Musculoskeletal: Positive for back pain. Negative for arthralgias, gait problem, joint swelling and myalgias.   Skin: Negative for rash.   Allergic/Immunologic: Negative.    Neurological: Negative for dizziness, syncope, weakness, numbness and headaches.   Hematological: Negative for adenopathy. Does not bruise/bleed easily.   Psychiatric/Behavioral: Negative for sleep disturbance and suicidal ideas. The patient is not nervous/anxious.         Physical Exam    /82   Pulse 91   Wt 87.1 kg (192 lb)   LMP  (LMP Unknown)   SpO2 98%   BMI 35.12 kg/m² Body mass index is 35.12 kg/m².  Physical Exam   Constitutional: She is oriented to person, place, and time. She appears well-developed. No distress.   HENT:   Head: Normocephalic.   Right Ear: External ear normal.   Left Ear: External ear normal.   Mouth/Throat: No oropharyngeal exudate.   Eyes: Conjunctivae and lids are normal. Right eye exhibits no discharge. Left eye exhibits no discharge. Right pupil is reactive. Left pupil is reactive.   Neck: No JVD present. No tracheal deviation present. No thyroid mass and no thyromegaly present.   Cardiovascular: Normal rate, regular rhythm, normal heart sounds and intact distal pulses.   No murmur heard.  Pulmonary/Chest: Effort normal and breath sounds normal. No respiratory distress. She has no wheezes.   Abdominal: Soft. Bowel sounds are normal. There is no tenderness.   Musculoskeletal: She exhibits no edema or tenderness.     Vascular Status -  Her right foot exhibits no edema. Her left foot exhibits no edema.  Lymphadenopathy:     She has no cervical adenopathy.   Neurological: She is alert and oriented to person, place, and time.   Skin: Skin is warm, dry and intact. No rash  noted. She is not diaphoretic. No erythema.   Psychiatric: She has a normal mood and affect. Her speech is normal and behavior is normal. Thought content normal.       Labs and Imaging   Lab Results   Component Value Date    HGBA1C 7.2 01/07/2019    HGBA1C 7.7 09/14/2018    HGBA1C 8.9 02/07/2018     Office Visit on 01/07/2019   Component Date Value Ref Range Status   • Glucose 01/07/2019 248* 70 - 130 mg/dL Final   • Hemoglobin A1C 01/07/2019 7.2  % Final     No images are attached to the encounter or orders placed in the encounter.  Xr Spine Thoracic 1 View    Result Date: 12/21/2017  Spinal cord stimulator terminating at the T7 superior endplate level.  D:  12/21/2017 E:  12/21/2017  This report was finalized on 12/21/2017 5:12 PM by Dr. Kulwinder Cesar.        Assessment / Plan   Yi was seen today for follow-up.    Diagnoses and all orders for this visit:    Uncontrolled type 2 diabetes mellitus with hyperglycemia, without long-term current use of insulin (CMS/[facility])  -     POC Glucose Fingerstick  -     POC Glycosylated Hemoglobin (Hb A1C)  -     Empagliflozin (JARDIANCE) 10 MG tablet; Take 10 mg by mouth Daily.  -     exenatide er (BYDUREON BCISE) 2 MG/0.85ML auto-injector injection; Inject 0.85 units in to the appropriate area one time weekly  -     TSH  -     T4, Free    Mixed hyperlipidemia  -     rosuvastatin (CRESTOR) 20 MG tablet; Take 1 tablet by mouth Every Night.  -     Lipid Panel  -     Comprehensive Metabolic Panel        Diabetes Mellitus 2 is under good control.  -A1c 7.2, down from 7.7 9/2018  -continue bydureon bcise 2mg sc weekly.  -change glimepiride to jardiance 10mg daily. Discussed importance of adequate hydration and good hygiene with this medication. Samples provided.   -intolerant to metformin  -intolerant to statins- praluent needs PA, appears she needs to try crestor as well per PA, will rx   -bring meter to f/u- check sugar fasting and 2h pp  -encouraged to have eye exam    1.  Diet:  3-4 carb servings per meal for females, 4-5 carb servings per meal for males  Spread carb intake throughout the day  Increase lean protein and vegetable intake  Avoid sugary drinks and processed carbs including crackers, cookies, cakes  2.  Exercise: Recommend at least 30 minutes of exercise daily, at least 5 days per week. Increase exercise gradually.   3.  Blood Glucose Goal: Blood glucose goal <150 fasting, <180 2 hr postprandial  4.  Microalbumin due 2/2019  5.  Education performed during this visit: long term diabetic complications discussed. , annual eye examinations at Ophthalmology discussed, dental hygiene discussed  and foot care reviewed., home glucose monitoring emphasized, all medications, side effects and compliance discussed carefully and Hypoglycemia management and prevention reviewed. Reviewed ‘ABCs’ of diabetes management (respective goals in parentheses):  A1C (<7), blood pressure (<130/80), and cholesterol (LDL <100, if CVD <70).      There are no Patient Instructions on file for this visit.    Follow up: Return in about 3 months (around 4/7/2019).    Discussed the nature of the disease including, risks, complications, implications, management, safe and proper use of medications. Encouraged therapeutic lifestyle changes including low calorie diet with plenty of fruits and vegetables, daily exercise, medication compliance, and keeping scheduled follow up appointments with me and any other providers. Encouraged patient to have appointment for complete physical, fasting labs, appropriate screenings, and immunizations on an annual basis.    20 min  of 30 min face-to-face visit time spent for coordination of care and counselling regarding identified problems as outlined in the objective, assessment and discussion portions of the documentation.    Wero Hernandez PA-C

## 2019-01-08 ENCOUNTER — HOSPITAL ENCOUNTER (OUTPATIENT)
Dept: NUCLEAR MEDICINE | Facility: HOSPITAL | Age: 64
Discharge: HOME | End: 2019-01-08

## 2019-01-08 ENCOUNTER — TELEPHONE (OUTPATIENT)
Dept: GASTROENTEROLOGY | Facility: CLINIC | Age: 64
End: 2019-01-08

## 2019-01-08 DIAGNOSIS — Z11.59 ENCOUNTER FOR SCREENING FOR OTHER VIRAL DISEASES: ICD-10-CM

## 2019-01-08 DIAGNOSIS — R11.0 NAUSEA: ICD-10-CM

## 2019-01-08 DIAGNOSIS — R79.89 LFT ELEVATION: ICD-10-CM

## 2019-01-08 DIAGNOSIS — K31.84 GASTROPARESIS: Primary | ICD-10-CM

## 2019-01-08 DIAGNOSIS — R74.8 ABNORMAL LEVELS OF OTHER SERUM ENZYMES: ICD-10-CM

## 2019-01-08 DIAGNOSIS — R10.13 DYSPEPSIA: ICD-10-CM

## 2019-01-08 LAB
GGT SERPL-CCNC: 49 U/L (ref 0–37)
HBV SURFACE AB SER RIA-ACNC: NORMAL

## 2019-01-08 PROCEDURE — 0 TECHNETIUM SULFUR COLLOID: Performed by: NURSE PRACTITIONER

## 2019-01-08 PROCEDURE — 78264 GASTRIC EMPTYING IMG STUDY: CPT

## 2019-01-08 PROCEDURE — A9541 TC99M SULFUR COLLOID: HCPCS | Performed by: NURSE PRACTITIONER

## 2019-01-08 RX ORDER — METOCLOPRAMIDE 5 MG/1
5 TABLET ORAL
Qty: 120 TABLET | Refills: 1 | Status: SHIPPED | OUTPATIENT
Start: 2019-01-08 | End: 2019-05-03 | Stop reason: SDUPTHER

## 2019-01-08 RX ADMIN — TECHNETIUM TC 99M SULFUR COLLOID 1 DOSE: KIT at 08:35

## 2019-01-08 NOTE — TELEPHONE ENCOUNTER
Ahsan  Pls let pt know that gastric emptying study shows gastroparesis or slow digestion or emptying.  Rx for Reglan is sent to pharmacy.     Keep f/u appt       Also, pls ask lab to add on Hepatitis A Antibody, Total [[id number]] to lab sample collected on 1/7/19         .

## 2019-01-09 NOTE — TELEPHONE ENCOUNTER
Called patient and gave gastric emptying study results. I also called Main lab Chemistry and spoke with Gisselle. She agreed to add on Hepatitis A Antibody, total lab to recent specimen collected.

## 2019-01-10 LAB — HAV AB SER QL IA: POSITIVE

## 2019-01-11 ENCOUNTER — OFFICE VISIT (OUTPATIENT)
Dept: INTERNAL MEDICINE | Facility: CLINIC | Age: 64
End: 2019-01-11

## 2019-01-11 ENCOUNTER — PRIOR AUTHORIZATION (OUTPATIENT)
Dept: ENDOCRINOLOGY | Facility: CLINIC | Age: 64
End: 2019-01-11

## 2019-01-11 VITALS
HEIGHT: 62 IN | SYSTOLIC BLOOD PRESSURE: 118 MMHG | WEIGHT: 189 LBS | DIASTOLIC BLOOD PRESSURE: 64 MMHG | OXYGEN SATURATION: 98 % | HEART RATE: 83 BPM | BODY MASS INDEX: 34.78 KG/M2

## 2019-01-11 DIAGNOSIS — I10 BENIGN ESSENTIAL HYPERTENSION: ICD-10-CM

## 2019-01-11 DIAGNOSIS — F32.A ANXIETY AND DEPRESSION: ICD-10-CM

## 2019-01-11 DIAGNOSIS — E11.65 UNCONTROLLED TYPE 2 DIABETES MELLITUS WITH HYPERGLYCEMIA, WITHOUT LONG-TERM CURRENT USE OF INSULIN (HCC): ICD-10-CM

## 2019-01-11 DIAGNOSIS — G25.81 RLS (RESTLESS LEGS SYNDROME): ICD-10-CM

## 2019-01-11 DIAGNOSIS — E78.2 MIXED HYPERLIPIDEMIA: ICD-10-CM

## 2019-01-11 DIAGNOSIS — G89.29 OTHER CHRONIC PAIN: ICD-10-CM

## 2019-01-11 DIAGNOSIS — F41.9 ANXIETY AND DEPRESSION: ICD-10-CM

## 2019-01-11 DIAGNOSIS — R76.8 HEPATITIS A ANTIBODY POSITIVE: Primary | ICD-10-CM

## 2019-01-11 DIAGNOSIS — E66.8 MODERATE OBESITY: ICD-10-CM

## 2019-01-11 LAB — HAV IGM SERPL QL IA: NORMAL

## 2019-01-11 PROCEDURE — 86709 HEPATITIS A IGM ANTIBODY: CPT | Performed by: NURSE PRACTITIONER

## 2019-01-11 PROCEDURE — 99214 OFFICE O/P EST MOD 30 MIN: CPT | Performed by: NURSE PRACTITIONER

## 2019-01-11 NOTE — PROGRESS NOTES
Chief Complaint   Patient presents with   • Follow-up     Re-establishing care, Ofelia's patient   • Hypertension   • Anxiety   • Hyperlipidemia       History of Present Illness  63 y.o.female presents for follow up of chronic conditions.    Hx of diabetes; followed by endocrinology.  Eye exam current (within one year): yes- no retinopathy  Foot care and dental care: recently discussed per endocrine  Current diabetic medications include:  Bydureon 2mg sc weekly  Empagliflozin 10 mg daily  ACEI/ARB: losartan-hctz  Statin: Repatha was not covered by insurance; recent started rosuvastatin 20mg nightly.   Hx GI gastroparesis    htn   doing ok without difficulties; no headaches, dizziness or vision changes; no chest pain.Takes lasix 20 mg once every other day  losartin-hctz  Nifedipine  bisoprolol    Anxiety/depression takes pristqu followed by Dr. Oliveira.  No self harm or suicidal ideations.    Chronic pain sciatic nerve multi back surgeries; leg gives out sometimes; takes Tramadol and Baclofen.  Needs updated disability car tag for parking  Muscle cramps rls takes requip at night.      Review of Systems   Constitutional: Negative for chills, fatigue and fever.   Eyes: Negative for blurred vision and visual disturbance.   Respiratory: Negative for shortness of breath.    Cardiovascular: Negative for chest pain, palpitations and leg swelling.   Genitourinary: Negative for difficulty urinating.   Musculoskeletal: Positive for back pain. Negative for arthralgias.        Muscle cramps at night   Neurological: Negative for dizziness, light-headedness and headache.   Psychiatric/Behavioral: Positive for sleep disturbance and depressed mood. Negative for self-injury and suicidal ideas. The patient is nervous/anxious.          [facility]  The following portions of the patient's history were reviewed and updated as appropriate: allergies, current medications, past family history, past medical history, past social history, past surgical  history and problem list.     Social hx:  Tobacco nonsmoker  Alcohol  Past Medical History:   Diagnosis Date   • Anxiety    • Benign essential hypertension    • Cervical disc disorder    • Chronic pain disorder    • Colon polyps    • Complication of device    • Decreased libido    • Dizziness    • Encounter for long-term (current) use of medications    • Extremity pain    • Fatigue    • Hip pain    • History of alopecia    • History of backache    • History of colonoscopy     Fiberoptic   • History of diabetes mellitus    • History of insomnia    • History of mastoiditis    • History of migraine headaches    • History of urinary stone    • Infection of kidney    • Insomnia    • Joint pain    • Kidney infection    • Low back pain    • Lumbar radiculopathy    • Lumbar radiculopathy    • Lumbosacral disc disease    • Migraine    • Myofascial pain syndrome    • Neck pain    • Palpitations    • Sleep apnea    • Spinal cord stimulator status    • Stress reaction, emotional    • Urinary incontinence    • Urinary tract infection    • Visit for screening mammogram     Description: 08/28/2009   • Vitamin D deficiency       Past Surgical History:   Procedure Laterality Date   • BACK SURGERY      Lower Back   • BLADDER SURGERY     • COLONOSCOPY      Complete Colonoscopy   • HYSTERECTOMY      Total Abdominal Hysterectomy (Description: BSO)   • OTHER SURGICAL HISTORY      Elbow Surgery   • OTHER SURGICAL HISTORY      Spinal Sterotaxis Stimulation Of Cord   • SPINAL CORD STIMULATOR IMPLANT  2015    replacement      Allergies   Allergen Reactions   • Ace Inhibitors Cough   • Amlodipine    • Beta Adrenergic Blockers    • Cyclobenzaprine    • Hydrochlorothiazide    • Hyzaar  [Losartan Potassium-Hctz]    • Latex    • Losartan    • Metformin    • Penicillins    • Pioglitazone    • Spironolactone       Family History   Problem Relation Age of Onset   • Hypertension Mother    • Peripheral vascular disease Mother    • Ulcers Mother    •  Lung disease Father    • Diabetes Other         type 2            Current Outpatient Medications:   •  acetaminophen (TYLENOL) 500 MG tablet, Take 1 tablet by mouth 2 (Two) Times a Day. Every 4 to 6 hours as needed, Disp: , Rfl:   •  albuterol (PROVENTIL HFA;VENTOLIN HFA) 108 (90 Base) MCG/ACT inhaler, Inhale 2 puffs Every 6 (Six) Hours As Needed for Wheezing., Disp: 1 inhaler, Rfl: 0  •  Alirocumab (PRALUENT) 75 MG/ML solution pen-injector, Inject 75 mg under the skin into the appropriate area as directed Every 14 (Fourteen) Days., Disp: 6 pen, Rfl: 1  •  aspirin 81 MG EC tablet, Take 81 mg by mouth Daily., Disp: , Rfl:   •  baclofen (LIORESAL) 10 MG tablet, Take 1/2 to 1 tab po Qd to bid prn, Disp: 180 tablet, Rfl: 1  •  bisoprolol (ZEBeta) 5 MG tablet, Take 5 mg by mouth Daily., Disp: , Rfl:   •  desvenlafaxine (PRISTIQ) 50 MG 24 hr tablet, Take 1 tablet by mouth Daily., Disp: , Rfl:   •  Empagliflozin (JARDIANCE) 10 MG tablet, Take 10 mg by mouth Daily., Disp: 90 tablet, Rfl: 1  •  estradiol (ESTRACE VAGINAL) 0.1 MG/GM vaginal cream, Insert 2 g into the vagina 1 (One) Time Per Week., Disp: 42.5 g, Rfl: 5  •  exenatide er (BYDUREON BCISE) 2 MG/0.85ML auto-injector injection, Inject 0.85 units in to the appropriate area one time weekly, Disp: 12 pen, Rfl: 3  •  furosemide (LASIX) 20 MG tablet, Take 20 mg by mouth 2 (Two) Times a Day., Disp: , Rfl:   •  Liniments (SALONPAS) pads, Apply  topically. prn, Disp: , Rfl:   •  losartan (COZAAR) 100 MG tablet, Take 100 mg by mouth Daily., Disp: , Rfl:   •  metoclopramide (REGLAN) 5 MG tablet, Take 1 tablet by mouth 4 (Four) Times a Day Before Meals & at Bedtime., Disp: 120 tablet, Rfl: 1  •  NIFEdipine CC (ADALAT CC) 90 MG 24 hr tablet, Take 60 mg by mouth Daily., Disp: , Rfl:   •  omeprazole (priLOSEC) 40 MG capsule, Take 1 capsule by mouth Every Morning. Take first thing in the morning on an empty stomach.  Wait at least 30 min to 1hr before eating., Disp: 30 capsule,  "Rfl: 2  •  predniSONE (DELTASONE) 20 MG tablet, Take 1 tablet by mouth 2 (Two) Times a Day., Disp: 10 tablet, Rfl: 0  •  rOPINIRole (REQUIP) 0.25 MG tablet, Take 1 tablet by mouth Every Night. Take 1 hour before bedtime. (Patient taking differently: Take 0.5 mg by mouth Every Night. Take 1 hour before bedtime.), Disp: 30 tablet, Rfl: 3  •  rosuvastatin (CRESTOR) 20 MG tablet, Take 1 tablet by mouth Every Night., Disp: 30 tablet, Rfl: 3  •  topiramate (TOPAMAX) 50 MG tablet, TAKE ONE TABLET BY MOUTH TWICE DAILY (Patient taking differently: TAKE ONE TABLET BY MOUTH as needed), Disp: 180 tablet, Rfl: 0  •  traMADol (ULTRAM) 50 MG tablet, A tablet PO TID PRN severe breakthrough pain, Disp: 270 tablet, Rfl: 0  •  traZODone (DESYREL) 50 MG tablet, Take 1 to 2 tabs po hs prn, Disp: 180 tablet, Rfl: 2    VITALS:  /64   Pulse 83   Ht 157.5 cm (62\")   Wt 85.7 kg (189 lb)   LMP  (LMP Unknown)   SpO2 98%   BMI 34.57 kg/m²     Physical Exam   Constitutional: She is oriented to person, place, and time. She appears well-developed and well-nourished. No distress.   HENT:   Head: Normocephalic.   Right Ear: External ear normal.   Left Ear: External ear normal.   Nose: Nose normal.   Mouth/Throat: Oropharynx is clear and moist.   Eyes: Conjunctivae and EOM are normal. Pupils are equal, round, and reactive to light. No scleral icterus.   Neck: Normal range of motion. Neck supple.   Cardiovascular: Normal rate, regular rhythm and normal heart sounds.   Pulmonary/Chest: Effort normal and breath sounds normal. No respiratory distress.   Abdominal: Soft. Normal appearance and bowel sounds are normal. There is no hepatosplenomegaly. There is no tenderness.   Musculoskeletal:   Slow ROM with knee hip joints   Neurological: She is alert and oriented to person, place, and time.   Skin: Skin is warm and dry. Capillary refill takes less than 2 seconds. No rash noted.   Psychiatric: She has a normal mood and affect. Her behavior is " normal.       LABS  Results for orders placed or performed during the hospital encounter of 01/08/19   Hepatitis A Antibody, Total   Result Value Ref Range    Hep A Total Ab Positive (A) Negative       ASSESSMENT/PLAN  Yi was seen today for follow-up, hypertension, anxiety and hyperlipidemia.    Diagnoses and all orders for this visit:    Hepatitis A antibody positive  Comments:  added IGM to rule out active hep A; negative  Orders:  -     Cancel: Hepatitis A Antibody, IgM  -     Hepatitis A Antibody, IgM    Mixed hyperlipidemia  Comments:  cont statin    Benign essential hypertension  Comments:  cont meds as above    Moderate obesity  Comments:  need weight loss    Uncontrolled type 2 diabetes mellitus with hyperglycemia, without long-term current use of insulin (CMS/[facility])  Comments:  cont FU with endocrinology    Anxiety and depression  Comments:  controlled; continue pristque and FU with Dr. Oliveira    RLS (restless legs syndrome)  -     rOPINIRole (REQUIP) 0.25 MG tablet; Take 1 tablet by mouth Every Night. Take 1 hour before bedtime.    Other chronic pain  Comments:  followed by pain mgt; completed plaqurd form today    Nutrition and activity goals reviewed including: mainly water to drink, limit white flour/processed sugar; increase high protein, high fiber carbs, good breakfast.    I discussed the patients findings and my recommendations with patient.  Patient was encouraged to keep me informed of any acute changes, lack of improvement, or any new concerning symptoms.    Patient voiced understanding of all instructions and denied further questions.      FOLLOW-UP  Return in about 6 months (around 7/11/2019) for Recheck.    Electronically signed by:    OCTAVIANO Tovar  01/11/2019

## 2019-01-12 RX ORDER — ROPINIROLE 0.25 MG/1
0.25 TABLET, FILM COATED ORAL NIGHTLY
Qty: 30 TABLET | Refills: 3 | Status: SHIPPED | OUTPATIENT
Start: 2019-01-12 | End: 2019-05-21 | Stop reason: SDUPTHER

## 2019-01-16 ENCOUNTER — HOSPITAL ENCOUNTER (OUTPATIENT)
Dept: NUTRITION | Facility: HOSPITAL | Age: 64
Setting detail: RECURRING SERIES
Discharge: HOME | End: 2019-01-16

## 2019-01-16 VITALS — HEIGHT: 62 IN | BODY MASS INDEX: 35.22 KG/M2 | WEIGHT: 191.4 LBS

## 2019-02-11 ENCOUNTER — OFFICE VISIT (OUTPATIENT)
Dept: GASTROENTEROLOGY | Facility: CLINIC | Age: 64
End: 2019-02-11

## 2019-02-11 VITALS
SYSTOLIC BLOOD PRESSURE: 138 MMHG | OXYGEN SATURATION: 95 % | TEMPERATURE: 97.6 F | HEART RATE: 85 BPM | HEIGHT: 62 IN | WEIGHT: 188 LBS | BODY MASS INDEX: 34.6 KG/M2 | DIASTOLIC BLOOD PRESSURE: 76 MMHG

## 2019-02-11 DIAGNOSIS — R74.8 LIVER ENZYME ELEVATION: ICD-10-CM

## 2019-02-11 DIAGNOSIS — K59.09 CHRONIC CONSTIPATION: ICD-10-CM

## 2019-02-11 DIAGNOSIS — E66.9 DIABETES MELLITUS TYPE 2 IN OBESE (HCC): ICD-10-CM

## 2019-02-11 DIAGNOSIS — E11.69 DIABETES MELLITUS TYPE 2 IN OBESE (HCC): ICD-10-CM

## 2019-02-11 DIAGNOSIS — Z86.010 HISTORY OF COLON POLYPS: ICD-10-CM

## 2019-02-11 DIAGNOSIS — K31.84 GASTROPARESIS: ICD-10-CM

## 2019-02-11 DIAGNOSIS — Z23 NEED FOR HEPATITIS B VACCINATION: ICD-10-CM

## 2019-02-11 DIAGNOSIS — K76.0 NAFLD (NONALCOHOLIC FATTY LIVER DISEASE): Primary | ICD-10-CM

## 2019-02-11 DIAGNOSIS — Z12.11 SCREENING FOR COLON CANCER: ICD-10-CM

## 2019-02-11 PROCEDURE — G0010 ADMIN HEPATITIS B VACCINE: HCPCS | Performed by: NURSE PRACTITIONER

## 2019-02-11 PROCEDURE — 99214 OFFICE O/P EST MOD 30 MIN: CPT | Performed by: NURSE PRACTITIONER

## 2019-02-11 PROCEDURE — 90746 HEPB VACCINE 3 DOSE ADULT IM: CPT | Performed by: NURSE PRACTITIONER

## 2019-02-11 RX ORDER — POLYETHYLENE GLYCOL 3350 17 G/17G
17 POWDER, FOR SOLUTION ORAL DAILY
Qty: 1 EACH | Refills: 2 | Status: SHIPPED | OUTPATIENT
Start: 2019-02-11 | End: 2019-04-25

## 2019-02-11 RX ORDER — SODIUM, POTASSIUM,MAG SULFATES 17.5-3.13G
SOLUTION, RECONSTITUTED, ORAL ORAL
Qty: 2 BOTTLE | Refills: 0 | Status: SHIPPED | OUTPATIENT
Start: 2019-02-11 | End: 2019-04-15

## 2019-02-11 NOTE — PATIENT INSTRUCTIONS
Gastroparesis  Gastroparesis, also called delayed gastric emptying, is a condition in which food takes longer than normal to empty from the stomach. The condition is usually long-lasting (chronic).  What are the causes?  This condition may be caused by:  · An endocrine disorder, such as hypothyroidism or diabetes. Diabetes is the most common cause of this condition.  · A nervous system disease, such as Parkinson disease or multiple sclerosis.  · Cancer, infection, or surgery of the stomach or vagus nerve.  · A connective tissue disorder, such as scleroderma.  · Certain medicines.    In most cases, the cause is not known.  What increases the risk?  This condition is more likely to develop in:  · People with certain disorders, including endocrine disorders, eating disorders, amyloidosis, and scleroderma.  · People with certain diseases, including Parkinson disease or multiple sclerosis.  · People with cancer or infection of the stomach or vagus nerve.  · People who have had surgery on the stomach or vagus nerve.  · People who take certain medicines.  · Women.    What are the signs or symptoms?  Symptoms of this condition include:  · An early feeling of fullness when eating.  · Nausea.  · Weight loss.  · Vomiting.  · Heartburn.  · Abdominal bloating.  · Inconsistent blood glucose levels.  · Lack of appetite.  · Acid from the stomach coming up into the esophagus (gastroesophageal reflux).  · Spasms of the stomach.    Symptoms may come and go.  How is this diagnosed?  This condition is diagnosed with tests, such as:  · Tests that check how long it takes food to move through the stomach and intestines. These tests include:  ? Upper gastrointestinal (GI) series. In this test, X-rays of the intestines are taken after you drink a liquid. The liquid makes the intestines show up better on the X-rays.  ? Gastric emptying scintigraphy. In this test, scans are taken after you eat food that contains a small amount of radioactive  material.  ? Wireless capsule GI monitoring system. This test involves swallowing a capsule that records information about movement through the stomach.  · Gastric manometry. This test measures electrical and muscular activity in the stomach. It is done with a thin tube that is passed down the throat and into the stomach.  · Endoscopy. This test checks for abnormalities in the lining of the stomach. It is done with a long, thin tube that is passed down the throat and into the stomach.  · An ultrasound. This test can help rule out gallbladder disease or pancreatitis as a cause of your symptoms. It uses sound waves to take pictures of the inside of your body.    How is this treated?  There is no cure for gastroparesis. This condition may be managed with:  · Treatment of the underlying condition causing the gastroparesis.  · Lifestyle changes, including exercise and dietary changes. Dietary changes can include:  ? Changes in what and when you eat.  ? Eating smaller meals more often.  ? Eating low-fat foods.  ? Eating low-fiber forms of high-fiber foods, such as cooked vegetables instead of raw vegetables.  ? Having liquid foods in place of solid foods. Liquid foods are easier to digest.  · Medicines. These may be given to control nausea and vomiting and to stimulate stomach muscles.  · Getting food through a feeding tube. This may be done in severe cases.  · A gastric neurostimulator. This is a device that is inserted into the body with surgery. It helps improve stomach emptying and control nausea and vomiting.    Follow these instructions at home:  · Follow your health care provider's instructions about exercise and diet.  · Take medicines only as directed by your health care provider.  Contact a health care provider if:  · Your symptoms do not improve with treatment.  · You have new symptoms.  Get help right away if:  · You have severe abdominal pain that does not improve with treatment.  · You have nausea that does  not go away.  · You cannot keep fluids down.  This information is not intended to replace advice given to you by your health care provider. Make sure you discuss any questions you have with your health care provider.  Document Released: 12/18/2006 Document Revised: 05/25/2017 Document Reviewed: 12/14/2015  [employer] Interactive Patient Education © 2018 [employer]  Metoclopramide tablets  What is this medicine?  METOCLOPRAMIDE (met oh kloe PRA mide) is used to treat the symptoms of gastroesophageal reflux disease (GERD) like heartburn. It is also used to treat people with slow emptying of the stomach and intestinal tract.  This medicine may be used for other purposes; ask your health care provider or pharmacist if you have questions.  COMMON BRAND NAME(S): Akanksha  What should I tell my health care provider before I take this medicine?  They need to know if you have any of these conditions:  -breast cancer  -depression  -diabetes  -heart failure  -high blood pressure  -kidney disease  -liver disease  -Parkinson's disease or a movement disorder  -pheochromocytoma  -seizures  -stomach obstruction, bleeding, or perforation  -an unusual or allergic reaction to metoclopramide, procainamide, sulfites, other medicines, foods, dyes, or preservatives  -pregnant or trying to get pregnant  -breast-feeding  How should I use this medicine?  Take this medicine by mouth with a glass of water. Follow the directions on the prescription label. Take this medicine on an empty stomach, about 30 minutes before eating. Take your doses at regular intervals. Do not take your medicine more often than directed. Do not stop taking except on the advice of your doctor or health care professional.  A special MedGuide will be given to you by the pharmacist with each prescription and refill. Be sure to read this information carefully each time.  Talk to your pediatrician regarding the use of this medicine in children. Special care may be  needed.  Overdosage: If you think you have taken too much of this medicine contact a poison control center or emergency room at once.  NOTE: This medicine is only for you. Do not share this medicine with others.  What if I miss a dose?  If you miss a dose, take it as soon as you can. If it is almost time for your next dose, take only that dose. Do not take double or extra doses.  What may interact with this medicine?  -acetaminophen  -cyclosporine  -digoxin  -medicines for blood pressure  -medicines for diabetes, including insulin  -medicines for hay fever and other allergies  -medicines for depression, especially a Monoamine Oxidase Inhibitor (MAOI)  -medicines for Parkinson's disease, like levodopa  -medicines for sleep or for pain  -quinidine  -tetracycline  This list may not describe all possible interactions. Give your health care provider a list of all the medicines, herbs, non-prescription drugs, or dietary supplements you use. Also tell them if you smoke, drink alcohol, or use illegal drugs. Some items may interact with your medicine.  What should I watch for while using this medicine?  It may take a few weeks for your stomach condition to start to get better. However, do not take this medicine for longer than 12 weeks. The longer you take this medicine, and the more you take it, the greater your chances are of developing serious side effects. If you are an elderly patient, a female patient, or you have diabetes, you may be at an increased risk for side effects from this medicine. Contact your doctor immediately if you start having movements you cannot control such as lip smacking, rapid movements of the tongue, involuntary or uncontrollable movements of the eyes, head, arms and legs, or muscle twitches and spasms.  Patients and their families should watch out for worsening depression or thoughts of suicide. Also watch out for any sudden or severe changes in feelings such as feeling anxious, agitated,  panicky, irritable, hostile, aggressive, impulsive, severely restless, overly excited and hyperactive, or not being able to sleep. If this happens, especially at the beginning of treatment or after a change in dose, call your doctor.  Do not treat yourself for high fever. Ask your doctor or health care professional for advice.  You may get drowsy or dizzy. Do not drive, use machinery, or do anything that needs mental alertness until you know how this drug affects you. Do not stand or sit up quickly, especially if you are an older patient. This reduces the risk of dizzy or fainting spells. Alcohol can make you more drowsy and dizzy. Avoid alcoholic drinks.  What side effects may I notice from receiving this medicine?  Side effects that you should report to your doctor or health care professional as soon as possible:  -allergic reactions like skin rash, itching or hives, swelling of the face, lips, or tongue  -abnormal production of milk in females  -breast enlargement in both males and females  -change in the way you walk  -difficulty moving, speaking or swallowing  -drooling, lip smacking, or rapid movements of the tongue  -excessive sweating  -fever  -involuntary or uncontrollable movements of the eyes, head, arms and legs  -irregular heartbeat or palpitations  -muscle twitches and spasms  -unusually weak or tired  Side effects that usually do not require medical attention (report to your doctor or health care professional if they continue or are bothersome):  -change in sex drive or performance  -depressed mood  -diarrhea  -difficulty sleeping  -headache  -menstrual changes  -restless or nervous  This list may not describe all possible side effects. Call your doctor for medical advice about side effects. You may report side effects to FDA at 1-363-FDA-5496.  Where should I keep my medicine?  Keep out of the reach of children.  Store at room temperature between 20 and 25 degrees C (68 and 77 degrees F). Protect  from light. Keep container tightly closed. Throw away any unused medicine after the expiration date.  NOTE: This sheet is a summary. It may not cover all possible information. If you have questions about this medicine, talk to your doctor, pharmacist, or health care provider.  © 2018 Elsevier/Gold Standard (2017-10-04 15:13:45)

## 2019-02-11 NOTE — PROGRESS NOTES
GASTROENTEROLOGY OUTPATIENT ESTABLISHED PATIENT NOTE  Patient: ROMEO NAYAK : 1955  Date of Service: 2019  CC: Follow-up    Assessment/Plan                                             ASSESSMENT & PLANS     NAFLD (nonalcoholic fatty liver disease)  Liver enzyme elevation r/t problem # 1.  Improving  -     US Liver (Elastography US)  • Weight loss is only safe way to mgt fatty liver disease.   • I recommend moderate, regular exercise as tolerated.  • Follow up with dietician as scheduled.  Pt is seeing them today.   • Get vaccinated for Hepatitis A and B.  This can done with us, PCP, or health department  • Pt is encouraged to drink 3-4 cups of black coffee (caffeinated or decaffeinated) for NAFLD    • Follow up with PCP and/or endocrinologist for mgt of HTN, HLP, and DM. Statin therapy has been shown to be safe in patients with nonalcoholic fatty liver disease.  PCP is encouraged to have a low threshold for starting statins, despite potential side effects of LFT elevation w/ statins    • I encouraged pt to stay healthy in general.  Avoid or modest alcohol consumption.  Avoid recreation drugs and excessive acetaminophen.  I explained to pt that fatty liver, if not controlled, can develop into liver cirrhosis    Screening for colon cancer  History of colon polyps  -     Colonoscopy; Future    Chronic constipation  -     Start polyethylene glycol (MIRALAX) powder; Take 17 g by mouth Daily. Stir powder in 4 to 8 ounces of water, juice, soda, coffee, or tea until dissolved and administer immediately    Gastroparesis  Diabetes mellitus type 2 in obese (CMS/HCC).  BMI 34.4  -     Ambulatory Referral to gastroparesis specialist  · Follow up with dietician as scheduled.  Pt is seeing them today.   · Follow up w/ endocrinologist for DM2 mgt  · Continue Reglan for now.  Side effects discussed w/ pt.     Need for hepatitis B vaccination  -     Hepatitis B Vaccine Adult IM.  First dose today.  Repeat in 1  month (nursing visit only) and again in 6 months.     Follow Up:Return in about 6 months (around 8/11/2019) for Recheck.      DISCUSSION: The above plan was delineated in details with patient and all questions and concerns were answered.  Patient is also given contact information.  Patient is to return as scheduled or sooner if new problems arise.   Subjective                                                     SUBJECTIVE   History of Present Illness  Ms. Yi Garcia is a 63 y.o. female who is here for Follow-up on fatty liver and recent dx of gastroparesis. Pt has seen dietician twice.  Pt has lost 3-4 lbs w/in the last 4 wks. Her DM2 and HLP are improving. Endocrinologist mgt DM2. Pt denies any abdominal pain, including RUQ abdominal pain.  Pt denies jaundice, pruritus, stool or urine color changes, palmar erythema, or any skin changes. No stigmata of liver disease.  She has started on Reglan w/ sx control.     Colonoscopy, done in [facility] in [place] about 4-5yrs ago.  Reports of having polyp.  I don't have copy of this colonoscopy or path report  Per our electronic medical record, pathology report, done on 3/13/09 (done by Dr. Ruel Mehta), showed one ascending colon polyp. Bx showed tubular adenoma.     ROS:Review of Systems   Constitutional: Negative.         Pt currently or recently takes NSAIDS ( (i.e Ibuprofen, Aleve, Advil, Exedrin, BC Powder, diclofenac, meloxicam, & Naproxen, etc)? Yes (aspirin)    Pt currently or recently takes abx? No   HENT: Negative.    Eyes: Negative.    Respiratory: Negative.    Cardiovascular: Negative.    Gastrointestinal: Negative.    Endocrine: Negative.    Genitourinary: Negative.    Musculoskeletal: Negative.    Skin: Negative.    Allergic/Immunologic: Negative.    Neurological: Negative.    Hematological: Negative.    Psychiatric/Behavioral: Negative.      Objective                                                           OBJECTIVE   Allergy: Pt is allergic  to ace inhibitors; amlodipine; beta adrenergic blockers; cyclobenzaprine; hydrochlorothiazide; hyzaar  [losartan potassium-hctz]; latex; losartan; metformin; penicillins; pioglitazone; and spironolactone.  MEDS:•  albuterol (PROVENTIL HFA;VENTOLIN HFA) 108 (90 Base) MCG/ACT inhaler, Inhale 2 puffs Every 6 (Six) Hours As Needed for Wheezing., Disp: 1 inhaler, Rfl: 0  •  Alirocumab (PRALUENT) 75 MG/ML solution pen-injector, Inject 75 mg under the skin into the appropriate area as directed Every 14 (Fourteen) Days., Disp: 6 pen, Rfl: 1  •  aspirin 81 MG EC tablet, Take 81 mg by mouth Daily., Disp: , Rfl:   •  baclofen (LIORESAL) 10 MG tablet, Take 1/2 to 1 tab po Qd to bid prn, Disp: 180 tablet, Rfl: 1  •  bisoprolol (ZEBeta) 5 MG tablet, Take 5 mg by mouth Daily., Disp: , Rfl:   •  Empagliflozin (JARDIANCE) 10 MG tablet, Take 10 mg by mouth Daily., Disp: 90 tablet, Rfl: 1  •  exenatide er (BYDUREON BCISE) 2 MG/0.85ML auto-injector injection, Inject 0.85 units in to the appropriate area one time weekly, Disp: 12 pen, Rfl: 3  •  furosemide (LASIX) 20 MG tablet, Take 20 mg by mouth 2 (Two) Times a Day., Disp: , Rfl:   •  Liniments (SALONPAS) pads, Apply  topically. prn, Disp: , Rfl:   •  losartan (COZAAR) 100 MG tablet, Take 100 mg by mouth Daily., Disp: , Rfl:   •  metoclopramide (REGLAN) 5 MG tablet, Take 1 tablet by mouth 4 (Four) Times a Day Before Meals & at Bedtime., Disp: 120 tablet, Rfl: 1  •  NIFEdipine CC (ADALAT CC) 90 MG 24 hr tablet, Take 60 mg by mouth Daily., Disp: , Rfl:   •  omeprazole (priLOSEC) 40 MG capsule, Take 1 capsule by mouth Every Morning. Take first thing in the morning on an empty stomach.  Wait at least 30 min to 1hr before eating., Disp: 30 capsule, Rfl: 2  •  predniSONE (DELTASONE) 20 MG tablet, Take 1 tablet by mouth 2 (Two) Times a Day., Disp: 10 tablet, Rfl: 0  •  rOPINIRole (REQUIP) 0.25 MG tablet, Take 1 tablet by mouth Every Night. Take 1 hour before bedtime., Disp: 30 tablet, Rfl:  3  •  topiramate (TOPAMAX) 50 MG tablet, TAKE ONE TABLET BY MOUTH TWICE DAILY (Patient taking differently: TAKE ONE TABLET BY MOUTH as needed), Disp: 180 tablet, Rfl: 0  •  traMADol (ULTRAM) 50 MG tablet, A tablet PO TID PRN severe breakthrough pain, Disp: 270 tablet, Rfl: 0  •  traZODone (DESYREL) 50 MG tablet, Take 1 to 2 tabs po hs prn, Disp: 180 tablet, Rfl: 2  •  estradiol (ESTRACE VAGINAL) 0.1 MG/GM vaginal cream, Insert 2 g into the vagina 1 (One) Time Per Week., Disp: 42.5 g, Rfl: 5    EXAMINATION: NM GASTRIC EMPTYING- 01/08/2019     INDICATION: Persistent nausea, vomiting and abdominal pain;  R10.13-Epigastric pain; R11.0-Nausea         TECHNIQUE: The patient ingested 1.1 mCi technetium sulfur colloid.  Images were obtained over a 4 hour duration.     COMPARISON: NONE     FINDINGS: At 4 hours there is 80% gastric emptying. The T1 half is 2  hours.     IMPRESSION:  Delayed gastric emptying with 80% emptying at 4 hours and a  T1 half of 2 hours.    Lab Results   Component Value Date    WBC 7.23 09/26/2018    WBC 5.09 02/06/2017    WBC 5.35 02/05/2016    EOSABS 0.18 09/26/2018    EOSABS 0.17 07/03/2014    EOSABS 0.15 06/19/2014    HGB 14.4 09/26/2018    HGB 15.5 02/06/2017    HGB 14.8 02/05/2016    HCT 44.8 (H) 09/26/2018    HCT 47.0 (H) 02/06/2017    HCT 43.8 02/05/2016    MCV 90.9 09/26/2018    MCV 92.0 02/06/2017    MCV 88.8 02/05/2016    MCHC 32.1 09/26/2018    MCHC 33.0 02/06/2017    MCHC 33.8 02/05/2016     09/26/2018     02/06/2017     02/05/2016     Lab Results   Component Value Date     01/07/2019    K 4.7 01/07/2019     01/07/2019    CO2 26.0 01/07/2019    BUN 21 01/07/2019    BUN 21 10/31/2018    BUN 18 09/14/2018    CREATININE 0.85 01/07/2019    CREATININE 0.80 10/31/2018    CREATININE 0.73 09/14/2018    EGFRIFNONA 68 01/07/2019    EGFRIFNONA 73 10/31/2018    EGFRIFNONA 81 09/14/2018    GLUCOSE 168 (H) 01/07/2019    CALCIUM 9.6 01/07/2019    ANIONGAP 9.0 01/07/2019      Lab Results   Component Value Date    HEPAIGM Non-Reactive 01/11/2019    HEPAIGM Non-Reactive 09/26/2018    HEPBSAG Non-Reactive 09/26/2018    HEPBIGMCORE Non-Reactive 09/26/2018    HEPCVIRUSABY Non-Reactive 09/26/2018      Liver Profile     Lab Results   Component Value Date    AST 53 (H) 01/07/2019    AST 74 (H) 10/31/2018     (H) 09/14/2018    AST 38 (H) 02/06/2017    AST 27 02/05/2016    AST 28 09/14/2015    AST 37 (H) 01/08/2015     Lab Results   Component Value Date    ALT 69 (H) 01/07/2019     (H) 10/31/2018     (H) 09/14/2018    ALT 67 (H) 02/06/2017    ALT 38 02/05/2016    ALT 37 09/14/2015    ALT 51 (H) 01/08/2015     Lab Results   Component Value Date    ALKPHOS 147 (H) 01/07/2019    ALKPHOS 153 (H) 10/31/2018    ALKPHOS 149 (H) 09/14/2018    ALKPHOS 115 (H) 02/06/2017    ALKPHOS 127 (H) 02/05/2016    GGT 49 (H) 01/07/2019    GGT 59 (H) 09/26/2018     Lab Results   Component Value Date    HGBA1C 7.2 01/07/2019    HGBA1C 7.7 09/14/2018    HGBA1C 8.9 02/07/2018   HLP  Lab Results   Component Value Date    CHOL 170 01/07/2019    CHOL 284 (H) 09/14/2018    CHOL 251 (H) 06/13/2018    CHOL 290 (H) 02/06/2017    CHLPL 285 (H) 02/05/2016    CHLPL 280 (H) 01/08/2015          TRIG 385 (H) 01/07/2019    TRIG 452 (H) 09/14/2018    TRIG 288 (H) 06/13/2018    TRIG 232 (H) 02/06/2017    TRIG 251 (H) 02/05/2016    HDL 51 01/07/2019    HDL 49 09/14/2018    HDL 46 06/13/2018    HDL 60 02/06/2017    HDL 56 02/05/2016     01/07/2019     (H) 09/14/2018     (H) 06/13/2018     (H) 02/06/2017     (H) 02/05/2016       Wt Readings from Last 5 Encounters:   02/11/19 85.3 kg (188 lb)   01/16/19 86.8 kg (191 lb 6.4 oz)   01/11/19 85.7 kg (189 lb)   01/07/19 87.1 kg (192 lb)   12/11/18 86.2 kg (190 lb)   body mass index is 34.39 kg/m².,Temp: 97.6 °F (36.4 °C),BP: 138/76,Heart Rate: 85   Physical Exam  General Well developed; well nourished; no acute distress.   ENT Oral  mucosa pink and moist without thrush or lesions.    GI Abd soft, NT, ND, normal active bowel sounds.  Obese. No abd hernia    Pt care team: Zaira BRUMFIELD & RASHAWN Liang  02/11/19 10:19 AM  [facility]--Gastroenterology  [phone]    CC: Renetta Mendenhall, APRN   [address] [address] FAX:[phone]

## 2019-02-14 ENCOUNTER — HOSPITAL ENCOUNTER (OUTPATIENT)
Dept: ULTRASOUND IMAGING | Facility: HOSPITAL | Age: 64
Discharge: HOME | End: 2019-02-14
Admitting: NURSE PRACTITIONER

## 2019-02-14 ENCOUNTER — TELEPHONE (OUTPATIENT)
Dept: INTERNAL MEDICINE | Facility: CLINIC | Age: 64
End: 2019-02-14

## 2019-02-14 DIAGNOSIS — K76.0 NAFLD (NONALCOHOLIC FATTY LIVER DISEASE): ICD-10-CM

## 2019-02-14 PROCEDURE — 76981 USE PARENCHYMA: CPT

## 2019-02-14 PROCEDURE — 76705 ECHO EXAM OF ABDOMEN: CPT

## 2019-02-14 NOTE — TELEPHONE ENCOUNTER
Called pt and left vm to return my call regarding labs. Expressed I was in office until 7 tonight.

## 2019-02-14 NOTE — TELEPHONE ENCOUNTER
----- Message from OCTAVIANO Wong sent at 2/11/2019  2:33 PM EST -----  Please call pt. We rcvd GI specialist note and recommendation for cholesterol med statin is noted.  This can help some with fatty liver disease.  If pt is agreeable, I would like to send her a prescription for atorvastatin (lipitor) take once per day in evening.  Possible side effects of muscle aches.  Let me know if she is ok with this and I will send RX. She would need to come see me in 6 weeks for follow up to make sure her liver enzymes tolerated new med ok.  Encourage diet/exercise as well for fatty liver.

## 2019-02-18 ENCOUNTER — OUTSIDE FACILITY SERVICE (OUTPATIENT)
Dept: GASTROENTEROLOGY | Facility: CLINIC | Age: 64
End: 2019-02-18

## 2019-02-18 PROCEDURE — G0105 COLORECTAL SCRN; HI RISK IND: HCPCS | Performed by: INTERNAL MEDICINE

## 2019-02-19 ENCOUNTER — HOSPITAL ENCOUNTER (OUTPATIENT)
Dept: NUTRITION | Facility: HOSPITAL | Age: 64
Setting detail: RECURRING SERIES
Discharge: HOME | End: 2019-02-19

## 2019-02-19 VITALS — HEIGHT: 62 IN | WEIGHT: 187 LBS | BODY MASS INDEX: 34.41 KG/M2

## 2019-02-19 PROCEDURE — 97803 MED NUTRITION INDIV SUBSEQ: CPT | Performed by: DIETITIAN, REGISTERED

## 2019-03-04 DIAGNOSIS — Z79.1 ENCOUNTER FOR LONG-TERM (CURRENT) USE OF NSAIDS: ICD-10-CM

## 2019-03-04 DIAGNOSIS — K21.9 CHRONIC GERD: ICD-10-CM

## 2019-03-04 RX ORDER — OMEPRAZOLE 40 MG/1
CAPSULE, DELAYED RELEASE ORAL
Qty: 30 CAPSULE | Refills: 2 | Status: SHIPPED | OUTPATIENT
Start: 2019-03-04 | End: 2019-05-14 | Stop reason: SDUPTHER

## 2019-03-05 DIAGNOSIS — M96.1 POSTLAMINECTOMY SYNDROME OF LUMBAR REGION: ICD-10-CM

## 2019-03-05 DIAGNOSIS — M51.26 HERNIATED LUMBAR INTERVERTEBRAL DISC: ICD-10-CM

## 2019-03-05 DIAGNOSIS — G96.12: ICD-10-CM

## 2019-03-05 RX ORDER — TRAMADOL HYDROCHLORIDE 50 MG/1
TABLET ORAL
Qty: 270 TABLET | Refills: 0 | OUTPATIENT
Start: 2019-03-05 | End: 2019-09-10 | Stop reason: SDUPTHER

## 2019-03-11 ENCOUNTER — CLINICAL SUPPORT (OUTPATIENT)
Dept: GASTROENTEROLOGY | Facility: CLINIC | Age: 64
End: 2019-03-11

## 2019-03-11 DIAGNOSIS — Z23 ENCOUNTER FOR IMMUNIZATION: ICD-10-CM

## 2019-03-11 DIAGNOSIS — Z29.9 ENCOUNTER FOR PREVENTIVE MEASURE: Primary | ICD-10-CM

## 2019-03-11 PROCEDURE — 90746 HEPB VACCINE 3 DOSE ADULT IM: CPT | Performed by: INTERNAL MEDICINE

## 2019-03-11 PROCEDURE — G0010 ADMIN HEPATITIS B VACCINE: HCPCS | Performed by: NURSE PRACTITIONER

## 2019-03-13 NOTE — PROGRESS NOTES
"Chief Complain: \"Medication refills.\"     Brief History: Ms. Garcia has a history of chronic lower back and left leg pain.  She presents to the clinic today for medication refills.  She takes baclofen, trazodone, and tramadol for her chronic pain.  Patient reports that she is doing well with the medications, and she denies any adverse effects.  She was last seen on 02/05/2018 for SCS reprogramming.  Patient states that she has not used her SCS since last reprogrammed because she \"feels better without it.\"  Patient voices no concerns today.   Pain level ranges from 1/10 to 5/10 without the use of her spinal cord stimulator.  Pain location: Left lower back, buttocks, and left leg.  Quality of pain: deep ache and burning  Radiation of pain: Left gluteal region to the posterior aspect of left thigh, left calf, and into the dorsal aspect of her left foot.  Current analgesics: baclofen, tramadol, Lidocaine Patches, and Tylenol    I have reviewed [facility] #[id number] consistent with medication reconciliation.      Global Pain Scale 10- 03-14-19         Pain 13 11         Feelings 16 8         Clinical outcomes 17 8         Activities 15 7         GPS Total: 61 34           Diagnostic Studies:   UDS on 10/31/2018, appropriate.  XR SPINE, THORACIC, 1 VW-12/21/2017: No evidence for acute fracture, subluxation or dislocation of the thoracic spine with spinal cord stimulator terminating at the T7 superior endplate level.        CT SCAN OF THE THORACIC SPINE WO CONTRAST-09/13/2017: There is evidence of laminectomy at T9 and T10 where there is insertion of a spinal cord stimulator which is placed at the level of T7. There are mild diffuse osteoarthritic changes. There is no compression fracture or subluxation. There is no spinal stenosis.         The following portions of the patient's history were reviewed and updated as appropriate: problem list, past medical history, past surgery history, social history, family history, " "medications, and allergies     Review of Systems   Musculoskeletal: Positive for arthralgias, back pain, joint swelling and myalgias.   Neurological: Positive for headaches.   All other systems reviewed and are negative.     /88   Temp 97.9 °F (36.6 °C)   Ht 157.5 cm (62\")   Wt 83.5 kg (184 lb)   LMP  (LMP Unknown)   BMI 33.65 kg/m²      Physical Exam   Neurologic Exam  Constitutional: No acute distress, well appearing. Obese.   Head and Face: Normal.   Conjunctiva and lids: No swelling, erythema or discharge. Pupils: Equal, round, and reactive to light.   Pulmonary: No increased work of breathing or signs of respiratory distress.  Clear to auscultation bilaterally.   Cardiovascular: Normal rate and rhythm, normal S1 and S2, no murmurs.   Musculoskeletal   Gait and station: Normal.    The range of motion of the lumbar spine is limited but without significant pain. Lumbar facet joint loading maneuvers are negative.   Krish and Gaenslen's tests are negative.   The range of motion of the hip joints is full and without pain.   Muscle tone is normal.   Muscle strength/tone: Normal.   Skin and subcutaneous tissue: Normal without rashes or lesions.    Neurologic   Cranial nerves: Cranial nerves II-XII intact.   Cortical function: Normal mental status.   Reflexes: 2+ patellar bilaterally, 2+ Achilles bilaterally.   Straight leg raising test is positive on the left.    Negative clonus bilaterally.   Sensation: No sensory loss. Sensory exam: intact to light touch, intact pain and temperature sensation, intact vibration sensation and normal proprioception.   Coordination: Normal finger to nose and heel to shin. Coordination: normal balance and negative Romberg's sign.   Psychiatric: Judgment and insight: Normal. Orientation to person, place, and time: Normal. Recent and remote memory: Intact. Mood and affect: Normal.      ASSESSMENT:   1. Meningeal adhesions/lumbar arachnoiditis    2. Postlaminectomy syndrome of " lumbar region    3. Herniated lumbar intervertebral disc    4. Lumbar facet arthropathy/lumbar spondylosis without myelopathy    5. Sacroiliac joint dysfunction of left side    6. Myofascial pain syndrome    7. Diabetes mellitus type 2 in obese (CMS/HCC)    8. Physical deconditioning    9. Insomnia, unspecified type    10. Therapeutic drug monitoring       PLAN:   I have reviewed the above medical records, and discussed the patient with Dr. Velasquez.  1. Diagnostics: Random UDS   2. Pharmacological measures.  Reviewed and discussed.  A. Continue topiramate 50 mg twice daily.  B. Continue trazodone  mg each bedtime for insomnia.  Refilled.  D. Continue baclofen 10 mg 1/2 to 1 tablet 1-2 times a day as needed muscle spasms. Refilled.  E.  Tramadol 50 mg 2-3 times a day as needed for severe breakthrough pain. Refill if unremarkable UDS.  Patient advised that she will need medication follow-up in 6 months in order to refill.  Screening and compliance with controlled substances: Patient has completed a SOAPP questionnaire and ORT questionnaires (revealing low risk).  Bernabe reports have been reviewed at each visit, and at least every three months and appropriate.  Patient has signed a consent for treatment with controlled substances after reviewing the document and verbalizing full understanding of the risks, benefits, alternatives, and consequences of compliance and adherence with treatment and comprehensive plan of care. Patient received in hand a copy of this document.  3. Follow-up as needed for SCS reprogramming.  4. The patient has been instructed to contact my office with any questions or difficulties. The patient understands the plan and agrees to proceed accordingly.      Patient Care Team:  Renetta Aguero APRN as PCP - General (Internal Medicine)  Rafael Velasquez MD as Consulting Physician (Pain Medicine)  Von Croft MD as Surgeon (Neurosurgery)  Emmie Stallworth MD as Consulting Physician  (Internal Medicine)     New Medications Ordered This Visit   Medications   • baclofen (LIORESAL) 10 MG tablet     Sig: Take 1/2 to 1 tab po Qd to bid prn     Dispense:  180 tablet     Refill:  1   • traZODone (DESYREL) 50 MG tablet     Sig: Take 1 to 2 tabs po hs prn     Dispense:  180 tablet     Refill:  2         Future Appointments   Date Time Provider Department Center   3/14/2019  2:00 PM Gigi oRjo PA-C [facility] None   4/9/2019 10:00 AM Wero Hernandez PA-C [facility] None   4/9/2019  2:00 PM Nila Blanco, OPAL [facility]   7/12/2019 10:00 AM Renetta Aguero APRN [facility] None   8/12/2019 11:00 AM MA/NURSE GASTR[facility] None   8/13/2019 11:00 AM Zaira Briggs APRN [facility] None   9/10/2019  1:30 PM Gigi Rojo PA-C [facility] None         Gigi Rojo PA-C

## 2019-03-14 ENCOUNTER — OFFICE VISIT (OUTPATIENT)
Dept: PAIN MEDICINE | Facility: CLINIC | Age: 64
End: 2019-03-14

## 2019-03-14 ENCOUNTER — APPOINTMENT (OUTPATIENT)
Dept: LAB | Facility: HOSPITAL | Age: 64
End: 2019-03-14

## 2019-03-14 VITALS
BODY MASS INDEX: 33.86 KG/M2 | HEIGHT: 62 IN | WEIGHT: 184 LBS | SYSTOLIC BLOOD PRESSURE: 142 MMHG | TEMPERATURE: 97.9 F | DIASTOLIC BLOOD PRESSURE: 88 MMHG

## 2019-03-14 DIAGNOSIS — M47.816 LUMBAR FACET ARTHROPATHY: ICD-10-CM

## 2019-03-14 DIAGNOSIS — M51.26 HERNIATED LUMBAR INTERVERTEBRAL DISC: ICD-10-CM

## 2019-03-14 DIAGNOSIS — G96.12: Primary | ICD-10-CM

## 2019-03-14 DIAGNOSIS — M79.18 MYOFASCIAL PAIN SYNDROME: ICD-10-CM

## 2019-03-14 DIAGNOSIS — E11.69 DIABETES MELLITUS TYPE 2 IN OBESE (HCC): ICD-10-CM

## 2019-03-14 DIAGNOSIS — R53.81 PHYSICAL DECONDITIONING: ICD-10-CM

## 2019-03-14 DIAGNOSIS — G47.00 INSOMNIA, UNSPECIFIED TYPE: ICD-10-CM

## 2019-03-14 DIAGNOSIS — E66.9 DIABETES MELLITUS TYPE 2 IN OBESE (HCC): ICD-10-CM

## 2019-03-14 DIAGNOSIS — M53.3 SACROILIAC JOINT DYSFUNCTION OF LEFT SIDE: ICD-10-CM

## 2019-03-14 DIAGNOSIS — M96.1 POSTLAMINECTOMY SYNDROME OF LUMBAR REGION: ICD-10-CM

## 2019-03-14 DIAGNOSIS — Z51.81 THERAPEUTIC DRUG MONITORING: ICD-10-CM

## 2019-03-14 PROCEDURE — 99213 OFFICE O/P EST LOW 20 MIN: CPT | Performed by: PHYSICIAN ASSISTANT

## 2019-03-14 RX ORDER — TRAZODONE HYDROCHLORIDE 50 MG/1
TABLET ORAL
Qty: 180 TABLET | Refills: 2 | Status: SHIPPED | OUTPATIENT
Start: 2019-03-14 | End: 2020-01-21 | Stop reason: SDUPTHER

## 2019-03-14 RX ORDER — BACLOFEN 10 MG/1
TABLET ORAL
Qty: 180 TABLET | Refills: 1 | Status: SHIPPED | OUTPATIENT
Start: 2019-03-14 | End: 2020-01-21

## 2019-04-08 DIAGNOSIS — E78.2 MIXED HYPERLIPIDEMIA: ICD-10-CM

## 2019-04-08 NOTE — TELEPHONE ENCOUNTER
TOOK LAST DOSE OF PRALUENT 75 MG INJECTION. SHE NEEDS [facility] ON THIS PRES[facility] [phone] PHONE NUMBER [phone]. PATIENTS NUMBER [phone]

## 2019-04-15 ENCOUNTER — OFFICE VISIT (OUTPATIENT)
Dept: ENDOCRINOLOGY | Facility: CLINIC | Age: 64
End: 2019-04-15

## 2019-04-15 VITALS
SYSTOLIC BLOOD PRESSURE: 142 MMHG | DIASTOLIC BLOOD PRESSURE: 88 MMHG | BODY MASS INDEX: 33.29 KG/M2 | WEIGHT: 182 LBS | HEART RATE: 71 BPM | OXYGEN SATURATION: 96 %

## 2019-04-15 DIAGNOSIS — E78.2 MIXED HYPERLIPIDEMIA: ICD-10-CM

## 2019-04-15 DIAGNOSIS — E11.65 UNCONTROLLED TYPE 2 DIABETES MELLITUS WITH HYPERGLYCEMIA, WITHOUT LONG-TERM CURRENT USE OF INSULIN (HCC): Primary | ICD-10-CM

## 2019-04-15 DIAGNOSIS — L65.9 HAIR LOSS: ICD-10-CM

## 2019-04-15 LAB
ALBUMIN UR-MCNC: <1.2 MG/L
BASOPHILS # BLD AUTO: 0.06 10*3/MM3 (ref 0–0.2)
BASOPHILS NFR BLD AUTO: 1.1 % (ref 0–1.5)
CREAT UR-MCNC: 74.6 MG/DL
DEPRECATED RDW RBC AUTO: 44.5 FL (ref 37–54)
EOSINOPHIL # BLD AUTO: 0.13 10*3/MM3 (ref 0–0.4)
EOSINOPHIL NFR BLD AUTO: 2.3 % (ref 0.3–6.2)
ERYTHROCYTE [DISTWIDTH] IN BLOOD BY AUTOMATED COUNT: 13.1 % (ref 12.3–15.4)
FERRITIN SERPL-MCNC: 184 NG/ML (ref 13–150)
GLUCOSE BLDC GLUCOMTR-MCNC: 135 MG/DL (ref 70–130)
HBA1C MFR BLD: 7.6 %
HCT VFR BLD AUTO: 50.7 % (ref 34–46.6)
HGB BLD-MCNC: 15.8 G/DL (ref 12–15.9)
IMM GRANULOCYTES # BLD AUTO: 0.02 10*3/MM3 (ref 0–0.05)
IMM GRANULOCYTES NFR BLD AUTO: 0.4 % (ref 0–0.5)
LYMPHOCYTES # BLD AUTO: 1.8 10*3/MM3 (ref 0.7–3.1)
LYMPHOCYTES NFR BLD AUTO: 31.5 % (ref 19.6–45.3)
MCH RBC QN AUTO: 29.1 PG (ref 26.6–33)
MCHC RBC AUTO-ENTMCNC: 31.2 G/DL (ref 31.5–35.7)
MCV RBC AUTO: 93.4 FL (ref 79–97)
MICROALBUMIN/CREAT UR: NORMAL MG/G
MONOCYTES # BLD AUTO: 0.38 10*3/MM3 (ref 0.1–0.9)
MONOCYTES NFR BLD AUTO: 6.7 % (ref 5–12)
NEUTROPHILS # BLD AUTO: 3.32 10*3/MM3 (ref 1.4–7)
NEUTROPHILS NFR BLD AUTO: 58 % (ref 42.7–76)
NRBC BLD AUTO-RTO: 0 /100 WBC (ref 0–0)
PLATELET # BLD AUTO: 268 10*3/MM3 (ref 140–450)
PMV BLD AUTO: 10.9 FL (ref 6–12)
RBC # BLD AUTO: 5.43 10*6/MM3 (ref 3.77–5.28)
TSH SERPL DL<=0.05 MIU/L-ACNC: 1.76 MIU/ML (ref 0.27–4.2)
WBC NRBC COR # BLD: 5.71 10*3/MM3 (ref 3.4–10.8)

## 2019-04-15 PROCEDURE — 83540 ASSAY OF IRON: CPT | Performed by: PHYSICIAN ASSISTANT

## 2019-04-15 PROCEDURE — 82947 ASSAY GLUCOSE BLOOD QUANT: CPT | Performed by: PHYSICIAN ASSISTANT

## 2019-04-15 PROCEDURE — 82728 ASSAY OF FERRITIN: CPT | Performed by: PHYSICIAN ASSISTANT

## 2019-04-15 PROCEDURE — 82043 UR ALBUMIN QUANTITATIVE: CPT | Performed by: PHYSICIAN ASSISTANT

## 2019-04-15 PROCEDURE — 80053 COMPREHEN METABOLIC PANEL: CPT | Performed by: PHYSICIAN ASSISTANT

## 2019-04-15 PROCEDURE — 84443 ASSAY THYROID STIM HORMONE: CPT | Performed by: PHYSICIAN ASSISTANT

## 2019-04-15 PROCEDURE — 83036 HEMOGLOBIN GLYCOSYLATED A1C: CPT | Performed by: PHYSICIAN ASSISTANT

## 2019-04-15 PROCEDURE — 99214 OFFICE O/P EST MOD 30 MIN: CPT | Performed by: PHYSICIAN ASSISTANT

## 2019-04-15 PROCEDURE — 80061 LIPID PANEL: CPT | Performed by: PHYSICIAN ASSISTANT

## 2019-04-15 PROCEDURE — 82570 ASSAY OF URINE CREATININE: CPT | Performed by: PHYSICIAN ASSISTANT

## 2019-04-15 PROCEDURE — 85025 COMPLETE CBC W/AUTO DIFF WBC: CPT | Performed by: PHYSICIAN ASSISTANT

## 2019-04-15 NOTE — PROGRESS NOTES
"Chief Complaint  F/u for Diabetes Mellitus.    HPI   Yi Garcia is a 63 y.o. female with chronic back pain and HTN- on multiple antihypertensives followed by cardiology in [place], who is here today for f/u of Diabetes Mellitus type 2. The initial diagnosis of diabetes was made approximately 2016.    She still c/o hair loss    A1C- 7.6 (4/15/19), 7.2 (1/7/19), 7.7 (9/14/18), 6.7 (6/13/18), 8.9 (2/2018)  Labs reviewed:   6/13/18- GFR 73, ,   2/20/18- TSH 1.465    Diabetic complications: gastroparesis followed by GI at [facility]  Eye exam current (within one year): yes- no retinopathy  Foot care and dental care: discussed    Current diabetic medications include:  Bydureon 2mg sc weekly  Jardiance 10mg daily  ACEI/ARB: losartan-hctz  Statin: intolerant to statins, Repatha was not covered by insurance.     Past medications: metformin (???caused blurred vision, went away when stopped taking metformin), invokana (insurance didn't cover but thinks this controlled glucose better), glimepiride (pt reported it made her feel \"weird\" when she took it)    Diabetic Monitoring  - no meter or log, reports not checking    Nutrition:     Current diet: improving diet last 2 weeks. Eats out on Sunday, but cooks rest of week. Breakfast is egg sandwich on flatbread. Likes fruit. No sugary drinks. Got rid of candy in the house. No cakes. Weakness is break, pasta, potatoes. Likes vegetables.  Current exercise: none  Seen RD in past: yes    The following portions of the patient's history were reviewed and updated by me as appropriate: allergies, current medications, past family history, past social history, past surgical history and problem list.    Past Medical History:   Diagnosis Date   • Anxiety    • Benign essential hypertension    • Cervical disc disorder    • Chronic pain disorder    • Colon polyps    • Complication of device    • Decreased libido    • Dizziness    • Encounter for long-term (current) use of " medications    • Extremity pain    • Fatigue    • Hip pain    • History of alopecia    • History of backache    • History of colonoscopy     Fiberoptic   • History of diabetes mellitus    • History of insomnia    • History of mastoiditis    • History of migraine headaches    • History of urinary stone    • Infection of kidney    • Insomnia    • Joint pain    • Kidney infection    • Low back pain    • Lumbar radiculopathy    • Lumbar radiculopathy    • Lumbosacral disc disease    • Migraine    • Myofascial pain syndrome    • Neck pain    • Palpitations    • Sleep apnea    • Spinal cord stimulator status    • Stress reaction, emotional    • Urinary incontinence    • Urinary tract infection    • Visit for screening mammogram     Description: 08/28/2009   • Vitamin D deficiency        Medications    Current Outpatient Medications:   •  albuterol (PROVENTIL HFA;VENTOLIN HFA) 108 (90 Base) MCG/ACT inhaler, Inhale 2 puffs Every 6 (Six) Hours As Needed for Wheezing., Disp: 1 inhaler, Rfl: 0  •  Alirocumab (PRALUENT) 75 MG/ML solution pen-injector, Inject 75 mg under the skin into the appropriate area as directed Every 14 (Fourteen) Days., Disp: 6 pen, Rfl: 0  •  aspirin 81 MG EC tablet, Take 81 mg by mouth Daily., Disp: , Rfl:   •  baclofen (LIORESAL) 10 MG tablet, Take 1/2 to 1 tab po Qd to bid prn, Disp: 180 tablet, Rfl: 1  •  bisoprolol (ZEBeta) 5 MG tablet, Take 5 mg by mouth Daily., Disp: , Rfl:   •  estradiol (ESTRACE VAGINAL) 0.1 MG/GM vaginal cream, Insert 2 g into the vagina 1 (One) Time Per Week., Disp: 42.5 g, Rfl: 5  •  exenatide er (BYDUREON BCISE) 2 MG/0.85ML auto-injector injection, Inject 0.85 units in to the appropriate area one time weekly, Disp: 12 pen, Rfl: 3  •  furosemide (LASIX) 20 MG tablet, Take 20 mg by mouth 2 (Two) Times a Day., Disp: , Rfl:   •  Liniments (SALONPAS) pads, Apply  topically. prn, Disp: , Rfl:   •  losartan (COZAAR) 100 MG tablet, Take 100 mg by mouth Daily., Disp: , Rfl:   •   metoclopramide (REGLAN) 5 MG tablet, Take 1 tablet by mouth 4 (Four) Times a Day Before Meals & at Bedtime., Disp: 120 tablet, Rfl: 1  •  NIFEdipine CC (ADALAT CC) 90 MG 24 hr tablet, Take 60 mg by mouth Daily., Disp: , Rfl:   •  omeprazole (priLOSEC) 40 MG capsule, TAKE 1 CAPSULE EVERY MORNING. TAKE FIRST THING IN THE MORNING ON AN EMPTY STOMACH. WAIT AT LEAST 30 MINUTES TO 1 HOUR BEFORE EATING., Disp: 30 capsule, Rfl: 2  •  polyethylene glycol (MIRALAX) powder, Take 17 g by mouth Daily. Stir powder in 4 to 8 ounces of water, juice, soda, coffee, or tea until dissolved and administer immediately, Disp: 1 each, Rfl: 2  •  rOPINIRole (REQUIP) 0.25 MG tablet, Take 1 tablet by mouth Every Night. Take 1 hour before bedtime., Disp: 30 tablet, Rfl: 3  •  topiramate (TOPAMAX) 50 MG tablet, TAKE ONE TABLET BY MOUTH TWICE DAILY (Patient taking differently: TAKE ONE TABLET BY MOUTH as needed), Disp: 180 tablet, Rfl: 0  •  traMADol (ULTRAM) 50 MG tablet, TAKE 1 TABLET PO TID PRN FOR SEVERE BREAKTHROUGH PAIN, Disp: 270 tablet, Rfl: 0  •  traZODone (DESYREL) 50 MG tablet, Take 1 to 2 tabs po hs prn, Disp: 180 tablet, Rfl: 2  •  Empagliflozin (JARDIANCE) 25 MG tablet, Take 25 mg by mouth Daily., Disp: 30 tablet, Rfl: 6    Review of Systems  Review of Systems   Constitutional: Positive for fatigue. Negative for chills, fever and unexpected weight change.   HENT: Negative for ear pain, hearing loss, nosebleeds, rhinorrhea and sore throat.    Eyes: Positive for visual disturbance. Negative for pain, discharge, redness and itching.   Respiratory: Negative for cough, shortness of breath and wheezing.    Cardiovascular: Positive for leg swelling. Negative for chest pain and palpitations.   Gastrointestinal: Negative for abdominal pain, blood in stool, constipation and diarrhea.   Endocrine: Negative for cold intolerance, heat intolerance and polydipsia.   Genitourinary: Negative for dysuria, frequency, hematuria, pelvic pain, vaginal  bleeding and vaginal discharge.   Musculoskeletal: Positive for back pain. Negative for arthralgias, gait problem, joint swelling and myalgias.   Skin: Negative for rash.        Hair loss   Allergic/Immunologic: Negative.    Neurological: Negative for dizziness, syncope, weakness, numbness and headaches.   Hematological: Negative for adenopathy. Does not bruise/bleed easily.   Psychiatric/Behavioral: Negative for sleep disturbance and suicidal ideas. The patient is not nervous/anxious.         Physical Exam    /88   Pulse 71   Wt 82.6 kg (182 lb)   LMP  (LMP Unknown)   SpO2 96%   BMI 33.29 kg/m² Body mass index is 33.29 kg/m².  Physical Exam   Constitutional: She is oriented to person, place, and time. She appears well-developed. No distress.   HENT:   Head: Normocephalic.   Right Ear: External ear normal.   Left Ear: External ear normal.   Mouth/Throat: No oropharyngeal exudate.   Eyes: Conjunctivae and lids are normal. Right eye exhibits no discharge. Left eye exhibits no discharge. Right pupil is reactive. Left pupil is reactive.   Neck: No JVD present. No tracheal deviation present. No thyroid mass and no thyromegaly present.   Cardiovascular: Normal rate, regular rhythm, normal heart sounds and intact distal pulses.   No murmur heard.  Pulmonary/Chest: Effort normal and breath sounds normal. No respiratory distress. She has no wheezes.   Abdominal: Soft. Bowel sounds are normal. There is no tenderness.   Musculoskeletal: She exhibits no edema or tenderness.    Yi had a diabetic foot exam performed today.   During the foot exam she had a monofilament test performed.    Neurological Sensory Findings -  Unaltered sharp/dull right ankle/foot discrimination and unaltered sharp/dull left ankle/foot discrimination.  Vascular Status -  Her right foot exhibits normal foot vasculature  and no edema. Her left foot exhibits normal foot vasculature  and no edema.  Skin Integrity  -  Her right foot skin is  intact.  She has no right foot onychomycosis, no right foot ulcer and non-callous right foot.Her left foot skin is intact. She has no left foot onychomycosis, no left foot ulcer and non-callous left foot..  Lymphadenopathy:     She has no cervical adenopathy.   Neurological: She is alert and oriented to person, place, and time.   Skin: Skin is warm, dry and intact. No rash noted. She is not diaphoretic. No erythema.   Psychiatric: She has a normal mood and affect. Her speech is normal and behavior is normal. Thought content normal.       Labs and Imaging   Lab Results   Component Value Date    HGBA1C 7.6 04/15/2019    HGBA1C 7.2 01/07/2019    HGBA1C 7.7 09/14/2018     Office Visit on 04/15/2019   Component Date Value Ref Range Status   • Glucose 04/15/2019 135* 70 - 130 mg/dL Final   • Hemoglobin A1C 04/15/2019 7.6  % Final     No images are attached to the encounter or orders placed in the encounter.  Xr Spine Thoracic 1 View    Result Date: 12/21/2017  Spinal cord stimulator terminating at the T7 superior endplate level.  D:  12/21/2017 E:  12/21/2017  This report was finalized on 12/21/2017 5:12 PM by Dr. Kulwinder Cesar.        Assessment / Plan   Yi was seen today for follow-up.    Diagnoses and all orders for this visit:    Uncontrolled type 2 diabetes mellitus with hyperglycemia, without long-term current use of insulin (CMS/[facility])  -     POC Glucose Fingerstick  -     POC Glycosylated Hemoglobin (Hb A1C)  -     Microalbumin / Creatinine Urine Ratio - Urine, Clean Catch  -     Comprehensive Metabolic Panel  -     Lipid Panel  -     TSH  -     CBC & Differential  -     Empagliflozin (JARDIANCE) 25 MG tablet; Take 25 mg by mouth Daily.  -     CBC Auto Differential    Mixed hyperlipidemia  -     Alirocumab (PRALUENT) 75 MG/ML solution pen-injector; Inject 75 mg under the skin into the appropriate area as directed Every 14 (Fourteen) Days.  -     Comprehensive Metabolic Panel  -     Lipid Panel    Hair loss  -      Iron  -     Ferritin        Diabetes Mellitus 2 is under fair control.  -A1c 7.6, up from 7.2 1/2019  -continue bydureon bcise 2mg sc weekly.  -increase jardiance to 25mg daily. Discussed importance of adequate hydration and good hygiene with this medication. Samples provided.   -intolerant to metformin  -bring meter to f/u  -encouraged to have eye exam    1.  Diet: 3-4 carb servings per meal for females, 4-5 carb servings per meal for males  Spread carb intake throughout the day  Increase lean protein and vegetable intake  Avoid sugary drinks and processed carbs including crackers, cookies, cakes  2.  Exercise: Recommend at least 30 minutes of exercise daily, at least 5 days per week. Increase exercise gradually.   3.  Blood Glucose Goal: Blood glucose goal <150 fasting, <180 2 hr postprandial  4.  Microalbumin due 2/2019  5.  Education performed during this visit: long term diabetic complications discussed. , annual eye examinations at Ophthalmology discussed, dental hygiene discussed  and foot care reviewed., home glucose monitoring emphasized, all medications, side effects and compliance discussed carefully and Hypoglycemia management and prevention reviewed. Reviewed ‘ABCs’ of diabetes management (respective goals in parentheses):  A1C (<7), blood pressure (<130/80), and cholesterol (LDL <100, if CVD <70).    Hyperlipidemia  -continue praluent      There are no Patient Instructions on file for this visit.    Follow up: Return in about 3 months (around 7/15/2019).    Discussed the nature of the disease including, risks, complications, implications, management, safe and proper use of medications. Encouraged therapeutic lifestyle changes including low calorie diet with plenty of fruits and vegetables, daily exercise, medication compliance, and keeping scheduled follow up appointments with me and any other providers. Encouraged patient to have appointment for complete physical, fasting labs, appropriate screenings,  and immunizations on an annual basis.    20 min  of 30 min face-to-face visit time spent for coordination of care and counselling regarding identified problems as outlined in the objective, assessment and discussion portions of the documentation.    Wero Hernandez PA-C

## 2019-04-16 LAB
ALBUMIN SERPL-MCNC: 4.2 G/DL (ref 3.5–5.2)
ALBUMIN/GLOB SERPL: 1.2 G/DL
ALP SERPL-CCNC: 142 U/L (ref 39–117)
ALT SERPL W P-5'-P-CCNC: 37 U/L (ref 1–33)
ANION GAP SERPL CALCULATED.3IONS-SCNC: 14.4 MMOL/L
AST SERPL-CCNC: 29 U/L (ref 1–32)
BILIRUB SERPL-MCNC: 0.3 MG/DL (ref 0.2–1.2)
BUN BLD-MCNC: 12 MG/DL (ref 8–23)
BUN/CREAT SERPL: 19.4 (ref 7–25)
CALCIUM SPEC-SCNC: 9.9 MG/DL (ref 8.6–10.5)
CHLORIDE SERPL-SCNC: 102 MMOL/L (ref 98–107)
CHOLEST SERPL-MCNC: 146 MG/DL (ref 0–200)
CO2 SERPL-SCNC: 28.6 MMOL/L (ref 22–29)
CREAT BLD-MCNC: 0.62 MG/DL (ref 0.57–1)
GFR SERPL CREATININE-BSD FRML MDRD: 98 ML/MIN/1.73
GLOBULIN UR ELPH-MCNC: 3.5 GM/DL
GLUCOSE BLD-MCNC: 115 MG/DL (ref 65–99)
HDLC SERPL-MCNC: 50 MG/DL (ref 40–60)
IRON 24H UR-MRATE: 70 MCG/DL (ref 37–145)
LDLC SERPL CALC-MCNC: 64 MG/DL (ref 0–100)
LDLC/HDLC SERPL: 1.29 {RATIO}
POTASSIUM BLD-SCNC: 4.5 MMOL/L (ref 3.5–5.2)
PROT SERPL-MCNC: 7.7 G/DL (ref 6–8.5)
SODIUM BLD-SCNC: 145 MMOL/L (ref 136–145)
TRIGL SERPL-MCNC: 158 MG/DL (ref 0–150)
VLDLC SERPL-MCNC: 31.6 MG/DL (ref 5–40)

## 2019-04-18 ENCOUNTER — TELEPHONE (OUTPATIENT)
Dept: PAIN MEDICINE | Facility: CLINIC | Age: 64
End: 2019-04-18

## 2019-04-18 NOTE — TELEPHONE ENCOUNTER
Spoke with patient. Advised that her papers were completed. She requested a mailed copy and to be faxed to [employer] to the number listed on the top. Papers faxed success recieved, copy sent to patient.   She reports she previously paid the $25 fee.

## 2019-04-19 DIAGNOSIS — E11.65 UNCONTROLLED TYPE 2 DIABETES MELLITUS WITH HYPERGLYCEMIA, WITHOUT LONG-TERM CURRENT USE OF INSULIN (HCC): ICD-10-CM

## 2019-04-19 DIAGNOSIS — E78.2 MIXED HYPERLIPIDEMIA: ICD-10-CM

## 2019-04-24 ENCOUNTER — TELEPHONE (OUTPATIENT)
Dept: INTERNAL MEDICINE | Facility: CLINIC | Age: 64
End: 2019-04-24

## 2019-04-24 NOTE — PROGRESS NOTES
"Chief Complain: \"Low back and left leg pain.\"     Brief History: Ms. Garcia has a history of chronic lower back and left leg pain.  She returns today for re-evaluation and possible SCS reprogramming.  Patient reports lower back and left leg pain for the past 2 weeks.  She feels that her symptoms began after bending forward to  a carrot.  She was last seen for SCS reprogramming on 02/05/2018.   Patient states that she has not used her SCS since last reprogrammed because she \"feels better without it.\"  Patient is interested in resuming Physical Therapy.     Pain level ranges from 5/10 to 8/10 without the use of her spinal cord stimulator.  Pain location: Left lower back, buttocks, and left leg.  Quality of pain: deep ache and burning  Radiation of pain: Left gluteal region to the posterior aspect of left thigh, left calf, and into the dorsal aspect of her left foot.  Current analgesics: baclofen, tramadol, Lidocaine Patches, and Tylenol    Patient denies any new bowel or bladder problems.  I have reviewed [facility] #[id number] consistent with medication reconciliation.      Global Pain Scale 10- 03-14-19 04-25-19        Pain 13 11 15        Feelings 16 8 18        Clinical outcomes 17 8 17        Activities 15 7 18        GPS Total: 61 34 68          Diagnostic Studies:   XR SPINE, THORACIC, 1 VW-12/21/2017: No evidence for acute fracture, subluxation or dislocation of the thoracic spine with spinal cord stimulator terminating at the T7 superior endplate level.        CT SCAN OF THE THORACIC SPINE WO CONTRAST-09/13/2017: There is evidence of laminectomy at T9 and T10 where there is insertion of a spinal cord stimulator which is placed at the level of T7. There are mild diffuse osteoarthritic changes. There is no compression fracture or subluxation. There is no spinal stenosis.         The following portions of the patient's history were reviewed and updated as appropriate: problem list, past medical history, " "past surgery history, social history, family history, medications, and allergies     Review of Systems   Musculoskeletal: Positive for arthralgias, back pain, joint swelling and myalgias.   Neurological: Positive for headaches.   All other systems reviewed and are negative.     /70   Pulse 74   Temp 97.8 °F (36.6 °C) (Temporal)   Resp 18   Ht 157.5 cm (62\")   Wt 81.9 kg (180 lb 9.6 oz)   LMP  (LMP Unknown)   SpO2 94%   BMI 33.03 kg/m²      Physical Exam   Neurologic Exam  Constitutional: No acute distress.  Well appearing. Obese.   Head and Face: Normal.   Conjunctiva and lids: No swelling, erythema or discharge. Pupils: Equal, round, and reactive to light.   Pulmonary: No increased work of breathing or signs of respiratory distress.  Clear to auscultation bilaterally.   Cardiovascular: Normal rate and rhythm, normal S1 and S2, no murmurs.   Musculoskeletal   Gait and station: antalgic    The range of motion of the lumbar spine is limited secondary to pain. Lumbar facet joint loading maneuvers are negative.   Krish and Gaenslen's tests are negative.   The range of motion of the hip joints is full and without pain.   Muscle tone is normal.   Muscle strength/tone: Normal.   Skin and subcutaneous tissue: Normal without rashes or lesions.    Neurologic   Cranial nerves: Cranial nerves II-XII intact.   Cortical function: Normal mental status.   Reflexes: 2+ patellar bilaterally.  2+ Achilles bilaterally.   Straight leg raising test is positive on the left.    Negative clonus bilaterally.   Sensation: No sensory loss. Sensory exam: intact to light touch, intact pain and temperature sensation, intact vibration sensation and normal proprioception.   Coordination: Normal finger to nose and heel to shin. Coordination: normal balance and negative Romberg's sign.   Psychiatric: Judgment and insight: Normal. Orientation to person, place, and time: Normal. Recent and remote memory: Intact. Mood and affect: Normal. "      ASSESSMENT:   1. Meningeal adhesions/lumbar arachnoiditis    2. Postlaminectomy syndrome of lumbar region    3. Herniated lumbar intervertebral disc    4. Lumbar facet arthropathy/lumbar spondylosis without myelopathy    5. Sacroiliac joint dysfunction of left side    6. Myofascial pain syndrome    7. Uncontrolled type 2 diabetes mellitus with hyperglycemia, without long-term current use of insulin (CMS/[facility])    8. Moderate obesity    9. Obesity with sleep apnea    10. Insomnia, unspecified type    11. Anxiety and depression    12. Generalized anxiety disorder    13. Physical deconditioning       PLAN:   I have reviewed the above medical records, and discussed the patient with Dr. Velasquez.  The reps from [employer] feel that the battery needs recharging.  Unfortunately patient did not bring her  today.  She will be contacted by a [employer] rep to meet for charging, analysis, and reprogramming.  We discussed potential epidurals, but patient states that she would like to attempt SCS recharging/reprogramming and Physical Therapy prior to planning any interventional procedures.  1. If she fails to experience relief from SCS reprogramming, patient could be scheduled for diagnostic and therapeutic left L4-L5 and left L5-S1 transforaminal epidural steroid injections.  If she continues to struggle with pain, Dr. Velasquez had previously mentioned a referral back to Dr. Croft for possible revision of her SCS lead.  2. Pharmacological measures.  Reviewed and discussed.  A. Continue topiramate 50 mg twice daily.  B. Continue trazodone  mg each bedtime for insomnia.  Refilled.  D. Continue baclofen 10 mg 1/2 to 1 tablet 1-2 times a day as needed muscle spasms. Refilled.  E.  Tramadol 50 mg 2-3 times a day as needed for severe breakthrough pain.    3. Long-term rehabilitation efforts:   A. Resume comprehensive physical therapy program  B. Resume water therapy   C. Follow-up at [facility]  D. Follow-up with Dr. Berry Bradshaw for cognitive behavioral therapy, biofeedback, and CPAP compliance  E. Follow-up with Dr. Oliveira for unrelenting anxiety, depression and insomnia  4. The patient has been instructed to contact my office with any questions or difficulties. The patient understands the plan and agrees to proceed accordingly.      Patient Care Team:  Renetta Aguero APRN as PCP - General (Internal Medicine)  Rafael Velasquez MD as Consulting Physician (Pain Medicine)  Von Croft MD as Consulting Physician (Neurosurgery)  Emmie Stallworth MD as Consulting Physician (Internal Medicine)  Gigi Rojo PA-C as Physician Assistant (Physician Assistant)  Zaira Briggs APRN as Nurse Practitioner (Gastroenterology)  Wero Hernandez PA-C as Physician Assistant (Family Medicine)  Maurilio Simmons MD as Consulting Physician (Gastroenterology)     No orders of the defined types were placed in this encounter.        Future Appointments   Date Time Provider Department Center   7/12/2019 10:00 AM Renetta Aguero APRN [facility] None   7/12/2019 11:45 AM Wero Hernandez PA-C [facility] None   8/12/2019 11:00 AM MA/NURSE GASTRO [facility] None   8/13/2019 11:00 AM Zaira Briggs APRN [facility] None   9/10/2019  1:30 PM Gigi Rojo PA-C [facility] None         Gigi Rojo PA-C

## 2019-04-24 NOTE — TELEPHONE ENCOUNTER
PT. CALLED AND STATED THAT HER PRALUENT NEEDS A PA; PT. STATED THAT YOU CAN CALL THIS PHONE NUMBER FOR THE PA [phone]

## 2019-04-25 ENCOUNTER — OFFICE VISIT (OUTPATIENT)
Dept: PAIN MEDICINE | Facility: CLINIC | Age: 64
End: 2019-04-25

## 2019-04-25 VITALS
HEART RATE: 74 BPM | OXYGEN SATURATION: 94 % | HEIGHT: 62 IN | DIASTOLIC BLOOD PRESSURE: 70 MMHG | TEMPERATURE: 97.8 F | SYSTOLIC BLOOD PRESSURE: 120 MMHG | BODY MASS INDEX: 33.23 KG/M2 | RESPIRATION RATE: 18 BRPM | WEIGHT: 180.6 LBS

## 2019-04-25 DIAGNOSIS — M96.1 POSTLAMINECTOMY SYNDROME OF LUMBAR REGION: ICD-10-CM

## 2019-04-25 DIAGNOSIS — M47.816 LUMBAR FACET ARTHROPATHY: ICD-10-CM

## 2019-04-25 DIAGNOSIS — E66.9 OBESITY WITH SLEEP APNEA: ICD-10-CM

## 2019-04-25 DIAGNOSIS — M51.26 HERNIATED LUMBAR INTERVERTEBRAL DISC: ICD-10-CM

## 2019-04-25 DIAGNOSIS — F41.1 GENERALIZED ANXIETY DISORDER: ICD-10-CM

## 2019-04-25 DIAGNOSIS — F41.9 ANXIETY AND DEPRESSION: ICD-10-CM

## 2019-04-25 DIAGNOSIS — E66.8 MODERATE OBESITY: ICD-10-CM

## 2019-04-25 DIAGNOSIS — E11.65 UNCONTROLLED TYPE 2 DIABETES MELLITUS WITH HYPERGLYCEMIA, WITHOUT LONG-TERM CURRENT USE OF INSULIN (HCC): ICD-10-CM

## 2019-04-25 DIAGNOSIS — G47.30 OBESITY WITH SLEEP APNEA: ICD-10-CM

## 2019-04-25 DIAGNOSIS — G96.12: Primary | ICD-10-CM

## 2019-04-25 DIAGNOSIS — G47.00 INSOMNIA, UNSPECIFIED TYPE: ICD-10-CM

## 2019-04-25 DIAGNOSIS — R53.81 PHYSICAL DECONDITIONING: ICD-10-CM

## 2019-04-25 DIAGNOSIS — M79.18 MYOFASCIAL PAIN SYNDROME: ICD-10-CM

## 2019-04-25 DIAGNOSIS — F32.A ANXIETY AND DEPRESSION: ICD-10-CM

## 2019-04-25 DIAGNOSIS — M53.3 SACROILIAC JOINT DYSFUNCTION OF LEFT SIDE: ICD-10-CM

## 2019-04-25 PROCEDURE — 99213 OFFICE O/P EST LOW 20 MIN: CPT | Performed by: PHYSICIAN ASSISTANT

## 2019-04-25 RX ORDER — PREDNISONE 20 MG/1
20 TABLET ORAL DAILY
COMMUNITY
End: 2019-08-13

## 2019-05-03 ENCOUNTER — TELEPHONE (OUTPATIENT)
Dept: GASTROENTEROLOGY | Facility: CLINIC | Age: 64
End: 2019-05-03

## 2019-05-03 DIAGNOSIS — K31.84 GASTROPARESIS: ICD-10-CM

## 2019-05-03 RX ORDER — METOCLOPRAMIDE 5 MG/1
5 TABLET ORAL 2 TIMES DAILY
Qty: 60 TABLET | Refills: 0 | Status: SHIPPED | OUTPATIENT
Start: 2019-05-03 | End: 2019-07-12

## 2019-05-03 NOTE — TELEPHONE ENCOUNTER
Ahsan garcia let pt know that I'm decreasing Reglan fr QID to BID.   I recommend adding OTC win root tabs BID.    I try not to keep pt on Reglan long term.

## 2019-05-07 ENCOUNTER — HOSPITAL ENCOUNTER (OUTPATIENT)
Dept: HOSPITAL 22 - PT | Age: 64
Discharge: HOME | End: 2019-05-07
Payer: MEDICARE

## 2019-05-07 ENCOUNTER — TELEPHONE (OUTPATIENT)
Dept: PAIN MEDICINE | Facility: CLINIC | Age: 64
End: 2019-05-07

## 2019-05-07 DIAGNOSIS — M96.1: Primary | ICD-10-CM

## 2019-05-07 NOTE — TELEPHONE ENCOUNTER
Received faxed request from [employer] requesting refill on Tramadol 50 mg on 05/03/19. 90 day supply was sent on 03/05/19 however only a 7 day supply was dispensed. Called [facility] to find out why. They stated that since it was the first fill of the year anything beyond a 7 day supply required prior authorization and the patient chose do the 7 day instead. They need a new Rx to refill. Rx faxed to [phone]

## 2019-05-10 ENCOUNTER — TELEPHONE (OUTPATIENT)
Dept: PAIN MEDICINE | Facility: CLINIC | Age: 64
End: 2019-05-10

## 2019-05-10 NOTE — TELEPHONE ENCOUNTER
Inés from [facility] called. Patient's physical therapist would like to know if it's okay to do lumbar traction. Please advise

## 2019-05-10 NOTE — TELEPHONE ENCOUNTER
Spoke with Dr. Velasquez. He states no traction for patient. Called Inés back and let her know. Understanding verbalized

## 2019-05-14 DIAGNOSIS — K21.9 CHRONIC GERD: ICD-10-CM

## 2019-05-14 DIAGNOSIS — Z79.1 ENCOUNTER FOR LONG-TERM (CURRENT) USE OF NSAIDS: ICD-10-CM

## 2019-05-14 RX ORDER — OMEPRAZOLE 40 MG/1
CAPSULE, DELAYED RELEASE ORAL
Qty: 30 CAPSULE | Refills: 2 | Status: SHIPPED | OUTPATIENT
Start: 2019-05-14 | End: 2019-08-23 | Stop reason: SDUPTHER

## 2019-05-21 DIAGNOSIS — G25.81 RLS (RESTLESS LEGS SYNDROME): ICD-10-CM

## 2019-05-21 RX ORDER — ROPINIROLE 0.25 MG/1
TABLET, FILM COATED ORAL
Qty: 30 TABLET | Refills: 1 | Status: SHIPPED | OUTPATIENT
Start: 2019-05-21 | End: 2019-07-13

## 2019-07-08 ENCOUNTER — HOSPITAL ENCOUNTER (OUTPATIENT)
Dept: HOSPITAL 22 - PT | Age: 64
Discharge: HOME | End: 2019-07-08
Payer: MEDICARE

## 2019-07-08 DIAGNOSIS — M96.1: Primary | ICD-10-CM

## 2019-07-08 PROCEDURE — 97033 APP MDLTY 1+IONTPHRSIS EA 15: CPT

## 2019-07-08 PROCEDURE — 97163 PT EVAL HIGH COMPLEX 45 MIN: CPT

## 2019-07-08 PROCEDURE — 97010 HOT OR COLD PACKS THERAPY: CPT

## 2019-07-08 PROCEDURE — 97140 MANUAL THERAPY 1/> REGIONS: CPT

## 2019-07-08 PROCEDURE — 97035 APP MDLTY 1+ULTRASOUND EA 15: CPT

## 2019-07-08 PROCEDURE — 97110 THERAPEUTIC EXERCISES: CPT

## 2019-07-12 ENCOUNTER — OFFICE VISIT (OUTPATIENT)
Dept: INTERNAL MEDICINE | Facility: CLINIC | Age: 64
End: 2019-07-12

## 2019-07-12 ENCOUNTER — OFFICE VISIT (OUTPATIENT)
Dept: ENDOCRINOLOGY | Facility: CLINIC | Age: 64
End: 2019-07-12

## 2019-07-12 VITALS
DIASTOLIC BLOOD PRESSURE: 80 MMHG | HEART RATE: 84 BPM | BODY MASS INDEX: 32.19 KG/M2 | SYSTOLIC BLOOD PRESSURE: 118 MMHG | OXYGEN SATURATION: 99 % | WEIGHT: 176 LBS

## 2019-07-12 VITALS
SYSTOLIC BLOOD PRESSURE: 118 MMHG | DIASTOLIC BLOOD PRESSURE: 80 MMHG | HEART RATE: 84 BPM | TEMPERATURE: 98 F | HEIGHT: 62 IN | WEIGHT: 176 LBS | BODY MASS INDEX: 32.39 KG/M2 | OXYGEN SATURATION: 99 %

## 2019-07-12 DIAGNOSIS — K59.00 CONSTIPATION, UNSPECIFIED CONSTIPATION TYPE: ICD-10-CM

## 2019-07-12 DIAGNOSIS — E78.2 MIXED HYPERLIPIDEMIA: ICD-10-CM

## 2019-07-12 DIAGNOSIS — E83.52 SERUM CALCIUM ELEVATED: ICD-10-CM

## 2019-07-12 DIAGNOSIS — F32.A ANXIETY AND DEPRESSION: ICD-10-CM

## 2019-07-12 DIAGNOSIS — F41.9 ANXIETY AND DEPRESSION: ICD-10-CM

## 2019-07-12 DIAGNOSIS — D75.1 POLYCYTHEMIA: ICD-10-CM

## 2019-07-12 DIAGNOSIS — G25.81 RLS (RESTLESS LEGS SYNDROME): ICD-10-CM

## 2019-07-12 DIAGNOSIS — Z12.39 BREAST CANCER SCREENING: ICD-10-CM

## 2019-07-12 DIAGNOSIS — E78.5 HYPERLIPIDEMIA, UNSPECIFIED HYPERLIPIDEMIA TYPE: ICD-10-CM

## 2019-07-12 DIAGNOSIS — E11.65 TYPE 2 DIABETES MELLITUS WITH HYPERGLYCEMIA, WITHOUT LONG-TERM CURRENT USE OF INSULIN (HCC): ICD-10-CM

## 2019-07-12 DIAGNOSIS — I10 ESSENTIAL HYPERTENSION: Primary | ICD-10-CM

## 2019-07-12 DIAGNOSIS — R79.89 ELEVATED FERRITIN: ICD-10-CM

## 2019-07-12 DIAGNOSIS — R74.8 ELEVATED ALKALINE PHOSPHATASE LEVEL: ICD-10-CM

## 2019-07-12 DIAGNOSIS — E11.65 UNCONTROLLED TYPE 2 DIABETES MELLITUS WITH HYPERGLYCEMIA, WITHOUT LONG-TERM CURRENT USE OF INSULIN (HCC): Primary | ICD-10-CM

## 2019-07-12 LAB
ALBUMIN SERPL-MCNC: 4.6 G/DL (ref 3.5–5.2)
ALBUMIN/GLOB SERPL: 1.3 G/DL
ALP SERPL-CCNC: 161 U/L (ref 39–117)
ALT SERPL W P-5'-P-CCNC: 49 U/L (ref 1–33)
ANION GAP SERPL CALCULATED.3IONS-SCNC: 14.4 MMOL/L (ref 5–15)
AST SERPL-CCNC: 38 U/L (ref 1–32)
BILIRUB SERPL-MCNC: 0.5 MG/DL (ref 0.2–1.2)
BUN BLD-MCNC: 24 MG/DL (ref 8–23)
BUN/CREAT SERPL: 26.1 (ref 7–25)
CALCIUM SPEC-SCNC: 11 MG/DL (ref 8.6–10.5)
CHLORIDE SERPL-SCNC: 100 MMOL/L (ref 98–107)
CO2 SERPL-SCNC: 27.6 MMOL/L (ref 22–29)
CREAT BLD-MCNC: 0.92 MG/DL (ref 0.57–1)
FERRITIN SERPL-MCNC: 225 NG/ML (ref 13–150)
GFR SERPL CREATININE-BSD FRML MDRD: 62 ML/MIN/1.73
GLOBULIN UR ELPH-MCNC: 3.6 GM/DL
GLUCOSE BLD-MCNC: 153 MG/DL (ref 65–99)
GLUCOSE BLDC GLUCOMTR-MCNC: 134 MG/DL (ref 70–130)
HBA1C MFR BLD: 7.4 %
POTASSIUM BLD-SCNC: 5 MMOL/L (ref 3.5–5.2)
PROT SERPL-MCNC: 8.2 G/DL (ref 6–8.5)
SODIUM BLD-SCNC: 142 MMOL/L (ref 136–145)

## 2019-07-12 PROCEDURE — 83036 HEMOGLOBIN GLYCOSYLATED A1C: CPT | Performed by: PHYSICIAN ASSISTANT

## 2019-07-12 PROCEDURE — 82728 ASSAY OF FERRITIN: CPT | Performed by: NURSE PRACTITIONER

## 2019-07-12 PROCEDURE — 82947 ASSAY GLUCOSE BLOOD QUANT: CPT | Performed by: PHYSICIAN ASSISTANT

## 2019-07-12 PROCEDURE — 85025 COMPLETE CBC W/AUTO DIFF WBC: CPT | Performed by: NURSE PRACTITIONER

## 2019-07-12 PROCEDURE — 82043 UR ALBUMIN QUANTITATIVE: CPT | Performed by: NURSE PRACTITIONER

## 2019-07-12 PROCEDURE — 99214 OFFICE O/P EST MOD 30 MIN: CPT | Performed by: NURSE PRACTITIONER

## 2019-07-12 PROCEDURE — 82570 ASSAY OF URINE CREATININE: CPT | Performed by: NURSE PRACTITIONER

## 2019-07-12 PROCEDURE — 99214 OFFICE O/P EST MOD 30 MIN: CPT | Performed by: PHYSICIAN ASSISTANT

## 2019-07-12 PROCEDURE — 80053 COMPREHEN METABOLIC PANEL: CPT | Performed by: NURSE PRACTITIONER

## 2019-07-12 RX ORDER — GLIMEPIRIDE 4 MG/1
TABLET ORAL
Qty: 30 TABLET | Refills: 6 | Status: SHIPPED | OUTPATIENT
Start: 2019-07-12 | End: 2019-07-12 | Stop reason: SDUPTHER

## 2019-07-12 RX ORDER — GLIMEPIRIDE 4 MG/1
TABLET ORAL
Qty: 30 TABLET | Refills: 6 | Status: SHIPPED | OUTPATIENT
Start: 2019-07-12 | End: 2020-01-17 | Stop reason: SDUPTHER

## 2019-07-12 RX ORDER — ROSUVASTATIN CALCIUM 5 MG/1
5 TABLET, COATED ORAL NIGHTLY
Qty: 90 TABLET | Refills: 1 | Status: SHIPPED | OUTPATIENT
Start: 2019-07-12 | End: 2019-10-14 | Stop reason: SINTOL

## 2019-07-12 RX ORDER — DESVENLAFAXINE 25 MG/1
25 TABLET, EXTENDED RELEASE ORAL DAILY
Qty: 30 TABLET | Refills: 0 | Status: SHIPPED | OUTPATIENT
Start: 2019-07-12 | End: 2020-01-21 | Stop reason: SDUPTHER

## 2019-07-12 NOTE — PROGRESS NOTES
"Chief Complaint  F/u for Diabetes Mellitus.    HPI   Yi Garcia is a 63 y.o. female with chronic back pain and HTN- on multiple antihypertensives followed by cardiology in [place], who is here today for f/u of Diabetes Mellitus type 2. The initial diagnosis of diabetes was made approximately 2016.    Has underlying chronic nausea.     A1C- 7.4 (7/12/19), 7.6 (4/15/19), 7.2 (1/7/19), 7.7 (9/14/18), 6.7 (6/13/18), 8.9 (2/2018)  Labs reviewed:   6/13/18- GFR 73, ,   2/20/18- TSH 1.465    Diabetic complications: gastroparesis followed by GI at [facility], [place], operated by [facility]  Eye exam current (within one year): yes- no retinopathy  Foot care and dental care: discussed    Current diabetic medications include:  Bydureon 2mg sc weekly   Jardiance 25mg daily  ACEI/ARB: losartan-hctz  Statin: intolerant to statins, Repatha and Praluent not covered by insurance.     Past medications: metformin (???caused blurred vision, went away when stopped taking metformin), invokana (insurance didn't cover but thinks this controlled glucose better), glimepiride (pt reported it made her feel \"weird\" when she took it), actos     Diabetic Monitoring  - no meter or log, reports not checking    Nutrition:     Current diet: improving diet last 2 weeks. Eats out on Sunday, but cooks rest of week. Breakfast is egg sandwich on flatbread. Likes fruit. No sugary drinks. Got rid of candy in the house. No cakes. Weakness is break, pasta, potatoes. Likes vegetables.  Current exercise: none  Seen RD in past: yes    The following portions of the patient's history were reviewed and updated by me as appropriate: allergies, current medications, past family history, past social history, past surgical history and problem list.    Past Medical History:   Diagnosis Date   • Anxiety    • Benign essential hypertension    • Cervical disc disorder    • Chronic pain disorder    • Colon polyps    • Complication of device    • Decreased libido    • Dizziness    • " Encounter for long-term (current) use of medications    • Extremity pain    • Fatigue    • Hip pain    • History of alopecia    • History of backache    • History of colonoscopy     Fiberoptic   • History of diabetes mellitus    • History of insomnia    • History of mastoiditis    • History of migraine headaches    • History of urinary stone    • Infection of kidney    • Insomnia    • Joint pain    • Kidney infection    • Low back pain    • Lumbar radiculopathy    • Lumbar radiculopathy    • Lumbosacral disc disease    • Migraine    • Myofascial pain syndrome    • Neck pain    • Palpitations    • Sleep apnea    • Spinal cord stimulator status    • Stress reaction, emotional    • Urinary incontinence    • Urinary tract infection    • Visit for screening mammogram     Description: 08/28/2009   • Vitamin D deficiency        Medications    Current Outpatient Medications:   •  albuterol (PROVENTIL HFA;VENTOLIN HFA) 108 (90 Base) MCG/ACT inhaler, Inhale 2 puffs Every 6 (Six) Hours As Needed for Wheezing., Disp: 1 inhaler, Rfl: 0  •  aspirin 81 MG EC tablet, Take 81 mg by mouth Daily., Disp: , Rfl:   •  baclofen (LIORESAL) 10 MG tablet, Take 1/2 to 1 tab po Qd to bid prn, Disp: 180 tablet, Rfl: 1  •  bisoprolol (ZEBeta) 5 MG tablet, Take 5 mg by mouth Daily., Disp: , Rfl:   •  Desvenlafaxine Succinate ER (PRISTIQ) 25 MG tablet sustained-release 24 hour, Take 1 tablet by mouth Daily., Disp: 30 tablet, Rfl: 0  •  Empagliflozin (JARDIANCE) 25 MG tablet, Take 25 mg by mouth Daily., Disp: 90 tablet, Rfl: 1  •  estradiol (ESTRACE VAGINAL) 0.1 MG/GM vaginal cream, Insert 2 g into the vagina 1 (One) Time Per Week., Disp: 42.5 g, Rfl: 5  •  furosemide (LASIX) 20 MG tablet, Take 20 mg by mouth 2 (Two) Times a Day., Disp: , Rfl:   •  Liniments (SALONPAS) pads, Apply  topically. prn, Disp: , Rfl:   •  losartan (COZAAR) 100 MG tablet, Take 100 mg by mouth Daily., Disp: , Rfl:   •  NIFEdipine CC (ADALAT CC) 90 MG 24 hr tablet, Take 60  mg by mouth Daily., Disp: , Rfl:   •  omeprazole (priLOSEC) 40 MG capsule, TAKE 1 CAPSULE EVERY MORNING. TAKE FIRST THING IN THE MORNING ON AN EMPTY STOMACH. WAIT AT LEAST 30 MINUTES TO 1 HOUR BEFORE EATING, Disp: 30 capsule, Rfl: 2  •  predniSONE (DELTASONE) 20 MG tablet, Take 20 mg by mouth Daily., Disp: , Rfl:   •  rOPINIRole (REQUIP) 0.25 MG tablet, TAKE 1 TABLET EVERY NIGHT 1 HOUR BEFORE BEDTIME, Disp: 30 tablet, Rfl: 1  •  topiramate (TOPAMAX) 50 MG tablet, TAKE ONE TABLET BY MOUTH TWICE DAILY (Patient taking differently: TAKE ONE TABLET BY MOUTH as needed), Disp: 180 tablet, Rfl: 0  •  traMADol (ULTRAM) 50 MG tablet, TAKE 1 TABLET PO TID PRN FOR SEVERE BREAKTHROUGH PAIN, Disp: 270 tablet, Rfl: 0  •  traZODone (DESYREL) 50 MG tablet, Take 1 to 2 tabs po hs prn, Disp: 180 tablet, Rfl: 2  •  Alirocumab (PRALUENT) 75 MG/ML solution pen-injector, Inject 75 mg under the skin into the appropriate area as directed Every 14 (Fourteen) Days., Disp: 6 pen, Rfl: 1  •  glimepiride (AMARYL) 4 MG tablet, Take 1 po qd before a meal, Disp: 30 tablet, Rfl: 6  •  rosuvastatin (CRESTOR) 5 MG tablet, Take 1 tablet by mouth Every Night., Disp: 90 tablet, Rfl: 1  •  SITagliptin (JANUVIA) 100 MG tablet, Take 1 tablet by mouth Daily., Disp: 90 tablet, Rfl: 1    Review of Systems  Review of Systems   Constitutional: Positive for fatigue. Negative for chills, fever and unexpected weight change.   HENT: Negative for ear pain, hearing loss, nosebleeds, rhinorrhea and sore throat.    Eyes: Positive for visual disturbance. Negative for pain, discharge, redness and itching.   Respiratory: Negative for cough, shortness of breath and wheezing.    Cardiovascular: Positive for leg swelling. Negative for chest pain and palpitations.   Gastrointestinal: Negative for abdominal pain, blood in stool, constipation and diarrhea.   Endocrine: Negative for cold intolerance, heat intolerance and polydipsia.   Genitourinary: Negative for dysuria,  frequency, hematuria, pelvic pain, vaginal bleeding and vaginal discharge.   Musculoskeletal: Positive for back pain. Negative for arthralgias, gait problem, joint swelling and myalgias.   Skin: Negative for rash.        Hair loss   Allergic/Immunologic: Negative.    Neurological: Negative for dizziness, syncope, weakness, numbness and headaches.   Hematological: Negative for adenopathy. Does not bruise/bleed easily.   Psychiatric/Behavioral: Negative for sleep disturbance and suicidal ideas. The patient is not nervous/anxious.         Physical Exam    /80   Pulse 84   Wt 79.8 kg (176 lb)   LMP  (LMP Unknown)   SpO2 99%   BMI 32.19 kg/m² Body mass index is 32.19 kg/m².  Physical Exam   Constitutional: She is oriented to person, place, and time. She appears well-developed. No distress.   HENT:   Head: Normocephalic.   Right Ear: External ear normal.   Left Ear: External ear normal.   Mouth/Throat: No oropharyngeal exudate.   Eyes: Conjunctivae and lids are normal. Right eye exhibits no discharge. Left eye exhibits no discharge. Right pupil is reactive. Left pupil is reactive.   Neck: No JVD present. No tracheal deviation present. No thyroid mass and no thyromegaly present.   Cardiovascular: Normal rate, regular rhythm, normal heart sounds and intact distal pulses.   No murmur heard.  Pulmonary/Chest: Effort normal and breath sounds normal. No respiratory distress. She has no wheezes.   Abdominal: Soft. Bowel sounds are normal. There is no tenderness.   Musculoskeletal: She exhibits no edema or tenderness.    Yi had a diabetic foot exam performed today.   During the foot exam she had a monofilament test performed.    Neurological Sensory Findings -  Unaltered sharp/dull right ankle/foot discrimination and unaltered sharp/dull left ankle/foot discrimination.  Vascular Status -  Her right foot exhibits normal foot vasculature  and no edema. Her left foot exhibits normal foot vasculature  and no  edema.  Skin Integrity  -  Her right foot skin is intact.  She has no right foot onychomycosis, no right foot ulcer and non-callous right foot.Her left foot skin is intact. She has no left foot onychomycosis, no left foot ulcer and non-callous left foot..  Lymphadenopathy:     She has no cervical adenopathy.   Neurological: She is alert and oriented to person, place, and time.   Skin: Skin is warm, dry and intact. No rash noted. She is not diaphoretic. No erythema.   Psychiatric: She has a normal mood and affect. Her speech is normal and behavior is normal. Thought content normal.       Labs and Imaging   Lab Results   Component Value Date    HGBA1C 7.4 07/12/2019    HGBA1C 7.6 04/15/2019    HGBA1C 7.2 01/07/2019     Office Visit on 07/12/2019   Component Date Value Ref Range Status   • Glucose 07/12/2019 134* 70 - 130 mg/dL Final   • Hemoglobin A1C 07/12/2019 7.4  % Final     No images are attached to the encounter or orders placed in the encounter.  Xr Spine Thoracic 1 View    Result Date: 12/21/2017  Spinal cord stimulator terminating at the T7 superior endplate level.  D:  12/21/2017 E:  12/21/2017  This report was finalized on 12/21/2017 5:12 PM by Dr. Kulwinder Cesar.        Assessment / Plan   Yi was seen today for follow-up.    Diagnoses and all orders for this visit:    Uncontrolled type 2 diabetes mellitus with hyperglycemia, without long-term current use of insulin (CMS/[facility])  -     POC Glucose Fingerstick  -     POC Glycosylated Hemoglobin (Hb A1C)  -     SITagliptin (JANUVIA) 100 MG tablet; Take 1 tablet by mouth Daily.  -     Discontinue: glimepiride (AMARYL) 4 MG tablet; Take 1 po qd before a meal  -     glimepiride (AMARYL) 4 MG tablet; Take 1 po qd before a meal    Mixed hyperlipidemia  -     rosuvastatin (CRESTOR) 5 MG tablet; Take 1 tablet by mouth Every Night.        Diabetes Mellitus 2 is under fair control.  -A1c 7.4  -dc bydureon bcise 2mg d/t gastroparesis  -continue jardiance to 25mg daily.    -start januvia 100mg daily  -will try glimepiride again to see if she tolerates. Takes 4mg QD AC. Emphasis on taking AV to avoid hypoglycemia  -intolerant to metformin  -bring meter to f/u  -encouraged to have eye exam  -call if any issues    1.  Diet: 3-4 carb servings per meal for females, 4-5 carb servings per meal for males  Spread carb intake throughout the day  Increase lean protein and vegetable intake  Avoid sugary drinks and processed carbs including crackers, cookies, cakes  2.  Exercise: Recommend at least 30 minutes of exercise daily, at least 5 days per week. Increase exercise gradually.   3.  Blood Glucose Goal: Blood glucose goal <150 fasting, <180 2 hr postprandial  4.  Microalbumin due 2/2019  5.  Education performed during this visit: long term diabetic complications discussed. , annual eye examinations at Ophthalmology discussed, dental hygiene discussed  and foot care reviewed., home glucose monitoring emphasized, all medications, side effects and compliance discussed carefully and Hypoglycemia management and prevention reviewed. Reviewed ‘ABCs’ of diabetes management (respective goals in parentheses):  A1C (<7), blood pressure (<130/80), and cholesterol (LDL <100, if CVD <70).    Hyperlipidemia  -intolerant to statins  -praluent/repatha not covered by insurance  -try crestor 5mg qhs      Patient Instructions   Try rosuvastatin (crestor) 5mg at bedtime for cholesterol.  Stop Bydureon.  Start Januvia 100mg daily.  Start glimepiride 4mg BEFORE A MEAL.  Continue Jardiance 25mg daily.       Follow up: Return in about 3 months (around 10/12/2019).    Discussed the nature of the disease including, risks, complications, implications, management, safe and proper use of medications. Encouraged therapeutic lifestyle changes including low calorie diet with plenty of fruits and vegetables, daily exercise, medication compliance, and keeping scheduled follow up appointments with me and any other providers.  Encouraged patient to have appointment for complete physical, fasting labs, appropriate screenings, and immunizations on an annual basis.      Wero Hernandez PA-C

## 2019-07-12 NOTE — PROGRESS NOTES
Chief Complaint   Patient presents with   • Hypertension     follow up   • Hyperlipidemia   • Anxiety   • Depression       History of Present Illness  63 y.o.female presents for follow-up of hypertension, hyperlipidemia, anxiety, depression.  Hyperlipidemia chronic onset years: Was taking Praluent but insurance won't cover more. Tries to eat healthy and some exercise.  History of diabetes. Pt wants to discuss with endocrine who had prescribed statins before. Has appt after this one.    Hypertension chronic onset years: doesn't check BP unless she feels bad. Takes losartan, nifedipine as directed. BP a little high, but is good today.  No chest pain, dizziness, vision changes, or edema.  Follows with cardiology for bp meds.    Restless leg syndrome chronic symptoms just about every night. takes ropinirole nightly, but still having trouble, not helping as much as she would like it to.  Dose reduced recently due to increased liver enzymes.    Anxiety depression: Has not been doing well.  She has been having increased trouble with her back and has been having episodes of being angry and depressed.  Has custody of her great nephew which is causing additional stress.  No thoughts of self-harm or suicidal thoughts.  Had been on Pristiq in the past but she stopped taking this about 8 months ago.  Would like to go back on this medication.  Had tolerated it okay.    Constipation: Takes Miralax daily, but not longer works well for her.  Is interested in Linzess.  Has a bowel movent once or twice a week with significant straining to have bm.     Need screening mammogram updated    States was told her ferritin levels were too high in the past and she would like to have this rechecked.    Review of Systems   Constitutional: Negative for chills, fatigue and fever.   Eyes: Negative for blurred vision and visual disturbance.   Respiratory: Negative for shortness of breath.    Cardiovascular: Negative for chest pain, palpitations and  leg swelling.   Gastrointestinal: Positive for constipation. Negative for abdominal pain, diarrhea, nausea and vomiting.   Genitourinary: Negative for difficulty urinating and breast lump.   Musculoskeletal: Positive for arthralgias and myalgias.   Neurological: Negative for dizziness, light-headedness and headache.   Psychiatric/Behavioral: Positive for behavioral problems, sleep disturbance, depressed mood and stress. Negative for self-injury and suicidal ideas. The patient is nervous/anxious.        [facility]  The following portions of the patient's history were reviewed and updated as appropriate: allergies, current medications, past family history, past medical history, past social history, past surgical history and problem list.       Past Medical History:   Diagnosis Date   • Anxiety    • Benign essential hypertension    • Cervical disc disorder    • Chronic pain disorder    • Colon polyps    • Complication of device    • Decreased libido    • Dizziness    • Encounter for long-term (current) use of medications    • Extremity pain    • Fatigue    • Hip pain    • History of alopecia    • History of backache    • History of colonoscopy     Fiberoptic   • History of diabetes mellitus    • History of insomnia    • History of mastoiditis    • History of migraine headaches    • History of urinary stone    • Infection of kidney    • Insomnia    • Joint pain    • Kidney infection    • Low back pain    • Lumbar radiculopathy    • Lumbar radiculopathy    • Lumbosacral disc disease    • Migraine    • Myofascial pain syndrome    • Neck pain    • Palpitations    • Sleep apnea    • Spinal cord stimulator status    • Stress reaction, emotional    • Urinary incontinence    • Urinary tract infection    • Visit for screening mammogram     Description: 08/28/2009   • Vitamin D deficiency       Past Surgical History:   Procedure Laterality Date   • BACK SURGERY      Lower Back   • BLADDER SURGERY     • COLONOSCOPY      Complete  Colonoscopy   • HYSTERECTOMY      Total Abdominal Hysterectomy (Description: BSO)   • OTHER SURGICAL HISTORY      Elbow Surgery   • OTHER SURGICAL HISTORY      Spinal Sterotaxis Stimulation Of Cord   • SPINAL CORD STIMULATOR IMPLANT  2015    replacement      Allergies   Allergen Reactions   • Ace Inhibitors Cough   • Amlodipine    • Beta Adrenergic Blockers    • Cyclobenzaprine    • Hydrochlorothiazide    • Hyzaar  [Losartan Potassium-Hctz]    • Latex    • Losartan    • Metformin    • Penicillins    • Pioglitazone    • Spironolactone       Family History   Problem Relation Age of Onset   • Hypertension Mother    • Peripheral vascular disease Mother    • Ulcers Mother    • Lung disease Father    • Diabetes Other         type 2            Current Outpatient Medications:   •  albuterol (PROVENTIL HFA;VENTOLIN HFA) 108 (90 Base) MCG/ACT inhaler, Inhale 2 puffs Every 6 (Six) Hours As Needed for Wheezing., Disp: 1 inhaler, Rfl: 0  •  Alirocumab (PRALUENT) 75 MG/ML solution pen-injector, Inject 75 mg under the skin into the appropriate area as directed Every 14 (Fourteen) Days., Disp: 6 pen, Rfl: 1  •  aspirin 81 MG EC tablet, Take 81 mg by mouth Daily., Disp: , Rfl:   •  baclofen (LIORESAL) 10 MG tablet, Take 1/2 to 1 tab po Qd to bid prn, Disp: 180 tablet, Rfl: 1  •  bisoprolol (ZEBeta) 5 MG tablet, Take 5 mg by mouth Daily., Disp: , Rfl:   •  Empagliflozin (JARDIANCE) 25 MG tablet, Take 25 mg by mouth Daily., Disp: 90 tablet, Rfl: 1  •  estradiol (ESTRACE VAGINAL) 0.1 MG/GM vaginal cream, Insert 2 g into the vagina 1 (One) Time Per Week., Disp: 42.5 g, Rfl: 5  •  exenatide er (BYDUREON BCISE) 2 MG/0.85ML auto-injector injection, Inject 0.85 units in to the appropriate area one time weekly, Disp: 12 pen, Rfl: 3  •  furosemide (LASIX) 20 MG tablet, Take 20 mg by mouth 2 (Two) Times a Day., Disp: , Rfl:   •  Liniments (SALONPAS) pads, Apply  topically. prn, Disp: , Rfl:   •  losartan (COZAAR) 100 MG tablet, Take 100 mg by  "mouth Daily., Disp: , Rfl:   •  metoclopramide (REGLAN) 5 MG tablet, Take 1 tablet by mouth 2 (Two) Times a Day., Disp: 60 tablet, Rfl: 0  •  NIFEdipine CC (ADALAT CC) 90 MG 24 hr tablet, Take 60 mg by mouth Daily., Disp: , Rfl:   •  omeprazole (priLOSEC) 40 MG capsule, TAKE 1 CAPSULE EVERY MORNING. TAKE FIRST THING IN THE MORNING ON AN EMPTY STOMACH. WAIT AT LEAST 30 MINUTES TO 1 HOUR BEFORE EATING, Disp: 30 capsule, Rfl: 2  •  predniSONE (DELTASONE) 20 MG tablet, Take 20 mg by mouth Daily., Disp: , Rfl:   •  rOPINIRole (REQUIP) 0.25 MG tablet, TAKE 1 TABLET EVERY NIGHT 1 HOUR BEFORE BEDTIME, Disp: 30 tablet, Rfl: 1  •  topiramate (TOPAMAX) 50 MG tablet, TAKE ONE TABLET BY MOUTH TWICE DAILY (Patient taking differently: TAKE ONE TABLET BY MOUTH as needed), Disp: 180 tablet, Rfl: 0  •  traMADol (ULTRAM) 50 MG tablet, TAKE 1 TABLET PO TID PRN FOR SEVERE BREAKTHROUGH PAIN, Disp: 270 tablet, Rfl: 0  •  traZODone (DESYREL) 50 MG tablet, Take 1 to 2 tabs po hs prn, Disp: 180 tablet, Rfl: 2    VITALS:  /80   Pulse 84   Temp 98 °F (36.7 °C)   Ht 157.5 cm (62\")   Wt 79.8 kg (176 lb)   LMP  (LMP Unknown)   SpO2 99%   BMI 32.19 kg/m²     Physical Exam   Constitutional: She is oriented to person, place, and time. She appears well-developed and well-nourished. No distress.   HENT:   Head: Normocephalic.   Mouth/Throat: Oropharynx is clear and moist.   Eyes: Pupils are equal, round, and reactive to light.   Neck: Normal range of motion. Neck supple.   Cardiovascular: Normal rate, regular rhythm and normal heart sounds.   No edema   Pulmonary/Chest: Effort normal and breath sounds normal. No respiratory distress.   Abdominal: Soft. Bowel sounds are normal. There is no tenderness.   Lymphadenopathy:     She has no cervical adenopathy.   Neurological: She is alert and oriented to person, place, and time.   Skin: Skin is warm and dry. Capillary refill takes less than 2 seconds. No rash noted.   Psychiatric: Her speech is " normal and behavior is normal. Her mood appears anxious.   Vitals reviewed.      LABS  Results for orders placed or performed in visit on 07/12/19   Comprehensive Metabolic Panel   Result Value Ref Range    Glucose 153 (H) 65 - 99 mg/dL    BUN 24 (H) 8 - 23 mg/dL    Creatinine 0.92 0.57 - 1.00 mg/dL    Sodium 142 136 - 145 mmol/L    Potassium 5.0 3.5 - 5.2 mmol/L    Chloride 100 98 - 107 mmol/L    CO2 27.6 22.0 - 29.0 mmol/L    Calcium 11.0 (H) 8.6 - 10.5 mg/dL    Total Protein 8.2 6.0 - 8.5 g/dL    Albumin 4.60 3.50 - 5.20 g/dL    ALT (SGPT) 49 (H) 1 - 33 U/L    AST (SGOT) 38 (H) 1 - 32 U/L    Alkaline Phosphatase 161 (H) 39 - 117 U/L    Total Bilirubin 0.5 0.2 - 1.2 mg/dL    eGFR Non African Amer 62 >60 mL/min/1.73    Globulin 3.6 gm/dL    A/G Ratio 1.3 g/dL    BUN/Creatinine Ratio 26.1 (H) 7.0 - 25.0    Anion Gap 14.4 5.0 - 15.0 mmol/L   Microalbumin / Creatinine Urine Ratio - Urine, Clean Catch   Result Value Ref Range    Microalbumin/Creatinine Ratio  mg/g    Creatinine, Urine 119.2 mg/dL    Microalbumin, Urine <1.2 mg/L   Ferritin   Result Value Ref Range    Ferritin 225.00 (H) 13.00 - 150.00 ng/mL   CBC Auto Differential   Result Value Ref Range    WBC 7.58 3.40 - 10.80 10*3/mm3    RBC 5.85 (H) 3.77 - 5.28 10*6/mm3    Hemoglobin 16.5 (H) 12.0 - 15.9 g/dL    Hematocrit 54.4 (H) 34.0 - 46.6 %    MCV 93.0 79.0 - 97.0 fL    MCH 28.2 26.6 - 33.0 pg    MCHC 30.3 (L) 31.5 - 35.7 g/dL    RDW 14.1 12.3 - 15.4 %    RDW-SD 48.1 37.0 - 54.0 fl    MPV 10.9 6.0 - 12.0 fL    Platelets 287 140 - 450 10*3/mm3    Neutrophil % 59.7 42.7 - 76.0 %    Lymphocyte % 30.2 19.6 - 45.3 %    Monocyte % 6.3 5.0 - 12.0 %    Eosinophil % 2.1 0.3 - 6.2 %    Basophil % 1.3 0.0 - 1.5 %    Immature Grans % 0.4 0.0 - 0.5 %    Neutrophils, Absolute 4.52 1.70 - 7.00 10*3/mm3    Lymphocytes, Absolute 2.29 0.70 - 3.10 10*3/mm3    Monocytes, Absolute 0.48 0.10 - 0.90 10*3/mm3    Eosinophils, Absolute 0.16 0.00 - 0.40 10*3/mm3    Basophils, Absolute  0.10 0.00 - 0.20 10*3/mm3    Immature Grans, Absolute 0.03 0.00 - 0.05 10*3/mm3    nRBC 0.0 0.0 - 0.2 /100 WBC       ASSESSMENT/PLAN  Yi was seen today for hypertension, hyperlipidemia, anxiety and depression.    Diagnoses and all orders for this visit:    Essential hypertension  Comments:  controlled; cont nifedipine, losartan, lasix, zebeta  Orders:  -     Comprehensive Metabolic Panel  -     Microalbumin / Creatinine Urine Ratio - Urine, Clean Catch    Hyperlipidemia, unspecified hyperlipidemia type  Comments:  will discuss with endocrine    Anxiety and depression  Comments:  restarted on pristiq; started on lower dose with anticipation of needing to titrate up for sx control  Orders:  -     Desvenlafaxine Succinate ER (PRISTIQ) 25 MG tablet sustained-release 24 hour; Take 1 tablet by mouth Daily.    Type 2 diabetes mellitus with hyperglycemia, without long-term current use of insulin (CMS/[facility])  Comments:  FU with endocrine for mgt  Orders:  -     Ambulatory Referral for Diabetic Eye Exam-Ophthalmology    Elevated ferritin  -     CBC & Differential  -     Ferritin  -     CBC Auto Differential  -     Ambulatory Referral to Hematology    Polycythemia  -     Ambulatory Referral to Hematology  -     Erythropoietin; Future    Constipation, unspecified constipation type  Comments:  will try linzess sample to see if works ok.  linzess 72mcg tab 1 tab by mouth daily prn constipation. Increase dietary fiber and exercise. drink plenty water    RLS (restless legs syndrome)  Comments:  increased dose.  Orders:  -     rOPINIRole (REQUIP) 0.5 MG tablet; Take 0.5 tablets by mouth Every Night. Take 1 hour before bedtime.    Breast cancer screening  -     Mammo screening digital tomosynthesis bilateral w CAD; Future    Serum calcium elevated  -     PTH, Intact; Future    Elevated alkaline phosphatase level  -     Alkaline Phosphatase, Isoenzymes; Future    Nutrition and activity goals reviewed including: mainly water to  drink, limit white flour/processed sugar; increase high protein, high fiber carbs in moderation, good breakfast, working toward 150 mins cardio per week, resistance training 2x/week.    Pt updated:  Her ferritin (iron stores) hemoglobin are continuing to trend up.  I would like her to see a hematologist for further eval of this.  Her liver enzymes are ok, still elevated but ok.  Her kidney fx was at 62%. Avoid NSAIDS and make sure staying hydrated. Her calcium level was elevated.  I have ordered a few more labs to help further eval her high hgb and calcium. She can stop by anytime this week to get these done please.  I increased her requip back to 0.5mg (I sent her a new RX for a 0.5mg tab).  I will see her in 6 months unless she needs to come in sooner.  But again she needs the additional labs this week.    I discussed the patients findings and my recommendations with patient.  Patient was encouraged to keep me informed of any acute changes, lack of improvement, or any new concerning symptoms.    Patient voiced understanding of all instructions and denied further questions.      FOLLOW-UP  Return in about 6 months (around 1/12/2020), or if symptoms worsen or fail to improve.    Electronically signed by:    OCTAVIANO Tovar  07/12/2019

## 2019-07-12 NOTE — PATIENT INSTRUCTIONS
Try rosuvastatin (crestor) 5mg at bedtime for cholesterol.  Stop Bydureon.  Start Januvia 100mg daily.  Start glimepiride 4mg BEFORE A MEAL.  Continue Jardiance 25mg daily.

## 2019-07-12 NOTE — PATIENT INSTRUCTIONS
Linaclotide oral capsules  What is this medicine?  LINACLOTIDE (sade a KLOE tide) is used to treat irritable bowel syndrome (IBS) with constipation as the main problem. It may also be used for relief of chronic constipation.  This medicine may be used for other purposes; ask your health care provider or pharmacist if you have questions.  COMMON BRAND NAME(S): Linzess  What should I tell my health care provider before I take this medicine?  They need to know if you have any of these conditions:  -history of stool (fecal) impaction  -now have diarrhea or have diarrhea often  -other medical condition  -stomach or intestinal disease, including bowel obstruction or abdominal adhesions  -an unusual or allergic reaction to linaclotide, other medicines, foods, dyes, or preservatives  -pregnant or trying to get pregnant  -breast-feeding  How should I use this medicine?  Take this medicine by mouth with a glass of water. Follow the directions on the prescription label. Do not cut, crush or chew this medicine. Take on an empty stomach, at least 30 minutes before your first meal of the day. Take your medicine at regular intervals. Do not take your medicine more often than directed. Do not stop taking except on your doctor's advice.  A special MedGuide will be given to you by the pharmacist with each prescription and refill. Be sure to read this information carefully each time.  Talk to your pediatrician regarding the use of this medicine in children. This medicine is not approved for use in children.  Overdosage: If you think you have taken too much of this medicine contact a poison control center or emergency room at once.  NOTE: This medicine is only for you. Do not share this medicine with others.  What if I miss a dose?  If you miss a dose, just skip that dose. Wait until your next dose, and take only that dose. Do not take double or extra doses.  What may interact with this medicine?  -certain medicines for bowel problems  or bladder incontinence (these can cause constipation)  This list may not describe all possible interactions. Give your health care provider a list of all the medicines, herbs, non-prescription drugs, or dietary supplements you use. Also tell them if you smoke, drink alcohol, or use illegal drugs. Some items may interact with your medicine.  What should I watch for while using this medicine?  Visit your doctor for regular check ups. Tell your doctor if your symptoms do not get better or if they get worse.  Diarrhea is a common side effect of this medicine. It often begins within 2 weeks of starting this medicine. Stop taking this medicine and call your doctor if you get severe diarrhea.  Stop taking this medicine and call your doctor or go to the nearest hospital emergency room right away if you develop unusual or severe stomach-area (abdominal) pain, especially if you also have bright red, bloody stools or black stools that look like tar.  What side effects may I notice from receiving this medicine?  Side effects that you should report to your doctor or health care professional as soon as possible:  -allergic reactions like skin rash, itching or hives, swelling of the face, lips, or tongue  -black, tarry stools  -bloody or watery diarrhea  -new or worsening stomach pain  -severe or prolonged diarrhea  Side effects that usually do not require medical attention (report to your doctor or health care professional if they continue or are bothersome):  -bloating  -gas  -loose stools  This list may not describe all possible side effects. Call your doctor for medical advice about side effects. You may report side effects to FDA at 2-889-FDA-0920.  Where should I keep my medicine?  Keep out of the reach of children.  Store at room temperature between 20 and 25 degrees C (68 and 77 degrees F). Keep this medicine in the original container. Keep tightly closed in a dry place. Do not remove the desiccant packet from the bottle,  it helps to protect your medicine from moisture. Throw away any unused medicine after the expiration date.  NOTE: This sheet is a summary. It may not cover all possible information. If you have questions about this medicine, talk to your doctor, pharmacist, or health care provider.  © 2019 [employer] (2017-01-19 12:17:04)

## 2019-07-13 LAB
ALBUMIN UR-MCNC: <1.2 MG/L
BASOPHILS # BLD AUTO: 0.1 10*3/MM3 (ref 0–0.2)
BASOPHILS NFR BLD AUTO: 1.3 % (ref 0–1.5)
CREAT UR-MCNC: 119.2 MG/DL
DEPRECATED RDW RBC AUTO: 48.1 FL (ref 37–54)
EOSINOPHIL # BLD AUTO: 0.16 10*3/MM3 (ref 0–0.4)
EOSINOPHIL NFR BLD AUTO: 2.1 % (ref 0.3–6.2)
ERYTHROCYTE [DISTWIDTH] IN BLOOD BY AUTOMATED COUNT: 14.1 % (ref 12.3–15.4)
HCT VFR BLD AUTO: 54.4 % (ref 34–46.6)
HGB BLD-MCNC: 16.5 G/DL (ref 12–15.9)
IMM GRANULOCYTES # BLD AUTO: 0.03 10*3/MM3 (ref 0–0.05)
IMM GRANULOCYTES NFR BLD AUTO: 0.4 % (ref 0–0.5)
LYMPHOCYTES # BLD AUTO: 2.29 10*3/MM3 (ref 0.7–3.1)
LYMPHOCYTES NFR BLD AUTO: 30.2 % (ref 19.6–45.3)
MCH RBC QN AUTO: 28.2 PG (ref 26.6–33)
MCHC RBC AUTO-ENTMCNC: 30.3 G/DL (ref 31.5–35.7)
MCV RBC AUTO: 93 FL (ref 79–97)
MICROALBUMIN/CREAT UR: NORMAL MG/G
MONOCYTES # BLD AUTO: 0.48 10*3/MM3 (ref 0.1–0.9)
MONOCYTES NFR BLD AUTO: 6.3 % (ref 5–12)
NEUTROPHILS # BLD AUTO: 4.52 10*3/MM3 (ref 1.7–7)
NEUTROPHILS NFR BLD AUTO: 59.7 % (ref 42.7–76)
NRBC BLD AUTO-RTO: 0 /100 WBC (ref 0–0.2)
PLATELET # BLD AUTO: 287 10*3/MM3 (ref 140–450)
PMV BLD AUTO: 10.9 FL (ref 6–12)
RBC # BLD AUTO: 5.85 10*6/MM3 (ref 3.77–5.28)
WBC NRBC COR # BLD: 7.58 10*3/MM3 (ref 3.4–10.8)

## 2019-07-13 RX ORDER — ROPINIROLE 0.5 MG/1
0.25 TABLET, FILM COATED ORAL NIGHTLY
Qty: 30 TABLET | Refills: 5 | Status: SHIPPED | OUTPATIENT
Start: 2019-07-13 | End: 2019-08-23 | Stop reason: SDUPTHER

## 2019-07-14 ENCOUNTER — TELEPHONE (OUTPATIENT)
Dept: INTERNAL MEDICINE | Facility: CLINIC | Age: 64
End: 2019-07-14

## 2019-07-14 NOTE — TELEPHONE ENCOUNTER
----- Message from OCTAVIANO Wong sent at 7/13/2019  5:29 PM EDT -----  Call pt with labs.  Her ferritin (iron stores) hemoglobin are continuing to trend up.  I would like her to see a hematologist for further eval of this.  Her liver enzymes are ok, still elevated but ok.  Her kidney fx was at 62%. Avoid NSAIDS and make sure staying hydrated. Her calcium level was elevated.  I have ordered a few more labs to help further eval her high hgb and calcium. She can stop by anytime this week to get these done please.  I increased her requip back to 0.5mg (I sent her a new RX for a 0.5mg tab).  I will see her in 6 months unless she needs to come in sooner.  But again she needs the additional labs this week.

## 2019-07-15 ENCOUNTER — LAB (OUTPATIENT)
Dept: INTERNAL MEDICINE | Facility: CLINIC | Age: 64
End: 2019-07-15

## 2019-07-15 DIAGNOSIS — E83.52 SERUM CALCIUM ELEVATED: ICD-10-CM

## 2019-07-15 DIAGNOSIS — R74.8 ELEVATED ALKALINE PHOSPHATASE LEVEL: ICD-10-CM

## 2019-07-15 DIAGNOSIS — D75.1 POLYCYTHEMIA: ICD-10-CM

## 2019-07-15 LAB — PTH-INTACT SERPL-MCNC: 30.4 PG/ML (ref 15–65)

## 2019-07-15 PROCEDURE — 82668 ASSAY OF ERYTHROPOIETIN: CPT | Performed by: NURSE PRACTITIONER

## 2019-07-15 PROCEDURE — 84075 ASSAY ALKALINE PHOSPHATASE: CPT | Performed by: NURSE PRACTITIONER

## 2019-07-15 PROCEDURE — 84080 ASSAY ALKALINE PHOSPHATASES: CPT | Performed by: NURSE PRACTITIONER

## 2019-07-15 PROCEDURE — 83970 ASSAY OF PARATHORMONE: CPT | Performed by: NURSE PRACTITIONER

## 2019-07-16 LAB — ETHNIC BACKGROUND STATED: 25.2 MIU/ML (ref 2.6–18.5)

## 2019-07-17 LAB
ALP BONE CFR SERPL: 44 % (ref 14–68)
ALP INTEST CFR SERPL: 6 % (ref 0–18)
ALP LIVER CFR SERPL: 50 % (ref 18–85)
ALP SERPL-CCNC: 161 IU/L (ref 39–117)

## 2019-07-22 ENCOUNTER — TELEPHONE (OUTPATIENT)
Dept: INTERNAL MEDICINE | Facility: CLINIC | Age: 64
End: 2019-07-22

## 2019-07-22 NOTE — TELEPHONE ENCOUNTER
----- Message from OCTAVIANO Wong sent at 7/22/2019  7:35 AM EDT -----  Call pt with results.  Parathyroid level normal; will recheck calcium level with next annual labs.  She still needs to see hematology for her elevated hgb as her ferritin (iron stores) is also elevated. Clarify if that hematology appt has been scheduled.  Thanks.

## 2019-07-22 NOTE — TELEPHONE ENCOUNTER
She can still get her diabetic eye exam.  With that type eye exam we are typically looking for long term side effects of diabetes. Recent missed doses would not grossly affect exam

## 2019-08-12 ENCOUNTER — TELEPHONE (OUTPATIENT)
Dept: GASTROENTEROLOGY | Facility: CLINIC | Age: 64
End: 2019-08-12

## 2019-08-12 NOTE — TELEPHONE ENCOUNTER
Called patient to reschedule nurse visit for Hep B vaccines due to Hep B vaccines being out of stock. Patient agreed  to reschedule for next Monday 8/19/19 at 11 am.

## 2019-08-13 ENCOUNTER — LAB (OUTPATIENT)
Dept: LAB | Facility: HOSPITAL | Age: 64
End: 2019-08-13

## 2019-08-13 ENCOUNTER — OFFICE VISIT (OUTPATIENT)
Dept: GASTROENTEROLOGY | Facility: CLINIC | Age: 64
End: 2019-08-13

## 2019-08-13 VITALS
BODY MASS INDEX: 33.01 KG/M2 | OXYGEN SATURATION: 93 % | TEMPERATURE: 97.6 F | HEART RATE: 72 BPM | DIASTOLIC BLOOD PRESSURE: 78 MMHG | WEIGHT: 179.4 LBS | HEIGHT: 62 IN | SYSTOLIC BLOOD PRESSURE: 122 MMHG

## 2019-08-13 DIAGNOSIS — Z23 NEED FOR HEPATITIS B VACCINATION: ICD-10-CM

## 2019-08-13 DIAGNOSIS — R79.89 ELEVATED FERRITIN: ICD-10-CM

## 2019-08-13 DIAGNOSIS — K31.84 GASTROPARESIS: ICD-10-CM

## 2019-08-13 DIAGNOSIS — R79.89 LFT ELEVATION: ICD-10-CM

## 2019-08-13 DIAGNOSIS — D58.2 ELEVATED HEMOGLOBIN (HCC): ICD-10-CM

## 2019-08-13 DIAGNOSIS — K21.9 CHRONIC GERD: ICD-10-CM

## 2019-08-13 DIAGNOSIS — K76.0 NAFLD (NONALCOHOLIC FATTY LIVER DISEASE): Primary | ICD-10-CM

## 2019-08-13 DIAGNOSIS — K59.09 CHRONIC CONSTIPATION: ICD-10-CM

## 2019-08-13 DIAGNOSIS — Z79.1 ENCOUNTER FOR LONG-TERM (CURRENT) USE OF NSAIDS: ICD-10-CM

## 2019-08-13 LAB
FERRITIN SERPL-MCNC: 212 NG/ML (ref 13–150)
IRON 24H UR-MRATE: 108 MCG/DL (ref 37–145)
IRON SATN MFR SERPL: 25 % (ref 20–50)
RETICS # AUTO: 0.08 10*6/MM3 (ref 0.02–0.13)
RETICS/RBC NFR AUTO: 1.47 % (ref 0.7–1.9)
TIBC SERPL-MCNC: 440 MCG/DL (ref 298–536)
TRANSFERRIN SERPL-MCNC: 295 MG/DL (ref 200–360)

## 2019-08-13 PROCEDURE — 90746 HEPB VACCINE 3 DOSE ADULT IM: CPT | Performed by: NURSE PRACTITIONER

## 2019-08-13 PROCEDURE — 84466 ASSAY OF TRANSFERRIN: CPT

## 2019-08-13 PROCEDURE — 85045 AUTOMATED RETICULOCYTE COUNT: CPT

## 2019-08-13 PROCEDURE — G0010 ADMIN HEPATITIS B VACCINE: HCPCS | Performed by: NURSE PRACTITIONER

## 2019-08-13 PROCEDURE — 83540 ASSAY OF IRON: CPT

## 2019-08-13 PROCEDURE — 36415 COLL VENOUS BLD VENIPUNCTURE: CPT

## 2019-08-13 PROCEDURE — 99214 OFFICE O/P EST MOD 30 MIN: CPT | Performed by: NURSE PRACTITIONER

## 2019-08-13 PROCEDURE — 82728 ASSAY OF FERRITIN: CPT

## 2019-08-13 RX ORDER — LUBIPROSTONE 24 UG/1
24 CAPSULE ORAL 2 TIMES DAILY WITH MEALS
Qty: 20 CAPSULE | Refills: 0 | COMMUNITY
Start: 2019-08-13 | End: 2019-08-21 | Stop reason: SDUPTHER

## 2019-08-13 RX ORDER — METOCLOPRAMIDE 10 MG/1
10 TABLET ORAL
Qty: 40 TABLET | Refills: 2 | Status: SHIPPED | OUTPATIENT
Start: 2019-08-13 | End: 2020-01-17 | Stop reason: ALTCHOICE

## 2019-08-13 NOTE — PATIENT INSTRUCTIONS
· Try OTC (over the counter) Ginger root tablets 3 times a day as needed for nausea.  Be proactive.  Take it as soon as you feel queezy and not wait until you are feeling really sick to your stomach  · Eat 5 to 6 small meals a day instead of 3 meals a day.  Try to avoid miguel, greasy, and processed food.   · When nauseous, try to eat more bland foods: applesauce, bananas, crackers, dry toast, rice cereal/cakes, yogurt, and ginger ale.

## 2019-08-13 NOTE — PROGRESS NOTES
GASTROENTEROLOGY OUTPATIENT ESTABLISHED PATIENT NOTE  Patient: YI NAYAK : 1955  Date of Service: 2019  CC: Follow-up (NAFLD)    Assessment/Plan                                             ASSESSMENT & PLANS     NAFLD (nonalcoholic fatty liver disease). Metavir score of F0 per liver US 2019  LFT elevation.  Stable and improving some  · Pt is encouraged of wt control    Gastroparesis  Diabetes mellitus type 2 in obese (CMS/HCC).  BMI 34.4  -     Ambulatory Referral to gastroparesis specialist in [facility] instead since [place] is too far for pt.   · Follow up with dietician as scheduled.   · Follow up w/ endocrinologist for DM2 mgt  · Start metoclopramide (REGLAN) 10 MG tablet; Take 1 tablet po QID PRN, instead of scheduled  · Start OTC Ginger root 500 mg TID.  Small, frequent meals.  Tries ginger ale and crackers    Need for hepatitis B vaccination  -     Hepatitis B Vaccine Adult IM.  Last dose today    Elevated ferritin  Elevated hemoglobin (CMS/HCC)  -     Ferritin; Future  -     Reticulocytes; Future  -     Iron Profile; Future    Chronic constipation r/t gastroparesis, meds, and/or opioid induced  -     DC Miralax.  Start lubiprostone (AMITIZA) 24 MCG capsule; Take 1 capsule by mouth 2 (Two) Times a Day With Meals.  Samples given.  Pt is to call me and let me know of its efficacy so I can send in rx.     Follow Up:Return in about 6 months (around 2020).      DISCUSSION: The above plan was delineated in details with patient and all questions and concerns were answered.  Patient is also given contact information.  Patient is to return as scheduled or sooner if new problems arise.   Subjective                                                     SUBJECTIVE   History of Present Illness  Ms. Yi Nayak is a 63 y.o. female who is here for Follow-up (NAFLD)  Hx of gastroparesis dx in 2019 w/ Delayed gastric emptying with 80% emptying at 4 hours per gastric emptying  study.  Pt was started on scheduled Reglan for about 2 months w/ sx control.  Pt was referred to a gastroparesis specialist. Has not seen gastroparesis doctor as referred d/t [place] being too far for pt. DM2.  A1C at 7.4.  Still seeing endocrinologist for DM.   Drinks pickle juice helps w/ nausea.  Has not tried win as previously recommended.      Recently found w/ elevated ferritin, but iron profile not done.  Pt is being scheduled to see a hematology for consult.    With BID Miralax, pt has a BM twice a wk w/ Hinds 1. Occasional rectal bleeding if strains hard. Pt tried Linzess in the past , but it caused gas. Tries fiber for constipation but it makes gastroparesis worsen.      Does not drink or smoke.  Hx of social drink only in the past  Takes Tramadol daily    EGD, done on 11/26/2018 by Dr. Simmons due to dyspepsia and GERD, showed esophagitis, gastritis, but normal duodenum.    Colonoscopy, done on 2/18/2019 by Dr. joseph due to personal history of colon polyps, showed hemorrhoid, but otherwise normal colonoscopy to the ileum.  Patient was recommended to have a repeat colonoscopy in 5 years.    ROS:Review of Systems   Constitutional: Negative.         Pt currently or recently takes NSAIDS ( (i.e Ibuprofen, Aleve, Advil, Exedrin, BC Powder, diclofenac, meloxicam, & Naproxen, etc)? No    Pt currently or recently takes abx? No   HENT: Negative.    Eyes: Negative.    Respiratory: Negative.    Cardiovascular: Negative.    Gastrointestinal: Positive for nausea.   Endocrine: Negative.    Genitourinary: Negative.    Musculoskeletal: Negative.    Skin: Negative.    Allergic/Immunologic: Negative.    Neurological: Negative.    Hematological: Negative.    Psychiatric/Behavioral: Negative.    Subjective   PAST MED HX: Pt  has a past medical history of Anxiety, Benign essential hypertension, Cervical disc disorder, Chronic pain disorder, Colon polyps, Complication of device, Decreased libido, Dizziness, Encounter  for long-term (current) use of medications, Extremity pain, Fatigue, Hip pain, History of alopecia, History of backache, History of colonoscopy, History of diabetes mellitus, History of insomnia, History of mastoiditis, History of migraine headaches, History of urinary stone, Infection of kidney, Insomnia, Joint pain, Kidney infection, Low back pain, Lumbar radiculopathy, Lumbar radiculopathy, Lumbosacral disc disease, Migraine, Myofascial pain syndrome, Neck pain, Palpitations, Sleep apnea, Spinal cord stimulator status, Stress reaction, emotional, Urinary incontinence, Urinary tract infection, Visit for screening mammogram, and Vitamin D deficiency.  PAST SURG HX: Pt  has a past surgical history that includes Bladder surgery; Colonoscopy; Other surgical history; Back surgery; Other surgical history; Hysterectomy; and Spinal cord stimulator implant (2015).  FAM HX: family history includes Diabetes in her other; Hypertension in her mother; Lung disease in her father; Peripheral vascular disease in her mother; Ulcers in her mother.  SOC HX: Pt  reports that she has never smoked. She has never used smokeless tobacco. She reports that she does not drink alcohol or use drugs.    Objective                                                           OBJECTIVE   Allergy: Pt is allergic to ace inhibitors; amlodipine; beta adrenergic blockers; cyclobenzaprine; hydrochlorothiazide; hyzaar  [losartan potassium-hctz]; latex; losartan; metformin; penicillins; pioglitazone; and spironolactone.  MEDS: •  albuterol (PROVENTIL HFA;VENTOLIN HFA) 108 (90 Base) MCG/ACT inhaler, Inhale 2 puffs Every 6 (Six) Hours As Needed for Wheezing., Disp: 1 inhaler, Rfl: 0  •  baclofen (LIORESAL) 10 MG tablet, Take 1/2 to 1 tab po Qd to bid prn, Disp: 180 tablet, Rfl: 1  •  bisoprolol (ZEBeta) 5 MG tablet, Take 5 mg by mouth Daily., Disp: , Rfl:   •  Desvenlafaxine Succinate ER (PRISTIQ) 25 MG tablet sustained-release 24 hour, Take 1 tablet by mouth  Daily., Disp: 30 tablet, Rfl: 0  •  Empagliflozin (JARDIANCE) 25 MG tablet, Take 25 mg by mouth Daily., Disp: 90 tablet, Rfl: 1  •  furosemide (LASIX) 20 MG tablet, Take 20 mg by mouth 2 (Two) Times a Day., Disp: , Rfl:   •  glimepiride (AMARYL) 4 MG tablet, Take 1 po qd before a meal, Disp: 30 tablet, Rfl: 6  •  Liniments (SALONPAS) pads, Apply  topically. prn, Disp: , Rfl:   •  losartan (COZAAR) 100 MG tablet, Take 100 mg by mouth Daily., Disp: , Rfl:   •  NIFEdipine CC (ADALAT CC) 90 MG 24 hr tablet, Take 60 mg by mouth Daily., Disp: , Rfl:   •  omeprazole (priLOSEC) 40 MG capsule, TAKE 1 CAPSULE EVERY MORNING. TAKE FIRST THING IN THE MORNING ON AN EMPTY STOMACH. WAIT AT LEAST 30 MINUTES TO 1 HOUR BEFORE EATING, Disp: 30 capsule, Rfl: 2  •  rOPINIRole (REQUIP) 0.5 MG tablet, Take 0.5 tablets by mouth Every Night. Take 1 hour before bedtime., Disp: 30 tablet, Rfl: 5  •  rosuvastatin (CRESTOR) 5 MG tablet, Take 1 tablet by mouth Every Night., Disp: 90 tablet, Rfl: 1  •  topiramate (TOPAMAX) 50 MG tablet, TAKE ONE TABLET BY MOUTH TWICE DAILY (Patient taking differently: TAKE ONE TABLET BY MOUTH as needed), Disp: 180 tablet, Rfl: 0  •  traMADol (ULTRAM) 50 MG tablet, TAKE 1 TABLET PO TID PRN FOR SEVERE BREAKTHROUGH PAIN, Disp: 270 tablet, Rfl: 0  •  traZODone (DESYREL) 50 MG tablet, Take 1 to 2 tabs po hs prn, Disp: 180 tablet, Rfl: 2  •  Alirocumab (PRALUENT) 75 MG/ML solution pen-injector, Inject 75 mg under the skin into the appropriate area as directed Every 14 (Fourteen) Days., Disp: 6 pen, Rfl: 1  •  estradiol (ESTRACE VAGINAL) 0.1 MG/GM vaginal cream, Insert 2 g into the vagina 1 (One) Time Per Week., Disp: 42.5 g, Rfl: 5    Has not started januvia     EXAMINATION: US LIVER- 25/14/19    FINDINGS:  Limited views of the pancreas are unremarkable.  There is  hepatopetal portal venous flow. Liver is increased in echogenicity  without focal lesion identified.  There is no intrahepatic or  extrahepatic biliary  ductal dilatation; the common bile duct diameter is  2 mm at the kendall. There is no shadowing gallstone.  There is no  gallbladder wall thickening or pericholecystic fluid/inflammatory  change. The right kidney is grossly unremarkable.     Hepatic elastography was performed with a mean ShearWave velocity of 0.6  meters per second corresponding to a liver biopsy Metavir score of F 0.     IMPRESSION:  Fatty liver.    EXAMINATION: NM GASTRIC EMPTYING- 01/08/2019     IMPRESSION:  Delayed gastric emptying with 80% emptying at 4 hours and a  T1 half of 2 hours.    Lab Results   Component Value Date    WBC 7.58 07/12/2019    WBC 5.71 04/15/2019    WBC 7.23 09/26/2018    EOSABS 0.16 07/12/2019    EOSABS 0.13 04/15/2019    EOSABS 0.18 09/26/2018    HGB 16.5 (H) 07/12/2019    HGB 15.8 04/15/2019    HGB 14.4 09/26/2018    HCT 54.4 (H) 07/12/2019    HCT 50.7 (H) 04/15/2019    HCT 44.8 (H) 09/26/2018    MCV 93.0 07/12/2019    MCV 93.4 04/15/2019    MCV 90.9 09/26/2018    MCHC 30.3 (L) 07/12/2019    MCHC 31.2 (L) 04/15/2019    MCHC 32.1 09/26/2018     07/12/2019     04/15/2019     09/26/2018     Lab Results   Component Value Date    RDW 14.1 07/12/2019    RDW 13.1 04/15/2019    RDW 13.1 09/26/2018    MCHC 30.3 (L) 07/12/2019    MCHC 31.2 (L) 04/15/2019    MCHC 32.1 09/26/2018     Lab Results   Component Value Date     07/12/2019    K 5.0 07/12/2019     07/12/2019    CO2 27.6 07/12/2019    BUN 24 (H) 07/12/2019    BUN 12 04/15/2019    BUN 21 01/07/2019    CREATININE 0.92 07/12/2019    CREATININE 0.62 04/15/2019    CREATININE 0.85 01/07/2019    EGFRIFNONA 62 07/12/2019    EGFRIFNONA 98 04/15/2019    EGFRIFNONA 68 01/07/2019    GLUCOSE 153 (H) 07/12/2019    CALCIUM 11.0 (H) 07/12/2019    ANIONGAP 14.4 07/12/2019     Lab Results   Component Value Date    INR 0.99 07/03/2014    INR 1.00 06/19/2014    INR 1.02 01/28/2014     Lab Results   Component Value Date    HEPAIGM Non-Reactive 01/11/2019     HEPAIGM Non-Reactive 09/26/2018    HEPBSAG Non-Reactive 09/26/2018    HEPBIGMCORE Non-Reactive 09/26/2018    HEPCVIRUSABY Non-Reactive 09/26/2018      Liver Profile     Lab Results   Component Value Date    AST 38 (H) 07/12/2019    AST 29 04/15/2019    AST 53 (H) 01/07/2019    AST 74 (H) 10/31/2018     (H) 09/14/2018    AST 38 (H) 02/06/2017    AST 27 02/05/2016    AST 28 09/14/2015    AST 37 (H) 01/08/2015     Lab Results   Component Value Date    ALT 49 (H) 07/12/2019    ALT 37 (H) 04/15/2019    ALT 69 (H) 01/07/2019     (H) 10/31/2018     (H) 09/14/2018    ALT 67 (H) 02/06/2017    ALT 38 02/05/2016    ALT 37 09/14/2015    ALT 51 (H) 01/08/2015     Lab Results   Component Value Date    ALKPHOS 161 (H) 07/15/2019    ALKPHOS 161 (H) 07/12/2019    ALKPHOS 142 (H) 04/15/2019    ALKPHOS 147 (H) 01/07/2019    ALKPHOS 153 (H) 10/31/2018    GGT 49 (H) 01/07/2019    GGT 59 (H) 09/26/2018     Lab Results   Component Value Date    BILITOT 0.5 07/12/2019    BILITOT 0.3 04/15/2019    BILITOT 0.3 01/07/2019    ALBUMIN 4.60 07/12/2019    ALBUMIN 4.20 04/15/2019    ALBUMIN 4.41 01/07/2019    PROTEINTOT 8.2 07/12/2019    INR 0.99 07/03/2014    INR 1.00 06/19/2014    INR 1.02 01/28/2014    PROTIME 10.6 07/03/2014    PROTIME 10.7 06/19/2014    PROTIME 10.9 01/28/2014     07/12/2019     04/15/2019     09/26/2018     Lab Results   Component Value Date    LIPASE 55 (H) 09/14/2018    AMYLASE 28 (L) 09/14/2018     Immunity against Hep A and B  Lab Results   Component Value Date    HAV Positive (A) 01/07/2019    HEPBSAB Non-Reactive 01/07/2019     Ferritin (Normal 20.00 - 291.00 ng/mL)  Lab Results   Component Value Date    FERRITIN 225.00 (H) 07/12/2019    FERRITIN 184.00 (H) 04/15/2019     Co-morbidities   Lab Results   Component Value Date    HGBA1C 7.4 07/12/2019    HGBA1C 7.6 04/15/2019    HGBA1C 7.2 01/07/2019    HGBA1C 7.7 09/14/2018    HGBA1C 8.9 02/07/2018     HLP  Lab Results   Component  Value Date    CHOL 146 04/15/2019    CHOL 170 01/07/2019    CHOL 284 (H) 09/14/2018    CHOL 251 (H) 06/13/2018    CHOL 290 (H) 02/06/2017    CHLPL 285 (H) 02/05/2016    CHLPL 280 (H) 01/08/2015    TRIG 158 (H) 04/15/2019    TRIG 385 (H) 01/07/2019    TRIG 452 (H) 09/14/2018    TRIG 288 (H) 06/13/2018    TRIG 232 (H) 02/06/2017    HDL 50 04/15/2019    HDL 51 01/07/2019    HDL 49 09/14/2018    HDL 46 06/13/2018    HDL 60 02/06/2017    VLDL 31.6 04/15/2019    LDL 64 04/15/2019     01/07/2019     (H) 09/14/2018     (H) 06/13/2018     (H) 02/06/2017       Lab Results   Component Value Date    THCURSCR Negative 10/31/2018    THCURSCR Negative 04/18/2017    PCPUR Negative 10/31/2018    PCPUR Negative 02/05/2018    COCAINEUR Negative 10/31/2018    COCAINEUR Negative 04/18/2017    METAMPSCNUR Negative 10/31/2018    METAMPSCNUR Negative 04/18/2017    LABOPIASCN Negative 10/31/2018    LABOPIASCN Negative 04/18/2017    AMPHETSCREEN Negative 10/31/2018    AMPHETSCREEN Negative 02/05/2018    LABBENZSCN Negative 10/31/2018    LABBENZSCN Negative 02/05/2018    TRICYCLICSCN Negative 10/31/2018    TRICYCLICSCN Negative 04/18/2017    LABMETHSCN Negative 10/31/2018    LABMETHSCN Negative 02/05/2018    BARBITSCNUR Negative 10/31/2018    BARBITSCNUR Negative 02/05/2018    OXYCODONESCN Negative 10/31/2018    OXYCODONESCN Negative 04/18/2017    PROPOXSCN Negative 10/31/2018    PROPOXSCN Negative 02/05/2018    BUPRENORSCNU Negative 10/31/2018    BUPRENORSCNU Negative 04/18/2017     Wt Readings from Last 5 Encounters:   08/13/19 81.4 kg (179 lb 6.4 oz)   07/12/19 79.8 kg (176 lb)   07/12/19 79.8 kg (176 lb)   04/25/19 81.9 kg (180 lb 9.6 oz)   04/15/19 82.6 kg (182 lb)   body mass index is 32.81 kg/m².,Temp: 97.6 °F (36.4 °C),BP: 122/78,Heart Rate: 72   Physical Exam  General Well developed; well nourished. NAD  ENT Anicteric sclerae. Oral mucosa pink and moist without thrush or lesions    GI Abd soft, NT, ND,  normal active bowel sounds.  No HSM.  No abd hernia    Pt care team: Zaira BRUMFIELD & RASHAWN Liang  08/13/19 10:57 AM  [facility]--Gastroenterology  [phone]    CC: Renetta Mendenhall, APRN   [address] [id number] FAX:[phone]

## 2019-08-14 RX ORDER — OMEPRAZOLE 40 MG/1
CAPSULE, DELAYED RELEASE ORAL
Qty: 30 CAPSULE | Refills: 2 | OUTPATIENT
Start: 2019-08-14

## 2019-08-21 ENCOUNTER — TELEPHONE (OUTPATIENT)
Dept: GASTROENTEROLOGY | Facility: CLINIC | Age: 64
End: 2019-08-21

## 2019-08-21 DIAGNOSIS — K59.09 CHRONIC CONSTIPATION: ICD-10-CM

## 2019-08-21 RX ORDER — LUBIPROSTONE 24 UG/1
24 CAPSULE ORAL 2 TIMES DAILY WITH MEALS
Qty: 180 CAPSULE | Refills: 1 | COMMUNITY
Start: 2019-08-21 | End: 2019-10-14 | Stop reason: SDUPTHER

## 2019-08-21 NOTE — TELEPHONE ENCOUNTER
Zaira,  Patient called. She stated Amitiza is working. Please send in script to prefer pharmacy. Thanks

## 2019-08-23 ENCOUNTER — OFFICE VISIT (OUTPATIENT)
Dept: INTERNAL MEDICINE | Facility: CLINIC | Age: 64
End: 2019-08-23

## 2019-08-23 VITALS
BODY MASS INDEX: 33.13 KG/M2 | HEART RATE: 90 BPM | SYSTOLIC BLOOD PRESSURE: 138 MMHG | HEIGHT: 62 IN | OXYGEN SATURATION: 95 % | DIASTOLIC BLOOD PRESSURE: 80 MMHG | WEIGHT: 180 LBS | TEMPERATURE: 98.6 F

## 2019-08-23 DIAGNOSIS — N30.00 ACUTE CYSTITIS WITHOUT HEMATURIA: Primary | ICD-10-CM

## 2019-08-23 DIAGNOSIS — G25.81 RLS (RESTLESS LEGS SYNDROME): ICD-10-CM

## 2019-08-23 DIAGNOSIS — K21.9 CHRONIC GERD: ICD-10-CM

## 2019-08-23 LAB
BILIRUB BLD-MCNC: NEGATIVE MG/DL
CLARITY, POC: CLEAR
COLOR UR: YELLOW
GLUCOSE UR STRIP-MCNC: ABNORMAL MG/DL
KETONES UR QL: NEGATIVE
LEUKOCYTE EST, POC: ABNORMAL
NITRITE UR-MCNC: NEGATIVE MG/ML
PH UR: 5 [PH] (ref 5–8)
PROT UR STRIP-MCNC: NEGATIVE MG/DL
RBC # UR STRIP: NEGATIVE /UL
SP GR UR: 1.02 (ref 1–1.03)
UROBILINOGEN UR QL: NORMAL

## 2019-08-23 PROCEDURE — 81003 URINALYSIS AUTO W/O SCOPE: CPT | Performed by: NURSE PRACTITIONER

## 2019-08-23 PROCEDURE — 87086 URINE CULTURE/COLONY COUNT: CPT | Performed by: NURSE PRACTITIONER

## 2019-08-23 PROCEDURE — 99214 OFFICE O/P EST MOD 30 MIN: CPT | Performed by: NURSE PRACTITIONER

## 2019-08-23 RX ORDER — NITROFURANTOIN 25; 75 MG/1; MG/1
100 CAPSULE ORAL EVERY 12 HOURS SCHEDULED
Qty: 14 CAPSULE | Refills: 0 | Status: SHIPPED | OUTPATIENT
Start: 2019-08-23 | End: 2019-08-30

## 2019-08-23 RX ORDER — ROPINIROLE 0.5 MG/1
0.5 TABLET, FILM COATED ORAL NIGHTLY
Qty: 90 TABLET | Refills: 1 | Status: SHIPPED | OUTPATIENT
Start: 2019-08-23 | End: 2020-01-21 | Stop reason: SDUPTHER

## 2019-08-23 RX ORDER — OMEPRAZOLE 40 MG/1
40 CAPSULE, DELAYED RELEASE ORAL DAILY
Qty: 90 CAPSULE | Refills: 3 | Status: SHIPPED | OUTPATIENT
Start: 2019-08-23 | End: 2019-12-04 | Stop reason: SDUPTHER

## 2019-08-23 NOTE — PROGRESS NOTES
"Chief Complaint   Patient presents with   • Urinary Tract Infection       History of Present Illness  63 y.o.female presents for UTI symptoms.  The patient describes dysuria with low back pain for a few days not improving with home care.  Her back pain is low and spreads down thru legs \"feels like infection moving down\" thinks has a UTI.  The patient reports associated urinary frequency, urgency.  There is no associated gross hematuria or incontinence.  The patient denies fever, urethral/urogenital discharge.  The patient continues to hydrate well.  Low back pain chronic several back surgeries vascillo pain mgt; but current pain feels different.  GERD chronic onset years takes Prilosec needs refill.  With intermittent reflux symptoms indigestion.  No melena, hematochezia, or hematemesis.  Restless leg syndrome follow-up chronic onset years.  Pain in legs at nighttime with sleep intermittent.  Takes Requip nightly does well for her symptoms.  Needs refill.    Review of Systems   Constitutional: Negative for chills, fatigue and fever.   Respiratory: Negative for shortness of breath.    Cardiovascular: Negative for chest pain.   Gastrointestinal: Positive for abdominal pain, GERD and indigestion. Negative for constipation, diarrhea, nausea and vomiting.   Genitourinary: Positive for dysuria, frequency, pelvic pain and urgency. Negative for difficulty urinating, flank pain and hematuria.   Musculoskeletal: Positive for arthralgias and back pain. Negative for myalgias.       [facility]  The following portions of the patient's history were reviewed and updated as appropriate: allergies, current medications, past family history, past medical history, past social history, past surgical history and problem list.       Past Medical History:   Diagnosis Date   • Anxiety    • Benign essential hypertension    • Cervical disc disorder    • Chronic pain disorder    • Colon polyps    • Complication of device    • Decreased libido    • " Dizziness    • Encounter for long-term (current) use of medications    • Extremity pain    • Fatigue    • Hip pain    • History of alopecia    • History of backache    • History of colonoscopy     Fiberoptic   • History of diabetes mellitus    • History of insomnia    • History of mastoiditis    • History of migraine headaches    • History of urinary stone    • Infection of kidney    • Insomnia    • Joint pain    • Kidney infection    • Low back pain    • Lumbar radiculopathy    • Lumbar radiculopathy    • Lumbosacral disc disease    • Migraine    • Myofascial pain syndrome    • Neck pain    • Palpitations    • Sleep apnea    • Spinal cord stimulator status    • Stress reaction, emotional    • Urinary incontinence    • Urinary tract infection    • Visit for screening mammogram     Description: 08/28/2009   • Vitamin D deficiency       Past Surgical History:   Procedure Laterality Date   • BACK SURGERY      Lower Back   • BLADDER SURGERY     • COLONOSCOPY      Complete Colonoscopy   • HYSTERECTOMY      Total Abdominal Hysterectomy (Description: BSO)   • OTHER SURGICAL HISTORY      Elbow Surgery   • OTHER SURGICAL HISTORY      Spinal Sterotaxis Stimulation Of Cord   • SPINAL CORD STIMULATOR IMPLANT  2015    replacement      Allergies   Allergen Reactions   • Ace Inhibitors Cough   • Amlodipine    • Beta Adrenergic Blockers    • Cyclobenzaprine    • Hydrochlorothiazide    • Hyzaar  [Losartan Potassium-Hctz]    • Latex    • Losartan    • Metformin    • Penicillins    • Pioglitazone    • Spironolactone       Family History   Problem Relation Age of Onset   • Hypertension Mother    • Peripheral vascular disease Mother    • Ulcers Mother    • Lung disease Father    • Diabetes Other         type 2            Current Outpatient Medications:   •  albuterol (PROVENTIL HFA;VENTOLIN HFA) 108 (90 Base) MCG/ACT inhaler, Inhale 2 puffs Every 6 (Six) Hours As Needed for Wheezing., Disp: 1 inhaler, Rfl: 0  •  baclofen (LIORESAL) 10  MG tablet, Take 1/2 to 1 tab po Qd to bid prn, Disp: 180 tablet, Rfl: 1  •  bisoprolol (ZEBeta) 5 MG tablet, Take 5 mg by mouth Daily., Disp: , Rfl:   •  Desvenlafaxine Succinate ER (PRISTIQ) 25 MG tablet sustained-release 24 hour, Take 1 tablet by mouth Daily., Disp: 30 tablet, Rfl: 0  •  Empagliflozin (JARDIANCE) 25 MG tablet, Take 25 mg by mouth Daily., Disp: 90 tablet, Rfl: 1  •  estradiol (ESTRACE VAGINAL) 0.1 MG/GM vaginal cream, Insert 2 g into the vagina 1 (One) Time Per Week., Disp: 42.5 g, Rfl: 5  •  furosemide (LASIX) 20 MG tablet, Take 20 mg by mouth 2 (Two) Times a Day., Disp: , Rfl:   •  glimepiride (AMARYL) 4 MG tablet, Take 1 po qd before a meal, Disp: 30 tablet, Rfl: 6  •  Liniments (SALONPAS) pads, Apply  topically. prn, Disp: , Rfl:   •  losartan (COZAAR) 100 MG tablet, Take 100 mg by mouth Daily., Disp: , Rfl:   •  lubiprostone (AMITIZA) 24 MCG capsule, Take 1 capsule by mouth 2 (Two) Times a Day With Meals., Disp: 180 capsule, Rfl: 1  •  metoclopramide (REGLAN) 10 MG tablet, Take 1 tablet by mouth 4 (Four) Times a Day Before Meals & at Bedtime As Needed (for nausea or vomiting)., Disp: 40 tablet, Rfl: 2  •  NIFEdipine CC (ADALAT CC) 90 MG 24 hr tablet, Take 60 mg by mouth Daily., Disp: , Rfl:   •  omeprazole (priLOSEC) 40 MG capsule, TAKE 1 CAPSULE EVERY MORNING. TAKE FIRST THING IN THE MORNING ON AN EMPTY STOMACH. WAIT AT LEAST 30 MINUTES TO 1 HOUR BEFORE EATING, Disp: 30 capsule, Rfl: 2  •  rOPINIRole (REQUIP) 0.5 MG tablet, Take 0.5 tablets by mouth Every Night. Take 1 hour before bedtime., Disp: 30 tablet, Rfl: 5  •  rosuvastatin (CRESTOR) 5 MG tablet, Take 1 tablet by mouth Every Night., Disp: 90 tablet, Rfl: 1  •  SITagliptin (JANUVIA) 100 MG tablet, Take 1 tablet by mouth Daily., Disp: 90 tablet, Rfl: 1  •  topiramate (TOPAMAX) 50 MG tablet, TAKE ONE TABLET BY MOUTH TWICE DAILY (Patient taking differently: TAKE ONE TABLET BY MOUTH as needed), Disp: 180 tablet, Rfl: 0  •  traMADol (ULTRAM)  "50 MG tablet, TAKE 1 TABLET PO TID PRN FOR SEVERE BREAKTHROUGH PAIN, Disp: 270 tablet, Rfl: 0  •  traZODone (DESYREL) 50 MG tablet, Take 1 to 2 tabs po hs prn, Disp: 180 tablet, Rfl: 2    VITALS:  /80   Pulse 90   Temp 98.6 °F (37 °C)   Ht 157.5 cm (62\")   Wt 81.6 kg (180 lb)   LMP  (LMP Unknown)   SpO2 95%   BMI 32.92 kg/m²     Physical Exam   Constitutional: She is oriented to person, place, and time. She appears well-developed and well-nourished. No distress.   Cardiovascular: Normal rate.   Pulmonary/Chest: Effort normal.   Abdominal: There is no tenderness. There is no CVA tenderness.   Neurological: She is alert and oriented to person, place, and time.   Skin: Skin is warm and dry. No rash noted.   Vitals reviewed.      LABS  Results for orders placed or performed in visit on 08/23/19   Urine Culture - Urine, Urine, Clean Catch   Result Value Ref Range    Urine Culture No growth    POCT urinalysis dipstick, automated   Result Value Ref Range    Color Yellow Yellow, Straw, Dark Yellow, Lavinia    Clarity, UA Clear Clear    Specific Gravity  1.020 1.005 - 1.030    pH, Urine 5.0 5.0 - 8.0    Leukocytes 500 Caas/ul (A) Negative    Nitrite, UA Negative Negative    Protein, POC Negative Negative mg/dL    Glucose, UA >=1000 mg/dL (3+) (A) Negative, 1000 mg/dL (3+) mg/dL    Ketones, UA Negative Negative    Urobilinogen, UA Normal Normal    Bilirubin Negative Negative    Blood, UA Negative Negative       ASSESSMENT/PLAN  Yi was seen today for urinary tract infection.    Diagnoses and all orders for this visit:    Acute cystitis without hematuria  -     POCT urinalysis dipstick, automated  -     nitrofurantoin, macrocrystal-monohydrate, (MACROBID) 100 MG capsule; Take 1 capsule by mouth Every 12 (Twelve) Hours for 7 days.  -     Urine Culture - Urine, Urine, Clean Catch    Chronic GERD  -     omeprazole (priLOSEC) 40 MG capsule; Take 1 capsule by mouth Daily.  - HOB elevated  - Weight loss  - Avoid late " night meals  - Avoid excessive caffeine   - Avoid excessive carbonated beverages  - Report any dysphagia or odynophagia    RLS (restless legs syndrome)  -     rOPINIRole (REQUIP) 0.5 MG tablet; Take 1 tablet by mouth Every Night. Take 1 hour before bedtime.      I discussed the patients findings and my recommendations with patient.  Patient was encouraged to keep me informed of any acute changes, lack of improvement, or any new concerning symptoms.    Patient voiced understanding of all instructions and denied further questions.      FOLLOW-UP  Return if symptoms worsen or fail to improve.  Fu appt scheduled in Jan    Electronically signed by:    Renetta Coleman, OCTAVIANO  08/23/2019

## 2019-08-25 ENCOUNTER — TELEPHONE (OUTPATIENT)
Dept: INTERNAL MEDICINE | Facility: CLINIC | Age: 64
End: 2019-08-25

## 2019-08-25 LAB — BACTERIA SPEC AEROBE CULT: NO GROWTH

## 2019-08-25 NOTE — TELEPHONE ENCOUNTER
----- Message from OCTAVIANO Wong sent at 8/25/2019 12:30 PM EDT -----  Let pt know no significant bacteria on urine culture. Can stop abx at 5 days.

## 2019-08-27 NOTE — PROGRESS NOTES
Subjective     PROBLEM LIST:  1. Elevated ferritin  2. NAFLD  3. Gastroparesis  4. DM2      CHIEF COMPLAINT: elevated ferritin and hemoglobin      HISTORY OF PRESENT ILLNESS:  The patient is a 63 y.o. female, referred for evaluation of abnormal labs.    Labs:  Elevated ferrtin 212, iron sat 25%    hgb 16.5    Ca 11.0, PTH 30.4    Abnormal LFTs    She denies any family history of iron overload or liver disease.  Her mother  of kidney failure and COPD.  She does not take any iron-containing vitamins.  She does have gastroparesis.    She is a never smoker.  Her main complaint today is chronic pain that is in her back and goes down her legs.    She reports that she was tested for sleep apnea and was diagnosed with this.  However she was never able to tolerate the mask and so she no longer attempts to wear this.  The mask was taken away because it was not being used.    REVIEW OF SYSTEMS:  A 14 point review of systems was performed and is negative except as noted above.    Past Medical History:   Diagnosis Date   • Anxiety    • Benign essential hypertension    • Cervical disc disorder    • Chronic pain disorder    • Colon polyps    • Complication of device    • Decreased libido    • Dizziness    • Encounter for long-term (current) use of medications    • Extremity pain    • Fatigue    • Hip pain    • History of alopecia    • History of backache    • History of colonoscopy     Fiberoptic   • History of diabetes mellitus    • History of insomnia    • History of mastoiditis    • History of migraine headaches    • History of urinary stone    • Infection of kidney    • Insomnia    • Joint pain    • Kidney infection    • Low back pain    • Lumbar radiculopathy    • Lumbar radiculopathy    • Lumbosacral disc disease    • Migraine    • Myofascial pain syndrome    • Neck pain    • Palpitations    • Sleep apnea    • Spinal cord stimulator status    • Stress reaction, emotional    • Urinary incontinence    • Urinary tract  infection    • Visit for screening mammogram     Description: 08/28/2009   • Vitamin D deficiency          Current Outpatient Medications on File Prior to Visit   Medication Sig Dispense Refill   • albuterol (PROVENTIL HFA;VENTOLIN HFA) 108 (90 Base) MCG/ACT inhaler Inhale 2 puffs Every 6 (Six) Hours As Needed for Wheezing. 1 inhaler 0   • baclofen (LIORESAL) 10 MG tablet Take 1/2 to 1 tab po Qd to bid prn 180 tablet 1   • bisoprolol (ZEBeta) 5 MG tablet Take 5 mg by mouth Daily.     • Desvenlafaxine Succinate ER (PRISTIQ) 25 MG tablet sustained-release 24 hour Take 1 tablet by mouth Daily. 30 tablet 0   • Empagliflozin (JARDIANCE) 25 MG tablet Take 25 mg by mouth Daily. 90 tablet 1   • estradiol (ESTRACE VAGINAL) 0.1 MG/GM vaginal cream Insert 2 g into the vagina 1 (One) Time Per Week. 42.5 g 5   • furosemide (LASIX) 20 MG tablet Take 20 mg by mouth 2 (Two) Times a Day.     • glimepiride (AMARYL) 4 MG tablet Take 1 po qd before a meal 30 tablet 6   • Liniments (SALONPAS) pads Apply  topically. prn     • losartan (COZAAR) 100 MG tablet Take 100 mg by mouth Daily.     • lubiprostone (AMITIZA) 24 MCG capsule Take 1 capsule by mouth 2 (Two) Times a Day With Meals. 180 capsule 1   • metoclopramide (REGLAN) 10 MG tablet Take 1 tablet by mouth 4 (Four) Times a Day Before Meals & at Bedtime As Needed (for nausea or vomiting). 40 tablet 2   • NIFEdipine CC (ADALAT CC) 90 MG 24 hr tablet Take 60 mg by mouth Daily.     • nitrofurantoin, macrocrystal-monohydrate, (MACROBID) 100 MG capsule Take 1 capsule by mouth Every 12 (Twelve) Hours for 7 days. 14 capsule 0   • omeprazole (priLOSEC) 40 MG capsule Take 1 capsule by mouth Daily. 90 capsule 3   • rOPINIRole (REQUIP) 0.5 MG tablet Take 1 tablet by mouth Every Night. Take 1 hour before bedtime. 90 tablet 1   • rosuvastatin (CRESTOR) 5 MG tablet Take 1 tablet by mouth Every Night. 90 tablet 1   • SITagliptin (JANUVIA) 100 MG tablet Take 1 tablet by mouth Daily. 90 tablet 1   •  "topiramate (TOPAMAX) 50 MG tablet TAKE ONE TABLET BY MOUTH TWICE DAILY (Patient taking differently: TAKE ONE TABLET BY MOUTH as needed) 180 tablet 0   • traMADol (ULTRAM) 50 MG tablet TAKE 1 TABLET PO TID PRN FOR SEVERE BREAKTHROUGH PAIN 270 tablet 0   • traZODone (DESYREL) 50 MG tablet Take 1 to 2 tabs po hs prn 180 tablet 2     No current facility-administered medications on file prior to visit.        Allergies   Allergen Reactions   • Ace Inhibitors Cough   • Amlodipine    • Beta Adrenergic Blockers    • Cyclobenzaprine    • Hydrochlorothiazide    • Hyzaar  [Losartan Potassium-Hctz]    • Latex    • Losartan    • Metformin    • Penicillins    • Pioglitazone    • Spironolactone        Past Surgical History:   Procedure Laterality Date   • BACK SURGERY      x3; Lower Back   • BLADDER SURGERY     • BREAST BIOPSY Left     Benign   • COLONOSCOPY  2019    Complete Colonoscopy   • ELBOW ARTHROPLASTY Left    • HYSTERECTOMY      Total Abdominal Hysterectomy (Description: BSO)   • OTHER SURGICAL HISTORY      Spinal Sterotaxis Stimulation Of Cord   • SPINAL CORD STIMULATOR IMPLANT  2015    replacement   • TUBAL ABDOMINAL LIGATION         Social History     Socioeconomic History   • Marital status:      Spouse name: Not on file   • Number of children: Not on file   • Years of education: Not on file   • Highest education level: Not on file   Tobacco Use   • Smoking status: Never Smoker   • Smokeless tobacco: Never Used   Substance and Sexual Activity   • Alcohol use: No   • Drug use: No   • Sexual activity: Defer       Family History   Problem Relation Age of Onset   • Hypertension Mother    • Peripheral vascular disease Mother    • Ulcers Mother    • Kidney failure Mother    • Lung disease Father    • Diabetes Other         type 2   • Kidney failure Maternal Grandmother        Objective     /77 Comment: LUE  Pulse 75   Temp 97.3 °F (36.3 °C) (Temporal)   Resp 16   Ht 154.9 cm (61\")   Wt 81.2 kg (179 lb)   " LMP  (LMP Unknown)   SpO2 96% Comment: RA  BMI 33.82 kg/m²   Performance Status: 0  General: well appearing female in no acute distress  Neuro: alert and oriented  HEENT: sclerae anicteric, oropharynx clear  Lymphatics: no cervical, supraclavicular, or axillary adenopathy  Cardiovascular: regular rate and rhythm, no murmurs  Lungs: clear to auscultation bilaterally  Abdomen: soft, nontender, nondistended.  No palpable organomegaly  Extremities: no lower extremity edema  Skin: no rashes, lesions, bruising, or petechiae  Psych: mood and affect appropriate      Results for ROMEO NAYAK (MRN [id number]) as of 8/27/2019 12:49   Ref. Range 7/12/2019 11:40 7/15/2019 10:35 8/13/2019 11:48   Glucose Latest Ref Range: 65 - 99 mg/dL 153 (H)     Sodium Latest Ref Range: 136 - 145 mmol/L 142     Potassium Latest Ref Range: 3.5 - 5.2 mmol/L 5.0     CO2 Latest Ref Range: 22.0 - 29.0 mmol/L 27.6     Chloride Latest Ref Range: 98 - 107 mmol/L 100     Anion Gap Latest Ref Range: 5.0 - 15.0 mmol/L 14.4     Creatinine Latest Ref Range: 0.57 - 1.00 mg/dL 0.92     BUN Latest Ref Range: 8 - 23 mg/dL 24 (H)     BUN/Creatinine Ratio Latest Ref Range: 7.0 - 25.0  26.1 (H)     Calcium Latest Ref Range: 8.6 - 10.5 mg/dL 11.0 (H)     eGFR Non  Am Latest Ref Range: >60 mL/min/1.73 62     Alkaline Phosphatase Latest Ref Range: 39 - 117 IU/L 161 (H) 161 (H)    Total Protein Latest Ref Range: 6.0 - 8.5 g/dL 8.2     ALT (SGPT) Latest Ref Range: 1 - 33 U/L 49 (H)     AST (SGOT) Latest Ref Range: 1 - 32 U/L 38 (H)     Total Bilirubin Latest Ref Range: 0.2 - 1.2 mg/dL 0.5     Albumin Latest Ref Range: 3.50 - 5.20 g/dL 4.60     Globulin Latest Units: gm/dL 3.6     A/G Ratio Latest Units: g/dL 1.3     PTH Intact (Serial Monitor) Latest Ref Range: 15.0 - 65.0 pg/mL  30.4    Iron Latest Ref Range: 37 - 145 mcg/dL   108   Ferritin Latest Ref Range: 13.00 - 150.00 ng/mL 225.00 (H)  212.00 (H)   Iron Saturation Latest Ref Range: 20 - 50 %   25    Transferrin Latest Ref Range: 200 - 360 mg/dL   295   TIBC Latest Ref Range: 298 - 536 mcg/dL   440   Bone Fraction: Latest Ref Range: 14 - 68 %  44    Intestinal Frac.: Latest Ref Range: 0 - 18 %  6    Liver Fraction: Latest Ref Range: 18 - 85 %  50    WBC Latest Ref Range: 3.40 - 10.80 10*3/mm3 7.58     RBC Latest Ref Range: 3.77 - 5.28 10*6/mm3 5.85 (H)     Hemoglobin Latest Ref Range: 12.0 - 15.9 g/dL 16.5 (H)     Hematocrit Latest Ref Range: 34.0 - 46.6 % 54.4 (H)     RDW Latest Ref Range: 12.3 - 15.4 % 14.1     MCV Latest Ref Range: 79.0 - 97.0 fL 93.0     MCH Latest Ref Range: 26.6 - 33.0 pg 28.2     MCHC Latest Ref Range: 31.5 - 35.7 g/dL 30.3 (L)     MPV Latest Ref Range: 6.0 - 12.0 fL 10.9     Platelets Latest Ref Range: 140 - 450 10*3/mm3 287     RDW-SD Latest Ref Range: 37.0 - 54.0 fl 48.1     Neutrophil Rel % Latest Ref Range: 42.7 - 76.0 % 59.7     Lymphocyte Rel % Latest Ref Range: 19.6 - 45.3 % 30.2     Monocyte Rel % Latest Ref Range: 5.0 - 12.0 % 6.3     Eosinophil Rel % Latest Ref Range: 0.3 - 6.2 % 2.1     Basophil Rel % Latest Ref Range: 0.0 - 1.5 % 1.3     Immature Granulocyte Rel % Latest Ref Range: 0.0 - 0.5 % 0.4     Reticulocyte % Latest Ref Range: 0.70 - 1.90 %   1.47   Reticulocyte Absolute Latest Ref Range: 0.0200 - 0.1300 10*6/mm3   0.0817   Neutrophils Absolute Latest Ref Range: 1.70 - 7.00 10*3/mm3 4.52     Lymphocytes Absolute Latest Ref Range: 0.70 - 3.10 10*3/mm3 2.29     Monocytes Absolute Latest Ref Range: 0.10 - 0.90 10*3/mm3 0.48     Eosinophils Absolute Latest Ref Range: 0.00 - 0.40 10*3/mm3 0.16     Basophils Absolute Latest Ref Range: 0.00 - 0.20 10*3/mm3 0.10     Immature Grans, Absolute Latest Ref Range: 0.00 - 0.05 10*3/mm3 0.03     nRBC Latest Ref Range: 0.0 - 0.2 /100 WBC 0.0     Erythropoietin Latest Ref Range: 2.6 - 18.5 mIU/mL  25.2 (H)          Assessment/Plan     Yi Garcia is a 63 y.o. year old female referred for abnormal labs.    Elevated ferritin: She  has a mild elevation of ferritin.  We will do testing to see if there is evidence of hemochromatosis.  We discussed that ferritin can also be elevated in the setting of chronic inflammation and especially in the setting of chronic liver disease.  She does have fatty liver and the ferritin elevation could be a consequence of this.    Erythrocytosis: She has had a mild elevation of her hematocrit for over 2 years.  I suspect this is due to untreated sleep apnea and overnight hypoxia.  She has an erythropoietin level which is elevated, and this supports secondary erythrocytosis.  I recommended that she contact her sleep physician and follow-up regarding CPAP treatment and strategies for being able to tolerate this.    I will see her back in about 3 weeks to review her blood work.           Lexie Robledo MD    8/29/2019

## 2019-08-29 ENCOUNTER — CONSULT (OUTPATIENT)
Dept: ONCOLOGY | Facility: CLINIC | Age: 64
End: 2019-08-29

## 2019-08-29 ENCOUNTER — APPOINTMENT (OUTPATIENT)
Dept: LAB | Facility: HOSPITAL | Age: 64
End: 2019-08-29

## 2019-08-29 VITALS
RESPIRATION RATE: 16 BRPM | OXYGEN SATURATION: 96 % | TEMPERATURE: 97.3 F | SYSTOLIC BLOOD PRESSURE: 136 MMHG | DIASTOLIC BLOOD PRESSURE: 77 MMHG | HEART RATE: 75 BPM | BODY MASS INDEX: 33.79 KG/M2 | WEIGHT: 179 LBS | HEIGHT: 61 IN

## 2019-08-29 DIAGNOSIS — E83.52 HYPERCALCEMIA: ICD-10-CM

## 2019-08-29 DIAGNOSIS — R79.89 ELEVATED FERRITIN LEVEL: Primary | ICD-10-CM

## 2019-08-29 LAB
ALBUMIN SERPL-MCNC: 4.4 G/DL (ref 3.5–5.2)
ALBUMIN/GLOB SERPL: 1.3 G/DL
ALP SERPL-CCNC: 163 U/L (ref 39–117)
ALT SERPL W P-5'-P-CCNC: 47 U/L (ref 1–33)
ANION GAP SERPL CALCULATED.3IONS-SCNC: 12 MMOL/L (ref 5–15)
AST SERPL-CCNC: 39 U/L (ref 1–32)
BILIRUB SERPL-MCNC: 0.4 MG/DL (ref 0.2–1.2)
BUN BLD-MCNC: 15 MG/DL (ref 8–23)
BUN/CREAT SERPL: 19.5 (ref 7–25)
CALCIUM SPEC-SCNC: 10.5 MG/DL (ref 8.6–10.5)
CHLORIDE SERPL-SCNC: 101 MMOL/L (ref 98–107)
CO2 SERPL-SCNC: 30 MMOL/L (ref 22–29)
CREAT BLD-MCNC: 0.77 MG/DL (ref 0.57–1)
ERYTHROCYTE [DISTWIDTH] IN BLOOD BY AUTOMATED COUNT: 13.8 % (ref 12.3–15.4)
FERRITIN SERPL-MCNC: 235.1 NG/ML (ref 13–150)
GFR SERPL CREATININE-BSD FRML MDRD: 76 ML/MIN/1.73
GLOBULIN UR ELPH-MCNC: 3.4 GM/DL
GLUCOSE BLD-MCNC: 102 MG/DL (ref 65–99)
HCT VFR BLD AUTO: 49.1 % (ref 34–46.6)
HGB BLD-MCNC: 15.7 G/DL (ref 12–15.9)
IRON 24H UR-MRATE: 85 MCG/DL (ref 37–145)
IRON SATN MFR SERPL: 21 % (ref 20–50)
LYMPHOCYTES # BLD AUTO: 2 10*3/MM3 (ref 0.7–3.1)
LYMPHOCYTES NFR BLD AUTO: 31.7 % (ref 19.6–45.3)
MCH RBC QN AUTO: 28.8 PG (ref 26.6–33)
MCHC RBC AUTO-ENTMCNC: 32 G/DL (ref 31.5–35.7)
MCV RBC AUTO: 90 FL (ref 79–97)
MONOCYTES # BLD AUTO: 0.3 10*3/MM3 (ref 0.1–0.9)
MONOCYTES NFR BLD AUTO: 4.1 % (ref 5–12)
NEUTROPHILS # BLD AUTO: 4.1 10*3/MM3 (ref 1.7–7)
NEUTROPHILS NFR BLD AUTO: 64.2 % (ref 42.7–76)
PLATELET # BLD AUTO: 287 10*3/MM3 (ref 140–450)
PMV BLD AUTO: 8 FL (ref 6–12)
POTASSIUM BLD-SCNC: 4.7 MMOL/L (ref 3.5–5.2)
PROT SERPL-MCNC: 7.8 G/DL (ref 6–8.5)
RBC # BLD AUTO: 5.46 10*6/MM3 (ref 3.77–5.28)
SODIUM BLD-SCNC: 143 MMOL/L (ref 136–145)
TIBC SERPL-MCNC: 410 MCG/DL (ref 298–536)
TRANSFERRIN SERPL-MCNC: 275 MG/DL (ref 200–360)
WBC NRBC COR # BLD: 6.4 10*3/MM3 (ref 3.4–10.8)

## 2019-08-29 PROCEDURE — 82306 VITAMIN D 25 HYDROXY: CPT | Performed by: INTERNAL MEDICINE

## 2019-08-29 PROCEDURE — 85025 COMPLETE CBC W/AUTO DIFF WBC: CPT | Performed by: INTERNAL MEDICINE

## 2019-08-29 PROCEDURE — 36415 COLL VENOUS BLD VENIPUNCTURE: CPT | Performed by: INTERNAL MEDICINE

## 2019-08-29 PROCEDURE — 82728 ASSAY OF FERRITIN: CPT | Performed by: INTERNAL MEDICINE

## 2019-08-29 PROCEDURE — 99204 OFFICE O/P NEW MOD 45 MIN: CPT | Performed by: INTERNAL MEDICINE

## 2019-08-29 PROCEDURE — 80053 COMPREHEN METABOLIC PANEL: CPT | Performed by: INTERNAL MEDICINE

## 2019-08-29 PROCEDURE — 84466 ASSAY OF TRANSFERRIN: CPT | Performed by: INTERNAL MEDICINE

## 2019-08-29 PROCEDURE — 83540 ASSAY OF IRON: CPT | Performed by: INTERNAL MEDICINE

## 2019-08-30 LAB — 25(OH)D3 SERPL-MCNC: 33.2 NG/ML (ref 30–100)

## 2019-09-02 PROBLEM — Z46.2 ENCOUNTER FOR FITTING AND ADJUSTMENT OF NEUROPACEMAKER OF SPINAL CORD: Status: ACTIVE | Noted: 2019-09-02

## 2019-09-02 PROBLEM — G47.33 OBSTRUCTIVE SLEEP APNEA, ADULT: Status: ACTIVE | Noted: 2019-09-02

## 2019-09-02 NOTE — PROGRESS NOTES
"Chief Complain: \"I have pain in my lower back, buttocks and into my left leg down to my left foot.\"          Brief History: Ms. Yi Garcia, 63 y.o. female, returns to the clinic for evaluation of her chronic lower back and left lower extremity pain and possible SCS reprogramming. Patient was last seen on 04/25/2019, by Gigi Rojo PA-C. She returns for evaluation of her chronic pain, medication refill and possible spinal cord stimulator reprogramming. Patient did not bring her  at her last visit. Patient reports that she had a recent UTI treated with ABx with relief of an exacerbation of her lower back pain.  Patient underwent implantation of her spinal cord stimulator device system on October 1, 2015 with Dr. Von Croft. [place] Penta lead with the top electrodes projecting at the level of the superior endplate of the T7 vertebral body. IPG: Protégé.  Patient did really well with her spinal cord stimulator device until about December 2017 when apparently, there was some migration of the lead and we have been unable to provide effective stimulation in the areas of her pain (left lower back left hip and left lower extremity).  Pain level ranges from 1/10 to 10/10 with certain activities and during the night.  Pain level: 4/10 (SCS is not working at this time due to end of life)  Pain location: Left lower back, buttocks, and left lower extremity to her left foot (ankle and dorsal aspect of foot).  Quality of pain: deep ache and burning  Radiation of pain: From the left gluteal region to the posterior aspect of her left thigh, left calf and into the dorsal aspect of her left foot  Current analgesics: ice and heat in the areas of her chronic pain, SalonPas, baclofen, tramadol, diclofenac, Lidocaine Patches and Tylenol without side effects.   I have reviewed [facility] #[id number] consistent with medication reconciliation.      Comorbid factors:   Depression and anxiety: Patient reports increasing " depression and anxiety.   She continues under the psychiatric care of Dr. Lon Oliveira.   Patient continues on Pristiq, Geodon, trazodone. She has stopped Seroquel as indicated by Dr. Oliveira because she had been sleeping most of the time.   She continues CBT with Savita Minor.   Physical deconditioning and noncompliance: She has not yet resumed water therapy or physical therapy   Sleep disturbance: Patient underwent a sleep study 04/29/2017. Patient stopped using her CPAP machine. She uses a mouth guard  Obesity: Patient has not followed up with [facility] despite medical advice.   Diabetes: NIDDM, not controlled. No meds. No compliance.     Global Pain Scale 10-31  2018 03-14  2019 04-25  2019 09-10  2019           Pain 13 11 15  12           Feelings 16 8 18  14           Clinical outcomes 17 8 17  20           Activities 15 7 18  20           GPS Total: 61 34 68  66               Review of Diagnostic Studies:  XR SPINE, THORACIC, 1 VW-12/21/2017: No evidence for acute fracture, subluxation or dislocation of the thoracic spine with spinal cord stimulator terminating at the T7 superior endplate level.   CT SCAN OF THE THORACIC SPINE WO CONTRAST-09/13/2017: CT scan of the thoracic spine was performed in the axial plane at 2 mm intervals. Images were acquired in the axial plane with images displayed in the axial as well as reconstructed sagittal and coronal projections. There are findings of laminectomy at T9 and T10 where there is insertion of a spinal cord stimulator which extends to the level of T7. There is no spinal stenosis. There are  degenerative changes primarily manifest by anterior osteophytes in the mid and lower thoracic region. There is no paraspinous mass. There is no compression fracture. There is no blastic or lytic process.  UDS on 04/27/2017, appropriate  UDS on 12/06/2016, appropriate  X-rays of the sacroiliac joints from 04/19/2016 revealed lumbosacral and SI arthropathy.  MRI of  "the lumbar spine from June 2013 revealed lumbar facet hypertrophy from L2-L3 through L5-S1. L4-L5 moderate disc bulge lateralizes towards the right. Moderate right and mild left neuroforaminal stenosis. L5-S1 moderate left foraminal disc protrusion combined with facet hypertrophy creating moderate left neuroforaminal stenosis with no significant canal stenosis.  X-ray of the pelvis on 10/07/2014, revealed mild sclerosis about the right sacroiliac joint.  X-ray of the thoracic spine on 09/09/2014, revealed spinal cord stimulator electrodes positioned at T7. The thoracic spine otherwise demonstrates mild degenerative change without acute abnormality.    X-rays of the sacroiliac joints from 04/19/2016 revealed lumbosacral and SI arthropathy.    The following portions of the patient's history were reviewed and updated as appropriate: problem list, past medical history, past surgery history, social history, family history, medications, and allergies     Review of Systems   Respiratory: Positive for apnea.    Genitourinary: Positive for pelvic pain.   Musculoskeletal: Positive for back pain, myalgias, neck pain and neck stiffness.   Neurological: Positive for headaches.   Psychiatric/Behavioral: Positive for agitation, decreased concentration and sleep disturbance. The patient is nervous/anxious.    All other systems reviewed and are negative.     /70 (BP Location: Left arm, Patient Position: Sitting)   Temp 97.4 °F (36.3 °C) (Temporal)   Ht 154.9 cm (61\")   Wt 80.7 kg (178 lb)   LMP  (LMP Unknown)   BMI 33.63 kg/m²      Physical Exam   Neurologic Exam  Constitutional General appearance: No acute distress, well appearing. Obese and well hydrated.   Head and Face Normal.   Eyes: Conjunctiva and lids: No swelling, erythema or discharge. Pupils: Equal, round, reactive to light.   Pulmonary Respiratory effort: No increased work of breathing or signs of respiratory distress. Auscultation of lungs: Clear to " auscultation.   Cardiovascular Auscultation of heart: Normal rate and rhythm, normal S1 and S2, no murmurs. Peripheral vascular exam: Normal. Examination of extremities for edema and/or varicosities: Normal.   Digits and nails: Normal without clubbing or cyanosis.  Musculoskeletal Gait and station: Normal.  The range of motion of the lumbar spine is improved. Lumbar facet joint loading maneuvers are negative. Krish and Gaenslen's tests are negative. Palpation of the left gluteal bursa does not reproduce pain at this time. The range of motion of the hip joints is full and without pain. Muscle tone is normal. Left piriformis maneuvers are negative at this time.   Muscle strength/tone: Normal.   Skin and subcutaneous tissue: Normal without rashes or lesions. There is complete epithelization of her surgical wounds, without erythema, drainage, or fluid accumulation.   Neurologic   Cranial nerves: Cranial nerves II-XII intact.   Cortical function: Normal mental status.   Reflexes: 2+ and symmetric. Deep tendon reflexes: 2+ right biceps, 2+ left biceps, 2+ right patella, 2+ left patella, 2+ right ankle jerk and 2+ left ankle jerk. Superficial/Primitive Reflexes: primitive reflexes were absent. Straight leg raising test is positive on the left, with a positive contralateral SLR. Femoral stretch sign is negative. No clonus or Babinski. Romberg's is negative.   Sensation: No sensory loss. Sensory exam: intact to light touch, intact pain and temperature sensation, intact vibration sensation and normal proprioception.   Coordination: Normal finger to nose and heel to shin. Coordination: normal balance and negative Romberg's sign.   Psychiatric: Judgment and insight: Normal. Orientation to person, place, and time: Normal. Recent and remote memory: Intact. Mood and affect: Normal.       Analysis of the spinal cord stimulator device without spinal cord stimulator reprogramming   Analysis of the spinal cord stimulator device  reveals that the patient has used her stimulator device for a total of 0 hours since last reprogramming, and 12,617 lifetime hours (0 hours per day). Analysis of impedance revealed normal impedance for all contacts. IPG has been depleted several times and is at end of life.     Program TEN (best program)  Electrode polarities: 1+, 2+, 4-, 6-  Amplitude: 1.4-3 mA   Pulse width: 1000 mcs  Intraburst rate: 500 Hz  Pulse Rate: 40 Hz  Micro-dosin:90  A copy of the telemetry report will be scanned in the patient's chart      ASSESSMENT:   1. Migration of spinal cord stimulator, sequela    2. Battery end of life of spinal cord stimulator    3. Meningeal adhesions/lumbar arachnoiditis    4. Postlaminectomy syndrome of lumbar region    5. Herniated lumbar intervertebral disc    6. Lumbar facet arthropathy/lumbar spondylosis without myelopathy    7. Sacroiliac joint dysfunction of left side    8. Myofascial pain syndrome    9. Uncontrolled type 2 diabetes mellitus with hyperglycemia, without long-term current use of insulin (CMS/[facility])    10. Moderate obesity    11. Obstructive sleep apnea, adult    12. Insomnia, unspecified type    13. Anxiety and depression    14. Physical deconditioning    15. Encounter for fitting and adjustment of neuropacemaker of spinal cord         PLAN: Patient continues to struggle with lower back and left lower extremity pain, which had been well controlled with her SCS device until a few months ago. She also continues to struggle with anxiety and depression, insomnia, JOHN (no compliance with CPAP). Therefore, I have proposed the following plan:  1. Referral to Dr. Croft in consultation for revision of her SCS device: mobilization of the lead towards the left side of the epidural space and moving the lead down to the level of the superior aspect of the T8 vertebral body. IPG exchange to Proclaim 5 (non-rechargeable). Her SCS will be full body MRI compatible. Patient will follow-up with me  thereafter.   2. Diagnostics: Random UDS   3. Long-term rehabilitation efforts:   A. Resume comprehensive physical therapy program  B. Resume water therapy   C. Follow-up at [facility]  D. Follow-up with Dr. Berry Bradshaw for cognitive behavioral therapy, biofeedback, and CPAP compliance  E. Follow-up with Dr. Oliveira for unrelenting anxiety, depression and insomnia  F.  The patient does not have a history of falls. I did complete a risk assessment for falls. Patient has risks factors. Fall precautions: Patient has been instructed regarding universal fall precautions, such as;   · Removing all area rugs and coffee tables to create a safe environment at home  · Ensure clean, dry floors  · Wearing supportive footwear and properly fitting clothing  · Ensure bed/chair is appropriate height and patient's feet can touch the floor  · Using a shower transfer bench  · Using walk-in shower and having shower safety bars installed  · Ensure proper lighting, minimize glare  · Have nightlights operational and in use  · Participation in an exercise program for gait training, balance training and strength  · Avoid carrying laundry up and down steps  · Ensure proper compliance and organization of medications to avoid errors   · Avoid use of over the counter sedatives and alcohol consumption  4. Pharmacological measures, as follows:  A. Continue topiramate 50 mg twice daily  B. Continue trazodone  mg each bedtime for insomnia  C. Continue baclofen 10 mg 1/2 to 1 tablet 1-2 times a day as needed muscle spasm  D. Continue lidocaine patches  E. Continue Tramadol 50 mg 2-3 times a day as needed for severe breakthrough pain, will refill today.   Screening and compliance with controlled substances:   Patient has completed a SOAPP questionnaire and ORT questionnaires (revealing low risk).  Bernabe reports have been reviewed at each visit, and at least every three months and appropriate. Random urine drug screenings  have been obtained and appropriate. Patient has signed a consent for treatment with controlled substances after reviewing the document and verbalizing full understanding of the risks, benefits, alternatives, and consequences of compliance and adherence with treatment and comprehensive plan of care. Patient received in hand a copy of this document.  5. The patient has been instructed to contact my office with any questions or difficulties. The patient understands the plan and agrees to proceed accordingly.    I spent 30 minutes face-to-face with the patient, of which 20 minutes were spent counseling regarding evaluation, diagnosis, prognosis, diagnostic testing, potential referrals, treatment options for chronic pain condition and overall rehabilitation, implications of compliance with medical care, coordination of care with other providers involved in patient's care, long-term management of concurrent comorbidities affecting effective pain control, risk and benefits of different interventions, alternative therapies, a comprehensive plan of care to address physical deconditioning, strategies to prevent fall injuries due to high risk for falls and long-term management and functional goals of spinal cord stimulation     Patient Care Team:  Renetta Aguero APRN as PCP - General (Internal Medicine)  Rafael Velasquez MD as Consulting Physician (Pain Medicine)  Von Croft MD as Consulting Physician (Neurosurgery)  Emmie Stallworth MD as Consulting Physician (Internal Medicine)  Gigi Rojo PA-C as Physician Assistant (Physician Assistant)  Zaira Briggs APRN as Nurse Practitioner (Gastroenterology)  Wero Hernandez PA-C as Physician Assistant (Family Medicine)  Maurilio Simmons MD as Consulting Physician (Gastroenterology)     No orders of the defined types were placed in this encounter.        Future Appointments   Date Time Provider Department Center   9/19/2019 11:15 AM Lexie Robledo  MD [facility] ONC [facility]   10/4/2019  1:20 PM MIKEL [facility] 1 BH [facility] GE BR [place]   10/14/2019 11:30 AM Wero Hernandez PA-C [facility] END BM None   1/16/2020 12:15 PM Renetta Aguero APRN [facility] PC [facility] None   2/24/2020  9:30 AM Zaira Briggs APRN [facility] GE [facility]KEL None         Rafael Velasquez MD

## 2019-09-04 LAB — HFE GENE MUT ANL BLD/T: NORMAL

## 2019-09-10 ENCOUNTER — OFFICE VISIT (OUTPATIENT)
Dept: PAIN MEDICINE | Facility: CLINIC | Age: 64
End: 2019-09-10

## 2019-09-10 ENCOUNTER — APPOINTMENT (OUTPATIENT)
Dept: LAB | Facility: HOSPITAL | Age: 64
End: 2019-09-10

## 2019-09-10 VITALS
BODY MASS INDEX: 33.61 KG/M2 | WEIGHT: 178 LBS | SYSTOLIC BLOOD PRESSURE: 106 MMHG | HEIGHT: 61 IN | TEMPERATURE: 97.4 F | DIASTOLIC BLOOD PRESSURE: 70 MMHG

## 2019-09-10 DIAGNOSIS — G96.12: ICD-10-CM

## 2019-09-10 DIAGNOSIS — G47.33 OBSTRUCTIVE SLEEP APNEA, ADULT: ICD-10-CM

## 2019-09-10 DIAGNOSIS — M47.816 LUMBAR FACET ARTHROPATHY: ICD-10-CM

## 2019-09-10 DIAGNOSIS — E11.65 UNCONTROLLED TYPE 2 DIABETES MELLITUS WITH HYPERGLYCEMIA, WITHOUT LONG-TERM CURRENT USE OF INSULIN (HCC): ICD-10-CM

## 2019-09-10 DIAGNOSIS — M51.26 HERNIATED LUMBAR INTERVERTEBRAL DISC: ICD-10-CM

## 2019-09-10 DIAGNOSIS — T85.122S MIGRATION OF SPINAL CORD STIMULATOR, SEQUELA: ICD-10-CM

## 2019-09-10 DIAGNOSIS — E66.8 MODERATE OBESITY: ICD-10-CM

## 2019-09-10 DIAGNOSIS — M96.1 POSTLAMINECTOMY SYNDROME OF LUMBAR REGION: ICD-10-CM

## 2019-09-10 DIAGNOSIS — T85.122S MIGRATION OF SPINAL CORD STIMULATOR, SEQUELA: Primary | ICD-10-CM

## 2019-09-10 DIAGNOSIS — Z02.89 PAIN MEDICATION AGREEMENT: ICD-10-CM

## 2019-09-10 DIAGNOSIS — Z46.2 ENCOUNTER FOR FITTING AND ADJUSTMENT OF NEUROPACEMAKER OF SPINAL CORD: ICD-10-CM

## 2019-09-10 DIAGNOSIS — Z51.81 ENCOUNTER FOR THERAPEUTIC DRUG LEVEL MONITORING: ICD-10-CM

## 2019-09-10 DIAGNOSIS — F41.9 ANXIETY AND DEPRESSION: ICD-10-CM

## 2019-09-10 DIAGNOSIS — R53.81 PHYSICAL DECONDITIONING: ICD-10-CM

## 2019-09-10 DIAGNOSIS — F32.A ANXIETY AND DEPRESSION: ICD-10-CM

## 2019-09-10 DIAGNOSIS — Z45.42 BATTERY END OF LIFE OF SPINAL CORD STIMULATOR: ICD-10-CM

## 2019-09-10 DIAGNOSIS — M53.3 SACROILIAC JOINT DYSFUNCTION OF LEFT SIDE: ICD-10-CM

## 2019-09-10 DIAGNOSIS — M79.18 MYOFASCIAL PAIN SYNDROME: ICD-10-CM

## 2019-09-10 DIAGNOSIS — G47.00 INSOMNIA, UNSPECIFIED TYPE: ICD-10-CM

## 2019-09-10 PROBLEM — T85.122A MIGRATION OF SPINAL CORD STIMULATOR (HCC): Status: ACTIVE | Noted: 2019-09-10

## 2019-09-10 PROCEDURE — 99214 OFFICE O/P EST MOD 30 MIN: CPT | Performed by: ANESTHESIOLOGY

## 2019-09-10 PROCEDURE — 95970 ALYS NPGT W/O PRGRMG: CPT | Performed by: ANESTHESIOLOGY

## 2019-09-10 PROCEDURE — 80306 DRUG TEST PRSMV INSTRMNT: CPT | Performed by: ANESTHESIOLOGY

## 2019-09-10 RX ORDER — TRAMADOL HYDROCHLORIDE 50 MG/1
TABLET ORAL
Qty: 270 TABLET | Refills: 0 | Status: SHIPPED | OUTPATIENT
Start: 2019-09-10 | End: 2019-09-16 | Stop reason: SDUPTHER

## 2019-09-10 NOTE — TELEPHONE ENCOUNTER
Continue Tramadol 50 mg 2-3 times a day as needed for severe breakthrough pain, will refill today.

## 2019-09-16 DIAGNOSIS — M96.1 POSTLAMINECTOMY SYNDROME OF LUMBAR REGION: ICD-10-CM

## 2019-09-16 DIAGNOSIS — M51.26 HERNIATED LUMBAR INTERVERTEBRAL DISC: ICD-10-CM

## 2019-09-16 DIAGNOSIS — G96.12: ICD-10-CM

## 2019-09-16 RX ORDER — TRAMADOL HYDROCHLORIDE 50 MG/1
TABLET ORAL
Qty: 270 TABLET | Refills: 0 | Status: SHIPPED | OUTPATIENT
Start: 2019-09-16 | End: 2020-07-20 | Stop reason: SDUPTHER

## 2019-09-19 ENCOUNTER — OFFICE VISIT (OUTPATIENT)
Dept: ONCOLOGY | Facility: CLINIC | Age: 64
End: 2019-09-19

## 2019-09-19 VITALS
SYSTOLIC BLOOD PRESSURE: 150 MMHG | OXYGEN SATURATION: 91 % | HEART RATE: 103 BPM | BODY MASS INDEX: 33.79 KG/M2 | HEIGHT: 61 IN | WEIGHT: 179 LBS | RESPIRATION RATE: 16 BRPM | DIASTOLIC BLOOD PRESSURE: 79 MMHG | TEMPERATURE: 98.8 F

## 2019-09-19 DIAGNOSIS — R71.8 ELEVATED FERRITIN, HEMOGLOBIN, AND RED BLOOD CELL COUNT (HCC): Primary | ICD-10-CM

## 2019-09-19 DIAGNOSIS — R79.89 ELEVATED FERRITIN, HEMOGLOBIN, AND RED BLOOD CELL COUNT (HCC): Primary | ICD-10-CM

## 2019-09-19 DIAGNOSIS — D58.2 ELEVATED FERRITIN, HEMOGLOBIN, AND RED BLOOD CELL COUNT (HCC): Primary | ICD-10-CM

## 2019-09-19 PROCEDURE — 99213 OFFICE O/P EST LOW 20 MIN: CPT | Performed by: INTERNAL MEDICINE

## 2019-09-19 NOTE — PROGRESS NOTES
"      PROBLEM LIST:  1. Elevated ferritin  2. NAFLD  3. Gastroparesis  4. DM2    Subjective     CHIEF COMPLAINT: elevated ferritin and hgb    HISTORY OF PRESENT ILLNESS:   Yi Nayak returns for follow-up.   She is here to review her lab results.    Past Medical History, Past Surgical History, Social History, Family History have been reviewed and are without significant changes except as mentioned.    Review of Systems   A comprehensive 14 point review of systems was performed and was negative except as mentioned.    Medications:  The current medication list was reviewed in the EMR    ALLERGIES:    Allergies   Allergen Reactions   • Ace Inhibitors Cough   • Amlodipine    • Beta Adrenergic Blockers    • Cyclobenzaprine    • Hydrochlorothiazide    • Hyzaar  [Losartan Potassium-Hctz]    • Latex    • Losartan    • Metformin    • Penicillins    • Pioglitazone    • Spironolactone        Objective      /79   Pulse 103   Temp 98.8 °F (37.1 °C)   Resp 16   Ht 154.9 cm (61\")   Wt 81.2 kg (179 lb)   LMP  (LMP Unknown)   SpO2 91%   BMI 33.82 kg/m²      Performance Status: 0    General: well appearing female in no acute distress  Neuro: alert and oriented  Skin: no rashes, lesions, bruising, or petechiae  Psych: mood and affect appropriate      RECENT LABS:  Results for YI NAYAK (MRN [id number]) as of 9/19/2019 12:08   Ref. Range 8/29/2019 13:50   Iron Latest Ref Range: 37 - 145 mcg/dL 85   Ferritin Latest Ref Range: 13.00 - 150.00 ng/mL 235.10 (H)   Iron Saturation Latest Ref Range: 20 - 50 % 21   Transferrin Latest Ref Range: 200 - 360 mg/dL 275   TIBC Latest Ref Range: 298 - 536 mcg/dL 410         Results for YI NAYAK (MRN [id number]) as of 9/19/2019 12:08   Ref. Range 8/29/2019 13:50   WBC Latest Ref Range: 3.40 - 10.80 10*3/mm3 6.40   RBC Latest Ref Range: 3.77 - 5.28 10*6/mm3 5.46 (H)   Hemoglobin Latest Ref Range: 12.0 - 15.9 g/dL 15.7   Hematocrit Latest Ref Range: 34.0 - 46.6 % 49.1 " (H)   RDW Latest Ref Range: 12.3 - 15.4 % 13.8   MCV Latest Ref Range: 79.0 - 97.0 fL 90.0   MCH Latest Ref Range: 26.6 - 33.0 pg 28.8   MCHC Latest Ref Range: 31.5 - 35.7 g/dL 32.0   MPV Latest Ref Range: 6.0 - 12.0 fL 8.0   Platelets Latest Ref Range: 140 - 450 10*3/mm3 287   Neutrophil Rel % Latest Ref Range: 42.7 - 76.0 % 64.2   Lymphocyte Rel % Latest Ref Range: 19.6 - 45.3 % 31.7   Monocyte Rel % Latest Ref Range: 5.0 - 12.0 % 4.1 (L)     Hemochromatosis Gene   Hemochromatosis Mutation   Collected: 08/29/19 1350   Resulting lab: [facility]   Value: Comment   Comment: NO MUTATION IDENTIFIED        Assessment/Plan   Yi Garcia is a 63 y.o. year old female with elevated ferritin and erythrocytosis.    Elevated ferritin: No evidence of hereditary hemochromatosis based on genetic testing.  The ferritin elevation is fairly mild and I do not think any phlebotomy or other intervention is indicated at this time.  This may be a consequence of secondary to steatohepatitis.  She is scheduled to see a dietician next week.    Erythrocytosis: She had a mildly elevated hematocrit with a normal hemoglobin.  Erythropoietin level was elevated and she has untreated sleep apnea.  I would recommend following up regarding CPAP treatment.    I will see her back in 6 months just to recheck labs and make sure these findings remain relatively stable.                      I spent 15 minutes with the patient. I spent > 50% percent of this time counseling and discussing prognosis, diagnostic testing, evaluation, current status and management.        Lexie Robledo MD  [facility]ematology [facility]    9/19/2019          CC:

## 2019-09-20 ENCOUNTER — TELEPHONE (OUTPATIENT)
Dept: INTERNAL MEDICINE | Facility: CLINIC | Age: 64
End: 2019-09-20

## 2019-09-20 ENCOUNTER — OFFICE VISIT (OUTPATIENT)
Dept: NEUROSURGERY | Facility: CLINIC | Age: 64
End: 2019-09-20

## 2019-09-20 ENCOUNTER — HOSPITAL ENCOUNTER (OUTPATIENT)
Dept: GENERAL RADIOLOGY | Facility: HOSPITAL | Age: 64
Discharge: HOME | End: 2019-09-20
Admitting: NEUROLOGICAL SURGERY

## 2019-09-20 VITALS — HEIGHT: 61 IN | BODY MASS INDEX: 33.99 KG/M2 | WEIGHT: 180 LBS | RESPIRATION RATE: 16 BRPM

## 2019-09-20 DIAGNOSIS — T85.192A MALFUNCTION OF SPINAL CORD STIMULATOR, INITIAL ENCOUNTER (HCC): ICD-10-CM

## 2019-09-20 DIAGNOSIS — Z98.890 HISTORY OF LUMBAR DISCECTOMY: ICD-10-CM

## 2019-09-20 DIAGNOSIS — T85.192A MALFUNCTION OF SPINAL CORD STIMULATOR, INITIAL ENCOUNTER (HCC): Primary | ICD-10-CM

## 2019-09-20 DIAGNOSIS — Z45.42 BATTERY END OF LIFE OF SPINAL CORD STIMULATOR: ICD-10-CM

## 2019-09-20 PROCEDURE — 72070 X-RAY EXAM THORAC SPINE 2VWS: CPT

## 2019-09-20 PROCEDURE — 99213 OFFICE O/P EST LOW 20 MIN: CPT | Performed by: NEUROLOGICAL SURGERY

## 2019-09-20 NOTE — TELEPHONE ENCOUNTER
Called pt and she sees  for her sleep apena and she is currently in [place] but she states she gets back she will make an apt to see him,

## 2019-09-20 NOTE — PROGRESS NOTES
Patient: Yi Garcia  : 1955    Primary Care Provider: Renetta Aguero APRN    Requesting Provider: As above        History    Chief Complaint: Back and bilateral lower extremity pain, left greater than right.     History of Present Illness: Ms. Garcia is a 63-year-old disabled woman who is known to my service.  On 2013 she underwent left L4-5 discectomy and a redo left L5-S1 foraminotomy.  Redo left L4-5 discectomy was performed on 3/18/2013.  A [employer] spinal cord stimulator had been placed but was ineffective and as such in  I changed out her spinal cord stimulator to a [employer] system.  It helped for a while but then ceased helping.  The unit has been off and is now completely discharged.  She has seen Dr. Velasquez who would like the lead repositioned and a new IPG placed.  Most her pain involves the left buttock and extends into the side of the left calf.  She has less pain in the right leg.  Her symptoms are worse with bending, squatting, sitting, standing, reaching.  There is no position that makes her feel better.  She does have some swallowing trouble and has been diagnosed with gastroparesis.    Review of Systems   Constitutional: Positive for activity change and fatigue. Negative for appetite change, chills, diaphoresis, fever and unexpected weight change.   HENT: Positive for trouble swallowing. Negative for congestion, dental problem, drooling, ear discharge, ear pain, facial swelling, hearing loss, mouth sores, nosebleeds, postnasal drip, rhinorrhea, sinus pressure, sneezing, sore throat, tinnitus and voice change.    Eyes: Negative for photophobia, pain, discharge, redness, itching and visual disturbance.   Respiratory: Positive for wheezing. Negative for apnea, cough, choking, chest tightness, shortness of breath and stridor.    Cardiovascular: Negative for chest pain, palpitations and leg swelling.   Gastrointestinal: Positive for constipation and nausea. Negative for  "abdominal distention, abdominal pain, anal bleeding, blood in stool, diarrhea, rectal pain and vomiting.   Endocrine: Negative for cold intolerance, heat intolerance, polydipsia, polyphagia and polyuria.   Genitourinary: Negative for decreased urine volume, difficulty urinating, dysuria, enuresis, flank pain, frequency, genital sores, hematuria and urgency.   Musculoskeletal: Positive for arthralgias, back pain and gait problem. Negative for joint swelling, myalgias, neck pain and neck stiffness.   Skin: Negative for color change, pallor, rash and wound.   Allergic/Immunologic: Negative for environmental allergies, food allergies and immunocompromised state.   Neurological: Positive for weakness. Negative for dizziness, tremors, seizures, syncope, facial asymmetry, speech difficulty, light-headedness, numbness and headaches.   Hematological: Negative for adenopathy. Does not bruise/bleed easily.   Psychiatric/Behavioral: Negative for agitation, behavioral problems, confusion, decreased concentration, dysphoric mood, hallucinations, self-injury, sleep disturbance and suicidal ideas. The patient is not nervous/anxious and is not hyperactive.    All other systems reviewed and are negative.      The patient's past medical history, past surgical history, family history, and social history have been reviewed at length in the electronic medical record.    Physical Exam:   Resp 16   Ht 154.9 cm (61\")   Wt 81.6 kg (180 lb)   LMP  (LMP Unknown)   BMI 34.01 kg/m²   CONSTITUTIONAL: Patient is well-nourished, pleasant and appears stated age.  CV: Heart regular rate and rhythm without murmur, rub, or gallop.  PULMONARY: Lungs are clear to ascultation.  MUSCULOSKELETAL:  Straight leg raising is negative.  Krish's Sign is negative.  ROM in back normal.  Tenderness in the back to palpation is not observed.  Her IPG is over the right buttock.  NEUROLOGICAL:  Orientation, memory, attention span, language function, and cognition " have been examined and are intact.  Strength is intact in the lower extremities to direct testing.  Muscle tone is normal throughout.  Station and gait are normal.  Sensation is intact to light touch testing throughout except in the side of the left calf where it is diminished.  Deep tendon reflexes are 1+ and symmetrical.  Coordination is intact.      Medical Decision Making    Data Review:   Intraoperative plain films from 10/1/2015 demonstrate her paddle lead to be just slightly right of midline extending to the superior T7 endplate level.  Most recent films from 12/21/2017 reveal some shifting of the upper aspect of the lead more to the right.    Diagnosis:   Malfunctioning epidural spinal cord stimulator.    Treatment Options:   I am going to check some plain films to see where her lead currently rests.  Dr. Velasquez would like a new lead placed more in the midline and projecting to the superior T8 level.  He would also like the Proclaim 5 nonrechargeable IPG placed.  This would be full MRI body compatible.    Plain films demonstrate that her lead is in the same position as it was 2017.    I discussed what would be entailed with the lead and system revision.  There is certainly risk of infection or neurologic deficit including weakness, numbness, or even lorraine paralysis.  It is also possible that even with lead revision it may not help her pain.  The patient and her  indicate that she had very short-lived relief from the stimulator once it was put in and then there after it  was not helpful.  They both are a bit reluctant and have doubts about proceeding with the surgery given that it was never very helpful.  Options at this point include simply treating her pain symptomatically, removing the stimulator and obtaining other studies, or complete replacement of the spinal cord stimulator.  Even if we get other studies I am somewhat doubtful that were going to find a lesion that would be amenable to  conventional surgical intervention.  The patient and her  are going to consider their options and let me know how they would like to proceed.       Diagnosis Plan   1. Malfunction of spinal cord stimulator, initial encounter (CMS/[facility])  XR Spine Thoracic 2 View   2. Battery end of life of spinal cord stimulator     3. History of lumbar discectomy         Scribed for Von Croft MD by Joana Juarez CMA on 9/20/2019 12:05 PM       I, Dr. Croft, personally performed the services described in the documentation, as scribed in my presence, and it is both accurate and complete.

## 2019-09-20 NOTE — TELEPHONE ENCOUNTER
----- Message from OCTAVIANO Wong sent at 9/20/2019  8:36 AM EDT -----  Call pt and ask when is last time she has been seen for obstructive sleep apnea? Who does she see?  Hematology recommended she have FU for cpap machine eval.  Let me know please.

## 2019-09-26 DIAGNOSIS — K59.09 CHRONIC CONSTIPATION: ICD-10-CM

## 2019-09-26 NOTE — TELEPHONE ENCOUNTER
PATIENT IS CALLING, SHE SAID THE AMITIZA  WORKS GREAT FOR HER, SHE IS ASKING IF YOU WILL CALL IT IN FOR HER TO [facility]. PATIENT CAN BE REACHED [phone]

## 2019-09-27 RX ORDER — LUBIPROSTONE 24 UG/1
24 CAPSULE ORAL 2 TIMES DAILY WITH MEALS
Qty: 180 CAPSULE | Refills: 1 | COMMUNITY
Start: 2019-09-27

## 2019-10-02 DIAGNOSIS — K59.09 CHRONIC CONSTIPATION: ICD-10-CM

## 2019-10-02 RX ORDER — LUBIPROSTONE 24 UG/1
24 CAPSULE ORAL 2 TIMES DAILY WITH MEALS
Qty: 180 CAPSULE | Refills: 1 | COMMUNITY
Start: 2019-10-02

## 2019-10-02 NOTE — TELEPHONE ENCOUNTER
Should go to Carissa Coleman. I routed the message to her last week but she may have not seen it.

## 2019-10-02 NOTE — TELEPHONE ENCOUNTER
Patient would like to have her medication sent to the local pharm that is in her chart. She states that it was suppose to be sent to the pharm last week. If there is any questions or concerns the patient can be reached at [phone]

## 2019-10-04 ENCOUNTER — HOSPITAL ENCOUNTER (OUTPATIENT)
Dept: MAMMOGRAPHY | Facility: HOSPITAL | Age: 64
Discharge: HOME | End: 2019-10-04
Admitting: NURSE PRACTITIONER

## 2019-10-04 DIAGNOSIS — Z12.39 BREAST CANCER SCREENING: ICD-10-CM

## 2019-10-04 PROCEDURE — 77063 BREAST TOMOSYNTHESIS BI: CPT | Performed by: RADIOLOGY

## 2019-10-04 PROCEDURE — 77067 SCR MAMMO BI INCL CAD: CPT

## 2019-10-04 PROCEDURE — 77067 SCR MAMMO BI INCL CAD: CPT | Performed by: RADIOLOGY

## 2019-10-04 PROCEDURE — 77063 BREAST TOMOSYNTHESIS BI: CPT

## 2019-10-07 ENCOUNTER — TELEPHONE (OUTPATIENT)
Dept: PAIN MEDICINE | Facility: CLINIC | Age: 64
End: 2019-10-07

## 2019-10-07 NOTE — TELEPHONE ENCOUNTER
----- Message from Rafael Velasquez MD sent at 10/2/2019  5:47 PM EDT -----  Mrs Garcia has failed several lumbar surgeries. The SCS was giving her relief until her lead migrated.   The IPG is dead and needs to be replaced. Since we are going to do this, I asked Dr Croft to see if he could reposition the lead (It is all on my note) and Dr. Croft's note. She is supposed to follow with Dr croft      ----- Message -----  From: Suyapa Mckeon  Sent: 10/2/2019  12:13 PM  To: Rafael Velasquez MD    They can not connect to her scs because her battery is completely dead.  ----- Message -----  From: Rafael Velasquez MD  Sent: 9/27/2019  11:07 AM  To: Ingris Layton MA, Suyapa Mckeon, #    She can meet with [facility] to work on her SCS (as per Dr. WASHINGTON) she has not been using it  Once she uses her SCS consistently again, I would be able to give her some advice.     Let me know      ----- Message -----  From: Suyapa Mckeon  Sent: 9/27/2019  10:53 AM  To: Rafael Velasquez MD    Pt called because she seen  and she is really nervous about the surgery. She is requesting an appt to come in and talk with you about what  recommend. Let her know you are booked out until January...

## 2019-10-14 ENCOUNTER — TELEPHONE (OUTPATIENT)
Dept: GASTROENTEROLOGY | Facility: CLINIC | Age: 64
End: 2019-10-14

## 2019-10-14 ENCOUNTER — OFFICE VISIT (OUTPATIENT)
Dept: ENDOCRINOLOGY | Facility: CLINIC | Age: 64
End: 2019-10-14

## 2019-10-14 VITALS
HEART RATE: 83 BPM | DIASTOLIC BLOOD PRESSURE: 80 MMHG | SYSTOLIC BLOOD PRESSURE: 118 MMHG | WEIGHT: 181 LBS | BODY MASS INDEX: 34.2 KG/M2 | OXYGEN SATURATION: 98 %

## 2019-10-14 DIAGNOSIS — K59.09 CHRONIC CONSTIPATION: ICD-10-CM

## 2019-10-14 DIAGNOSIS — E11.65 UNCONTROLLED TYPE 2 DIABETES MELLITUS WITH HYPERGLYCEMIA, WITHOUT LONG-TERM CURRENT USE OF INSULIN (HCC): Primary | ICD-10-CM

## 2019-10-14 DIAGNOSIS — E78.2 MIXED HYPERLIPIDEMIA: ICD-10-CM

## 2019-10-14 LAB
GLUCOSE BLDC GLUCOMTR-MCNC: 209 MG/DL (ref 70–130)
HBA1C MFR BLD: 7.4 %

## 2019-10-14 PROCEDURE — 82947 ASSAY GLUCOSE BLOOD QUANT: CPT | Performed by: PHYSICIAN ASSISTANT

## 2019-10-14 PROCEDURE — 99214 OFFICE O/P EST MOD 30 MIN: CPT | Performed by: PHYSICIAN ASSISTANT

## 2019-10-14 PROCEDURE — 83036 HEMOGLOBIN GLYCOSYLATED A1C: CPT | Performed by: PHYSICIAN ASSISTANT

## 2019-10-14 RX ORDER — LUBIPROSTONE 24 UG/1
24 CAPSULE ORAL 2 TIMES DAILY WITH MEALS
Qty: 180 CAPSULE | Refills: 1 | Status: SHIPPED | OUTPATIENT
Start: 2019-10-14 | End: 2020-07-20

## 2019-10-14 NOTE — PROGRESS NOTES
Chief Complaint  F/u for Diabetes Mellitus.    HPI   Yi Garcia is a 64 y.o. female with chronic back pain and HTN- on multiple antihypertensives followed by cardiology in [place], who is here today for f/u of Diabetes Mellitus type 2. The initial diagnosis of diabetes was made approximately 2016.    Did not start Januvia.   Taking glimepiride and tolerating.   Gastroparesis better off Bydureon. Has appt to establish care with new specialist for gastroparesis.   Did not tolerate crestor 5 due to myalgias.  Screening mammogram was abnormal, diagnostic mammogram scheduled for tomorrow.     A1C- 7.4 (10/14/19), 7.4 (7/12/19), 7.6 (4/15/19), 7.2 (1/7/19), 7.7 (9/14/18), 6.7 (6/13/18), 8.9 (2/2018)    Diabetic complications: gastroparesis followed by GI at [facility]  Eye exam current (within one year): yes- no retinopathy  Foot care and dental care: discussed    Current diabetic medications include:  Jardiance 25mg daily  Glimepiride 4mg before breakfast  Januvia 100mg daily - has not started this  ACEI/ARB: losartan-hctz  Statin: intolerant to statins, Repatha and Praluent not covered by insurance.     Past medications: metformin (???caused blurred vision, went away when stopped taking metformin), invokana (insurance didn't cover but thinks this controlled glucose better), actos     Diabetic Monitoring  - no meter or log, reports not checking    Nutrition:     Current diet: improving diet last 2 weeks. Eats out on Sunday, but cooks rest of week. Breakfast is egg sandwich on flatbread. Likes fruit. No sugary drinks. Got rid of candy in the house. No cakes. Weakness is break, pasta, potatoes. Likes vegetables.  Current exercise: none  Seen RD in past: yes    The following portions of the patient's history were reviewed and updated by me as appropriate: allergies, current medications, past family history, past social history, past surgical history and problem list.    Past Medical History:   Diagnosis Date   • Anxiety     • Benign essential hypertension    • Cervical disc disorder    • Chronic pain disorder    • Colon polyps    • Complication of device    • Decreased libido    • Diabetes mellitus (CMS/HCC)    • Dizziness    • Encounter for long-term (current) use of medications    • Extremity pain    • Fatigue    • Hip pain    • History of alopecia    • History of backache    • History of colonoscopy     Fiberoptic   • History of diabetes mellitus    • History of insomnia    • History of mastoiditis    • History of migraine headaches    • History of urinary stone    • Infection of kidney    • Insomnia    • Joint pain    • Kidney infection    • Low back pain    • Lumbar radiculopathy    • Lumbar radiculopathy    • Lumbosacral disc disease    • Migraine    • Myofascial pain syndrome    • Neck pain    • Palpitations    • Sleep apnea    • Spinal cord stimulator status    • Stress reaction, emotional    • Urinary incontinence    • Urinary tract infection    • Visit for screening mammogram     Description: 08/28/2009   • Vitamin D deficiency        Medications    Current Outpatient Medications:   •  albuterol (PROVENTIL HFA;VENTOLIN HFA) 108 (90 Base) MCG/ACT inhaler, Inhale 2 puffs Every 6 (Six) Hours As Needed for Wheezing., Disp: 1 inhaler, Rfl: 0  •  baclofen (LIORESAL) 10 MG tablet, Take 1/2 to 1 tab po Qd to bid prn, Disp: 180 tablet, Rfl: 1  •  bisoprolol (ZEBeta) 5 MG tablet, Take 5 mg by mouth Daily., Disp: , Rfl:   •  Desvenlafaxine Succinate ER (PRISTIQ) 25 MG tablet sustained-release 24 hour, Take 1 tablet by mouth Daily., Disp: 30 tablet, Rfl: 0  •  Empagliflozin (JARDIANCE) 25 MG tablet, Take 25 mg by mouth Daily., Disp: 90 tablet, Rfl: 1  •  estradiol (ESTRACE VAGINAL) 0.1 MG/GM vaginal cream, Insert 2 g into the vagina 1 (One) Time Per Week., Disp: 42.5 g, Rfl: 5  •  furosemide (LASIX) 20 MG tablet, Take 20 mg by mouth 2 (Two) Times a Day., Disp: , Rfl:   •  glimepiride (AMARYL) 4 MG tablet, Take 1 po qd before a meal,  Disp: 30 tablet, Rfl: 6  •  Liniments (SALONPAS) pads, Apply  topically. prn, Disp: , Rfl:   •  losartan (COZAAR) 100 MG tablet, Take 100 mg by mouth Daily., Disp: , Rfl:   •  metoclopramide (REGLAN) 10 MG tablet, Take 1 tablet by mouth 4 (Four) Times a Day Before Meals & at Bedtime As Needed (for nausea or vomiting)., Disp: 40 tablet, Rfl: 2  •  NIFEdipine CC (ADALAT CC) 90 MG 24 hr tablet, Take 60 mg by mouth Daily., Disp: , Rfl:   •  omeprazole (priLOSEC) 40 MG capsule, Take 1 capsule by mouth Daily., Disp: 90 capsule, Rfl: 3  •  rOPINIRole (REQUIP) 0.5 MG tablet, Take 1 tablet by mouth Every Night. Take 1 hour before bedtime., Disp: 90 tablet, Rfl: 1  •  SITagliptin (JANUVIA) 100 MG tablet, Take 1 tablet by mouth Daily., Disp: 90 tablet, Rfl: 1  •  topiramate (TOPAMAX) 50 MG tablet, TAKE ONE TABLET BY MOUTH TWICE DAILY (Patient taking differently: TAKE ONE TABLET BY MOUTH as needed), Disp: 180 tablet, Rfl: 0  •  traMADol (ULTRAM) 50 MG tablet, TAKE 1 TABLET PO TID PRN FOR SEVERE BREAKTHROUGH PAIN, Disp: 270 tablet, Rfl: 0  •  traZODone (DESYREL) 50 MG tablet, Take 1 to 2 tabs po hs prn, Disp: 180 tablet, Rfl: 2  •  lubiprostone (AMITIZA) 24 MCG capsule, Take 1 capsule by mouth 2 (Two) Times a Day With Meals., Disp: 180 capsule, Rfl: 1    Review of Systems  Review of Systems   Constitutional: Positive for fatigue. Negative for chills, fever and unexpected weight change.   HENT: Negative for ear pain, hearing loss, nosebleeds, rhinorrhea and sore throat.    Eyes: Positive for visual disturbance. Negative for pain, discharge, redness and itching.   Respiratory: Negative for cough, shortness of breath and wheezing.    Cardiovascular: Positive for leg swelling. Negative for chest pain and palpitations.   Gastrointestinal: Negative for abdominal pain, blood in stool, constipation and diarrhea.   Endocrine: Negative for cold intolerance, heat intolerance and polydipsia.   Genitourinary: Negative for dysuria, frequency,  hematuria, pelvic pain, vaginal bleeding and vaginal discharge.   Musculoskeletal: Positive for back pain. Negative for arthralgias, gait problem, joint swelling and myalgias.   Skin: Negative for rash.        Hair loss   Allergic/Immunologic: Negative.    Neurological: Negative for dizziness, syncope, weakness, numbness and headaches.   Hematological: Negative for adenopathy. Does not bruise/bleed easily.   Psychiatric/Behavioral: Negative for sleep disturbance and suicidal ideas. The patient is not nervous/anxious.         Physical Exam    /80   Pulse 83   Wt 82.1 kg (181 lb)   LMP  (LMP Unknown)   SpO2 98%   BMI 34.20 kg/m² Body mass index is 34.2 kg/m².  Physical Exam   Constitutional: She is oriented to person, place, and time. She appears well-developed. No distress.   HENT:   Head: Normocephalic.   Right Ear: External ear normal.   Left Ear: External ear normal.   Mouth/Throat: No oropharyngeal exudate.   Eyes: Conjunctivae and lids are normal. Right eye exhibits no discharge. Left eye exhibits no discharge. Right pupil is reactive. Left pupil is reactive.   Neck: No JVD present. No tracheal deviation present. No thyroid mass and no thyromegaly present.   Cardiovascular: Normal rate, regular rhythm, normal heart sounds and intact distal pulses.   No murmur heard.  Pulmonary/Chest: Effort normal and breath sounds normal. No respiratory distress. She has no wheezes.   Abdominal: Soft. Bowel sounds are normal. There is no tenderness.   Musculoskeletal: She exhibits no edema or tenderness.    Yi had a diabetic foot exam performed today.   During the foot exam she had a monofilament test performed.    Neurological Sensory Findings -  Unaltered sharp/dull right ankle/foot discrimination and unaltered sharp/dull left ankle/foot discrimination.  Vascular Status -  Her right foot exhibits normal foot vasculature  and no edema. Her left foot exhibits normal foot vasculature  and no edema.  Skin Integrity   -  Her right foot skin is intact.  She has no right foot onychomycosis, no right foot ulcer and non-callous right foot.Her left foot skin is intact. She has no left foot onychomycosis, no left foot ulcer and non-callous left foot..  Lymphadenopathy:     She has no cervical adenopathy.   Neurological: She is alert and oriented to person, place, and time.   Skin: Skin is warm, dry and intact. No rash noted. She is not diaphoretic. No erythema.   Psychiatric: She has a normal mood and affect. Her speech is normal and behavior is normal. Thought content normal.       Labs and Imaging   Lab Results   Component Value Date    HGBA1C 7.4 10/14/2019    HGBA1C 7.4 07/12/2019    HGBA1C 7.6 04/15/2019     Office Visit on 10/14/2019   Component Date Value Ref Range Status   • Glucose 10/14/2019 209* 70 - 130 mg/dL Final   • Hemoglobin A1C 10/14/2019 7.4  % Final     No images are attached to the encounter or orders placed in the encounter.  Xr Spine Thoracic 1 View    Result Date: 12/21/2017  Spinal cord stimulator terminating at the T7 superior endplate level.  D:  12/21/2017 E:  12/21/2017  This report was finalized on 12/21/2017 5:12 PM by Dr. Kulwinder Cesar.        Assessment / Plan   Yi was seen today for follow-up.    Diagnoses and all orders for this visit:    Uncontrolled type 2 diabetes mellitus with hyperglycemia, without long-term current use of insulin (CMS/[facility])  -     POC Glucose Fingerstick  -     POC Glycosylated Hemoglobin (Hb A1C)    Mixed hyperlipidemia        Diabetes Mellitus 2 is under fair control.  -A1c 7.4, discussed goal <7  -continue jardiance to 25mg daily.   -start januvia 100mg daily  -continue glimepiride 4mg before breakfast  -intolerant to metformin  -bring meter to f/u  -call if any issues    1.  Diet: 3-4 carb servings per meal for females, 4-5 carb servings per meal for males  Spread carb intake throughout the day  Increase lean protein and vegetable intake  Avoid sugary drinks and processed  carbs including crackers, cookies, cakes  2.  Exercise: Recommend at least 30 minutes of exercise daily, at least 5 days per week. Increase exercise gradually.   3.  Blood Glucose Goal: Blood glucose goal <150 fasting, <180 2 hr postprandial  4.  Microalbumin due 7/2020  5.  Education performed during this visit: long term diabetic complications discussed. , annual eye examinations at Ophthalmology discussed, dental hygiene discussed  and foot care reviewed., home glucose monitoring emphasized, all medications, side effects and compliance discussed carefully and Hypoglycemia management and prevention reviewed. Reviewed ‘ABCs’ of diabetes management (respective goals in parentheses):  A1C (<7), blood pressure (<130/80), and cholesterol (LDL <100, if CVD <70).    Hyperlipidemia  -intolerant to statins due to myalgias, did not tolerate crestor 5mg  -praluent/repatha not covered by insurance        There are no Patient Instructions on file for this visit.    Follow up: Return in about 3 months (around 1/14/2020).    Discussed the nature of the disease including, risks, complications, implications, management, safe and proper use of medications. Encouraged therapeutic lifestyle changes including low calorie diet with plenty of fruits and vegetables, daily exercise, medication compliance, and keeping scheduled follow up appointments with me and any other providers. Encouraged patient to have appointment for complete physical, fasting labs, appropriate screenings, and immunizations on an annual basis.      Wero Hernandez PA-C

## 2019-10-15 ENCOUNTER — HOSPITAL ENCOUNTER (OUTPATIENT)
Dept: MAMMOGRAPHY | Facility: HOSPITAL | Age: 64
Discharge: HOME | End: 2019-10-15

## 2019-10-15 ENCOUNTER — HOSPITAL ENCOUNTER (OUTPATIENT)
Dept: ULTRASOUND IMAGING | Facility: HOSPITAL | Age: 64
Discharge: HOME | End: 2019-10-15
Admitting: RADIOLOGY

## 2019-10-15 DIAGNOSIS — R92.8 ABNORMAL MAMMOGRAM: ICD-10-CM

## 2019-10-15 PROCEDURE — 76642 ULTRASOUND BREAST LIMITED: CPT | Performed by: RADIOLOGY

## 2019-10-15 PROCEDURE — G0279 TOMOSYNTHESIS, MAMMO: HCPCS

## 2019-10-15 PROCEDURE — 77065 DX MAMMO INCL CAD UNI: CPT

## 2019-10-15 PROCEDURE — 76642 ULTRASOUND BREAST LIMITED: CPT

## 2019-10-15 PROCEDURE — G0279 TOMOSYNTHESIS, MAMMO: HCPCS | Performed by: RADIOLOGY

## 2019-10-15 PROCEDURE — 77065 DX MAMMO INCL CAD UNI: CPT | Performed by: RADIOLOGY

## 2019-11-04 DIAGNOSIS — E11.65 UNCONTROLLED TYPE 2 DIABETES MELLITUS WITH HYPERGLYCEMIA, WITHOUT LONG-TERM CURRENT USE OF INSULIN (HCC): ICD-10-CM

## 2019-11-04 RX ORDER — EMPAGLIFLOZIN 25 MG/1
TABLET, FILM COATED ORAL
Qty: 90 TABLET | Refills: 4 | Status: SHIPPED | OUTPATIENT
Start: 2019-11-04 | End: 2020-01-17 | Stop reason: SDUPTHER

## 2019-12-04 ENCOUNTER — TELEPHONE (OUTPATIENT)
Dept: INTERNAL MEDICINE | Facility: CLINIC | Age: 64
End: 2019-12-04

## 2019-12-04 DIAGNOSIS — K21.9 CHRONIC GERD: ICD-10-CM

## 2019-12-04 RX ORDER — OMEPRAZOLE 40 MG/1
40 CAPSULE, DELAYED RELEASE ORAL DAILY
Qty: 90 CAPSULE | Refills: 3 | Status: SHIPPED | OUTPATIENT
Start: 2019-12-04 | End: 2020-07-20 | Stop reason: SDUPTHER

## 2019-12-04 NOTE — TELEPHONE ENCOUNTER
PT CALLED STATING THAT [facility] REFILLED THE PRILOSEC BUT ONLY 20 MG AND PT STATES SHE IS TO HAVE 40 MG. PLEASE ADVISE THANKS

## 2020-01-17 ENCOUNTER — OFFICE VISIT (OUTPATIENT)
Dept: ENDOCRINOLOGY | Facility: CLINIC | Age: 65
End: 2020-01-17

## 2020-01-17 VITALS
SYSTOLIC BLOOD PRESSURE: 130 MMHG | OXYGEN SATURATION: 98 % | BODY MASS INDEX: 34.05 KG/M2 | HEART RATE: 86 BPM | WEIGHT: 180.2 LBS | DIASTOLIC BLOOD PRESSURE: 80 MMHG

## 2020-01-17 DIAGNOSIS — I10 BENIGN ESSENTIAL HYPERTENSION: ICD-10-CM

## 2020-01-17 DIAGNOSIS — E78.2 MIXED HYPERLIPIDEMIA: ICD-10-CM

## 2020-01-17 DIAGNOSIS — E11.65 UNCONTROLLED TYPE 2 DIABETES MELLITUS WITH HYPERGLYCEMIA, WITHOUT LONG-TERM CURRENT USE OF INSULIN (HCC): Primary | ICD-10-CM

## 2020-01-17 LAB
GLUCOSE BLDC GLUCOMTR-MCNC: 158 MG/DL (ref 70–130)
HBA1C MFR BLD: 6.8 %

## 2020-01-17 PROCEDURE — 83036 HEMOGLOBIN GLYCOSYLATED A1C: CPT | Performed by: PHYSICIAN ASSISTANT

## 2020-01-17 PROCEDURE — 99214 OFFICE O/P EST MOD 30 MIN: CPT | Performed by: PHYSICIAN ASSISTANT

## 2020-01-17 PROCEDURE — 82947 ASSAY GLUCOSE BLOOD QUANT: CPT | Performed by: PHYSICIAN ASSISTANT

## 2020-01-17 RX ORDER — GLIMEPIRIDE 4 MG/1
TABLET ORAL
Qty: 90 TABLET | Refills: 1 | Status: SHIPPED | OUTPATIENT
Start: 2020-01-17 | End: 2020-07-28 | Stop reason: SINTOL

## 2020-01-17 NOTE — PROGRESS NOTES
Chief Complaint  F/u for Diabetes Mellitus.    HPI   Yi Garcia is a 64 y.o. female with chronic back pain and HTN- on multiple antihypertensives followed by cardiology in [place], who is here today for f/u of Diabetes Mellitus type 2. The initial diagnosis of diabetes was made approximately 2016.    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     A1C- 6.8 (1/17/2020), 7.4 (10/14/19), 7.4 (7/12/19), 7.6 (4/15/19), 7.2 (1/7/19), 7.7 (9/14/18), 6.7 (6/13/18), 8.9 (2/2018)    Diabetic complications: gastroparesis followed by GI at [facility]  Eye exam current (within one year): yes- no retinopathy  Foot care and dental care: discussed    Current diabetic medications include:  Jardiance 25mg daily - takes in the morning  Glimepiride 4mg daily- takes at bedtime, doesn't eat after this  Januvia 100mg daily - takes in the morning  ACEI/ARB: losartan-hctz  Statin: intolerant to statins, including crestor 5mg, Repatha and Praluent not covered by insurance.     Past medications: metformin (???caused blurred vision, went away when stopped taking metformin), invokana (insurance didn't cover but thinks this controlled glucose better), actos     Diabetic Monitoring  - meter downloaded and reviewed- inaccurate date and time on meter, she only checks when doesn't feel well, readings range from 130s to 200s, no hypoglycemia    Nutrition:     Current diet: improving diet last 2 weeks. Eats out on Sunday, but cooks rest of week. Breakfast is egg sandwich on flatbread. Likes fruit. No sugary drinks. Got rid of candy in the house. No cakes. Weakness is break, pasta, potatoes. Likes vegetables.  Current exercise: none  Seen RD in past: yes    The following portions of the patient's history were reviewed and updated by me as appropriate: allergies, current medications, past family history, past social history, past surgical history and problem list.      Past Medical History:    Diagnosis Date   • Anxiety    • Benign essential hypertension    • Cervical disc disorder    • Chronic pain disorder    • Colon polyps    • Complication of device    • Decreased libido    • Diabetes mellitus (CMS/HCC)    • Dizziness    • Encounter for long-term (current) use of medications    • Extremity pain    • Fatigue    • Hip pain    • History of alopecia    • History of backache    • History of colonoscopy     Fiberoptic   • History of diabetes mellitus    • History of insomnia    • History of mastoiditis    • History of migraine headaches    • History of urinary stone    • Infection of kidney    • Insomnia    • Joint pain    • Kidney infection    • Low back pain    • Lumbar radiculopathy    • Lumbar radiculopathy    • Lumbosacral disc disease    • Migraine    • Myofascial pain syndrome    • Neck pain    • Palpitations    • Sleep apnea    • Spinal cord stimulator status    • Stress reaction, emotional    • Urinary incontinence    • Urinary tract infection    • Visit for screening mammogram     Description: 08/28/2009   • Vitamin D deficiency        Medications    Current Outpatient Medications:   •  albuterol (PROVENTIL HFA;VENTOLIN HFA) 108 (90 Base) MCG/ACT inhaler, Inhale 2 puffs Every 6 (Six) Hours As Needed for Wheezing., Disp: 1 inhaler, Rfl: 0  •  baclofen (LIORESAL) 10 MG tablet, Take 1/2 to 1 tab po Qd to bid prn, Disp: 180 tablet, Rfl: 1  •  bisoprolol (ZEBeta) 5 MG tablet, Take 5 mg by mouth Daily., Disp: , Rfl:   •  Desvenlafaxine Succinate ER (PRISTIQ) 25 MG tablet sustained-release 24 hour, Take 1 tablet by mouth Daily., Disp: 30 tablet, Rfl: 0  •  Empagliflozin (JARDIANCE) 25 MG tablet, Take 25 mg by mouth Daily., Disp: 90 tablet, Rfl: 1  •  estradiol (ESTRACE VAGINAL) 0.1 MG/GM vaginal cream, Insert 2 g into the vagina 1 (One) Time Per Week., Disp: 42.5 g, Rfl: 5  •  furosemide (LASIX) 20 MG tablet, Take 20 mg by mouth 2 (Two) Times a Day., Disp: , Rfl:   •  glimepiride (AMARYL) 4 MG tablet,  Take 1 po qd before a meal, Disp: 90 tablet, Rfl: 1  •  Liniments (SALONPAS) pads, Apply  topically. prn, Disp: , Rfl:   •  losartan (COZAAR) 100 MG tablet, Take 100 mg by mouth Daily., Disp: , Rfl:   •  lubiprostone (AMITIZA) 24 MCG capsule, Take 1 capsule by mouth 2 (Two) Times a Day With Meals., Disp: 180 capsule, Rfl: 1  •  NIFEdipine CC (ADALAT CC) 90 MG 24 hr tablet, Take 60 mg by mouth Daily., Disp: , Rfl:   •  omeprazole (priLOSEC) 40 MG capsule, Take 1 capsule by mouth Daily., Disp: 90 capsule, Rfl: 3  •  rOPINIRole (REQUIP) 0.5 MG tablet, Take 1 tablet by mouth Every Night. Take 1 hour before bedtime., Disp: 90 tablet, Rfl: 1  •  SITagliptin (JANUVIA) 100 MG tablet, Take 1 tablet by mouth Daily., Disp: 90 tablet, Rfl: 1  •  topiramate (TOPAMAX) 50 MG tablet, TAKE ONE TABLET BY MOUTH TWICE DAILY (Patient taking differently: TAKE ONE TABLET BY MOUTH as needed), Disp: 180 tablet, Rfl: 0  •  traMADol (ULTRAM) 50 MG tablet, TAKE 1 TABLET PO TID PRN FOR SEVERE BREAKTHROUGH PAIN, Disp: 270 tablet, Rfl: 0  •  traZODone (DESYREL) 50 MG tablet, Take 1 to 2 tabs po hs prn, Disp: 180 tablet, Rfl: 2    Review of Systems  Review of Systems   Constitutional: Positive for fatigue. Negative for chills, fever and unexpected weight change.   HENT: Negative for ear pain, hearing loss, nosebleeds, rhinorrhea and sore throat.    Eyes: Positive for visual disturbance. Negative for pain, discharge, redness and itching.   Respiratory: Negative for cough, shortness of breath and wheezing.    Cardiovascular: Positive for leg swelling. Negative for chest pain and palpitations.   Gastrointestinal: Negative for abdominal pain, blood in stool, constipation and diarrhea.   Endocrine: Negative for cold intolerance, heat intolerance and polydipsia.   Genitourinary: Negative for dysuria, frequency, hematuria, pelvic pain, vaginal bleeding and vaginal discharge.   Musculoskeletal: Positive for back pain. Negative for arthralgias, gait  problem, joint swelling and myalgias.   Skin: Negative for rash.        Hair loss   Allergic/Immunologic: Negative.    Neurological: Negative for dizziness, syncope, weakness, numbness and headaches.   Hematological: Negative for adenopathy. Does not bruise/bleed easily.   Psychiatric/Behavioral: Negative for sleep disturbance and suicidal ideas. The patient is not nervous/anxious.         Physical Exam    /80   Pulse 86   Wt 81.7 kg (180 lb 3.2 oz)   LMP  (LMP Unknown)   SpO2 98%   BMI 34.05 kg/m² Body mass index is 34.05 kg/m².  Physical Exam   Constitutional: She is oriented to person, place, and time. She appears well-developed. No distress.   HENT:   Head: Normocephalic.   Right Ear: External ear normal.   Left Ear: External ear normal.   Mouth/Throat: No oropharyngeal exudate.   Eyes: Conjunctivae and lids are normal. Right eye exhibits no discharge. Left eye exhibits no discharge. Right pupil is reactive. Left pupil is reactive.   Neck: No JVD present. No tracheal deviation present. No thyroid mass and no thyromegaly present.   Cardiovascular: Normal rate, regular rhythm, normal heart sounds and intact distal pulses.   No murmur heard.  Pulmonary/Chest: Effort normal and breath sounds normal. No respiratory distress. She has no wheezes.   Abdominal: Soft. Bowel sounds are normal. There is no tenderness.   Musculoskeletal: She exhibits no edema or tenderness.   Lymphadenopathy:     She has no cervical adenopathy.   Neurological: She is alert and oriented to person, place, and time.   Skin: Skin is warm, dry and intact. No rash noted. She is not diaphoretic. No erythema.   Psychiatric: She has a normal mood and affect. Her speech is normal and behavior is normal. Thought content normal.       Labs and Imaging   Lab Results   Component Value Date    HGBA1C 6.8 01/17/2020    HGBA1C 7.4 10/14/2019    HGBA1C 7.4 07/12/2019     Office Visit on 01/17/2020   Component Date Value Ref Range Status   • Glucose  01/17/2020 158* 70 - 130 mg/dL Final   • Hemoglobin A1C 01/17/2020 6.8  % Final       Assessment / Plan   Yi was seen today for follow-up.    Diagnoses and all orders for this visit:    Uncontrolled type 2 diabetes mellitus with hyperglycemia, without long-term current use of insulin (CMS/[facility])  -     POC Glucose Fingerstick  -     POC Glycosylated Hemoglobin (Hb A1C)  -     glimepiride (AMARYL) 4 MG tablet; Take 1 po qd before a meal  -     Empagliflozin (JARDIANCE) 25 MG tablet; Take 25 mg by mouth Daily.  -     SITagliptin (JANUVIA) 100 MG tablet; Take 1 tablet by mouth Daily.    Mixed hyperlipidemia    Benign essential hypertension        Diabetes Mellitus 2 is under improved control.  -A1c 6.8, down from 7.4 last visit, discussed goal <7  -continue jardiance to 25mg daily.   -continue januvia 100mg daily  -continue glimepiride 4mg daily- discussed she must take this medication AC to avoid hypoglycemia  -intolerant to metformin  -bring meter to all visits  -call if any issues    1.  Diet: 3-4 carb servings per meal for females, 4-5 carb servings per meal for males  Spread carb intake throughout the day  Increase lean protein and vegetable intake  Avoid sugary drinks and processed carbs including crackers, cookies, cakes  2.  Exercise: Recommend at least 30 minutes of exercise daily, at least 5 days per week. Increase exercise gradually.   3.  Blood Glucose Goal: Blood glucose goal <150 fasting, <180 2 hr postprandial  4.  Microalbumin due 7/2020  5.  Education performed during this visit: long term diabetic complications discussed. , annual eye examinations at Ophthalmology discussed, dental hygiene discussed  and foot care reviewed., home glucose monitoring emphasized, all medications, side effects and compliance discussed carefully and Hypoglycemia management and prevention reviewed. Reviewed ‘ABCs’ of diabetes management (respective goals in parentheses):  A1C (<7), blood pressure (<130/80), and  cholesterol (LDL <100, if CVD <70).    Hyperlipidemia  -intolerant to statins due to myalgias, did not tolerate crestor 5mg  -praluent/repatha not covered by insurance    HTN  -controlled  -continue bisoprolol, losartan         There are no Patient Instructions on file for this visit.    Follow up: Return in about 3 months (around 4/17/2020).    Discussed the nature of the disease including, risks, complications, implications, management, safe and proper use of medications. Encouraged therapeutic lifestyle changes including low calorie diet with plenty of fruits and vegetables, daily exercise, medication compliance, and keeping scheduled follow up appointments with me and any other providers. Encouraged patient to have appointment for complete physical, fasting labs, appropriate screenings, and immunizations on an annual basis.      Wero Hernandez PA-C

## 2020-01-21 ENCOUNTER — APPOINTMENT (OUTPATIENT)
Dept: LAB | Facility: HOSPITAL | Age: 65
End: 2020-01-21

## 2020-01-21 ENCOUNTER — OFFICE VISIT (OUTPATIENT)
Dept: INTERNAL MEDICINE | Facility: CLINIC | Age: 65
End: 2020-01-21

## 2020-01-21 VITALS
TEMPERATURE: 98.8 F | HEART RATE: 78 BPM | OXYGEN SATURATION: 99 % | SYSTOLIC BLOOD PRESSURE: 120 MMHG | BODY MASS INDEX: 34.93 KG/M2 | DIASTOLIC BLOOD PRESSURE: 80 MMHG | HEIGHT: 61 IN | WEIGHT: 185 LBS

## 2020-01-21 DIAGNOSIS — R05.9 COUGH: ICD-10-CM

## 2020-01-21 DIAGNOSIS — F51.01 PRIMARY INSOMNIA: ICD-10-CM

## 2020-01-21 DIAGNOSIS — G25.81 RLS (RESTLESS LEGS SYNDROME): ICD-10-CM

## 2020-01-21 DIAGNOSIS — I10 ESSENTIAL HYPERTENSION: Primary | ICD-10-CM

## 2020-01-21 DIAGNOSIS — F33.41 RECURRENT MAJOR DEPRESSIVE DISORDER, IN PARTIAL REMISSION (HCC): ICD-10-CM

## 2020-01-21 DIAGNOSIS — N95.2 VAGINAL ATROPHY: ICD-10-CM

## 2020-01-21 LAB
ALBUMIN SERPL-MCNC: 4.1 G/DL (ref 3.5–5.2)
ALBUMIN UR-MCNC: <1.2 MG/DL
ALBUMIN/GLOB SERPL: 1.1 G/DL
ALP SERPL-CCNC: 144 U/L (ref 39–117)
ALT SERPL W P-5'-P-CCNC: 40 U/L (ref 1–33)
ANION GAP SERPL CALCULATED.3IONS-SCNC: 13.1 MMOL/L (ref 5–15)
AST SERPL-CCNC: 30 U/L (ref 1–32)
BASOPHILS # BLD AUTO: 0.09 10*3/MM3 (ref 0–0.2)
BASOPHILS NFR BLD AUTO: 1.3 % (ref 0–1.5)
BILIRUB SERPL-MCNC: 0.5 MG/DL (ref 0.2–1.2)
BUN BLD-MCNC: 13 MG/DL (ref 8–23)
BUN/CREAT SERPL: 19.1 (ref 7–25)
CALCIUM SPEC-SCNC: 10 MG/DL (ref 8.6–10.5)
CHLORIDE SERPL-SCNC: 99 MMOL/L (ref 98–107)
CO2 SERPL-SCNC: 25.9 MMOL/L (ref 22–29)
CREAT BLD-MCNC: 0.68 MG/DL (ref 0.57–1)
DEPRECATED RDW RBC AUTO: 40.2 FL (ref 37–54)
EOSINOPHIL # BLD AUTO: 0.24 10*3/MM3 (ref 0–0.4)
EOSINOPHIL NFR BLD AUTO: 3.3 % (ref 0.3–6.2)
ERYTHROCYTE [DISTWIDTH] IN BLOOD BY AUTOMATED COUNT: 12.7 % (ref 12.3–15.4)
GFR SERPL CREATININE-BSD FRML MDRD: 88 ML/MIN/1.73
GLOBULIN UR ELPH-MCNC: 3.7 GM/DL
GLUCOSE BLD-MCNC: 144 MG/DL (ref 65–99)
HCT VFR BLD AUTO: 50.6 % (ref 34–46.6)
HGB BLD-MCNC: 16.7 G/DL (ref 12–15.9)
IMM GRANULOCYTES # BLD AUTO: 0.04 10*3/MM3 (ref 0–0.05)
IMM GRANULOCYTES NFR BLD AUTO: 0.6 % (ref 0–0.5)
LYMPHOCYTES # BLD AUTO: 1.7 10*3/MM3 (ref 0.7–3.1)
LYMPHOCYTES NFR BLD AUTO: 23.6 % (ref 19.6–45.3)
MCH RBC QN AUTO: 28.9 PG (ref 26.6–33)
MCHC RBC AUTO-ENTMCNC: 33 G/DL (ref 31.5–35.7)
MCV RBC AUTO: 87.5 FL (ref 79–97)
MONOCYTES # BLD AUTO: 0.4 10*3/MM3 (ref 0.1–0.9)
MONOCYTES NFR BLD AUTO: 5.6 % (ref 5–12)
NEUTROPHILS # BLD AUTO: 4.73 10*3/MM3 (ref 1.7–7)
NEUTROPHILS NFR BLD AUTO: 65.6 % (ref 42.7–76)
NRBC BLD AUTO-RTO: 0 /100 WBC (ref 0–0.2)
PLATELET # BLD AUTO: 301 10*3/MM3 (ref 140–450)
PMV BLD AUTO: 11.1 FL (ref 6–12)
POTASSIUM BLD-SCNC: 4.4 MMOL/L (ref 3.5–5.2)
PROT SERPL-MCNC: 7.8 G/DL (ref 6–8.5)
RBC # BLD AUTO: 5.78 10*6/MM3 (ref 3.77–5.28)
SODIUM BLD-SCNC: 138 MMOL/L (ref 136–145)
WBC NRBC COR # BLD: 7.2 10*3/MM3 (ref 3.4–10.8)

## 2020-01-21 PROCEDURE — 80053 COMPREHEN METABOLIC PANEL: CPT | Performed by: NURSE PRACTITIONER

## 2020-01-21 PROCEDURE — 99214 OFFICE O/P EST MOD 30 MIN: CPT | Performed by: NURSE PRACTITIONER

## 2020-01-21 PROCEDURE — 82043 UR ALBUMIN QUANTITATIVE: CPT | Performed by: NURSE PRACTITIONER

## 2020-01-21 PROCEDURE — 85025 COMPLETE CBC W/AUTO DIFF WBC: CPT | Performed by: NURSE PRACTITIONER

## 2020-01-21 RX ORDER — ROPINIROLE 1 MG/1
1 TABLET, FILM COATED ORAL NIGHTLY
Qty: 90 TABLET | Refills: 3 | Status: SHIPPED | OUTPATIENT
Start: 2020-01-21 | End: 2021-01-05

## 2020-01-21 RX ORDER — DESVENLAFAXINE 25 MG/1
25 TABLET, EXTENDED RELEASE ORAL DAILY
Qty: 30 TABLET | Refills: 5 | Status: SHIPPED | OUTPATIENT
Start: 2020-01-21 | End: 2020-07-20

## 2020-01-21 RX ORDER — ESTRADIOL 0.1 MG/G
2 CREAM VAGINAL WEEKLY
Qty: 42.5 G | Refills: 5 | Status: SHIPPED | OUTPATIENT
Start: 2020-01-21 | End: 2021-08-10 | Stop reason: SDUPTHER

## 2020-01-21 RX ORDER — ALBUTEROL SULFATE 90 UG/1
2 AEROSOL, METERED RESPIRATORY (INHALATION) EVERY 6 HOURS PRN
Qty: 1 INHALER | Refills: 11 | Status: SHIPPED | OUTPATIENT
Start: 2020-01-21 | End: 2021-08-10 | Stop reason: SDUPTHER

## 2020-01-21 RX ORDER — TRAZODONE HYDROCHLORIDE 50 MG/1
TABLET ORAL
Qty: 180 TABLET | Refills: 3 | Status: SHIPPED | OUTPATIENT
Start: 2020-01-21 | End: 2021-04-16 | Stop reason: SDUPTHER

## 2020-01-21 NOTE — PROGRESS NOTES
Chief Complaint   Patient presents with   • Depression     FU   • Insomnia   • Restless Legs Syndrome   • Vaginal Pain     menopausal vaginal atrophy       History of Present Illness  64 y.o.female presents for follow up depression, insomnia, vaginal pain atrophy, and restless leg syndrome.  Chronic insomnia onset years takes trazodone without difficulties. Med works ok; other than RLS bothers her at night and interferes with sleep,    RLS onset > 1 year; bilateral lower ext muscle cramps. Taking requip. Having to take 1mg instead of just 0.5mg.  Higher dose does better to control symptoms.    depression; onset years; recently started back on pristiq. Doing ok on current dose. Depression improved stable. Positive insomnia.. Denies any thoughts of self harm or suicidal ideations.     Post menopausal sp DOUGIE with c/o vaginal irritation in general and worse with wiping. Uses vaginal estrogen cream weekly as needed to help with vaginal sx. Does well.    Diabetes type 2 followed by endocrine.  HTN chronic onset years; follows with cardiology for bp meds. BP controlled. No headaches, vision changes dizziness or chest pain.    C/o cough intermittent nonproductive with some nasal congestion; not much sinus pressure. No fever. Occasional short of air. Onset resp sx about a week. Needs refill on inhaler.    Review of Systems   Constitutional: Negative for chills, fatigue and fever.   HENT: Positive for congestion and postnasal drip. Negative for ear pain, rhinorrhea, sinus pressure, sneezing and sore throat.    Eyes: Negative for blurred vision and visual disturbance.   Respiratory: Positive for cough and shortness of breath. Negative for wheezing.    Cardiovascular: Negative for chest pain, palpitations and leg swelling.   Gastrointestinal: Negative for abdominal pain.   Genitourinary: Positive for vaginal pain. Negative for difficulty urinating, vaginal bleeding and vaginal discharge.   Musculoskeletal: Positive for  arthralgias and back pain. Negative for myalgias.   Neurological: Negative for dizziness, syncope, light-headedness and headache.   Psychiatric/Behavioral: Positive for sleep disturbance and depressed mood. Negative for self-injury, suicidal ideas and stress. The patient is not nervous/anxious.          [facility]  The following portions of the patient's history were reviewed and updated as appropriate: allergies, current medications, past family history, past medical history, past social history, past surgical history and problem list.     Past Medical History:   Diagnosis Date   • Anxiety    • Benign essential hypertension    • Cervical disc disorder    • Chronic pain disorder    • Colon polyps    • Complication of device    • Decreased libido    • Diabetes mellitus (CMS/[facility])    • Dizziness    • Encounter for long-term (current) use of medications    • Extremity pain    • Fatigue    • Hip pain    • History of alopecia    • History of backache    • History of colonoscopy     Fiberoptic   • History of diabetes mellitus    • History of insomnia    • History of mastoiditis    • History of migraine headaches    • History of urinary stone    • Infection of kidney    • Insomnia    • Joint pain    • Kidney infection    • Low back pain    • Lumbar radiculopathy    • Lumbar radiculopathy    • Lumbosacral disc disease    • Migraine    • Myofascial pain syndrome    • Neck pain    • Palpitations    • Sleep apnea    • Spinal cord stimulator status    • Stress reaction, emotional    • Urinary incontinence    • Urinary tract infection    • Visit for screening mammogram     Description: 08/28/2009   • Vitamin D deficiency       Past Surgical History:   Procedure Laterality Date   • BLADDER SURGERY     • BREAST EXCISIONAL BIOPSY Left     Benign 2009   • COLONOSCOPY  2019    Complete Colonoscopy   • ELBOW ARTHROPLASTY Left    • HYSTERECTOMY      Total Abdominal Hysterectomy (Description: BSO)   • LUMBAR DISCECTOMY  01/28/2013    Lt- L4/5  discectomy redo Lt- L5/S1 foraminotomy -Dr. Von Croft   • LUMBAR DISCECTOMY  03/18/2013    Re-do Lt- L4/5 discectomy Dr. Von Croft    • OOPHORECTOMY     • OTHER SURGICAL HISTORY      Spinal Sterotaxis Stimulation Of Cord   • SPINAL CORD STIMULATOR IMPLANT  08/11/201410/01/2015    placement 2014, replacement 2015. Dr. Von Croft    • TUBAL ABDOMINAL LIGATION        Allergies   Allergen Reactions   • Beta Adrenergic Blockers    • Cyclobenzaprine    • Hydrochlorothiazide    • Hyzaar  [Losartan Potassium-Hctz]    • Latex    • Losartan    • Metformin    • Penicillins    • Pioglitazone    • Spironolactone    • Ace Inhibitors Cough   • Amlodipine Diarrhea      Social History     Socioeconomic History   • Marital status:      Spouse name: Not on file   • Number of children: Not on file   • Years of education: Not on file   • Highest education level: Not on file   Tobacco Use   • Smoking status: Never Smoker   • Smokeless tobacco: Never Used   Substance and Sexual Activity   • Alcohol use: No   • Drug use: No   • Sexual activity: Defer     Family History   Problem Relation Age of Onset   • Hypertension Mother    • Peripheral vascular disease Mother    • Ulcers Mother    • Kidney failure Mother    • Lung disease Father    • Diabetes Other         type 2   • Kidney failure Maternal Grandmother             Current Outpatient Medications:   •  albuterol (PROVENTIL HFA;VENTOLIN HFA) 108 (90 Base) MCG/ACT inhaler, Inhale 2 puffs Every 6 (Six) Hours As Needed for Wheezing., Disp: 1 inhaler, Rfl: 0  •  bisoprolol (ZEBeta) 5 MG tablet, Take 5 mg by mouth Daily., Disp: , Rfl:   •  Desvenlafaxine Succinate ER (PRISTIQ) 25 MG tablet sustained-release 24 hour, Take 1 tablet by mouth Daily., Disp: 30 tablet, Rfl: 0  •  Empagliflozin (JARDIANCE) 25 MG tablet, Take 25 mg by mouth Daily., Disp: 90 tablet, Rfl: 1  •  estradiol (ESTRACE VAGINAL) 0.1 MG/GM vaginal cream, Insert 2 g into the vagina 1 (One) Time Per Week., Disp:  "42.5 g, Rfl: 5  •  furosemide (LASIX) 20 MG tablet, Take 20 mg by mouth 2 (Two) Times a Day., Disp: , Rfl:   •  glimepiride (AMARYL) 4 MG tablet, Take 1 po qd before a meal, Disp: 90 tablet, Rfl: 1  •  Liniments (SALONPAS) pads, Apply  topically. prn, Disp: , Rfl:   •  losartan (COZAAR) 100 MG tablet, Take 100 mg by mouth Daily., Disp: , Rfl:   •  lubiprostone (AMITIZA) 24 MCG capsule, Take 1 capsule by mouth 2 (Two) Times a Day With Meals., Disp: 180 capsule, Rfl: 1  •  NIFEdipine CC (ADALAT CC) 90 MG 24 hr tablet, Take 60 mg by mouth Daily., Disp: , Rfl:   •  omeprazole (priLOSEC) 40 MG capsule, Take 1 capsule by mouth Daily., Disp: 90 capsule, Rfl: 3  •  rOPINIRole (REQUIP) 0.5 MG tablet, Take 1 tablet by mouth Every Night. Take 1 hour before bedtime., Disp: 90 tablet, Rfl: 1  •  SITagliptin (JANUVIA) 100 MG tablet, Take 1 tablet by mouth Daily., Disp: 90 tablet, Rfl: 1  •  topiramate (TOPAMAX) 50 MG tablet, TAKE ONE TABLET BY MOUTH TWICE DAILY (Patient taking differently: TAKE ONE TABLET BY MOUTH as needed), Disp: 180 tablet, Rfl: 0  •  traMADol (ULTRAM) 50 MG tablet, TAKE 1 TABLET PO TID PRN FOR SEVERE BREAKTHROUGH PAIN, Disp: 270 tablet, Rfl: 0  •  traZODone (DESYREL) 50 MG tablet, Take 1 to 2 tabs po hs prn, Disp: 180 tablet, Rfl: 2    VITALS:  /80   Pulse 78   Temp 98.8 °F (37.1 °C)   Ht 154.9 cm (61\")   Wt 83.9 kg (185 lb)   LMP  (LMP Unknown)   SpO2 99%   BMI 34.96 kg/m²     Physical Exam   Constitutional: She is oriented to person, place, and time. She appears well-developed and well-nourished. No distress.   HENT:   Head: Normocephalic.   Right Ear: Tympanic membrane, external ear and ear canal normal.   Left Ear: Tympanic membrane, external ear and ear canal normal.   Nose: Mucosal edema and congestion present. No rhinorrhea. Right sinus exhibits no maxillary sinus tenderness and no frontal sinus tenderness. Left sinus exhibits no maxillary sinus tenderness and no frontal sinus tenderness. "   Mouth/Throat: Oropharynx is clear and moist and mucous membranes are normal.   Eyes: Pupils are equal, round, and reactive to light. Conjunctivae and EOM are normal. Right eye exhibits no discharge. Left eye exhibits no discharge.   Neck: Normal range of motion. Neck supple.   Cardiovascular: Normal rate, regular rhythm and normal heart sounds.   No edema   Pulmonary/Chest: Effort normal and breath sounds normal. No respiratory distress.   Intermittent cough   Abdominal: Soft. Bowel sounds are normal. There is no tenderness.   Neurological: She is alert and oriented to person, place, and time.   Skin: Skin is warm and dry. Capillary refill takes less than 2 seconds. No rash noted.   Psychiatric: She has a normal mood and affect. Her behavior is normal.   Vitals reviewed.      LABS  Results for orders placed or performed in visit on 01/21/20   Comprehensive Metabolic Panel   Result Value Ref Range    Glucose 144 (H) 65 - 99 mg/dL    BUN 13 8 - 23 mg/dL    Creatinine 0.68 0.57 - 1.00 mg/dL    Sodium 138 136 - 145 mmol/L    Potassium 4.4 3.5 - 5.2 mmol/L    Chloride 99 98 - 107 mmol/L    CO2 25.9 22.0 - 29.0 mmol/L    Calcium 10.0 8.6 - 10.5 mg/dL    Total Protein 7.8 6.0 - 8.5 g/dL    Albumin 4.10 3.50 - 5.20 g/dL    ALT (SGPT) 40 (H) 1 - 33 U/L    AST (SGOT) 30 1 - 32 U/L    Alkaline Phosphatase 144 (H) 39 - 117 U/L    Total Bilirubin 0.5 0.2 - 1.2 mg/dL    eGFR Non African Amer 88 >60 mL/min/1.73    Globulin 3.7 gm/dL    A/G Ratio 1.1 g/dL    BUN/Creatinine Ratio 19.1 7.0 - 25.0    Anion Gap 13.1 5.0 - 15.0 mmol/L   MicroAlbumin, Urine, Random - Urine, Clean Catch   Result Value Ref Range    Microalbumin, Urine <1.2 mg/dL   CBC Auto Differential   Result Value Ref Range    WBC 7.20 3.40 - 10.80 10*3/mm3    RBC 5.78 (H) 3.77 - 5.28 10*6/mm3    Hemoglobin 16.7 (H) 12.0 - 15.9 g/dL    Hematocrit 50.6 (H) 34.0 - 46.6 %    MCV 87.5 79.0 - 97.0 fL    MCH 28.9 26.6 - 33.0 pg    MCHC 33.0 31.5 - 35.7 g/dL    RDW 12.7 12.3  - 15.4 %    RDW-SD 40.2 37.0 - 54.0 fl    MPV 11.1 6.0 - 12.0 fL    Platelets 301 140 - 450 10*3/mm3    Neutrophil % 65.6 42.7 - 76.0 %    Lymphocyte % 23.6 19.6 - 45.3 %    Monocyte % 5.6 5.0 - 12.0 %    Eosinophil % 3.3 0.3 - 6.2 %    Basophil % 1.3 0.0 - 1.5 %    Immature Grans % 0.6 (H) 0.0 - 0.5 %    Neutrophils, Absolute 4.73 1.70 - 7.00 10*3/mm3    Lymphocytes, Absolute 1.70 0.70 - 3.10 10*3/mm3    Monocytes, Absolute 0.40 0.10 - 0.90 10*3/mm3    Eosinophils, Absolute 0.24 0.00 - 0.40 10*3/mm3    Basophils, Absolute 0.09 0.00 - 0.20 10*3/mm3    Immature Grans, Absolute 0.04 0.00 - 0.05 10*3/mm3    nRBC 0.0 0.0 - 0.2 /100 WBC       ASSESSMENT/PLAN  Yi was seen today for depression, insomnia, restless legs syndrome and vaginal pain.    Diagnoses and all orders for this visit:    Essential hypertension  -     CBC & Differential  -     Comprehensive Metabolic Panel  -     MicroAlbumin, Urine, Random - Urine, Clean Catch  -     CBC Auto Differential  Recommend low Na diet less than 2400mg/day preferably <1500mg/day, increased aerobic exercise 4-5 X per week for total of 150 minutes/week; home BP monitoring with goal BP < 130/80.  DASH diet reviewed with pt; written instructions provided.  Compliance with medication regimen.  Cont fu with cardiology.    RLS (restless legs syndrome)  -     rOPINIRole (REQUIP) 1 MG tablet; Take 1 tablet by mouth Every Night. Take 1 hour before bedtime.  -     Comprehensive Metabolic Panel  Drink more water.  Primary insomnia  -     traZODone (DESYREL) 50 MG tablet; Take 1 to 2 tabs po hs prn    Recurrent major depressive disorder, in partial remission (CMS/HCC)  Comments:  restarted on pristiq; started on lower dose with anticipation of needing to titrate up for sx control  Orders:  -     Desvenlafaxine Succinate ER (PRISTIQ) 25 MG tablet sustained-release 24 hour; Take 1 tablet by mouth Daily.    Cough  -     albuterol sulfate  (90 Base) MCG/ACT inhaler; Inhale 2 puffs  Every 6 (Six) Hours As Needed for Wheezing or Shortness of Air.    Vaginal atrophy  -     estradiol (ESTRACE VAGINAL) 0.1 MG/GM vaginal cream; Insert 2 g into the vagina 1 (One) Time Per Week.    Recommended return for medicare wellness annual exam.  Pt declined flu vaccine.    I discussed the patients findings and my recommendations with patient.  Patient was encouraged to keep me informed of any acute changes, lack of improvement, or any new concerning symptoms.  Patient voiced understanding of all instructions and denied further questions.      FOLLOW-UP  Return in about 6 months (around 7/21/2020), or if symptoms worsen or fail to improve, for Medicare Wellness.    Electronically signed by:    OCTAVIANO Tovar  01/21/2020    EMR Dragon/Transcription Disclaimer:  Much of this encounter note is an electronic transcription/translation of spoken language to printed text.  The electronic translation of spoken language may permit erroneous, or at times, nonsensical words or phrases to be inadvertently transcribed.  Although I have reviewed the note for such errors, some may still exist

## 2020-01-27 ENCOUNTER — TELEPHONE (OUTPATIENT)
Dept: INTERNAL MEDICINE | Facility: CLINIC | Age: 65
End: 2020-01-27

## 2020-01-27 NOTE — TELEPHONE ENCOUNTER
----- Message from OCTAVIANO Wong sent at 1/23/2020  2:27 PM EST -----  Please contact patient regarding results.  Electrolytes liver kidney function ok, stable. Cont med follow up endo for diabetes.   CBC/blood count: no anemia. Blood count high need to drink more water. otherwise looks good.  Need heart healthy diet and wt loss. Thanks.

## 2020-01-31 ENCOUNTER — TELEPHONE (OUTPATIENT)
Dept: INTERNAL MEDICINE | Facility: CLINIC | Age: 65
End: 2020-01-31

## 2020-02-04 ENCOUNTER — TELEPHONE (OUTPATIENT)
Dept: INTERNAL MEDICINE | Facility: CLINIC | Age: 65
End: 2020-02-04

## 2020-02-04 NOTE — TELEPHONE ENCOUNTER
PT WAS DISCHARGED FROM [facility] ON 1/31/20 AFTER SPINAL SURGERY, USING WATERS CATH, URINE IS CLOUDY DARK YELLOW, NURSE WANTS VERBAL ORDER TO TAKE A URINE SAMPLE TO THE LAB FOR TESTING. WILL FAX RESULTS TO YOU, IF NURSE DOES NOT ANSWER PLEASE MESSAGE [phone]

## 2020-02-05 ENCOUNTER — TELEPHONE (OUTPATIENT)
Dept: INTERNAL MEDICINE | Facility: CLINIC | Age: 65
End: 2020-02-05

## 2020-02-05 NOTE — TELEPHONE ENCOUNTER
HOME HEALTH REP (RD) CALLING ON BEHALF OF PT. PT EXPERIENCING BLOOD IN URINE AND GENERAL FEELINGS OF BEING UNWELL. SHE WANTED TO DISCUSS MEDICATION THAT COULD BE CALLED IN FOR PT RELIEF. PLEASE RETURN CALL [phone].

## 2020-02-07 ENCOUNTER — TELEPHONE (OUTPATIENT)
Dept: INTERNAL MEDICINE | Facility: CLINIC | Age: 65
End: 2020-02-07

## 2020-02-07 ENCOUNTER — OFFICE VISIT (OUTPATIENT)
Dept: INTERNAL MEDICINE | Facility: CLINIC | Age: 65
End: 2020-02-07

## 2020-02-07 VITALS
BODY MASS INDEX: 34.93 KG/M2 | TEMPERATURE: 98.5 F | HEART RATE: 78 BPM | WEIGHT: 185 LBS | OXYGEN SATURATION: 96 % | HEIGHT: 61 IN | DIASTOLIC BLOOD PRESSURE: 70 MMHG | SYSTOLIC BLOOD PRESSURE: 130 MMHG

## 2020-02-07 DIAGNOSIS — N30.01 ACUTE CYSTITIS WITH HEMATURIA: Primary | ICD-10-CM

## 2020-02-07 PROCEDURE — 99213 OFFICE O/P EST LOW 20 MIN: CPT | Performed by: NURSE PRACTITIONER

## 2020-02-07 RX ORDER — SULFAMETHOXAZOLE AND TRIMETHOPRIM 800; 160 MG/1; MG/1
1 TABLET ORAL 2 TIMES DAILY
Qty: 14 TABLET | Refills: 0 | Status: SHIPPED | OUTPATIENT
Start: 2020-02-07 | End: 2020-02-07

## 2020-02-07 RX ORDER — NITROFURANTOIN 25; 75 MG/1; MG/1
100 CAPSULE ORAL EVERY 12 HOURS SCHEDULED
Qty: 14 CAPSULE | Refills: 0 | Status: SHIPPED | OUTPATIENT
Start: 2020-02-07 | End: 2020-02-14

## 2020-02-07 NOTE — TELEPHONE ENCOUNTER
Wali, a physical therapist from [facility], requested a call back to discuss patient's physical therapy. Patient saw her spinal surgeon Dr Dial yesterday, 2/6/20, and was placed on restrictions. Wali stated due to these restrictions he was unable to start patient's physical therapy today 2/7/20. Wali requested a verbal order for physical therapy to start next week if possible.    Please call and advise. Wali call back [phone]

## 2020-02-07 NOTE — TELEPHONE ENCOUNTER
Ok for verbal order for PT starting next week as long as stays within spinal restrictions per surgeon.

## 2020-02-13 ENCOUNTER — TELEPHONE (OUTPATIENT)
Dept: INTERNAL MEDICINE | Facility: CLINIC | Age: 65
End: 2020-02-13

## 2020-02-13 NOTE — TELEPHONE ENCOUNTER
Khurram needs a verbal order to omit patient visit this week due to patient being on restrictions. She is a [facility] patient but they need the order from Dr. Nunez. She said you can call and leave a message and that would be enough for a verbal order.    You can reach the OT at [phone]

## 2020-02-14 ENCOUNTER — OFFICE VISIT (OUTPATIENT)
Dept: INTERNAL MEDICINE | Facility: CLINIC | Age: 65
End: 2020-02-14

## 2020-02-14 ENCOUNTER — TELEPHONE (OUTPATIENT)
Dept: INTERNAL MEDICINE | Facility: CLINIC | Age: 65
End: 2020-02-14

## 2020-02-14 VITALS
SYSTOLIC BLOOD PRESSURE: 122 MMHG | HEART RATE: 81 BPM | DIASTOLIC BLOOD PRESSURE: 68 MMHG | OXYGEN SATURATION: 98 % | WEIGHT: 182 LBS | BODY MASS INDEX: 34.36 KG/M2 | HEIGHT: 61 IN

## 2020-02-14 DIAGNOSIS — R81 GLUCOSE FOUND IN URINE ON EXAMINATION: ICD-10-CM

## 2020-02-14 DIAGNOSIS — R31.9 HEMATURIA, UNSPECIFIED TYPE: ICD-10-CM

## 2020-02-14 DIAGNOSIS — N30.01 ACUTE CYSTITIS WITH HEMATURIA: Primary | ICD-10-CM

## 2020-02-14 LAB
BILIRUB BLD-MCNC: NEGATIVE MG/DL
CLARITY, POC: CLEAR
COLOR UR: ABNORMAL
GLUCOSE BLDC GLUCOMTR-MCNC: 109 MG/DL (ref 70–130)
GLUCOSE UR STRIP-MCNC: ABNORMAL MG/DL
KETONES UR QL: NEGATIVE
LEUKOCYTE EST, POC: NEGATIVE
NITRITE UR-MCNC: NEGATIVE MG/ML
PH UR: 7 [PH] (ref 5–8)
PROT UR STRIP-MCNC: ABNORMAL MG/DL
RBC # UR STRIP: ABNORMAL /UL
SP GR UR: 1.01 (ref 1–1.03)
UROBILINOGEN UR QL: NORMAL

## 2020-02-14 PROCEDURE — 81003 URINALYSIS AUTO W/O SCOPE: CPT | Performed by: NURSE PRACTITIONER

## 2020-02-14 PROCEDURE — 99213 OFFICE O/P EST LOW 20 MIN: CPT | Performed by: NURSE PRACTITIONER

## 2020-02-14 PROCEDURE — 82947 ASSAY GLUCOSE BLOOD QUANT: CPT | Performed by: NURSE PRACTITIONER

## 2020-02-14 PROCEDURE — 87086 URINE CULTURE/COLONY COUNT: CPT | Performed by: NURSE PRACTITIONER

## 2020-02-14 RX ORDER — CIPROFLOXACIN 500 MG/1
500 TABLET, FILM COATED ORAL 2 TIMES DAILY
Qty: 20 TABLET | Refills: 0 | Status: SHIPPED | OUTPATIENT
Start: 2020-02-14 | End: 2020-02-24

## 2020-02-14 RX ORDER — SULFAMETHOXAZOLE AND TRIMETHOPRIM 800; 160 MG/1; MG/1
1 TABLET ORAL 2 TIMES DAILY
COMMUNITY
Start: 2020-02-07 | End: 2020-02-24

## 2020-02-14 NOTE — TELEPHONE ENCOUNTER
Wali with [facility] called and stated that the patient is on pretty strict restrictions from her back doctor/surgery.  Wali stated that he wanted to let Dr. Coleman know that he has not been able to work with the patient for Physical Therapy.  He will begin 2/24/20 due to the [facility] restrictions.    Wali callback: [phone]

## 2020-02-14 NOTE — PROGRESS NOTES
Chief Complaint   Patient presents with   • Blood in Urine     Has a pagan in, hematuria better today than yesterday       History of Present Illness    Yi Garcia is a 64 y.o. female who presents today for UTI.    Urinary Tract Infection    This is a recurrent problem. Episode onset: 2 days ago. Episode frequency: Patient recently treated 1 week ago for E. Coli UTI with resistance to Bactrim, antibiotic switched to Macrobid, patient has 1 day left to complete. There has been no fever. Associated symptoms include flank pain and hematuria (first noticed 2 days ago). Pertinent negatives include no chills, discharge, frequency, hesitancy, nausea, sweats, urgency or vomiting. Associated symptoms comments:  (+) Abdominal pain, bladder spasms. She has tried antibiotics and increased fluids for the symptoms. Her past medical history is significant for catheterization. Currently with urinary pagan catheter in place due to recent surgical procedure and anuria with prior removal of catheter. She has a follow-up apt with urology on 2/21/20 for catheter replacement.     [facility]    The following portions of the patient's history were reviewed and updated as appropriate: allergies, current medications, past family history, past medical history, past social history, past surgical history and problem list.     Social History     Tobacco Use   • Smoking status: Never Smoker   • Smokeless tobacco: Never Used   Substance Use Topics   • Alcohol use: No       Past Medical History:   Diagnosis Date   • Anxiety    • Benign essential hypertension    • Cervical disc disorder    • Chronic pain disorder    • Colon polyps    • Complication of device    • Decreased libido    • Diabetes mellitus (CMS/[facility])    • Dizziness    • Encounter for long-term (current) use of medications    • Extremity pain    • Fatigue    • Hip pain    • History of alopecia    • History of backache    • History of colonoscopy     Fiberoptic   • History of diabetes  mellitus    • History of insomnia    • History of mastoiditis    • History of migraine headaches    • History of urinary stone    • Infection of kidney    • Insomnia    • Joint pain    • Kidney infection    • Low back pain    • Lumbar radiculopathy    • Lumbar radiculopathy    • Lumbosacral disc disease    • Migraine    • Myofascial pain syndrome    • Neck pain    • Palpitations    • Sleep apnea    • Spinal cord stimulator status    • Stress reaction, emotional    • Urinary incontinence    • Urinary tract infection    • Visit for screening mammogram     Description: 08/28/2009   • Vitamin D deficiency        Past Surgical History:   Procedure Laterality Date   • BLADDER SURGERY     • BREAST EXCISIONAL BIOPSY Left     Benign 2009   • COLONOSCOPY  2019    Complete Colonoscopy   • ELBOW ARTHROPLASTY Left    • HYSTERECTOMY      Total Abdominal Hysterectomy (Description: BSO)   • LUMBAR DISCECTOMY  01/28/2013    Lt- L4/5 discectomy redo Lt- L5/S1 foraminotomy -Dr. Von Croft   • LUMBAR DISCECTOMY  03/18/2013    Re-do Lt- L4/5 discectomy Dr. Von Croft    • OOPHORECTOMY     • OTHER SURGICAL HISTORY      Spinal Sterotaxis Stimulation Of Cord   • SPINAL CORD STIMULATOR IMPLANT  08/11/201410/01/2015    placement 2014, replacement 2015. Dr. Von Croft    • TUBAL ABDOMINAL LIGATION         Family History   Problem Relation Age of Onset   • Hypertension Mother    • Peripheral vascular disease Mother    • Ulcers Mother    • Kidney failure Mother    • Lung disease Father    • Diabetes Other         type 2   • Kidney failure Maternal Grandmother        Allergies   Allergen Reactions   • Ace Inhibitors Cough   • Amlodipine Diarrhea   • Beta Adrenergic Blockers Rash   • Cyclobenzaprine Rash   • Hydrochlorothiazide Rash   • Hyzaar [Losartan Potassium-Hctz] Rash   • Latex Rash   • Losartan Rash   • Metformin Rash   • Penicillins Rash   • Pioglitazone Rash   • Spironolactone Rash         Current Outpatient Medications:   •   albuterol sulfate  (90 Base) MCG/ACT inhaler, Inhale 2 puffs Every 6 (Six) Hours As Needed for Wheezing or Shortness of Air., Disp: 1 inhaler, Rfl: 11  •  bisoprolol (ZEBeta) 5 MG tablet, Take 5 mg by mouth Daily., Disp: , Rfl:   •  Desvenlafaxine Succinate ER (PRISTIQ) 25 MG tablet sustained-release 24 hour, Take 1 tablet by mouth Daily., Disp: 30 tablet, Rfl: 5  •  Empagliflozin (JARDIANCE) 25 MG tablet, Take 25 mg by mouth Daily., Disp: 90 tablet, Rfl: 1  •  estradiol (ESTRACE VAGINAL) 0.1 MG/GM vaginal cream, Insert 2 g into the vagina 1 (One) Time Per Week., Disp: 42.5 g, Rfl: 5  •  furosemide (LASIX) 20 MG tablet, Take 20 mg by mouth 2 (Two) Times a Day., Disp: , Rfl:   •  glimepiride (AMARYL) 4 MG tablet, Take 1 po qd before a meal, Disp: 90 tablet, Rfl: 1  •  Liniments (SALONPAS) pads, Apply  topically. prn, Disp: , Rfl:   •  losartan (COZAAR) 100 MG tablet, Take 100 mg by mouth Daily., Disp: , Rfl:   •  lubiprostone (AMITIZA) 24 MCG capsule, Take 1 capsule by mouth 2 (Two) Times a Day With Meals., Disp: 180 capsule, Rfl: 1  •  NIFEdipine CC (ADALAT CC) 90 MG 24 hr tablet, Take 60 mg by mouth Daily., Disp: , Rfl:   •  nitrofurantoin, macrocrystal-monohydrate, (MACROBID) 100 MG capsule, Take 1 capsule by mouth Every 12 (Twelve) Hours for 7 days., Disp: 14 capsule, Rfl: 0  •  omeprazole (priLOSEC) 40 MG capsule, Take 1 capsule by mouth Daily., Disp: 90 capsule, Rfl: 3  •  rOPINIRole (REQUIP) 1 MG tablet, Take 1 tablet by mouth Every Night. Take 1 hour before bedtime., Disp: 90 tablet, Rfl: 3  •  SITagliptin (JANUVIA) 100 MG tablet, Take 1 tablet by mouth Daily., Disp: 90 tablet, Rfl: 1  •  sulfamethoxazole-trimethoprim (BACTRIM DS,SEPTRA DS) 800-160 MG per tablet, Take 1 tablet by mouth 2 (Two) Times a Day. for 7 days, Disp: , Rfl:   •  topiramate (TOPAMAX) 50 MG tablet, TAKE ONE TABLET BY MOUTH TWICE DAILY (Patient taking differently: TAKE ONE TABLET BY MOUTH as needed), Disp: 180 tablet, Rfl: 0  •   "traMADol (ULTRAM) 50 MG tablet, TAKE 1 TABLET PO TID PRN FOR SEVERE BREAKTHROUGH PAIN, Disp: 270 tablet, Rfl: 0  •  traZODone (DESYREL) 50 MG tablet, Take 1 to 2 tabs po hs prn, Disp: 180 tablet, Rfl: 3  •  ciprofloxacin (CIPRO) 500 MG tablet, Take 1 tablet by mouth 2 (Two) Times a Day for 10 days., Disp: 20 tablet, Rfl: 0    Review of Systems  Review of Systems   Constitutional: Negative for chills, diaphoresis, fatigue and fever.   Respiratory: Negative for cough and shortness of breath.    Cardiovascular: Negative for chest pain and palpitations.   Gastrointestinal: Negative for abdominal distention, abdominal pain, diarrhea, nausea and vomiting.   Genitourinary: Positive for dysuria, flank pain, hematuria (first noticed 2 days ago) and pelvic pain. Negative for decreased urine volume, difficulty urinating, frequency, hesitancy and urgency.   Musculoskeletal: Negative for myalgias.   Skin: Negative for color change and rash.   Neurological: Negative for dizziness, light-headedness and headaches.   Psychiatric/Behavioral: Negative for confusion and sleep disturbance.       Vitals:  Vitals:    02/14/20 1139   BP: 122/68   Pulse: 81   SpO2: 98%   Weight: 82.6 kg (182 lb)   Height: 154.9 cm (61\")   PainSc: 0-No pain       Physical Exam  Physical Exam   Constitutional: She is oriented to person, place, and time. Vital signs are normal. She appears well-developed and well-nourished.   Cardiovascular: Normal rate, regular rhythm and normal heart sounds.   Pulmonary/Chest: Effort normal and breath sounds normal. She has no decreased breath sounds. She has no wheezes. She has no rhonchi. She has no rales.   Abdominal: Soft. Normal appearance and bowel sounds are normal. There is tenderness in the suprapubic area. There is CVA tenderness.   Neurological: She is alert and oriented to person, place, and time.   Skin: Skin is warm and dry.   Psychiatric: She has a normal mood and affect. Her behavior is normal.   Nursing note " and vitals reviewed.      Labs  Results for orders placed or performed in visit on 02/14/20   POC Glucose   Result Value Ref Range    Glucose 109 70 - 130 mg/dL   POC Urinalysis Dipstick, Automated   Result Value Ref Range    Color Lavinia Yellow, Straw, Dark Yellow, Lavinia    Clarity, UA Clear Clear    Specific Gravity  1.015 1.005 - 1.030    pH, Urine 7.0 5.0 - 8.0    Leukocytes Negative Negative    Nitrite, UA Negative Negative    Protein, POC Trace (A) Negative mg/dL    Glucose, UA 3+ (A) Negative, 1000 mg/dL (3+) mg/dL    Ketones, UA Negative Negative    Urobilinogen, UA Normal Normal    Bilirubin Negative Negative    Blood, UA 3+ (A) Negative       Assessment/Plan    Yi was seen today for blood in urine.    Diagnoses and all orders for this visit:    Acute cystitis with hematuria  -     ciprofloxacin (CIPRO) 500 MG tablet; Take 1 tablet by mouth 2 (Two) Times a Day for 10 days.    Glucose found in urine on examination  -     POC Glucose    Hematuria, unspecified type  -     Urine Culture - Urine, Urine, Catheter; Future  -     POC Urinalysis Dipstick, Automated    Will notify of urine culture results when received and treat accordingly.  Switched antibiotics from Macrobid to Ciprofloxacin as prescribed and will continue to treat empirically given presenting symptoms and lab results in office Patient advised in adequate water intake, avoidance of excessive caffeine, and use of Tylenol/ibuprofen as needed.  Advised patient to follow-up with urology for new, worsening, or persistent symptoms.    Plan of care reviewed with patient at conclusion of today's visit. Patient education was provided regarding diagnosis, management, and prescribed or recommended OTC medications. Patient verbalized understanding and agreement with plan of care.     Follow-Up  Return if symptoms worsen or fail to improve.      Electronically Signed By:  OCTAVIANO Tran      EMR Dragon/Transcription Disclaimer:  Much of this encounter  note is an electronic transcription/translation of spoken language to printed text.  The electronic translation of spoken language may permit erroneous, or at times, nonsensical words or phrases to be inadvertently transcribed.  Although I have reviewed the note for such errors, some may still exist.

## 2020-02-14 NOTE — TELEPHONE ENCOUNTER
MOJGAN WITH [facility] CALLED TO REPORT A MISSED VISIT WITH PT,  PT HAD A DRS APPOINTMENT AND CANCELLED HOME HEALTH VISIT FOR 2/14/2020  NO CALL BACK REQUIRED, HE WAS CALLING TO DOCUMENT MISSED VISIT

## 2020-02-16 ENCOUNTER — TELEPHONE (OUTPATIENT)
Dept: INTERNAL MEDICINE | Facility: CLINIC | Age: 65
End: 2020-02-16

## 2020-02-16 LAB — BACTERIA SPEC AEROBE CULT: NO GROWTH

## 2020-02-16 NOTE — TELEPHONE ENCOUNTER
Called patient and informed or negative urine culture results, advised to stop taking prescribed antibiotic and follow-up with urology as previously indicated. Patient verbalized understanding and agreement.

## 2020-02-24 ENCOUNTER — OFFICE VISIT (OUTPATIENT)
Dept: GASTROENTEROLOGY | Facility: CLINIC | Age: 65
End: 2020-02-24

## 2020-02-24 VITALS
SYSTOLIC BLOOD PRESSURE: 120 MMHG | OXYGEN SATURATION: 90 % | TEMPERATURE: 98.5 F | WEIGHT: 176 LBS | DIASTOLIC BLOOD PRESSURE: 72 MMHG | BODY MASS INDEX: 33.23 KG/M2 | HEIGHT: 61 IN | HEART RATE: 84 BPM

## 2020-02-24 DIAGNOSIS — R79.89 LFT ELEVATION: ICD-10-CM

## 2020-02-24 DIAGNOSIS — K59.09 CHRONIC CONSTIPATION: ICD-10-CM

## 2020-02-24 DIAGNOSIS — E11.69 DIABETES MELLITUS TYPE 2 IN OBESE (HCC): ICD-10-CM

## 2020-02-24 DIAGNOSIS — E66.9 DIABETES MELLITUS TYPE 2 IN OBESE (HCC): ICD-10-CM

## 2020-02-24 DIAGNOSIS — K31.84 GASTROPARESIS: Primary | ICD-10-CM

## 2020-02-24 PROCEDURE — 99213 OFFICE O/P EST LOW 20 MIN: CPT | Performed by: NURSE PRACTITIONER

## 2020-02-24 RX ORDER — PYRIDOXINE HCL (VITAMIN B6) 100 MG
100 TABLET ORAL DAILY
COMMUNITY
End: 2020-10-16 | Stop reason: SDUPTHER

## 2020-02-24 RX ORDER — OXYCODONE HYDROCHLORIDE AND ACETAMINOPHEN 5; 325 MG/1; MG/1
1 TABLET ORAL EVERY 6 HOURS PRN
COMMUNITY
End: 2020-07-20

## 2020-02-24 RX ORDER — CYCLOBENZAPRINE HCL 10 MG
10 TABLET ORAL 3 TIMES DAILY PRN
COMMUNITY
End: 2022-11-18

## 2020-02-25 ENCOUNTER — TELEPHONE (OUTPATIENT)
Dept: INTERNAL MEDICINE | Facility: CLINIC | Age: 65
End: 2020-02-25

## 2020-02-25 NOTE — TELEPHONE ENCOUNTER
Pt called and stated that she has foul smelling urine, milky white, and burning while urinating.  Pt states that since catheter was removed her symptoms are getting worse.  Pt states that she is urinating a lot in small amounts and has stated that she is having discharge and itching like a yeast infection. Pt asks if she should start taking the ciprofloxacn that she was taken off of from negative urine test.    Pt callback: [phone]     Please advise.

## 2020-03-11 ENCOUNTER — TELEPHONE (OUTPATIENT)
Dept: INTERNAL MEDICINE | Facility: CLINIC | Age: 65
End: 2020-03-11

## 2020-03-11 NOTE — TELEPHONE ENCOUNTER
PT (KATERINA NAYAK) HAS REQUESTED TO BE DISMISSED FROM [facility], BUT  WANTS TO CONTINUE WITH PHYSICAL THERAPY, THIS CALL IS JUST A NOTIFICATION   RD FROM HOME HEALTH WILL BE FAXING THE ORDER FOR OUR RECORDS, NO CALL BACK NEEDED

## 2020-03-18 ENCOUNTER — APPOINTMENT (OUTPATIENT)
Dept: DIABETES SERVICES | Facility: HOSPITAL | Age: 65
End: 2020-03-18

## 2020-03-27 ENCOUNTER — TELEPHONE (OUTPATIENT)
Dept: ONCOLOGY | Facility: CLINIC | Age: 65
End: 2020-03-27

## 2020-03-27 NOTE — TELEPHONE ENCOUNTER
Due to the coronavirus pandemic I explained to the pt that Dr. Robledo is trying to limit as many people as possible to having to come to the office.  She will be calling patients by telephone or doing a live video chat.   Called pt to notify her of scheduled telephone encounter with Dr. Robledo per SUBHASH Garcia.  Pt does not have MyChart and states she isn't good with technology so is unable to do a video encounter.  She will have a telephone encounter with Dr. Robledo Thursday, 4/2/2020 @ 10 AM.  Pt verbalized understanding and will keep the phone nearby.

## 2020-04-02 ENCOUNTER — TELEMEDICINE (OUTPATIENT)
Dept: ONCOLOGY | Facility: CLINIC | Age: 65
End: 2020-04-02

## 2020-04-02 DIAGNOSIS — D75.1 ERYTHROCYTOSIS: Primary | ICD-10-CM

## 2020-04-02 DIAGNOSIS — R79.9 ABNORMAL FINDING OF BLOOD CHEMISTRY, UNSPECIFIED: ICD-10-CM

## 2020-04-02 PROCEDURE — 99213 OFFICE O/P EST LOW 20 MIN: CPT | Performed by: INTERNAL MEDICINE

## 2020-04-02 NOTE — PROGRESS NOTES
Visit conducted via telephone.  Start time 10:04.  Finish 10:14.      PROBLEM LIST:  1. Elevated ferritin  2. NAFLD  3. Gastroparesis  4. DM2    Subjective     CHIEF COMPLAINT: elevated ferritin and hgb    HISTORY OF PRESENT ILLNESS:   Yi Nayak returns for follow-up.      She has had some back pain related to a surgery in January.  She had a schwannoma removed from her spinal column.  She still has issues with bowel and bladder.  She is doing physical therapy and says that her surgeon thinks that she is doing extremely well.    She has not had any infections, fevers, or shortness of breath.  She denies any itching of the skin, headaches, or vision changes.    Past Medical History, Past Surgical History, Social History, Family History have been reviewed and are without significant changes except as mentioned.    Review of Systems   A comprehensive 14 point review of systems was performed and was negative except as mentioned.    Medications:  The current medication list was reviewed in the EMR    ALLERGIES:    Allergies   Allergen Reactions   • Ace Inhibitors Cough   • Amlodipine Diarrhea   • Beta Adrenergic Blockers Rash   • Cyclobenzaprine Rash   • Hydrochlorothiazide Rash   • Hyzaar [Losartan Potassium-Hctz] Rash   • Latex Rash   • Losartan Rash   • Metformin Rash   • Penicillins Rash   • Pioglitazone Rash   • Spironolactone Rash       Objective      LMP  (LMP Unknown)      Performance Status: 0    Neuro: alert and oriented  Psych: mood and affect appropriate      RECENT LABS:  Results for YI NAYAK (MRN [id number]) as of 9/19/2019 12:08   Ref. Range 8/29/2019 13:50   Iron Latest Ref Range: 37 - 145 mcg/dL 85   Ferritin Latest Ref Range: 13.00 - 150.00 ng/mL 235.10 (H)   Iron Saturation Latest Ref Range: 20 - 50 % 21   Transferrin Latest Ref Range: 200 - 360 mg/dL 275   TIBC Latest Ref Range: 298 - 536 mcg/dL 410         Results for YI NAYAK (MRN [id number]) as of 9/19/2019 12:08   Ref. Range  8/29/2019 13:50   WBC Latest Ref Range: 3.40 - 10.80 10*3/mm3 6.40   RBC Latest Ref Range: 3.77 - 5.28 10*6/mm3 5.46 (H)   Hemoglobin Latest Ref Range: 12.0 - 15.9 g/dL 15.7   Hematocrit Latest Ref Range: 34.0 - 46.6 % 49.1 (H)   RDW Latest Ref Range: 12.3 - 15.4 % 13.8   MCV Latest Ref Range: 79.0 - 97.0 fL 90.0   MCH Latest Ref Range: 26.6 - 33.0 pg 28.8   MCHC Latest Ref Range: 31.5 - 35.7 g/dL 32.0   MPV Latest Ref Range: 6.0 - 12.0 fL 8.0   Platelets Latest Ref Range: 140 - 450 10*3/mm3 287   Neutrophil Rel % Latest Ref Range: 42.7 - 76.0 % 64.2   Lymphocyte Rel % Latest Ref Range: 19.6 - 45.3 % 31.7   Monocyte Rel % Latest Ref Range: 5.0 - 12.0 % 4.1 (L)         Assessment/Plan   Yi Garcia is a 64 y.o. year old female with elevated ferritin and erythrocytosis.    Elevated ferritin: Mild ferritin elevation on recent blood work.  There is no urgency to recheck this at this point.    Erythrocytosis: No symptoms of increasing erythrocytosis.  We will plan to recheck labs in July.    Follow-up in 3 months.                            Lexie Robledo MD  [facility]    4/2/2020          CC:

## 2020-04-14 ENCOUNTER — TELEPHONE (OUTPATIENT)
Dept: INTERNAL MEDICINE | Facility: CLINIC | Age: 65
End: 2020-04-14

## 2020-04-14 NOTE — TELEPHONE ENCOUNTER
PT REQUESTING THAT HER HOME HEALTH BE PUT ON HOLD FOR TWO WEEKS DUE TO COVID-19.  TO LIMIT PEOPLE COMING IN AND OUT OF THE HOME. [facility] ASKED FOR A VERBAL ORDER TO STOP GOING BUT TO CHECK ON HER VIA PHONE.        MARLEN'S CONTACT [phone]

## 2020-04-15 NOTE — TELEPHONE ENCOUNTER
Verbal order given hold visits for two weeks per patient request. Stated that they will be contacting patient via phone to check in with her.

## 2020-04-20 ENCOUNTER — OFFICE VISIT (OUTPATIENT)
Dept: ENDOCRINOLOGY | Facility: CLINIC | Age: 65
End: 2020-04-20

## 2020-04-20 VITALS — HEIGHT: 61 IN | BODY MASS INDEX: 33.23 KG/M2 | WEIGHT: 176 LBS

## 2020-04-20 DIAGNOSIS — E11.649 CONTROLLED TYPE 2 DIABETES MELLITUS WITH HYPOGLYCEMIA, WITHOUT LONG-TERM CURRENT USE OF INSULIN (HCC): Primary | ICD-10-CM

## 2020-04-20 DIAGNOSIS — I10 BENIGN ESSENTIAL HYPERTENSION: ICD-10-CM

## 2020-04-20 DIAGNOSIS — E78.2 MIXED HYPERLIPIDEMIA: ICD-10-CM

## 2020-04-20 PROBLEM — E11.9 CONTROLLED TYPE 2 DIABETES MELLITUS WITHOUT COMPLICATION, WITHOUT LONG-TERM CURRENT USE OF INSULIN (HCC): Status: ACTIVE | Noted: 2018-02-20

## 2020-04-20 PROCEDURE — 99441 PR PHYS/QHP TELEPHONE EVALUATION 5-10 MIN: CPT | Performed by: PHYSICIAN ASSISTANT

## 2020-04-20 RX ORDER — MULTIVITAMIN WITH IRON
TABLET ORAL
COMMUNITY
End: 2021-08-10

## 2020-04-20 RX ORDER — BETHANECHOL CHLORIDE 50 MG/1
50 TABLET ORAL 2 TIMES DAILY
COMMUNITY
Start: 2020-03-27

## 2020-04-20 NOTE — PROGRESS NOTES
"Phone visit conducted to comply with patient safety concerns in accordance with CDC recommendations for the COVID-19 pandemic.     Chief Complaint  F/u for Diabetes Mellitus.    HPI   Yi Garcia is a 64 y.o. female with chronic back pain and HTN- on multiple antihypertensives followed by cardiology in [place], who is here today for f/u of Diabetes Mellitus type 2. The initial diagnosis of diabetes was made approximately 2016.    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      A1C- 6.8 (1/17/2020), 7.4 (10/14/19), 7.4 (7/12/19), 7.6 (4/15/19), 7.2 (1/7/19), 7.7 (9/14/18), 6.7 (6/13/18), 8.9 (2/2018)    Diabetic complications: gastroparesis followed by GI at [facility]  Eye exam current (within one year): yes- no retinopathy  Foot care and dental care: discussed    Current diabetic medications include:  Jardiance 25mg daily - takes in the morning  Glimepiride 4mg daily- takes at bedtime, doesn't eat after this  Januvia 100mg daily - takes in the morning  ACEI/ARB: losartan-hctz  Statin: intolerant to statins, including crestor 5mg, Repatha and Praluent not covered by insurance.     Past medications: metformin (???caused blurred vision, went away when stopped taking metformin), invokana (insurance didn't cover but thinks this controlled glucose better), actos     Diabetic Monitoring  - pt reports she checks bs fasting only, most readings 130-150, she feels shaky after breakfast (takes glimepiride before breakfast)    Nutrition:     Current diet: improving diet last 2 weeks. Eats out on Sunday, but cooks rest of week. Breakfast is egg sandwich on flatbread. Likes fruit. No sugary drinks. Got rid of candy in the house. No cakes. Weakness is break, pasta, potatoes. Likes vegetables.  Current exercise: none  Seen RD in past: yes    The following portions of the patient's history were reviewed and updated by me as appropriate: allergies, current medications, past family history, past social " history, past surgical history and problem list.      Past Medical History:   Diagnosis Date   • Anxiety    • Benign essential hypertension    • Cervical disc disorder    • Chronic pain disorder    • Colon polyps    • Complication of device    • Decreased libido    • Diabetes mellitus (CMS/HCC)    • Dizziness    • Encounter for long-term (current) use of medications    • Extremity pain    • Fatigue    • Hip pain    • History of alopecia    • History of backache    • History of colonoscopy     Fiberoptic   • History of diabetes mellitus    • History of insomnia    • History of mastoiditis    • History of migraine headaches    • History of urinary stone    • Infection of kidney    • Insomnia    • Joint pain    • Kidney infection    • Low back pain    • Lumbar radiculopathy    • Lumbar radiculopathy    • Lumbosacral disc disease    • Migraine    • Myofascial pain syndrome    • Neck pain    • Palpitations    • Sleep apnea    • Spinal cord stimulator status    • Stress reaction, emotional    • Urinary incontinence    • Urinary tract infection    • Visit for screening mammogram     Description: 08/28/2009   • Vitamin D deficiency        Medications    Current Outpatient Medications:   •  albuterol sulfate  (90 Base) MCG/ACT inhaler, Inhale 2 puffs Every 6 (Six) Hours As Needed for Wheezing or Shortness of Air., Disp: 1 inhaler, Rfl: 11  •  bethanechol (URECHOLINE) 50 MG tablet, Take 50 mg by mouth 3 (Three) Times a Day., Disp: , Rfl:   •  bisoprolol (ZEBeta) 5 MG tablet, Take 5 mg by mouth Daily., Disp: , Rfl:   •  cyclobenzaprine (FLEXERIL) 10 MG tablet, Take 10 mg by mouth 3 (Three) Times a Day As Needed for Muscle Spasms., Disp: , Rfl:   •  Desvenlafaxine Succinate ER (PRISTIQ) 25 MG tablet sustained-release 24 hour, Take 1 tablet by mouth Daily., Disp: 30 tablet, Rfl: 5  •  Empagliflozin (JARDIANCE) 25 MG tablet, Take 25 mg by mouth Daily., Disp: 90 tablet, Rfl: 1  •  estradiol (ESTRACE VAGINAL) 0.1 MG/GM  vaginal cream, Insert 2 g into the vagina 1 (One) Time Per Week., Disp: 42.5 g, Rfl: 5  •  furosemide (LASIX) 20 MG tablet, Take 20 mg by mouth 2 (Two) Times a Day As Needed., Disp: , Rfl:   •  glimepiride (AMARYL) 4 MG tablet, Take 1 po qd before a meal, Disp: 90 tablet, Rfl: 1  •  losartan (COZAAR) 100 MG tablet, Take 100 mg by mouth Daily., Disp: , Rfl:   •  lubiprostone (AMITIZA) 24 MCG capsule, Take 1 capsule by mouth 2 (Two) Times a Day With Meals., Disp: 180 capsule, Rfl: 1  •  NIFEdipine CC (ADALAT CC) 90 MG 24 hr tablet, Take 60 mg by mouth Daily., Disp: , Rfl:   •  omeprazole (priLOSEC) 40 MG capsule, Take 1 capsule by mouth Daily., Disp: 90 capsule, Rfl: 3  •  oxyCODONE-acetaminophen (PERCOCET) 5-325 MG per tablet, Take 1 tablet by mouth Every 6 (Six) Hours As Needed., Disp: , Rfl:   •  pyridoxine (VITAMIN B-6) 100 MG tablet, Take 100 mg by mouth Daily., Disp: , Rfl:   •  rOPINIRole (REQUIP) 1 MG tablet, Take 1 tablet by mouth Every Night. Take 1 hour before bedtime., Disp: 90 tablet, Rfl: 3  •  SITagliptin (JANUVIA) 100 MG tablet, Take 1 tablet by mouth Daily., Disp: 90 tablet, Rfl: 1  •  topiramate (TOPAMAX) 50 MG tablet, TAKE ONE TABLET BY MOUTH TWICE DAILY (Patient taking differently: TAKE ONE TABLET BY MOUTH as needed), Disp: 180 tablet, Rfl: 0  •  traMADol (ULTRAM) 50 MG tablet, TAKE 1 TABLET PO TID PRN FOR SEVERE BREAKTHROUGH PAIN, Disp: 270 tablet, Rfl: 0  •  traZODone (DESYREL) 50 MG tablet, Take 1 to 2 tabs po hs prn, Disp: 180 tablet, Rfl: 3  •  vitamin B-6 (PYRIDOXINE) 100 MG tablet, pyridoxine (vitamin B6), Disp: , Rfl:     Review of Systems  Review of Systems   Constitutional: Positive for fatigue. Negative for chills, fever and unexpected weight change.   HENT: Negative for ear pain, hearing loss, nosebleeds, rhinorrhea and sore throat.    Eyes: Negative for pain, discharge, redness and itching.   Respiratory: Negative for cough, shortness of breath and wheezing.    Cardiovascular: Negative  "for chest pain and palpitations.   Gastrointestinal: Negative for abdominal pain, blood in stool, constipation and diarrhea.   Endocrine: Negative for cold intolerance, heat intolerance and polydipsia.   Genitourinary: Negative for dysuria, frequency, hematuria, pelvic pain, vaginal bleeding and vaginal discharge.   Musculoskeletal: Positive for back pain. Negative for arthralgias, gait problem, joint swelling and myalgias.   Skin: Negative for rash.   Allergic/Immunologic: Negative.    Neurological: Negative for dizziness, syncope, weakness, numbness and headaches.   Hematological: Negative for adenopathy. Does not bruise/bleed easily.   Psychiatric/Behavioral: Negative for sleep disturbance and suicidal ideas. The patient is not nervous/anxious.         Physical Exam    Ht 154.9 cm (61\")   Wt 79.8 kg (176 lb)   LMP  (LMP Unknown)   Breastfeeding No   BMI 33.25 kg/m² Body mass index is 33.25 kg/m².  Physical Exam   Constitutional: No distress.   AAOx3   Psychiatric: She has a normal mood and affect. Her behavior is normal. Judgment and thought content normal.   limited exam due to phone visit    Labs and Imaging   Lab Results   Component Value Date    HGBA1C 6.8 01/17/2020    HGBA1C 7.4 10/14/2019    HGBA1C 7.4 07/12/2019     No visits with results within 1 Month(s) from this visit.   Latest known visit with results is:   Office Visit on 02/14/2020   Component Date Value Ref Range Status   • Glucose 02/14/2020 109  70 - 130 mg/dL Final   • Color 02/14/2020 Lavinia  Yellow, Straw, Dark Yellow, Lavinia Final   • Clarity, UA 02/14/2020 Clear  Clear Final   • Specific Gravity  02/14/2020 1.015  1.005 - 1.030 Final   • pH, Urine 02/14/2020 7.0  5.0 - 8.0 Final   • Leukocytes 02/14/2020 Negative  Negative Final   • Nitrite, UA 02/14/2020 Negative  Negative Final   • Protein, POC 02/14/2020 Trace* Negative mg/dL Final   • Glucose, UA 02/14/2020 3+* Negative, 1000 mg/dL (3+) mg/dL Final   • Ketones, UA 02/14/2020 Negative  " Negative Final   • Urobilinogen, UA 02/14/2020 Normal  Normal Final   • Bilirubin 02/14/2020 Negative  Negative Final   • Blood, UA 02/14/2020 3+* Negative Final   • Urine Culture 02/14/2020 No growth   Final       Assessment / Plan   Yi was seen today for diabetes.    Diagnoses and all orders for this visit:    Controlled type 2 diabetes mellitus with hypoglycemia, without long-term current use of insulin (CMS/[facility])    Mixed hyperlipidemia    Benign essential hypertension        Diabetes Mellitus 2 is under improved control.  -A1c 6.8 last visit, no a1c today due to phone visit  -she reports some shakiness after eating breakfast even though she takes glimepiride before eating  -glimepiride 4mg- take only 1/2 tab before breakfast  -continue jardiance to 25mg daily.   -continue januvia 100mg daily  -intolerant to metformin  -bring meter to all visits  -call if any issues  -fasting labs next visit    1.  Diet: 3-4 carb servings per meal for females, 4-5 carb servings per meal for males  Spread carb intake throughout the day  Increase lean protein and vegetable intake  Avoid sugary drinks and processed carbs including crackers, cookies, cakes  2.  Exercise: Recommend at least 30 minutes of exercise daily, at least 5 days per week. Increase exercise gradually.   3.  Blood Glucose Goal: Blood glucose goal <150 fasting, <180 2 hr postprandial  4.  Microalbumin due 7/2020  5.  Education performed during this visit: long term diabetic complications discussed. , annual eye examinations at Ophthalmology discussed, dental hygiene discussed  and foot care reviewed., home glucose monitoring emphasized, all medications, side effects and compliance discussed carefully and Hypoglycemia management and prevention reviewed. Reviewed ‘ABCs’ of diabetes management (respective goals in parentheses):  A1C (<7), blood pressure (<130/80), and cholesterol (LDL <100, if CVD <70).    Hyperlipidemia  -intolerant to statins due to myalgias,  did not tolerate crestor 5mg  -praluent/repatha not covered by insurance    HTN  -controlled  -continue bisoprolol, losartan         There are no Patient Instructions on file for this visit.    Follow up: Return in about 3 months (around 7/20/2020).    Discussed the nature of the disease including, risks, complications, implications, management, safe and proper use of medications. Encouraged therapeutic lifestyle changes including low calorie diet with plenty of fruits and vegetables, daily exercise, medication compliance, and keeping scheduled follow up appointments with me and any other providers. Encouraged patient to have appointment for complete physical, fasting labs, appropriate screenings, and immunizations on an annual basis.    Total time of discussion was 10 minutes.      Wero Hernandez PA-C

## 2020-04-29 ENCOUNTER — TELEPHONE (OUTPATIENT)
Dept: INTERNAL MEDICINE | Facility: CLINIC | Age: 65
End: 2020-04-29

## 2020-04-29 NOTE — TELEPHONE ENCOUNTER
MARLEN STATED THAT THE PT WOULD STILL LIKE TO CONTINUE HER COVID-19 CONCERNS FOR TWO MORE WEEKS.  SHE IS STILL NOT COMFORTABLE WITH PEOPLE COMING INTO HER HOME.    MARLEN CONTACT [phone]  [facility]

## 2020-05-06 ENCOUNTER — HOSPITAL ENCOUNTER (OUTPATIENT)
Dept: DIABETES SERVICES | Facility: HOSPITAL | Age: 65
Setting detail: RECURRING SERIES
Discharge: HOME | End: 2020-05-06

## 2020-05-14 ENCOUNTER — TELEPHONE (OUTPATIENT)
Dept: INTERNAL MEDICINE | Facility: CLINIC | Age: 65
End: 2020-05-14

## 2020-05-14 NOTE — TELEPHONE ENCOUNTER
MARLEN FROM [facility] HAS BEEN ON HOLD WITH PT SERVICES DUE TO COVID-19    PT IS REQUESTING TO BE ON HOLD FOR TWO MORE WEEKS    MARLEN IS REQUESTING A VERBAL ORDER FOR THIS    PLEASE CONTACT MARLEN AT: [phone]

## 2020-05-20 ENCOUNTER — LAB (OUTPATIENT)
Dept: LAB | Facility: HOSPITAL | Age: 65
End: 2020-05-20

## 2020-05-20 ENCOUNTER — OFFICE VISIT (OUTPATIENT)
Dept: ONCOLOGY | Facility: CLINIC | Age: 65
End: 2020-05-20

## 2020-05-20 VITALS
TEMPERATURE: 97 F | DIASTOLIC BLOOD PRESSURE: 65 MMHG | OXYGEN SATURATION: 95 % | WEIGHT: 177 LBS | HEART RATE: 74 BPM | RESPIRATION RATE: 18 BRPM | HEIGHT: 61 IN | SYSTOLIC BLOOD PRESSURE: 128 MMHG | BODY MASS INDEX: 33.42 KG/M2

## 2020-05-20 DIAGNOSIS — R79.89 ELEVATED FERRITIN, HEMOGLOBIN, AND RED BLOOD CELL COUNT (HCC): ICD-10-CM

## 2020-05-20 DIAGNOSIS — D58.2 ELEVATED FERRITIN, HEMOGLOBIN, AND RED BLOOD CELL COUNT (HCC): ICD-10-CM

## 2020-05-20 DIAGNOSIS — R71.8 ELEVATED FERRITIN, HEMOGLOBIN, AND RED BLOOD CELL COUNT (HCC): ICD-10-CM

## 2020-05-20 DIAGNOSIS — D75.1 ERYTHROCYTOSIS: Primary | ICD-10-CM

## 2020-05-20 LAB
ERYTHROCYTE [DISTWIDTH] IN BLOOD BY AUTOMATED COUNT: 14.2 % (ref 12.3–15.4)
FERRITIN SERPL-MCNC: 104.4 NG/ML (ref 13–150)
HCT VFR BLD AUTO: 51.7 % (ref 34–46.6)
HGB BLD-MCNC: 16.4 G/DL (ref 12–15.9)
IRON 24H UR-MRATE: 73 MCG/DL (ref 37–145)
IRON SATN MFR SERPL: 17 % (ref 20–50)
LYMPHOCYTES # BLD AUTO: 1.8 10*3/MM3 (ref 0.7–3.1)
LYMPHOCYTES NFR BLD AUTO: 24.5 % (ref 19.6–45.3)
MCH RBC QN AUTO: 27.3 PG (ref 26.6–33)
MCHC RBC AUTO-ENTMCNC: 31.8 G/DL (ref 31.5–35.7)
MCV RBC AUTO: 86 FL (ref 79–97)
MONOCYTES # BLD AUTO: 0.5 10*3/MM3 (ref 0.1–0.9)
MONOCYTES NFR BLD AUTO: 6.5 % (ref 5–12)
NEUTROPHILS # BLD AUTO: 5 10*3/MM3 (ref 1.7–7)
NEUTROPHILS NFR BLD AUTO: 69 % (ref 42.7–76)
PLATELET # BLD AUTO: 289 10*3/MM3 (ref 140–450)
PMV BLD AUTO: 8.3 FL (ref 6–12)
RBC # BLD AUTO: 6.01 10*6/MM3 (ref 3.77–5.28)
TIBC SERPL-MCNC: 426 MCG/DL (ref 298–536)
TRANSFERRIN SERPL-MCNC: 286 MG/DL (ref 200–360)
WBC NRBC COR # BLD: 7.2 10*3/MM3 (ref 3.4–10.8)

## 2020-05-20 PROCEDURE — 82728 ASSAY OF FERRITIN: CPT

## 2020-05-20 PROCEDURE — 85025 COMPLETE CBC W/AUTO DIFF WBC: CPT

## 2020-05-20 PROCEDURE — 36415 COLL VENOUS BLD VENIPUNCTURE: CPT

## 2020-05-20 PROCEDURE — 99213 OFFICE O/P EST LOW 20 MIN: CPT | Performed by: NURSE PRACTITIONER

## 2020-05-20 PROCEDURE — 83540 ASSAY OF IRON: CPT

## 2020-05-20 PROCEDURE — 84466 ASSAY OF TRANSFERRIN: CPT

## 2020-05-20 NOTE — PROGRESS NOTES
PROBLEM LIST:  1. Elevated ferritin  2. NAFLD  3. Gastroparesis  4. DM2    Subjective     CHIEF COMPLAINT: elevated ferritin and hgb    HISTORY OF PRESENT ILLNESS:   Yi Garcia returns for follow-up.  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  She had been doing physical therapy at home but has stopped recently due to concerns with the coronavirus.  She still has issues with bowel and bladder.  Otherwise she is doing well.  She has not had any fevers, infections, or shortness of breath.  She does have sleep apnea but could not tolerate a CPAP.  She states she has been sleeping better since her surgery in January.      Past Medical History, Past Surgical History, Social History, Family History have been reviewed and are without significant changes except as mentioned.    Review of Systems   Constitutional: Negative for appetite change, fatigue, fever and unexpected weight change.   HENT: Negative for mouth sores, sore throat and trouble swallowing.    Respiratory: Negative for cough, shortness of breath and wheezing.    Cardiovascular: Negative for chest pain, palpitations and leg swelling.   Gastrointestinal: Negative for abdominal distention, abdominal pain, constipation, diarrhea, nausea and vomiting.   Genitourinary: Negative for dysuria and frequency.        Bowel and bladder issues since back surgery   Musculoskeletal: Positive for gait problem. Negative for arthralgias.   Skin: Negative for pallor, rash and wound.   Neurological: Negative for dizziness and weakness.   Hematological: Does not bruise/bleed easily.   Psychiatric/Behavioral: Negative for confusion and sleep disturbance. The patient is not nervous/anxious.       A comprehensive 14 point review of systems was performed and was negative except as mentioned.    Medications:  The current medication list was reviewed in the EMR    ALLERGIES:    Allergies   Allergen Reactions   • Ace Inhibitors Cough  "  • Amlodipine Diarrhea   • Beta Adrenergic Blockers Rash   • Cyclobenzaprine Rash   • Hydrochlorothiazide Rash   • Hyzaar [Losartan Potassium-Hctz] Rash   • Latex Rash   • Losartan Rash   • Metformin Rash   • Penicillins Rash   • Pioglitazone Rash   • Spironolactone Rash       Objective      /65   Pulse 74   Temp 97 °F (36.1 °C)   Resp 18   Ht 154.9 cm (61\")   Wt 80.3 kg (177 lb)   LMP  (LMP Unknown)   SpO2 95%   BMI 33.44 kg/m²      Performance Status: 0    Constitutional: No acute distress, awake, alert  HENT: NCAT, mucous membranes moist  Respiratory: Clear to auscultation bilaterally, respiratory effort normal   Cardiovascular: RRR, no murmurs, rubs, or gallops, palpable pedal pulses bilaterally  Gastrointestinal: Positive bowel sounds, soft, nontender, nondistended  Musculoskeletal: No bilateral ankle edema  Psychiatric: Appropriate affect, cooperative  Neurologic: Oriented x 3, strength symmetric in all extremities, Cranial Nerves grossly intact to confrontation, speech clear  Skin: No rashes    Lab Results   Component Value Date    HGB 16.4 (H) 05/20/2020    HCT 51.7 (H) 05/20/2020    MCV 86.0 05/20/2020     05/20/2020    WBC 7.20 05/20/2020    NEUTROABS 5.00 05/20/2020    LYMPHSABS 1.80 05/20/2020    MONOSABS 0.50 05/20/2020    EOSABS 0.24 01/21/2020    BASOSABS 0.09 01/21/2020     Results for YI NAYAK (MRN [id number]) as of 5/20/2020 13:39   Ref. Range 5/20/2020 10:07   Iron Latest Ref Range: 37 - 145 mcg/dL 73   Ferritin Latest Ref Range: 13.00 - 150.00 ng/mL 104.40   Iron Saturation Latest Ref Range: 20 - 50 % 17 (L)   Transferrin Latest Ref Range: 200 - 360 mg/dL 286   TIBC Latest Ref Range: 298 - 536 mcg/dL 426     Hemochromatosis Gene   Hemochromatosis Mutation   Collected: 08/29/19 1350   Resulting lab: [facility]   Value: Comment   Comment: NO MUTATION IDENTIFIED        Assessment/Plan   Yi Nayak is a 64 y.o. year old female with elevated ferritin and " erythrocytosis.    Elevated ferritin:  No evidence of hereditary hemochromatosis based on genetic testing.  Ferritin level is normal on labs today.      Erythrocytosis:  She continues to have mildly elevated hematocrit and hemoglobin.  She had a elevated erythropoietin level and has untreated sleep apnea.  Encouraged her to follow up regarding CPAP treatment.    We will see her back in 3 months to recheck labs.      I spent 15 minutes with the patient. I spent > 50% percent of this time counseling and discussing prognosis, diagnostic testing, evaluation, current status and management.    Cheri Paiz, APRN  [facility]    5/20/2020

## 2020-06-22 ENCOUNTER — HOSPITAL ENCOUNTER (EMERGENCY)
Age: 65
Discharge: HOME | End: 2020-06-22
Payer: MEDICARE

## 2020-06-22 VITALS — BODY MASS INDEX: 32.1 KG/M2

## 2020-06-22 VITALS
SYSTOLIC BLOOD PRESSURE: 138 MMHG | HEART RATE: 76 BPM | DIASTOLIC BLOOD PRESSURE: 72 MMHG | TEMPERATURE: 98.24 F | OXYGEN SATURATION: 96 % | RESPIRATION RATE: 18 BRPM

## 2020-06-22 VITALS
SYSTOLIC BLOOD PRESSURE: 138 MMHG | OXYGEN SATURATION: 96 % | DIASTOLIC BLOOD PRESSURE: 72 MMHG | HEART RATE: 76 BPM | TEMPERATURE: 98.3 F | RESPIRATION RATE: 18 BRPM

## 2020-06-22 DIAGNOSIS — S98.012A: Primary | ICD-10-CM

## 2020-06-22 DIAGNOSIS — Z88.8: ICD-10-CM

## 2020-06-22 DIAGNOSIS — E78.5: ICD-10-CM

## 2020-06-22 DIAGNOSIS — Z90.79: ICD-10-CM

## 2020-06-22 DIAGNOSIS — E11.9: ICD-10-CM

## 2020-06-22 DIAGNOSIS — Z88.0: ICD-10-CM
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
## 2020-06-22 DIAGNOSIS — I10: ICD-10-CM

## 2020-06-22 PROCEDURE — 99201: CPT

## 2020-06-22 PROCEDURE — 99203 OFFICE O/P NEW LOW 30 MIN: CPT

## 2020-06-22 PROCEDURE — 29515 APPLICATION SHORT LEG SPLINT: CPT

## 2020-06-22 PROCEDURE — 73610 X-RAY EXAM OF ANKLE: CPT

## 2020-06-22 PROCEDURE — 73590 X-RAY EXAM OF LOWER LEG: CPT

## 2020-07-20 ENCOUNTER — LAB (OUTPATIENT)
Dept: LAB | Facility: HOSPITAL | Age: 65
End: 2020-07-20

## 2020-07-20 ENCOUNTER — OFFICE VISIT (OUTPATIENT)
Dept: INTERNAL MEDICINE | Facility: CLINIC | Age: 65
End: 2020-07-20

## 2020-07-20 VITALS
SYSTOLIC BLOOD PRESSURE: 130 MMHG | OXYGEN SATURATION: 95 % | HEIGHT: 61 IN | HEART RATE: 74 BPM | BODY MASS INDEX: 33.99 KG/M2 | WEIGHT: 180 LBS | TEMPERATURE: 98 F | DIASTOLIC BLOOD PRESSURE: 80 MMHG

## 2020-07-20 DIAGNOSIS — Z78.0 POST-MENOPAUSAL: ICD-10-CM

## 2020-07-20 DIAGNOSIS — E55.9 VITAMIN D DEFICIENCY: ICD-10-CM

## 2020-07-20 DIAGNOSIS — Z00.00 INITIAL MEDICARE ANNUAL WELLNESS VISIT: Primary | ICD-10-CM

## 2020-07-20 DIAGNOSIS — Z00.00 HEALTHCARE MAINTENANCE: ICD-10-CM

## 2020-07-20 DIAGNOSIS — E11.65 TYPE 2 DIABETES MELLITUS WITH HYPERGLYCEMIA, WITHOUT LONG-TERM CURRENT USE OF INSULIN (HCC): ICD-10-CM

## 2020-07-20 DIAGNOSIS — M51.26 HERNIATED LUMBAR INTERVERTEBRAL DISC: ICD-10-CM

## 2020-07-20 DIAGNOSIS — Z91.89 AT RISK FOR OSTEOPOROSIS: ICD-10-CM

## 2020-07-20 DIAGNOSIS — K21.9 GASTROESOPHAGEAL REFLUX DISEASE WITHOUT ESOPHAGITIS: ICD-10-CM

## 2020-07-20 DIAGNOSIS — G43.709 CHRONIC MIGRAINE WITHOUT AURA WITHOUT STATUS MIGRAINOSUS, NOT INTRACTABLE: ICD-10-CM

## 2020-07-20 DIAGNOSIS — F43.21 SITUATIONAL DEPRESSION: ICD-10-CM

## 2020-07-20 DIAGNOSIS — R74.8 ELEVATED ALKALINE PHOSPHATASE LEVEL: ICD-10-CM

## 2020-07-20 DIAGNOSIS — M96.1 POSTLAMINECTOMY SYNDROME OF LUMBAR REGION: ICD-10-CM

## 2020-07-20 DIAGNOSIS — E55.9 VITAMIN D INSUFFICIENCY: ICD-10-CM

## 2020-07-20 DIAGNOSIS — E78.2 MIXED HYPERLIPIDEMIA: ICD-10-CM

## 2020-07-20 LAB
25(OH)D3 SERPL-MCNC: 29.6 NG/ML (ref 30–100)
ALBUMIN SERPL-MCNC: 4.5 G/DL (ref 3.5–5.2)
ALBUMIN/GLOB SERPL: 1.3 G/DL
ALP SERPL-CCNC: 153 U/L (ref 39–117)
ALT SERPL W P-5'-P-CCNC: 63 U/L (ref 1–33)
ANION GAP SERPL CALCULATED.3IONS-SCNC: 9.9 MMOL/L (ref 5–15)
AST SERPL-CCNC: 39 U/L (ref 1–32)
BILIRUB SERPL-MCNC: 0.4 MG/DL (ref 0–1.2)
BUN SERPL-MCNC: 14 MG/DL (ref 8–23)
BUN/CREAT SERPL: 23.7 (ref 7–25)
CALCIUM SPEC-SCNC: 10.3 MG/DL (ref 8.6–10.5)
CHLORIDE SERPL-SCNC: 99 MMOL/L (ref 98–107)
CHOLEST SERPL-MCNC: 249 MG/DL (ref 0–200)
CO2 SERPL-SCNC: 28.1 MMOL/L (ref 22–29)
CREAT SERPL-MCNC: 0.59 MG/DL (ref 0.57–1)
GFR SERPL CREATININE-BSD FRML MDRD: 103 ML/MIN/1.73
GLOBULIN UR ELPH-MCNC: 3.6 GM/DL
GLUCOSE SERPL-MCNC: 190 MG/DL (ref 65–99)
HBA1C MFR BLD: 8.5 % (ref 4.8–5.6)
HDLC SERPL-MCNC: 50 MG/DL (ref 40–60)
LDLC SERPL CALC-MCNC: 157 MG/DL (ref 0–100)
LDLC/HDLC SERPL: 3.13 {RATIO}
POTASSIUM SERPL-SCNC: 4.3 MMOL/L (ref 3.5–5.2)
PROT SERPL-MCNC: 8.1 G/DL (ref 6–8.5)
SODIUM SERPL-SCNC: 137 MMOL/L (ref 136–145)
TRIGL SERPL-MCNC: 212 MG/DL (ref 0–150)
VLDLC SERPL-MCNC: 42.4 MG/DL (ref 5–40)

## 2020-07-20 PROCEDURE — 82306 VITAMIN D 25 HYDROXY: CPT | Performed by: NURSE PRACTITIONER

## 2020-07-20 PROCEDURE — G0438 PPPS, INITIAL VISIT: HCPCS | Performed by: NURSE PRACTITIONER

## 2020-07-20 PROCEDURE — 85025 COMPLETE CBC W/AUTO DIFF WBC: CPT | Performed by: NURSE PRACTITIONER

## 2020-07-20 PROCEDURE — 80053 COMPREHEN METABOLIC PANEL: CPT | Performed by: NURSE PRACTITIONER

## 2020-07-20 PROCEDURE — 83036 HEMOGLOBIN GLYCOSYLATED A1C: CPT | Performed by: NURSE PRACTITIONER

## 2020-07-20 PROCEDURE — 80061 LIPID PANEL: CPT | Performed by: NURSE PRACTITIONER

## 2020-07-20 RX ORDER — PLECANATIDE 3 MG/1
TABLET ORAL
COMMUNITY
Start: 2020-06-22 | End: 2021-08-10

## 2020-07-20 RX ORDER — OMEPRAZOLE 40 MG/1
40 CAPSULE, DELAYED RELEASE ORAL DAILY
Qty: 90 CAPSULE | Refills: 3 | Status: SHIPPED | OUTPATIENT
Start: 2020-07-20 | End: 2021-08-09

## 2020-07-20 RX ORDER — TOPIRAMATE 50 MG/1
50 TABLET, FILM COATED ORAL DAILY PRN
Qty: 90 TABLET | Refills: 3 | Status: SHIPPED | OUTPATIENT
Start: 2020-07-20 | End: 2021-08-10 | Stop reason: SDUPTHER

## 2020-07-20 RX ORDER — TRAMADOL HYDROCHLORIDE 50 MG/1
TABLET ORAL
Qty: 90 TABLET | Refills: 2 | Status: SHIPPED | OUTPATIENT
Start: 2020-07-20 | End: 2021-01-05

## 2020-07-20 NOTE — PROGRESS NOTES
"Initial Medicare Wellness Visit   The ABC's of the Annual Wellness Visit    Chief Complaint   Patient presents with   • Medicare Wellness-Initial Visit   • Hypertension       HPI:  Yi Garcia, -1955, is a 64 y.o. female who presents for an Initial Medicare Wellness Visit.  Patient had recent fall with twisting of left ankle in a hole and had a left ankle fracture ; now wearing walking boot and using cane.  Describes having chronic bladder retention and unable to empty bladder completely after \"tumor removed from spine\".  Has to in and out cath herself twice daily.  Also has difficulty knowing when she needs to have a bowel movement with decreased sensation in rectum.  Positive constipation.  Has to frequently self stimulate for bowel movement.  Feels like something is \"hanging down in vagina\" pushing against her bladder.  She has chronic low back pain related to above as well as herniated lumbar disc and postlaminectomy syndrome of lumbar region.  She was previously followed by pain management Dr. Shawn abrams but she is no longer seeing him.  Had to change pain management docs in order to get stimulator removed.  She needs a refill on her tramadol.  She had an MRI done last week and she follows with Dr. Dial through [facility].  Migraines chronic has 1 to 2/month.  Uses Topamax to help with migraine pain.  Needs refill.  Followed by cardiologist Dr. Curt bermudez [facility]  Does admit to some increased anxiety and depression.  Positive life changes.  Positive increased stress at home.  Positive insomnia.  Her daughter has been staying with her for a few weeks and has helped having someone there to talk to.  Patient does not want to take any antidepressants.  No thoughts of self-harm or suicidal ideations.  Sees endocrinologist for diabetes management.  GERD chronic onset years.  Takes Prilosec without difficulties.  No melena, hematochezia, hematemesis, or early satiety.    Status post " hysterectomy 20+ years.  Needs a DEXA scan.  History of vitamin D insufficiency.  Does not routinely take any calcium or vitamin D supplement.  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    Current Medical Providers:  Patient Care Team:  Renetta Aguero APRN as PCP - General (Internal Medicine)  Rafael Velasquez MD as Consulting Physician (Pain Medicine)  Von Croft MD as Consulting Physician (Neurosurgery)  Emmie Stallworth MD as Consulting Physician (Internal Medicine)  Gigi Rojo PA-C as Physician Assistant (Physician Assistant)  Zaira Briggs APRN as Nurse Practitioner (Gastroenterology)  Wero Hernandez PA-C as Physician Assistant (Family Medicine)  Maurilio Simmons MD as Consulting Physician (Gastroenterology)    Health Habits and Functional and Cognitive Screening and Depression Screening:  Functional & Cognitive Status 7/20/2020   Do you have difficulty preparing food and eating? Yes   Do you have difficulty bathing yourself, getting dressed or grooming yourself? Yes   Do you have difficulty using the toilet? Yes   Do you have difficulty moving around from place to place? Yes   Do you have trouble with steps or getting out of a bed or a chair? Yes   Current Diet Well Balanced Diet   Dental Exam Up to date   Eye Exam Up to date   Exercise (times per week) 0 times per week   Current Exercise Activities Include None   Do you need help using the phone?  No   Are you deaf or do you have serious difficulty hearing?  No   Do you need help with transportation? Yes   Do you need help shopping? Yes   Do you need help preparing meals?  Yes   Do you need help with housework?  Yes   Do you need help with laundry? Yes   Do you need help taking your medications? No   Do you need help managing money? No   Do you ever drive or ride in a car without wearing a seat belt? No   Have you felt unusual stress, anger or loneliness  in the last month? No   Who do you live with? Spouse   If you need help, do you have trouble finding someone available to you? No   Have you been bothered in the last four weeks by sexual problems? No   Do you have difficulty concentrating, remembering or making decisions? Yes       Compared to one year ago, the patient feels her physical health is better and her mental health is the same.    Depression Screen:  PHQ-2/PHQ-9 Depression Screening 7/20/2020   Little interest or pleasure in doing things 0   Feeling down, depressed, or hopeless 3   Trouble falling or staying asleep, or sleeping too much 3   Feeling tired or having little energy 3   Poor appetite or overeating 0   Feeling bad about yourself - or that you are a failure or have let yourself or your family down 3   Trouble concentrating on things, such as reading the newspaper or watching television 3   Moving or speaking so slowly that other people could have noticed. Or the opposite - being so fidgety or restless that you have been moving around a lot more than usual 1   Total Score 16   If you checked off any problems, how difficult have these problems made it for you to do your work, take care of things at home, or get along with other people? Somewhat difficult         Past Medical/Family/Social History:  The following portions of the patient's history were reviewed and updated as appropriate: allergies, current medications, past family history, past medical history, past social history, past surgical history and problem list.    Allergies   Allergen Reactions   • Ace Inhibitors Cough   • Amlodipine Diarrhea   • Beta Adrenergic Blockers Rash   • Cyclobenzaprine Rash   • Hydrochlorothiazide Rash   • Hyzaar [Losartan Potassium-Hctz] Rash   • Latex Rash   • Losartan Rash   • Metformin Rash   • Penicillins Rash   • Pioglitazone Rash   • Spironolactone Rash         Current Outpatient Medications:   •  albuterol sulfate  (90 Base) MCG/ACT inhaler,  Inhale 2 puffs Every 6 (Six) Hours As Needed for Wheezing or Shortness of Air., Disp: 1 inhaler, Rfl: 11  •  bethanechol (URECHOLINE) 50 MG tablet, Take 50 mg by mouth 3 (Three) Times a Day., Disp: , Rfl:   •  bisoprolol (ZEBeta) 5 MG tablet, Take 5 mg by mouth Daily., Disp: , Rfl:   •  cyclobenzaprine (FLEXERIL) 10 MG tablet, Take 10 mg by mouth 3 (Three) Times a Day As Needed for Muscle Spasms., Disp: , Rfl:   •  Empagliflozin (JARDIANCE) 25 MG tablet, Take 25 mg by mouth Daily., Disp: 90 tablet, Rfl: 1  •  estradiol (ESTRACE VAGINAL) 0.1 MG/GM vaginal cream, Insert 2 g into the vagina 1 (One) Time Per Week., Disp: 42.5 g, Rfl: 5  •  furosemide (LASIX) 20 MG tablet, Take 20 mg by mouth 2 (Two) Times a Day As Needed., Disp: , Rfl:   •  glimepiride (AMARYL) 4 MG tablet, Take 1 po qd before a meal, Disp: 90 tablet, Rfl: 1  •  losartan (COZAAR) 100 MG tablet, Take 100 mg by mouth Daily., Disp: , Rfl:   •  NIFEdipine CC (ADALAT CC) 90 MG 24 hr tablet, Take 60 mg by mouth Daily., Disp: , Rfl:   •  omeprazole (priLOSEC) 40 MG capsule, Take 1 capsule by mouth Daily., Disp: 90 capsule, Rfl: 3  •  pyridoxine (VITAMIN B-6) 100 MG tablet, Take 100 mg by mouth Daily., Disp: , Rfl:   •  rOPINIRole (REQUIP) 1 MG tablet, Take 1 tablet by mouth Every Night. Take 1 hour before bedtime., Disp: 90 tablet, Rfl: 3  •  SITagliptin (JANUVIA) 100 MG tablet, Take 1 tablet by mouth Daily., Disp: 90 tablet, Rfl: 1  •  topiramate (TOPAMAX) 50 MG tablet, TAKE ONE TABLET BY MOUTH TWICE DAILY (Patient taking differently: TAKE ONE TABLET BY MOUTH as needed), Disp: 180 tablet, Rfl: 0  •  traMADol (ULTRAM) 50 MG tablet, TAKE 1 TABLET PO TID PRN FOR SEVERE BREAKTHROUGH PAIN, Disp: 270 tablet, Rfl: 0  •  traZODone (DESYREL) 50 MG tablet, Take 1 to 2 tabs po hs prn, Disp: 180 tablet, Rfl: 3  •  TRULANCE 3 MG tablet, , Disp: , Rfl:   •  vitamin B-6 (PYRIDOXINE) 100 MG tablet, pyridoxine (vitamin B6), Disp: , Rfl:     Aspirin use counseling: not  currently on asa; will reeval after labs and updated ASCVD risk factor.    Current medication list contains no high risk medications.  No harmful drug interactions have been identified.     Family History   Problem Relation Age of Onset   • Hypertension Mother    • Peripheral vascular disease Mother    • Ulcers Mother    • Kidney failure Mother    • Lung disease Father    • Diabetes Other         type 2   • Kidney failure Maternal Grandmother        Social History     Tobacco Use   • Smoking status: Never Smoker   • Smokeless tobacco: Never Used   Substance Use Topics   • Alcohol use: No       Past Surgical History:   Procedure Laterality Date   • BLADDER SURGERY     • BREAST EXCISIONAL BIOPSY Left     Benign 2009   • COLONOSCOPY  2019    Complete Colonoscopy   • ELBOW ARTHROPLASTY Left    • HYSTERECTOMY      Total Abdominal Hysterectomy (Description: BSO)   • LUMBAR DISCECTOMY  01/28/2013    Lt- L4/5 discectomy redo Lt- L5/S1 foraminotomy -Dr. Von Croft   • LUMBAR DISCECTOMY  03/18/2013    Re-do Lt- L4/5 discectomy Dr. Von Croft    • OOPHORECTOMY     • OTHER SURGICAL HISTORY      Spinal Sterotaxis Stimulation Of Cord   • SPINAL CORD STIMULATOR IMPLANT  08/11/201410/01/2015    placement 2014, replacement 2015. Dr. Von Croft    • TUBAL ABDOMINAL LIGATION         Patient Active Problem List   Diagnosis   • Anxiety and depression   • Fatigue   • Hyperlipidemia   • Herniated lumbar intervertebral disc   • Vaginal atrophy   • Postlaminectomy syndrome of lumbar region   • Meningeal adhesions/lumbar arachnoiditis   • Lumbar facet arthropathy/lumbar spondylosis without myelopathy   • Sacroiliac joint dysfunction of left side   • Obesity with sleep apnea   • Physical deconditioning   • Moderate obesity   • Insomnia   • History of migraine headaches   • Benign essential hypertension   • Myofascial pain syndrome   • Controlled type 2 diabetes mellitus without complication, without long-term current use of insulin  "(CMS/[facility])   • Elevated liver enzymes   • Pain of upper abdomen   • Steatosis of liver   • Obstructive sleep apnea, adult   • Encounter for fitting and adjustment of neuropacemaker of spinal cord   • Migration of spinal cord stimulator (CMS/[facility])   • Battery end of life of spinal cord stimulator   • Erythrocytosis       Review of Systems   Constitutional: Negative for chills and fever.   Eyes: Negative for discharge and visual disturbance.   Respiratory: Negative for cough and shortness of breath.    Cardiovascular: Positive for palpitations. Negative for chest pain.   Genitourinary: Positive for difficulty urinating.   Musculoskeletal: Positive for arthralgias, back pain and gait problem.   Skin: Negative for rash.   Neurological: Positive for headaches. Negative for dizziness.   Psychiatric/Behavioral: Positive for dysphoric mood and sleep disturbance. Negative for self-injury and suicidal ideas. The patient is nervous/anxious.        Objective     Vitals:    07/20/20 1034   BP: 130/80   Pulse: 74   Temp: 98 °F (36.7 °C)   SpO2: 95%   Weight: 81.6 kg (180 lb)   Height: 154.9 cm (61\")   PainSc: 0-No pain       Patient's Body mass index is 34.01 kg/m². BMI is above normal parameters. Recommendations include: exercise counseling and nutrition counseling.    The patient has no evidence of cognitve impairment.     Physical Exam   Constitutional: She is oriented to person, place, and time. She appears well-developed and well-nourished. No distress.   HENT:   Head: Normocephalic.   Right Ear: External ear normal.   Left Ear: External ear normal.   Nose: Nose normal.   Mouth/Throat: Oropharynx is clear and moist.   Eyes: Pupils are equal, round, and reactive to light. Conjunctivae and EOM are normal. Right eye exhibits no discharge. Left eye exhibits no discharge.   Neck: Normal range of motion. Neck supple. No thyromegaly present.   Cardiovascular: Normal rate, regular rhythm, normal heart sounds and intact distal pulses. "   No edema   Pulmonary/Chest: Effort normal and breath sounds normal. No respiratory distress.   Abdominal: Soft. Bowel sounds are normal. There is no tenderness.   Genitourinary: Pelvic exam was performed with patient supine. There is no rash, tenderness or lesion on the right labia. There is no rash, tenderness or lesion on the left labia. Uterus is absent.   Cervix is absent. Vagina exhibits no lesion. No erythema or tenderness in the vagina. No foreign body in the vagina. No signs of injury around the vagina. No vaginal discharge found. No cystocele or rectocele present.  Musculoskeletal:        Left ankle: She exhibits decreased range of motion.   Wearing walking boot left foot/ankle; uses cane.   Lymphadenopathy:     She has no cervical adenopathy.   Neurological: She is alert and oriented to person, place, and time.   Skin: Skin is warm and dry. Capillary refill takes less than 2 seconds. No rash noted.   Psychiatric: She has a normal mood and affect. Her behavior is normal.   Vitals reviewed.      Recent Lab Results:        Resulted Orders   Comprehensive Metabolic Panel   Result Value Ref Range    Glucose 190 (H) 65 - 99 mg/dL    BUN 14 8 - 23 mg/dL    Creatinine 0.59 0.57 - 1.00 mg/dL    Sodium 137 136 - 145 mmol/L    Potassium 4.3 3.5 - 5.2 mmol/L    Chloride 99 98 - 107 mmol/L    CO2 28.1 22.0 - 29.0 mmol/L    Calcium 10.3 8.6 - 10.5 mg/dL    Total Protein 8.1 6.0 - 8.5 g/dL    Albumin 4.50 3.50 - 5.20 g/dL    ALT (SGPT) 63 (H) 1 - 33 U/L    AST (SGOT) 39 (H) 1 - 32 U/L    Alkaline Phosphatase 153 (H) 39 - 117 U/L    Total Bilirubin 0.4 0.0 - 1.2 mg/dL    eGFR Non African Amer 103 >60 mL/min/1.73    Globulin 3.6 gm/dL    A/G Ratio 1.3 g/dL    BUN/Creatinine Ratio 23.7 7.0 - 25.0    Anion Gap 9.9 5.0 - 15.0 mmol/L   Lipid Panel   Result Value Ref Range    Total Cholesterol 249 (H) 0 - 200 mg/dL    Triglycerides 212 (H) 0 - 150 mg/dL    HDL Cholesterol 50 40 - 60 mg/dL    LDL Cholesterol  157 (H) 0 - 100  mg/dL    VLDL Cholesterol 42.4 (H) 5 - 40 mg/dL    LDL/HDL Ratio 3.13    Vitamin D 25 Hydroxy   Result Value Ref Range    25 Hydroxy, Vitamin D 29.6 (L) 30.0 - 100.0 ng/ml   CBC Auto Differential   Result Value Ref Range    WBC 6.32 3.40 - 10.80 10*3/mm3    RBC 5.74 (H) 3.77 - 5.28 10*6/mm3    Hemoglobin 16.4 (H) 12.0 - 15.9 g/dL    Hematocrit 49.0 (H) 34.0 - 46.6 %    MCV 85.4 79.0 - 97.0 fL    MCH 28.6 26.6 - 33.0 pg    MCHC 33.5 31.5 - 35.7 g/dL    RDW 13.7 12.3 - 15.4 %    RDW-SD 43.1 37.0 - 54.0 fl    MPV 11.8 6.0 - 12.0 fL    Platelets 263 140 - 450 10*3/mm3    Neutrophil % 60.8 42.7 - 76.0 %    Lymphocyte % 29.3 19.6 - 45.3 %    Monocyte % 6.6 5.0 - 12.0 %    Eosinophil % 1.6 0.3 - 6.2 %    Basophil % 1.1 0.0 - 1.5 %    Immature Grans % 0.6 (H) 0.0 - 0.5 %    Neutrophils, Absolute 3.84 1.70 - 7.00 10*3/mm3    Lymphocytes, Absolute 1.85 0.70 - 3.10 10*3/mm3    Monocytes, Absolute 0.42 0.10 - 0.90 10*3/mm3    Eosinophils, Absolute 0.10 0.00 - 0.40 10*3/mm3    Basophils, Absolute 0.07 0.00 - 0.20 10*3/mm3    Immature Grans, Absolute 0.04 0.00 - 0.05 10*3/mm3    nRBC 0.2 0.0 - 0.2 /100 WBC         Assessment/Plan   Age-appropriate Screening Schedule:  Refer to the list below for future screening recommendations based on patient's age, sex and/or medical conditions.      Health Maintenance   Topic Date Due   • ZOSTER VACCINE (1 of 2) 09/24/2007   • PAP SMEAR  07/18/2016   • LIPID PANEL  04/15/2020   • HEMOGLOBIN A1C  07/17/2020   • DIABETIC EYE EXAM  07/25/2020   • INFLUENZA VACCINE  08/01/2020   • DIABETIC FOOT EXAM  10/14/2020   • URINE MICROALBUMIN  01/21/2021   • MAMMOGRAM  10/15/2021   • TDAP/TD VACCINES (2 - Td) 02/05/2026   • COLONOSCOPY  02/18/2029   • PNEUMOCOCCAL VACCINE (19-64 MEDIUM RISK)  Completed       Medicare Risks and Personalized Health Plan:  Advance Directive Discussion  Breast Cancer/Mammogram Screening: current next due Oct 2020.  Cardiovascular risk  Chronic Pain: chronic back pain.  Tramadol prn.  Colon Cancer Screening; done 2019 FU 5 years.  Diabetic Lab Screening: hx dm2; update A1c. Keep FU next week with endocrine.  Referral placed to update diabetic eye exam.  Inactivity/Sedentary: need increased exercise once recovered from ankle fx.  Obesity/Overweight: need heart healthy diet.  Osteoprorosis Risk: dexa scan ordered.       CMS-Preventive Services Quick Reference  Medicare Preventive Services Addressed:  Annual Wellness Visit (AWV)  Bone Density Measurements    Advance Care Planning:  ACP discussion was held with the patient during this visit. Patient has an advance directive (not in EMR), copy requested.    Diagnoses and all orders for this visit:    1. Initial Medicare annual wellness visit (Primary)    2. Gastroesophageal reflux disease without esophagitis  -     omeprazole (priLOSEC) 40 MG capsule; Take 1 capsule by mouth Daily.  Dispense: 90 capsule; Refill: 3    3. Postlaminectomy syndrome of lumbar region  -     traMADol (ULTRAM) 50 MG tablet; TAKE 1 TABLET PO TID PRN FOR SEVERE BREAKTHROUGH PAIN  Dispense: 90 tablet; Refill: 2  As part of patient's treatment plan I am prescribing a controlled substance.  The patient has been made aware of the appropriate use of such medications, including potential risk of somnolence, limited ability to drive and/or work safely, and potential for dependence and/or overdose.  It has also been made clear that these medications are for use by this patient only, without concomitant use of alcohol or other substances, unless prescribed.  The patient has read and signed the [facility] Controlled Substance Contract.  I will continue to see patient for regular follow up appointments.  They are well controlled on their medication.  JEAN-CLAUDE is updated today every 3 months; appears appropriate for fill. The patient is aware of the potential for addiction and dependence.    4. Herniated lumbar intervertebral disc  -     traMADol (ULTRAM) 50 MG tablet;  TAKE 1 TABLET PO TID PRN FOR SEVERE BREAKTHROUGH PAIN  Dispense: 90 tablet; Refill: 2    5. Chronic migraine without aura without status migrainosus, not intractable  -     topiramate (TOPAMAX) 50 MG tablet; Take 1 tablet by mouth Daily As Needed (migraine).  Dispense: 90 tablet; Refill: 3    6. Healthcare maintenance  -     CBC & Differential; Future  -     Comprehensive Metabolic Panel; Future  -     Lipid Panel; Future    7. At risk for osteoporosis  -     DEXA Bone Density Axial; Future  -     Vitamin D 25 Hydroxy; Future    8. Post-menopausal  -     DEXA Bone Density Axial; Future  -     Vitamin D 25 Hydroxy; Future    9. Vitamin D insufficiency  -     Vitamin D 25 Hydroxy; Future    10. Type 2 diabetes mellitus with hyperglycemia, without long-term current use of insulin (CMS/[facility])  -     Hemoglobin A1c; Future  -     Ambulatory Referral for Diabetic Eye Exam-Ophthalmology    11. Situational depression  Declines medications.     12.  Mixed hyperlipidemia  ASCVD risk 12.5%; discussed adding low dose ASA 81mg daily or every other day.  Intolerant to statins due to myalgias. Will try fenofibrate.  Encouraged heart healthy diet and exercise.  -    Fenofibrate 145mg daily    An After Visit Summary and PPPS with all of these plans were given to the patient.      Follow Up:  Return in about 3 months (around 10/20/2020).        For med refill    Carissa BRUMFIELD

## 2020-07-21 LAB
BASOPHILS # BLD AUTO: 0.07 10*3/MM3 (ref 0–0.2)
BASOPHILS NFR BLD AUTO: 1.1 % (ref 0–1.5)
DEPRECATED RDW RBC AUTO: 43.1 FL (ref 37–54)
EOSINOPHIL # BLD AUTO: 0.1 10*3/MM3 (ref 0–0.4)
EOSINOPHIL NFR BLD AUTO: 1.6 % (ref 0.3–6.2)
ERYTHROCYTE [DISTWIDTH] IN BLOOD BY AUTOMATED COUNT: 13.7 % (ref 12.3–15.4)
HCT VFR BLD AUTO: 49 % (ref 34–46.6)
HGB BLD-MCNC: 16.4 G/DL (ref 12–15.9)
IMM GRANULOCYTES # BLD AUTO: 0.04 10*3/MM3 (ref 0–0.05)
IMM GRANULOCYTES NFR BLD AUTO: 0.6 % (ref 0–0.5)
LYMPHOCYTES # BLD AUTO: 1.85 10*3/MM3 (ref 0.7–3.1)
LYMPHOCYTES NFR BLD AUTO: 29.3 % (ref 19.6–45.3)
MCH RBC QN AUTO: 28.6 PG (ref 26.6–33)
MCHC RBC AUTO-ENTMCNC: 33.5 G/DL (ref 31.5–35.7)
MCV RBC AUTO: 85.4 FL (ref 79–97)
MONOCYTES # BLD AUTO: 0.42 10*3/MM3 (ref 0.1–0.9)
MONOCYTES NFR BLD AUTO: 6.6 % (ref 5–12)
NEUTROPHILS NFR BLD AUTO: 3.84 10*3/MM3 (ref 1.7–7)
NEUTROPHILS NFR BLD AUTO: 60.8 % (ref 42.7–76)
NRBC BLD AUTO-RTO: 0.2 /100 WBC (ref 0–0.2)
PLATELET # BLD AUTO: 263 10*3/MM3 (ref 140–450)
PMV BLD AUTO: 11.8 FL (ref 6–12)
RBC # BLD AUTO: 5.74 10*6/MM3 (ref 3.77–5.28)
WBC # BLD AUTO: 6.32 10*3/MM3 (ref 3.4–10.8)

## 2020-07-21 RX ORDER — FENOFIBRATE 145 MG/1
145 TABLET, COATED ORAL DAILY
Qty: 30 TABLET | Refills: 2 | Status: SHIPPED | OUTPATIENT
Start: 2020-07-21 | End: 2020-10-16

## 2020-07-27 ENCOUNTER — HOSPITAL ENCOUNTER (OUTPATIENT)
Age: 65
End: 2020-07-27
Payer: MEDICARE

## 2020-07-27 DIAGNOSIS — S82.832A: ICD-10-CM

## 2020-07-27 DIAGNOSIS — T14.8XXA: ICD-10-CM

## 2020-07-27 PROCEDURE — 73610 X-RAY EXAM OF ANKLE: CPT

## 2020-07-28 ENCOUNTER — OFFICE VISIT (OUTPATIENT)
Dept: ENDOCRINOLOGY | Facility: CLINIC | Age: 65
End: 2020-07-28

## 2020-07-28 VITALS
BODY MASS INDEX: 33.71 KG/M2 | WEIGHT: 178.4 LBS | DIASTOLIC BLOOD PRESSURE: 80 MMHG | SYSTOLIC BLOOD PRESSURE: 132 MMHG | OXYGEN SATURATION: 98 % | HEART RATE: 64 BPM

## 2020-07-28 DIAGNOSIS — I10 BENIGN ESSENTIAL HYPERTENSION: ICD-10-CM

## 2020-07-28 DIAGNOSIS — E11.65 UNCONTROLLED TYPE 2 DIABETES MELLITUS WITH HYPERGLYCEMIA (HCC): Primary | ICD-10-CM

## 2020-07-28 DIAGNOSIS — E78.2 MIXED HYPERLIPIDEMIA: ICD-10-CM

## 2020-07-28 LAB — GLUCOSE BLDC GLUCOMTR-MCNC: 179 MG/DL (ref 70–130)

## 2020-07-28 PROCEDURE — 82947 ASSAY GLUCOSE BLOOD QUANT: CPT | Performed by: PHYSICIAN ASSISTANT

## 2020-07-28 PROCEDURE — 99214 OFFICE O/P EST MOD 30 MIN: CPT | Performed by: PHYSICIAN ASSISTANT

## 2020-07-28 RX ORDER — GLIMEPIRIDE 2 MG/1
2 TABLET ORAL
Qty: 90 TABLET | Refills: 1
Start: 2020-07-28 | End: 2020-10-26 | Stop reason: SDUPTHER

## 2020-07-28 NOTE — PROGRESS NOTES
Chief Complaint  F/u for Diabetes Mellitus.    HPI   Yi Garcia is a 64 y.o. female with chronic back pain and HTN- on multiple antihypertensives followed by cardiology in [place], who is here today for f/u of Diabetes Mellitus type 2. The initial diagnosis of diabetes was made approximately 2016.    She stopped taking glimepiride on her own due to hypoglycemia. Was not taking AC despite multiple discussions about this.     A1C- (7/28/2020), 6.8 (1/17/2020), 7.4 (10/14/19), 7.4 (7/12/19), 7.6 (4/15/19), 7.2 (1/7/19), 7.7 (9/14/18), 6.7 (6/13/18), 8.9 (2/2018)    Diabetic complications: gastroparesis followed by GI at [facility]  Eye exam current (within one year): yes- no retinopathy  Foot care and dental care: discussed    Current diabetic medications include:  Jardiance 25mg daily - takes in the morning  Januvia 100mg daily - takes in the morning  ACEI/ARB: losartan-hctz  Statin: intolerant to statins, including crestor 5mg, Repatha and Praluent not covered by insurance.     Past medications: metformin (???caused blurred vision, went away when stopped taking metformin), invokana (insurance didn't cover but thinks this controlled glucose better), actos  No GLP1 due to gastroparesis    Diabetic Monitoring  -   No meter or log for review. She reports fbs anywhere from 140-200. No hypoglycemia    Nutrition:     Current diet: unhealthy  Current exercise: none  Seen RD in past: yes    The following portions of the patient's history were reviewed and updated by me as appropriate: allergies, current medications, past family history, past social history, past surgical history and problem list.      Past Medical History:   Diagnosis Date   • Anxiety    • Benign essential hypertension    • Cervical disc disorder    • Chronic pain disorder    • Colon polyps    • Complication of device    • Decreased libido    • Diabetes mellitus (CMS/HCC)    • Dizziness    • Encounter for long-term (current) use of medications    •  Extremity pain    • Fatigue    • Hip pain    • History of alopecia    • History of backache    • History of colonoscopy     Fiberoptic   • History of diabetes mellitus    • History of insomnia    • History of mastoiditis    • History of migraine headaches    • History of urinary stone    • Infection of kidney    • Insomnia    • Joint pain    • Kidney infection    • Low back pain    • Lumbar radiculopathy    • Lumbar radiculopathy    • Lumbosacral disc disease    • Migraine    • Myofascial pain syndrome    • Neck pain    • Palpitations    • Sleep apnea    • Spinal cord stimulator status    • Stress reaction, emotional    • Urinary incontinence    • Urinary tract infection    • Visit for screening mammogram     Description: 08/28/2009   • Vitamin D deficiency        Medications    Current Outpatient Medications:   •  albuterol sulfate  (90 Base) MCG/ACT inhaler, Inhale 2 puffs Every 6 (Six) Hours As Needed for Wheezing or Shortness of Air., Disp: 1 inhaler, Rfl: 11  •  bethanechol (URECHOLINE) 50 MG tablet, Take 50 mg by mouth 3 (Three) Times a Day., Disp: , Rfl:   •  bisoprolol (ZEBeta) 5 MG tablet, Take 5 mg by mouth Daily., Disp: , Rfl:   •  cyclobenzaprine (FLEXERIL) 10 MG tablet, Take 10 mg by mouth 3 (Three) Times a Day As Needed for Muscle Spasms., Disp: , Rfl:   •  Empagliflozin (Jardiance) 25 MG tablet, Take 25 mg by mouth Daily., Disp: 90 tablet, Rfl: 1  •  estradiol (ESTRACE VAGINAL) 0.1 MG/GM vaginal cream, Insert 2 g into the vagina 1 (One) Time Per Week., Disp: 42.5 g, Rfl: 5  •  fenofibrate (TRICOR) 145 MG tablet, Take 1 tablet by mouth Daily., Disp: 30 tablet, Rfl: 2  •  furosemide (LASIX) 20 MG tablet, Take 20 mg by mouth 2 (Two) Times a Day As Needed., Disp: , Rfl:   •  losartan (COZAAR) 100 MG tablet, Take 100 mg by mouth Daily., Disp: , Rfl:   •  NIFEdipine CC (ADALAT CC) 90 MG 24 hr tablet, Take 60 mg by mouth Daily., Disp: , Rfl:   •  omeprazole (priLOSEC) 40 MG capsule, Take 1 capsule by  mouth Daily., Disp: 90 capsule, Rfl: 3  •  pyridoxine (VITAMIN B-6) 100 MG tablet, Take 100 mg by mouth Daily., Disp: , Rfl:   •  rOPINIRole (REQUIP) 1 MG tablet, Take 1 tablet by mouth Every Night. Take 1 hour before bedtime., Disp: 90 tablet, Rfl: 3  •  SITagliptin (Januvia) 100 MG tablet, Take 1 tablet by mouth Daily., Disp: 90 tablet, Rfl: 1  •  topiramate (TOPAMAX) 50 MG tablet, Take 1 tablet by mouth Daily As Needed (migraine)., Disp: 90 tablet, Rfl: 3  •  traMADol (ULTRAM) 50 MG tablet, TAKE 1 TABLET PO TID PRN FOR SEVERE BREAKTHROUGH PAIN, Disp: 90 tablet, Rfl: 2  •  traZODone (DESYREL) 50 MG tablet, Take 1 to 2 tabs po hs prn, Disp: 180 tablet, Rfl: 3  •  TRULANCE 3 MG tablet, , Disp: , Rfl:   •  vitamin B-6 (PYRIDOXINE) 100 MG tablet, pyridoxine (vitamin B6), Disp: , Rfl:   •  glimepiride (Amaryl) 2 MG tablet, Take 1 tablet by mouth Daily Before Supper., Disp: 90 tablet, Rfl: 1    Review of Systems  Review of Systems   Constitutional: Positive for fatigue. Negative for chills, fever and unexpected weight change.   HENT: Negative for ear pain, hearing loss, nosebleeds, rhinorrhea and sore throat.    Eyes: Negative for pain, discharge, redness and itching.   Respiratory: Negative for cough, shortness of breath and wheezing.    Cardiovascular: Negative for chest pain and palpitations.   Gastrointestinal: Negative for abdominal pain, blood in stool, constipation and diarrhea.   Endocrine: Negative for cold intolerance, heat intolerance and polydipsia.   Genitourinary: Negative for dysuria, frequency, hematuria, pelvic pain, vaginal bleeding and vaginal discharge.   Musculoskeletal: Positive for back pain. Negative for arthralgias, gait problem, joint swelling and myalgias.   Skin: Negative for rash.   Allergic/Immunologic: Negative.    Neurological: Negative for dizziness, syncope, weakness, numbness and headaches.   Hematological: Negative for adenopathy. Does not bruise/bleed easily.    Psychiatric/Behavioral: Negative for sleep disturbance and suicidal ideas. The patient is not nervous/anxious.         Physical Exam    /80   Pulse 64   Wt 80.9 kg (178 lb 6.4 oz)   LMP  (LMP Unknown)   SpO2 98%   BMI 33.71 kg/m² Body mass index is 33.71 kg/m².  Physical Exam   Constitutional: She is oriented to person, place, and time. She appears well-developed. No distress.   AAOx3   HENT:   Head: Normocephalic.   Right Ear: External ear normal.   Left Ear: External ear normal.   Mouth/Throat: No oropharyngeal exudate.   Eyes: Conjunctivae and lids are normal. Right eye exhibits no discharge. Left eye exhibits no discharge. Right pupil is reactive. Left pupil is reactive.   Neck: No JVD present. No tracheal deviation present. No thyroid mass and no thyromegaly present.   Cardiovascular: Normal rate, regular rhythm, normal heart sounds and intact distal pulses.   No murmur heard.  Pulmonary/Chest: Effort normal and breath sounds normal. No respiratory distress. She has no wheezes.   Abdominal: Soft. Bowel sounds are normal. There is no tenderness.   Musculoskeletal: She exhibits no edema or tenderness.   Lymphadenopathy:     She has no cervical adenopathy.   Neurological: She is alert and oriented to person, place, and time.   Skin: Skin is warm, dry and intact. No rash noted. She is not diaphoretic. No erythema.   Psychiatric: She has a normal mood and affect. Her speech is normal and behavior is normal. Judgment and thought content normal.       Labs and Imaging   Lab Results   Component Value Date    HGBA1C 8.50 (H) 07/20/2020    HGBA1C 6.8 01/17/2020    HGBA1C 7.4 10/14/2019     Office Visit on 07/28/2020   Component Date Value Ref Range Status   • Glucose 07/28/2020 179* 70 - 130 mg/dL Final   Lab on 07/20/2020   Component Date Value Ref Range Status   • Glucose 07/20/2020 190* 65 - 99 mg/dL Final   • BUN 07/20/2020 14  8 - 23 mg/dL Final   • Creatinine 07/20/2020 0.59  0.57 - 1.00 mg/dL Final    • Sodium 07/20/2020 137  136 - 145 mmol/L Final   • Potassium 07/20/2020 4.3  3.5 - 5.2 mmol/L Final   • Chloride 07/20/2020 99  98 - 107 mmol/L Final   • CO2 07/20/2020 28.1  22.0 - 29.0 mmol/L Final   • Calcium 07/20/2020 10.3  8.6 - 10.5 mg/dL Final   • Total Protein 07/20/2020 8.1  6.0 - 8.5 g/dL Final   • Albumin 07/20/2020 4.50  3.50 - 5.20 g/dL Final   • ALT (SGPT) 07/20/2020 63* 1 - 33 U/L Final   • AST (SGOT) 07/20/2020 39* 1 - 32 U/L Final   • Alkaline Phosphatase 07/20/2020 153* 39 - 117 U/L Final   • Total Bilirubin 07/20/2020 0.4  0.0 - 1.2 mg/dL Final   • eGFR Non African Amer 07/20/2020 103  >60 mL/min/1.73 Final   • Globulin 07/20/2020 3.6  gm/dL Final   • A/G Ratio 07/20/2020 1.3  g/dL Final   • BUN/Creatinine Ratio 07/20/2020 23.7  7.0 - 25.0 Final   • Anion Gap 07/20/2020 9.9  5.0 - 15.0 mmol/L Final   • Total Cholesterol 07/20/2020 249* 0 - 200 mg/dL Final   • Triglycerides 07/20/2020 212* 0 - 150 mg/dL Final   • HDL Cholesterol 07/20/2020 50  40 - 60 mg/dL Final   • LDL Cholesterol  07/20/2020 157* 0 - 100 mg/dL Final   • VLDL Cholesterol 07/20/2020 42.4* 5 - 40 mg/dL Final   • LDL/HDL Ratio 07/20/2020 3.13   Final   • 25 Hydroxy, Vitamin D 07/20/2020 29.6* 30.0 - 100.0 ng/ml Final   • WBC 07/20/2020 6.32  3.40 - 10.80 10*3/mm3 Final   • RBC 07/20/2020 5.74* 3.77 - 5.28 10*6/mm3 Final   • Hemoglobin 07/20/2020 16.4* 12.0 - 15.9 g/dL Final   • Hematocrit 07/20/2020 49.0* 34.0 - 46.6 % Final   • MCV 07/20/2020 85.4  79.0 - 97.0 fL Final   • MCH 07/20/2020 28.6  26.6 - 33.0 pg Final   • MCHC 07/20/2020 33.5  31.5 - 35.7 g/dL Final   • RDW 07/20/2020 13.7  12.3 - 15.4 % Final   • RDW-SD 07/20/2020 43.1  37.0 - 54.0 fl Final   • MPV 07/20/2020 11.8  6.0 - 12.0 fL Final   • Platelets 07/20/2020 263  140 - 450 10*3/mm3 Final   • Neutrophil % 07/20/2020 60.8  42.7 - 76.0 % Final   • Lymphocyte % 07/20/2020 29.3  19.6 - 45.3 % Final   • Monocyte % 07/20/2020 6.6  5.0 - 12.0 % Final   • Eosinophil %  07/20/2020 1.6  0.3 - 6.2 % Final   • Basophil % 07/20/2020 1.1  0.0 - 1.5 % Final   • Immature Grans % 07/20/2020 0.6* 0.0 - 0.5 % Final   • Neutrophils, Absolute 07/20/2020 3.84  1.70 - 7.00 10*3/mm3 Final   • Lymphocytes, Absolute 07/20/2020 1.85  0.70 - 3.10 10*3/mm3 Final   • Monocytes, Absolute 07/20/2020 0.42  0.10 - 0.90 10*3/mm3 Final   • Eosinophils, Absolute 07/20/2020 0.10  0.00 - 0.40 10*3/mm3 Final   • Basophils, Absolute 07/20/2020 0.07  0.00 - 0.20 10*3/mm3 Final   • Immature Grans, Absolute 07/20/2020 0.04  0.00 - 0.05 10*3/mm3 Final   • nRBC 07/20/2020 0.2  0.0 - 0.2 /100 WBC Final   Office Visit on 07/20/2020   Component Date Value Ref Range Status   • Hemoglobin A1C 07/20/2020 8.50* 4.80 - 5.60 % Final       Assessment / Plan   Yi was seen today for follow-up.    Diagnoses and all orders for this visit:    Uncontrolled type 2 diabetes mellitus with hyperglycemia (CMS/[facility])  -     POC Glucose Fingerstick  -     Empagliflozin (Jardiance) 25 MG tablet; Take 25 mg by mouth Daily.  -     SITagliptin (Januvia) 100 MG tablet; Take 1 tablet by mouth Daily.  -     glimepiride (Amaryl) 2 MG tablet; Take 1 tablet by mouth Daily Before Supper.    Mixed hyperlipidemia    Benign essential hypertension        Diabetes Mellitus 2 is under poor control.  -A1c 8.5, up from 6.8 last visit  -she stopped glimepiride due to hypoglycemia. Was taking it before breakfast.  -discussed our only options at this point are trying low dose glimepiride before dinner since that's a bigger meal or going on basal insulin  -she is very hesitant to try insulin, wants to try glimepiride (2mg) before dinner first and if she experiences hypoglycemia will call to start basal insulin  -resume glimepiride 2mg and take before dinner  -continue jardiance to 25mg daily.   -continue januvia 100mg daily  -intolerant to metformin, actos, no GLP1 agonist due to gastroparesis  -bring meter to all visits  -call if any issues      1.  Diet: 3-4  carb servings per meal for females, 4-5 carb servings per meal for males  Spread carb intake throughout the day  Increase lean protein and vegetable intake  Avoid sugary drinks and processed carbs including crackers, cookies, cakes  2.  Exercise: Recommend at least 30 minutes of exercise daily, at least 5 days per week. Increase exercise gradually.   3.  Blood Glucose Goal: Blood glucose goal <150 fasting, <180 2 hr postprandial  4.  Microalbumin due 7/2020  5.  Education performed during this visit: long term diabetic complications discussed. , annual eye examinations at Ophthalmology discussed, dental hygiene discussed  and foot care reviewed., home glucose monitoring emphasized, all medications, side effects and compliance discussed carefully and Hypoglycemia management and prevention reviewed. Reviewed ‘ABCs’ of diabetes management (respective goals in parentheses):  A1C (<7), blood pressure (<130/80), and cholesterol (LDL <100, if CVD <70).    Hyperlipidemia  -intolerant to statins due to myalgias, did not tolerate crestor 5mg  -praluent/repatha not covered by insurance  -on fenofibrate    HTN  -controlled  -continue bisoprolol, losartan         There are no Patient Instructions on file for this visit.    Follow up: Return in about 3 months (around 10/28/2020).    Discussed the nature of the disease including, risks, complications, implications, management, safe and proper use of medications. Encouraged therapeutic lifestyle changes including low calorie diet with plenty of fruits and vegetables, daily exercise, medication compliance, and keeping scheduled follow up appointments with me and any other providers. Encouraged patient to have appointment for complete physical, fasting labs, appropriate screenings, and immunizations on an annual basis.        Wero Hernandez PA-C

## 2020-08-21 ENCOUNTER — OFFICE VISIT (OUTPATIENT)
Dept: ONCOLOGY | Facility: CLINIC | Age: 65
End: 2020-08-21

## 2020-08-21 ENCOUNTER — LAB (OUTPATIENT)
Dept: LAB | Facility: HOSPITAL | Age: 65
End: 2020-08-21

## 2020-08-21 ENCOUNTER — TELEPHONE (OUTPATIENT)
Dept: ONCOLOGY | Facility: CLINIC | Age: 65
End: 2020-08-21

## 2020-08-21 VITALS
RESPIRATION RATE: 18 BRPM | BODY MASS INDEX: 33.42 KG/M2 | TEMPERATURE: 97.3 F | SYSTOLIC BLOOD PRESSURE: 114 MMHG | DIASTOLIC BLOOD PRESSURE: 60 MMHG | OXYGEN SATURATION: 96 % | HEART RATE: 65 BPM | WEIGHT: 177 LBS | HEIGHT: 61 IN

## 2020-08-21 DIAGNOSIS — R79.89 ELEVATED FERRITIN: ICD-10-CM

## 2020-08-21 DIAGNOSIS — D75.1 ERYTHROCYTOSIS: ICD-10-CM

## 2020-08-21 DIAGNOSIS — R79.9 ABNORMAL FINDING OF BLOOD CHEMISTRY, UNSPECIFIED: ICD-10-CM

## 2020-08-21 DIAGNOSIS — D75.1 ERYTHROCYTOSIS: Primary | ICD-10-CM

## 2020-08-21 LAB
ERYTHROCYTE [DISTWIDTH] IN BLOOD BY AUTOMATED COUNT: 14.3 % (ref 12.3–15.4)
FERRITIN SERPL-MCNC: 124.7 NG/ML (ref 13–150)
HCT VFR BLD AUTO: 46.9 % (ref 34–46.6)
HGB BLD-MCNC: 15.5 G/DL (ref 12–15.9)
IRON 24H UR-MRATE: 72 MCG/DL (ref 37–145)
IRON SATN MFR SERPL: 18 % (ref 20–50)
LYMPHOCYTES # BLD AUTO: 1.8 10*3/MM3 (ref 0.7–3.1)
LYMPHOCYTES NFR BLD AUTO: 25.4 % (ref 19.6–45.3)
MCH RBC QN AUTO: 28.8 PG (ref 26.6–33)
MCHC RBC AUTO-ENTMCNC: 33.1 G/DL (ref 31.5–35.7)
MCV RBC AUTO: 87 FL (ref 79–97)
MONOCYTES # BLD AUTO: 0.3 10*3/MM3 (ref 0.1–0.9)
MONOCYTES NFR BLD AUTO: 4.7 % (ref 5–12)
NEUTROPHILS NFR BLD AUTO: 4.8 10*3/MM3 (ref 1.7–7)
NEUTROPHILS NFR BLD AUTO: 69.9 % (ref 42.7–76)
PLATELET # BLD AUTO: 265 10*3/MM3 (ref 140–450)
PMV BLD AUTO: 8.5 FL (ref 6–12)
RBC # BLD AUTO: 5.4 10*6/MM3 (ref 3.77–5.28)
TIBC SERPL-MCNC: 405 MCG/DL (ref 298–536)
TRANSFERRIN SERPL-MCNC: 272 MG/DL (ref 200–360)
WBC # BLD AUTO: 6.9 10*3/MM3 (ref 3.4–10.8)

## 2020-08-21 PROCEDURE — 85025 COMPLETE CBC W/AUTO DIFF WBC: CPT

## 2020-08-21 PROCEDURE — 82728 ASSAY OF FERRITIN: CPT

## 2020-08-21 PROCEDURE — 99214 OFFICE O/P EST MOD 30 MIN: CPT | Performed by: NURSE PRACTITIONER

## 2020-08-21 PROCEDURE — 84466 ASSAY OF TRANSFERRIN: CPT

## 2020-08-21 PROCEDURE — 36415 COLL VENOUS BLD VENIPUNCTURE: CPT

## 2020-08-21 PROCEDURE — 83540 ASSAY OF IRON: CPT

## 2020-08-21 NOTE — PROGRESS NOTES
"      PROBLEM LIST:  1. Elevated ferritin  2. NAFLD  3. Gastroparesis  4. DM2    Subjective     CHIEF COMPLAINT: elevated ferritin and hgb    HISTORY OF PRESENT ILLNESS:   Yi Garcia returns for follow-up.       She broke her left ankle in June and is going to physical therapy. She still has issues with bowel and bladder. She denies any recurring infections or shortness of breath. No joint swelling or erythema, headaches or vision changes. She is having some depression related to her chronic health problems and 9 year old nephew that she has been watching for 2 years. She says he is possibly going to a foster home soon. She does see Dr. Oliveira for depression but is currently not on any antidepressants.          Past Medical History, Past Surgical History, Social History, Family History have been reviewed and are without significant changes except as mentioned.    Review of Systems   A comprehensive 14 point review of systems was performed and was negative except as mentioned.    Medications:  The current medication list was reviewed in the EMR    ALLERGIES:    Allergies   Allergen Reactions   • Ace Inhibitors Cough   • Amlodipine Diarrhea   • Beta Adrenergic Blockers Rash   • Cyclobenzaprine Rash   • Hydrochlorothiazide Rash   • Hyzaar [Losartan Potassium-Hctz] Rash   • Latex Rash   • Losartan Rash   • Metformin Rash   • Penicillins Rash   • Pioglitazone Rash   • Spironolactone Rash       Objective      /60   Pulse 65   Temp 97.3 °F (36.3 °C)   Resp 18   Ht 154.9 cm (61\")   Wt 80.3 kg (177 lb)   LMP  (LMP Unknown)   SpO2 96%   BMI 33.44 kg/m²    Vitals:    08/21/20 1006   PainSc: 0-No pain             Performance Status: 1  General: well appearing female in no acute distress  Neuro: alert and oriented  HEENT: sclerae anicteric, oropharynx clear  Lymphatics: no cervical, supraclavicular, or axillary adenopathy  Cardiovascular: regular rate and rhythm, no murmurs  Lungs: clear to auscultation " bilaterally  Abdomen: soft, nontender, nondistended.  No palpable organomegaly  Extremities: no lower extremity edema  Skin: no rashes, lesions, bruising, or petechiae  Psych: mood and affect appropriate      RECENT LABS:  WBC   Date Value Ref Range Status   08/21/2020 6.90 3.40 - 10.80 10*3/mm3 Final     RBC   Date Value Ref Range Status   08/21/2020 5.40 (H) 3.77 - 5.28 10*6/mm3 Final     Hemoglobin   Date Value Ref Range Status   08/21/2020 15.5 12.0 - 15.9 g/dL Final     Hematocrit   Date Value Ref Range Status   08/21/2020 46.9 (H) 34.0 - 46.6 % Final     MCV   Date Value Ref Range Status   08/21/2020 87.0 79.0 - 97.0 fL Final     MCH   Date Value Ref Range Status   08/21/2020 28.8 26.6 - 33.0 pg Final     MCHC   Date Value Ref Range Status   08/21/2020 33.1 31.5 - 35.7 g/dL Final     RDW   Date Value Ref Range Status   08/21/2020 14.3 12.3 - 15.4 % Final     RDW-SD   Date Value Ref Range Status   07/20/2020 43.1 37.0 - 54.0 fl Final     MPV   Date Value Ref Range Status   08/21/2020 8.5 6.0 - 12.0 fL Final     Platelets   Date Value Ref Range Status   08/21/2020 265 140 - 450 10*3/mm3 Final     Neutrophil %   Date Value Ref Range Status   08/21/2020 69.9 42.7 - 76.0 % Final     Lymphocyte %   Date Value Ref Range Status   08/21/2020 25.4 19.6 - 45.3 % Final     Monocyte %   Date Value Ref Range Status   08/21/2020 4.7 (L) 5.0 - 12.0 % Final     Eosinophil %   Date Value Ref Range Status   07/20/2020 1.6 0.3 - 6.2 % Final     Basophil %   Date Value Ref Range Status   07/20/2020 1.1 0.0 - 1.5 % Final     Immature Grans %   Date Value Ref Range Status   07/20/2020 0.6 (H) 0.0 - 0.5 % Final     Neutrophils, Absolute   Date Value Ref Range Status   08/21/2020 4.80 1.70 - 7.00 10*3/mm3 Final     Lymphocytes, Absolute   Date Value Ref Range Status   08/21/2020 1.80 0.70 - 3.10 10*3/mm3 Final     Monocytes, Absolute   Date Value Ref Range Status   08/21/2020 0.30 0.10 - 0.90 10*3/mm3 Final     Eosinophils, Absolute    Date Value Ref Range Status   07/20/2020 0.10 0.00 - 0.40 10*3/mm3 Final     Basophils, Absolute   Date Value Ref Range Status   07/20/2020 0.07 0.00 - 0.20 10*3/mm3 Final     Immature Grans, Absolute   Date Value Ref Range Status   07/20/2020 0.04 0.00 - 0.05 10*3/mm3 Final     nRBC   Date Value Ref Range Status   07/20/2020 0.2 0.0 - 0.2 /100 WBC Final         Hemochromatosis Gene   Hemochromatosis Mutation   Collected: 08/29/19 1350   Resulting lab: [id number]   Value: Comment   Comment: NO MUTATION IDENTIFIED        Assessment/Plan   Yi Garcia is a 64 y.o. year old female with elevated ferritin and erythrocytosis.    Elevated ferritin: No evidence of hereditary hemochromatosis based on genetic testing.    Ferritin level is pending.     Erythrocytosis: She had a mildly elevated hematocrit 46.9% with a normal hemoglobin 15.5 g/dl.  Erythropoietin level was elevated and she has untreated sleep apnea.  She is still not using CPAP for sleep apnea.    Depression. I recommended she make an appointment to follow up with Dr. Tee leo. I also offered referral to [facility] but patient declines at this time.     I will see her back in 6 months just to recheck labs and make sure these findings remain relatively stable.                   I spent 25 minutes with the patient. I spent > 50% percent of this time counseling and discussing prognosis, diagnostic testing, evaluation, current status and management.        Savita Schulz APRN  [facility]    8/21/2020          CC:

## 2020-09-28 ENCOUNTER — HOSPITAL ENCOUNTER (OUTPATIENT)
Age: 65
End: 2020-09-28
Payer: MEDICARE

## 2020-09-28 DIAGNOSIS — M25.572: Primary | ICD-10-CM

## 2020-09-28 PROCEDURE — 73610 X-RAY EXAM OF ANKLE: CPT

## 2020-09-28 PROCEDURE — 73630 X-RAY EXAM OF FOOT: CPT

## 2020-10-07 DIAGNOSIS — E11.65 UNCONTROLLED TYPE 2 DIABETES MELLITUS WITH HYPERGLYCEMIA (HCC): ICD-10-CM

## 2020-10-07 NOTE — TELEPHONE ENCOUNTER
Caller: GarciaYi    Relationship: Self    Best call back number: [phone]    Medication needed:   Requested Prescriptions     Pending Prescriptions Disp Refills   • SITagliptin (Januvia) 100 MG tablet 90 tablet 1     Sig: Take 1 tablet by mouth Daily.       When do you need the refill by: 10/07/2020    What details did the patient provide when requesting the medication: Patient is needing a referral for this medication and provided the number. [phone]    Does the patient have less than a 3 day supply:  [] Yes  [x] No    What is the patient's preferred pharmacy: [facility] - [address] [phone] [facility] [phone]

## 2020-10-16 ENCOUNTER — OFFICE VISIT (OUTPATIENT)
Dept: INTERNAL MEDICINE | Facility: CLINIC | Age: 65
End: 2020-10-16

## 2020-10-16 VITALS
SYSTOLIC BLOOD PRESSURE: 124 MMHG | BODY MASS INDEX: 33.99 KG/M2 | WEIGHT: 180 LBS | HEART RATE: 68 BPM | OXYGEN SATURATION: 97 % | HEIGHT: 61 IN | DIASTOLIC BLOOD PRESSURE: 76 MMHG | TEMPERATURE: 97.6 F

## 2020-10-16 DIAGNOSIS — E11.65 TYPE 2 DIABETES MELLITUS WITH HYPERGLYCEMIA, WITHOUT LONG-TERM CURRENT USE OF INSULIN (HCC): ICD-10-CM

## 2020-10-16 DIAGNOSIS — M51.26 HERNIATED LUMBAR INTERVERTEBRAL DISC: ICD-10-CM

## 2020-10-16 DIAGNOSIS — M96.1 POSTLAMINECTOMY SYNDROME OF LUMBAR REGION: ICD-10-CM

## 2020-10-16 DIAGNOSIS — E78.2 MIXED HYPERLIPIDEMIA: Primary | ICD-10-CM

## 2020-10-16 DIAGNOSIS — Z12.31 ENCOUNTER FOR SCREENING MAMMOGRAM FOR MALIGNANT NEOPLASM OF BREAST: ICD-10-CM

## 2020-10-16 LAB — HBA1C MFR BLD: 7.4 %

## 2020-10-16 PROCEDURE — 99214 OFFICE O/P EST MOD 30 MIN: CPT | Performed by: NURSE PRACTITIONER

## 2020-10-16 PROCEDURE — 83036 HEMOGLOBIN GLYCOSYLATED A1C: CPT | Performed by: NURSE PRACTITIONER

## 2020-10-16 RX ORDER — DOCUSATE SODIUM 100 MG/1
100 CAPSULE, LIQUID FILLED ORAL 2 TIMES DAILY
COMMUNITY

## 2020-10-16 RX ORDER — ATORVASTATIN CALCIUM 10 MG/1
10 TABLET, FILM COATED ORAL NIGHTLY
Qty: 30 TABLET | Refills: 5 | Status: SHIPPED | OUTPATIENT
Start: 2020-10-16 | End: 2021-04-16

## 2020-10-16 NOTE — PROGRESS NOTES
"Chief Complaint   Patient presents with   • Diabetes   • Hyperlipidemia     fenofibrate makes her sweat   • Back Pain       History of Present Illness  65 y.o.female presents for     Type 2 diabetes chronic years; follows with endocrine. bs have been doing better. Takes jardiance, amaryl, januvia.   HLD chronic onset years; taking fenofibrate but that made her sweat too much so she stopped taking.   Back pain chronic onset years with disc disease. Takes tramadol prn.   chronic bladder retention and unable to empty bladder completely after \"tumor removed from spine\".  in and out cath herself twice daily.  difficulty knowing when she needs to have a bowel movement with decreased sensation in rectum.  Positive constipation.  chronic low back pain related to above as well as herniated lumbar disc and postlaminectomy syndrome of lumbar region.  Had to change pain management docs in order to get stimulator removed.     Review of Systems   Constitutional: Positive for diaphoresis. Negative for chills and fever.   Respiratory: Negative for shortness of breath.    Cardiovascular: Negative for chest pain, palpitations and leg swelling.   Gastrointestinal: Positive for constipation.   Genitourinary: Positive for difficulty urinating.   Musculoskeletal: Positive for back pain. Negative for arthralgias.   Skin: Negative for rash.         [facility]  The following portions of the patient's history were reviewed and updated as appropriate: allergies, current medications, past family history, past medical history, past social history, past surgical history and problem list.     Past Medical History:   Diagnosis Date   • Anxiety    • Benign essential hypertension    • Cervical disc disorder    • Chronic pain disorder    • Colon polyps    • Complication of device    • Decreased libido    • Diabetes mellitus (CMS/HCC)    • Dizziness    • Encounter for long-term (current) use of medications    • Extremity pain    • Fatigue    • Hip pain  "   • History of alopecia    • History of backache    • History of colonoscopy     Fiberoptic   • History of diabetes mellitus    • History of insomnia    • History of mastoiditis    • History of migraine headaches    • History of urinary stone    • Infection of kidney    • Insomnia    • Joint pain    • Kidney infection    • Low back pain    • Lumbar radiculopathy    • Lumbar radiculopathy    • Lumbosacral disc disease    • Migraine    • Myofascial pain syndrome    • Neck pain    • Palpitations    • Sleep apnea    • Spinal cord stimulator status    • Stress reaction, emotional    • Urinary incontinence    • Urinary tract infection    • Visit for screening mammogram     Description: 08/28/2009   • Vitamin D deficiency       Past Surgical History:   Procedure Laterality Date   • BLADDER SURGERY     • BREAST EXCISIONAL BIOPSY Left     Benign 2009   • COLONOSCOPY  2019    Complete Colonoscopy   • ELBOW ARTHROPLASTY Left    • HYSTERECTOMY      Total Abdominal Hysterectomy (Description: BSO)   • LUMBAR DISCECTOMY  01/28/2013    Lt- L4/5 discectomy redo Lt- L5/S1 foraminotomy -Dr. Von Croft   • LUMBAR DISCECTOMY  03/18/2013    Re-do Lt- L4/5 discectomy Dr. Von Croft    • OOPHORECTOMY     • OTHER SURGICAL HISTORY      Spinal Sterotaxis Stimulation Of Cord   • SPINAL CORD STIMULATOR IMPLANT  08/11/201410/01/2015    placement 2014, replacement 2015. Dr. Von Croft    • TUBAL ABDOMINAL LIGATION        Allergies   Allergen Reactions   • Ace Inhibitors Cough   • Amlodipine Diarrhea   • Beta Adrenergic Blockers Rash   • Cyclobenzaprine Rash   • Hydrochlorothiazide Rash   • Hyzaar [Losartan Potassium-Hctz] Rash   • Latex Rash   • Losartan Rash   • Metformin Rash   • Penicillins Rash   • Pioglitazone Rash   • Spironolactone Rash      Social History     Socioeconomic History   • Marital status:    Tobacco Use   • Smoking status: Never Smoker   • Smokeless tobacco: Never Used   Substance and Sexual Activity   •  Alcohol use: No   • Drug use: No   • Sexual activity: Defer     Family History   Problem Relation Age of Onset   • Hypertension Mother    • Peripheral vascular disease Mother    • Ulcers Mother    • Kidney failure Mother    • Lung disease Father    • Diabetes Other         type 2   • Kidney failure Maternal Grandmother             Current Outpatient Medications:   •  albuterol sulfate  (90 Base) MCG/ACT inhaler, Inhale 2 puffs Every 6 (Six) Hours As Needed for Wheezing or Shortness of Air., Disp: 1 inhaler, Rfl: 11  •  bethanechol (URECHOLINE) 50 MG tablet, Take 50 mg by mouth 3 (Three) Times a Day., Disp: , Rfl:   •  bisoprolol (ZEBeta) 5 MG tablet, Take 5 mg by mouth Daily., Disp: , Rfl:   •  cyclobenzaprine (FLEXERIL) 10 MG tablet, Take 10 mg by mouth 3 (Three) Times a Day As Needed for Muscle Spasms., Disp: , Rfl:   •  docusate sodium (COLACE) 100 MG capsule, Take 100 mg by mouth 2 (Two) Times a Day., Disp: , Rfl:   •  Empagliflozin (Jardiance) 25 MG tablet, Take 25 mg by mouth Daily., Disp: 90 tablet, Rfl: 1  •  estradiol (ESTRACE VAGINAL) 0.1 MG/GM vaginal cream, Insert 2 g into the vagina 1 (One) Time Per Week., Disp: 42.5 g, Rfl: 5  •  fenofibrate (TRICOR) 145 MG tablet, Take 1 tablet by mouth Daily., Disp: 30 tablet, Rfl: 2  •  furosemide (LASIX) 20 MG tablet, Take 20 mg by mouth 2 (Two) Times a Day As Needed., Disp: , Rfl:   •  glimepiride (Amaryl) 2 MG tablet, Take 1 tablet by mouth Daily Before Supper., Disp: 90 tablet, Rfl: 1  •  losartan (COZAAR) 100 MG tablet, Take 100 mg by mouth Daily., Disp: , Rfl:   •  NIFEdipine CC (ADALAT CC) 90 MG 24 hr tablet, Take 60 mg by mouth Daily., Disp: , Rfl:   •  omeprazole (priLOSEC) 40 MG capsule, Take 1 capsule by mouth Daily., Disp: 90 capsule, Rfl: 3  •  rOPINIRole (REQUIP) 1 MG tablet, Take 1 tablet by mouth Every Night. Take 1 hour before bedtime., Disp: 90 tablet, Rfl: 3  •  SITagliptin (Januvia) 100 MG tablet, Take 1 tablet by mouth Daily., Disp: 90  "tablet, Rfl: 1  •  topiramate (TOPAMAX) 50 MG tablet, Take 1 tablet by mouth Daily As Needed (migraine)., Disp: 90 tablet, Rfl: 3  •  traMADol (ULTRAM) 50 MG tablet, TAKE 1 TABLET PO TID PRN FOR SEVERE BREAKTHROUGH PAIN, Disp: 90 tablet, Rfl: 2  •  traZODone (DESYREL) 50 MG tablet, Take 1 to 2 tabs po hs prn, Disp: 180 tablet, Rfl: 3  •  vitamin B-6 (PYRIDOXINE) 100 MG tablet, pyridoxine (vitamin B6), Disp: , Rfl:   •  TRULANCE 3 MG tablet, , Disp: , Rfl:     VITALS:  /76   Pulse 68   Temp 97.6 °F (36.4 °C)   Ht 154.9 cm (61\")   Wt 81.6 kg (180 lb)   LMP  (LMP Unknown)   SpO2 97%   Breastfeeding No   BMI 34.01 kg/m²     Physical Exam  Vitals signs reviewed.   HENT:      Head: Normocephalic.   Cardiovascular:      Rate and Rhythm: Normal rate and regular rhythm.      Pulses: Normal pulses.           Dorsalis pedis pulses are 2+ on the right side and 2+ on the left side.        Posterior tibial pulses are 2+ on the right side and 2+ on the left side.      Heart sounds: Normal heart sounds.   Pulmonary:      Effort: Pulmonary effort is normal. No respiratory distress.      Breath sounds: Normal breath sounds.   Feet:      Right foot:      Protective Sensation: 10 sites tested. 10 sites sensed.      Skin integrity: Skin integrity normal.      Left foot:      Protective Sensation: 10 sites tested. 10 sites sensed.      Skin integrity: Skin integrity normal.   Neurological:      Mental Status: She is alert and oriented to person, place, and time.     diabetic foot exam updated.    LABS  Results for orders placed or performed in visit on 08/21/20   Ferritin    Specimen: Blood   Result Value Ref Range    Ferritin 124.70 13.00 - 150.00 ng/mL   Iron Profile    Specimen: Blood   Result Value Ref Range    Iron 72 37 - 145 mcg/dL    Iron Saturation 18 (L) 20 - 50 %    Transferrin 272 200 - 360 mg/dL    TIBC 405 298 - 536 mcg/dL   CBC Auto Differential    Specimen: Blood   Result Value Ref Range    WBC 6.90 3.40 - " 10.80 10*3/mm3    RBC 5.40 (H) 3.77 - 5.28 10*6/mm3    Hemoglobin 15.5 12.0 - 15.9 g/dL    Hematocrit 46.9 (H) 34.0 - 46.6 %    RDW 14.3 12.3 - 15.4 %    MCV 87.0 79.0 - 97.0 fL    MCH 28.8 26.6 - 33.0 pg    MCHC 33.1 31.5 - 35.7 g/dL    MPV 8.5 6.0 - 12.0 fL    Platelets 265 140 - 450 10*3/mm3    Neutrophil % 69.9 42.7 - 76.0 %    Lymphocyte % 25.4 19.6 - 45.3 %    Monocyte % 4.7 (L) 5.0 - 12.0 %    Neutrophils, Absolute 4.80 1.70 - 7.00 10*3/mm3    Lymphocytes, Absolute 1.80 0.70 - 3.10 10*3/mm3    Monocytes, Absolute 0.30 0.10 - 0.90 10*3/mm3         ASSESSMENT/PLAN  Diagnoses and all orders for this visit:    1. Mixed hyperlipidemia (Primary)  -     atorvastatin (LIPITOR) 10 MG tablet; Take 1 tablet by mouth Every Night.  Dispense: 30 tablet; Refill: 5  Stop fenofibrate; will try statin start low dose to try to cut back on side effects; if tolerates ok will increase.  2. Type 2 diabetes mellitus with hyperglycemia, without long-term current use of insulin (CMS/[facility])  -     POC Glycosylated Hemoglobin (Hb A1C)  Cont meds follow up with endocrine.  3. Postlaminectomy syndrome of lumbar region  Still has a refill left on the tramadol.  4. Herniated lumbar intervertebral disc  Still has a refill left on the tramadol.  5. Encounter for screening mammogram for malignant neoplasm of breast  -     Mammo Screening Digital Tomosynthesis Bilateral With CAD; Future      I discussed the patients findings and my recommendations with patient.  Patient was encouraged to keep me informed of any acute changes, lack of improvement, or any new concerning symptoms.  Patient voiced understanding of all instructions and denied further questions.      FOLLOW-UP  Return in about 6 months (around 4/16/2021), or if symptoms worsen or fail to improve.    Electronically signed by:    OCTAVIANO Tovar  10/16/2020    EMR Dragon/Transcription Disclaimer:  Much of this encounter note is an electronic transcription/translation of spoken  language to printed text.  The electronic translation of spoken language may permit erroneous, or at times, nonsensical words or phrases to be inadvertently transcribed.  Although I have reviewed the note for such errors, some may still exist

## 2020-10-21 ENCOUNTER — HOSPITAL ENCOUNTER (OUTPATIENT)
Dept: MAMMOGRAPHY | Facility: HOSPITAL | Age: 65
Discharge: HOME | End: 2020-10-21
Admitting: NURSE PRACTITIONER

## 2020-10-21 DIAGNOSIS — Z12.31 ENCOUNTER FOR SCREENING MAMMOGRAM FOR MALIGNANT NEOPLASM OF BREAST: ICD-10-CM

## 2020-10-21 PROCEDURE — 77063 BREAST TOMOSYNTHESIS BI: CPT

## 2020-10-21 PROCEDURE — 77067 SCR MAMMO BI INCL CAD: CPT

## 2020-10-21 PROCEDURE — 77067 SCR MAMMO BI INCL CAD: CPT | Performed by: RADIOLOGY

## 2020-10-21 PROCEDURE — 77063 BREAST TOMOSYNTHESIS BI: CPT | Performed by: RADIOLOGY

## 2020-10-26 ENCOUNTER — OFFICE VISIT (OUTPATIENT)
Dept: ENDOCRINOLOGY | Facility: CLINIC | Age: 65
End: 2020-10-26

## 2020-10-26 VITALS
HEART RATE: 73 BPM | SYSTOLIC BLOOD PRESSURE: 120 MMHG | WEIGHT: 182.4 LBS | DIASTOLIC BLOOD PRESSURE: 72 MMHG | OXYGEN SATURATION: 98 % | BODY MASS INDEX: 34.46 KG/M2

## 2020-10-26 DIAGNOSIS — E78.2 MIXED HYPERLIPIDEMIA: ICD-10-CM

## 2020-10-26 DIAGNOSIS — I10 ESSENTIAL HYPERTENSION: ICD-10-CM

## 2020-10-26 DIAGNOSIS — E11.65 UNCONTROLLED TYPE 2 DIABETES MELLITUS WITH HYPERGLYCEMIA (HCC): Primary | ICD-10-CM

## 2020-10-26 LAB — GLUCOSE BLDC GLUCOMTR-MCNC: 174 MG/DL (ref 70–130)

## 2020-10-26 PROCEDURE — 82947 ASSAY GLUCOSE BLOOD QUANT: CPT | Performed by: PHYSICIAN ASSISTANT

## 2020-10-26 PROCEDURE — 99214 OFFICE O/P EST MOD 30 MIN: CPT | Performed by: PHYSICIAN ASSISTANT

## 2020-10-26 RX ORDER — GLIMEPIRIDE 2 MG/1
TABLET ORAL
Qty: 90 TABLET | Refills: 1
Start: 2020-10-26 | End: 2021-02-15 | Stop reason: ALTCHOICE

## 2020-10-26 RX ORDER — EMPAGLIFLOZIN 25 MG/1
1 TABLET, FILM COATED ORAL DAILY
Qty: 90 TABLET | Refills: 1 | Status: SHIPPED | OUTPATIENT
Start: 2020-10-26 | End: 2021-05-18 | Stop reason: SINTOL

## 2020-10-26 NOTE — PROGRESS NOTES
Chief Complaint  F/u for Diabetes Mellitus.    HPI   Yi Garcia is a 65 y.o. female with chronic back pain and HTN- on multiple antihypertensives followed by cardiology in [place], who is here today for f/u of Diabetes Mellitus type 2. The initial diagnosis of diabetes was made approximately 2016.    Has switched to whole wheat pasta but did not tolerate due to gastroparesis.     A1C- 7.4 (10/16/2020), 8.5 (7/28/2020), 6.8 (1/17/2020), 7.4 (10/14/19), 7.4 (7/12/19), 7.6 (4/15/19), 7.2 (1/7/19), 7.7 (9/14/18), 6.7 (6/13/18), 8.9 (2/2018)    Diabetic complications: gastroparesis followed by GI at [facility]  Eye exam current (within one year): yes- no retinopathy  Foot care and dental care: discussed    Current diabetic medications include:  Glimepiride 2mg 1/2 tab before dinner  Jardiance 25mg daily - takes in the morning  Januvia 100mg daily - takes in the morning  ACEI/ARB: losartan-hctz  Statin: intolerant to statins, including crestor 5mg, Repatha and Praluent not covered by insurance.     Past medications: metformin (???caused blurred vision, went away when stopped taking metformin), invokana (insurance didn't cover but thinks this controlled glucose better), actos  No GLP1 due to gastroparesis    Diabetic Monitoring  -   No meter or log for review. She reports fbs 130-180s, doesn't check later in the day often. No hypoglycemia.     Nutrition:     Current diet: unhealthy  Current exercise: none  Seen RD in past: yes    The following portions of the patient's history were reviewed and updated by me as appropriate: allergies, current medications, past family history, past social history, past surgical history and problem list.      Past Medical History:   Diagnosis Date   • Anxiety    • Benign essential hypertension    • Cervical disc disorder    • Chronic pain disorder    • Colon polyps    • Complication of device    • Decreased libido    • Diabetes mellitus (CMS/HCC)    • Dizziness    • Encounter for  long-term (current) use of medications    • Extremity pain    • Fatigue    • Hip pain    • History of alopecia    • History of backache    • History of colonoscopy     Fiberoptic   • History of diabetes mellitus    • History of insomnia    • History of mastoiditis    • History of migraine headaches    • History of urinary stone    • Infection of kidney    • Insomnia    • Joint pain    • Kidney infection    • Low back pain    • Lumbar radiculopathy    • Lumbar radiculopathy    • Lumbosacral disc disease    • Migraine    • Myofascial pain syndrome    • Neck pain    • Palpitations    • Sleep apnea    • Spinal cord stimulator status    • Stress reaction, emotional    • Urinary incontinence    • Urinary tract infection    • Visit for screening mammogram     Description: 08/28/2009   • Vitamin D deficiency        Medications    Current Outpatient Medications:   •  albuterol sulfate  (90 Base) MCG/ACT inhaler, Inhale 2 puffs Every 6 (Six) Hours As Needed for Wheezing or Shortness of Air., Disp: 1 inhaler, Rfl: 11  •  atorvastatin (LIPITOR) 10 MG tablet, Take 1 tablet by mouth Every Night., Disp: 30 tablet, Rfl: 5  •  bethanechol (URECHOLINE) 50 MG tablet, Take 50 mg by mouth 3 (Three) Times a Day., Disp: , Rfl:   •  bisoprolol (ZEBeta) 5 MG tablet, Take 5 mg by mouth Daily., Disp: , Rfl:   •  cyclobenzaprine (FLEXERIL) 10 MG tablet, Take 10 mg by mouth 3 (Three) Times a Day As Needed for Muscle Spasms., Disp: , Rfl:   •  docusate sodium (COLACE) 100 MG capsule, Take 100 mg by mouth 2 (Two) Times a Day., Disp: , Rfl:   •  Empagliflozin (Jardiance) 25 MG tablet, Take 25 mg by mouth Daily., Disp: 90 tablet, Rfl: 1  •  estradiol (ESTRACE VAGINAL) 0.1 MG/GM vaginal cream, Insert 2 g into the vagina 1 (One) Time Per Week., Disp: 42.5 g, Rfl: 5  •  furosemide (LASIX) 20 MG tablet, Take 20 mg by mouth 2 (Two) Times a Day As Needed., Disp: , Rfl:   •  glimepiride (Amaryl) 2 MG tablet, Take 1/2 pill before breakfast and  dinner, Disp: 90 tablet, Rfl: 1  •  losartan (COZAAR) 100 MG tablet, Take 100 mg by mouth Daily., Disp: , Rfl:   •  NIFEdipine CC (ADALAT CC) 90 MG 24 hr tablet, Take 60 mg by mouth Daily., Disp: , Rfl:   •  omeprazole (priLOSEC) 40 MG capsule, Take 1 capsule by mouth Daily., Disp: 90 capsule, Rfl: 3  •  rOPINIRole (REQUIP) 1 MG tablet, Take 1 tablet by mouth Every Night. Take 1 hour before bedtime., Disp: 90 tablet, Rfl: 3  •  SITagliptin (Januvia) 100 MG tablet, Take 1 tablet by mouth Daily., Disp: 90 tablet, Rfl: 1  •  topiramate (TOPAMAX) 50 MG tablet, Take 1 tablet by mouth Daily As Needed (migraine)., Disp: 90 tablet, Rfl: 3  •  traMADol (ULTRAM) 50 MG tablet, TAKE 1 TABLET PO TID PRN FOR SEVERE BREAKTHROUGH PAIN, Disp: 90 tablet, Rfl: 2  •  traZODone (DESYREL) 50 MG tablet, Take 1 to 2 tabs po hs prn, Disp: 180 tablet, Rfl: 3  •  TRULANCE 3 MG tablet, , Disp: , Rfl:   •  vitamin B-6 (PYRIDOXINE) 100 MG tablet, pyridoxine (vitamin B6), Disp: , Rfl:     Review of Systems  Review of Systems   Constitutional: Positive for fatigue. Negative for chills, fever and unexpected weight change.   HENT: Negative for ear pain, hearing loss, nosebleeds, rhinorrhea and sore throat.    Eyes: Negative for pain, discharge, redness and itching.   Respiratory: Negative for cough, shortness of breath and wheezing.    Cardiovascular: Negative for chest pain and palpitations.   Gastrointestinal: Negative for abdominal pain, blood in stool, constipation and diarrhea.   Endocrine: Negative for cold intolerance, heat intolerance and polydipsia.   Genitourinary: Negative for dysuria, frequency, hematuria, pelvic pain, vaginal bleeding and vaginal discharge.   Musculoskeletal: Positive for back pain. Negative for arthralgias, gait problem, joint swelling and myalgias.   Skin: Negative for rash.   Allergic/Immunologic: Negative.    Neurological: Negative for dizziness, syncope, weakness, numbness and headaches.   Hematological: Negative  for adenopathy. Does not bruise/bleed easily.   Psychiatric/Behavioral: Negative for sleep disturbance and suicidal ideas. The patient is not nervous/anxious.         Physical Exam    /72   Pulse 73   Wt 82.7 kg (182 lb 6.4 oz)   LMP  (LMP Unknown)   SpO2 98%   BMI 34.46 kg/m² Body mass index is 34.46 kg/m².  Physical Exam   Constitutional: She is oriented to person, place, and time. She appears well-developed. No distress.   AAOx3   HENT:   Head: Normocephalic.   Right Ear: External ear normal.   Left Ear: External ear normal.   Mouth/Throat: No oropharyngeal exudate.   Eyes: Conjunctivae and lids are normal. Right eye exhibits no discharge. Left eye exhibits no discharge. Right pupil is reactive. Left pupil is reactive.   Neck: No JVD present. No tracheal deviation present. No thyroid mass and no thyromegaly present.   Cardiovascular: Normal rate, regular rhythm and normal heart sounds.   No murmur heard.  Pulmonary/Chest: Effort normal and breath sounds normal. No respiratory distress. She has no wheezes.   Abdominal: Soft. Bowel sounds are normal. There is no abdominal tenderness.   Musculoskeletal: No tenderness.   Lymphadenopathy:     She has no cervical adenopathy.   Neurological: She is alert and oriented to person, place, and time.   Skin: Skin is warm and dry. No rash noted. She is not diaphoretic. No erythema.   Psychiatric: Her speech is normal and behavior is normal. Judgment and thought content normal.       Labs and Imaging   Lab Results   Component Value Date    HGBA1C 7.4 10/16/2020    HGBA1C 8.50 (H) 07/20/2020    HGBA1C 6.8 01/17/2020     Office Visit on 10/26/2020   Component Date Value Ref Range Status   • Glucose 10/26/2020 174* 70 - 130 mg/dL Final   Office Visit on 10/16/2020   Component Date Value Ref Range Status   • Hemoglobin A1C 10/16/2020 7.4  % Final       Assessment / Plan   Diagnoses and all orders for this visit:    1. Uncontrolled type 2 diabetes mellitus with  hyperglycemia (CMS/HCC) (Primary)  -     POC Glucose, Blood  -     glimepiride (Amaryl) 2 MG tablet; Take 1/2 pill before breakfast and dinner  Dispense: 90 tablet; Refill: 1  -     SITagliptin (Januvia) 100 MG tablet; Take 1 tablet by mouth Daily.  Dispense: 90 tablet; Refill: 1  -     Empagliflozin (Jardiance) 25 MG tablet; Take 25 mg by mouth Daily.  Dispense: 90 tablet; Refill: 1    2. Mixed hyperlipidemia    3. Essential hypertension        Diabetes Mellitus 2 is under inadequate control.  -A1c 7.4, down from 8.5 last visit  -fasting blood sugar fluctuates due to variable carb intake the day before- discussed working on carb consistent diet  -take glimepiride 2mg 1/2 tab before before dinner and add 1/2 tab before breakfast as well- pt to let me know if she experiences hypoglycemia  -continue jardiance to 25mg daily.   -continue januvia 100mg daily  -intolerant to metformin, actos, no GLP1 agonist due to gastroparesis  -bring meter to all visits  -call if any issues      1.  Diet: 3-4 carb servings per meal for females, 4-5 carb servings per meal for males  Spread carb intake throughout the day  Increase lean protein and vegetable intake  Avoid sugary drinks and processed carbs including crackers, cookies, cakes  2.  Exercise: Recommend at least 30 minutes of exercise daily, at least 5 days per week. Increase exercise gradually.   3.  Blood Glucose Goal: Blood glucose goal <150 fasting, <180 2 hr postprandial  4.  Microalbumin due 1/2021  5.  Education performed during this visit: long term diabetic complications discussed. , annual eye examinations at Ophthalmology discussed, dental hygiene discussed  and foot care reviewed., home glucose monitoring emphasized, all medications, side effects and compliance discussed carefully and Hypoglycemia management and prevention reviewed. Reviewed ‘ABCs’ of diabetes management (respective goals in parentheses):  A1C (<7), blood pressure (<130/80), and cholesterol (LDL  <100, if CVD <70).    Hyperlipidemia  -intolerant to statins due to myalgias, did not tolerate crestor 5mg  -praluent/repatha not covered by insurance  -on fenofibrate    HTN  -controlled  -continue bisoprolol, losartan         There are no Patient Instructions on file for this visit.    Follow up: Return in about 3 months (around 1/26/2021).    Discussed the nature of the disease including, risks, complications, implications, management, safe and proper use of medications. Encouraged therapeutic lifestyle changes including low calorie diet with plenty of fruits and vegetables, daily exercise, medication compliance, and keeping scheduled follow up appointments with me and any other providers. Encouraged patient to have appointment for complete physical, fasting labs, appropriate screenings, and immunizations on an annual basis.        Wero Hernandez PA-C

## 2020-10-30 ENCOUNTER — HOSPITAL ENCOUNTER (OUTPATIENT)
Dept: MAMMOGRAPHY | Facility: HOSPITAL | Age: 65
Discharge: HOME | End: 2020-10-30
Admitting: RADIOLOGY

## 2020-10-30 ENCOUNTER — TELEPHONE (OUTPATIENT)
Dept: INTERNAL MEDICINE | Facility: CLINIC | Age: 65
End: 2020-10-30

## 2020-10-30 DIAGNOSIS — R92.8 ABNORMAL MAMMOGRAM: ICD-10-CM

## 2020-10-30 PROCEDURE — 77065 DX MAMMO INCL CAD UNI: CPT | Performed by: RADIOLOGY

## 2020-10-30 PROCEDURE — G0279 TOMOSYNTHESIS, MAMMO: HCPCS | Performed by: RADIOLOGY

## 2020-10-30 PROCEDURE — G0279 TOMOSYNTHESIS, MAMMO: HCPCS

## 2020-10-30 PROCEDURE — 77065 DX MAMMO INCL CAD UNI: CPT

## 2020-10-30 NOTE — TELEPHONE ENCOUNTER
PT CALLED STATED THAT HER MAMMOGRAM TEST CAME BACK GOOD AND WAS TOLD TO CALL BACK AND HAVE A ULTRASOUND  APPT SET UP.    PLEASE ADVISE.  CALL BACK:[phone]

## 2020-11-02 NOTE — TELEPHONE ENCOUNTER
I am not understanding what you are wanting.  The pt had both a screening & diagnostic mammogram completed.  So I'm not sure what US you are wanting.    I've been told by the mammo scheduling that the US is only done if they deem it necessary when doing the diagnostic mammogram. For example you order a diagnostic mammogram & US of what is necessary.  They automatically schedule the mammogram, but the US order is just there as an in case order.  But sometimes it is not used.    Please Advise.

## 2020-11-04 DIAGNOSIS — R22.2 LUMP IN CHEST: Primary | ICD-10-CM

## 2020-11-04 NOTE — TELEPHONE ENCOUNTER
I spoke with pt;  Pt states that she has a knot on her chest near the collar bone;  pt states that Carissa told her that if mamm's were normal that she would order an x-ray or US to see about this knot.

## 2020-11-16 ENCOUNTER — HOSPITAL ENCOUNTER (OUTPATIENT)
Dept: ULTRASOUND IMAGING | Facility: HOSPITAL | Age: 65
Discharge: HOME | End: 2020-11-16
Admitting: NURSE PRACTITIONER

## 2020-11-16 DIAGNOSIS — R22.2 LUMP IN CHEST: ICD-10-CM

## 2020-11-16 PROCEDURE — 76604 US EXAM CHEST: CPT

## 2020-11-19 ENCOUNTER — HOSPITAL ENCOUNTER (OUTPATIENT)
Dept: HOSPITAL 22 - PT | Age: 65
Discharge: HOME | End: 2020-11-19
Payer: MEDICARE

## 2020-11-19 DIAGNOSIS — D43.4: Primary | ICD-10-CM

## 2020-11-19 DIAGNOSIS — D17.1 LIPOMA OF TORSO: Primary | ICD-10-CM

## 2020-11-19 DIAGNOSIS — R26.9: ICD-10-CM

## 2020-11-19 DIAGNOSIS — S82.892D: Primary | ICD-10-CM

## 2020-11-19 DIAGNOSIS — M54.5: ICD-10-CM

## 2020-11-19 DIAGNOSIS — M25.472: ICD-10-CM

## 2020-11-19 PROCEDURE — 97164 PT RE-EVAL EST PLAN CARE: CPT

## 2020-11-19 PROCEDURE — 97014 ELECTRIC STIMULATION THERAPY: CPT

## 2020-11-19 PROCEDURE — G0283 ELEC STIM OTHER THAN WOUND: HCPCS

## 2020-11-19 PROCEDURE — 97035 APP MDLTY 1+ULTRASOUND EA 15: CPT

## 2020-11-19 PROCEDURE — 97112 NEUROMUSCULAR REEDUCATION: CPT

## 2020-11-19 PROCEDURE — 97010 HOT OR COLD PACKS THERAPY: CPT

## 2020-11-19 PROCEDURE — 97140 MANUAL THERAPY 1/> REGIONS: CPT

## 2020-11-19 PROCEDURE — 97163 PT EVAL HIGH COMPLEX 45 MIN: CPT

## 2020-11-19 PROCEDURE — 97760 ORTHOTIC MGMT&TRAING 1ST ENC: CPT

## 2020-11-19 PROCEDURE — 97033 APP MDLTY 1+IONTPHRSIS EA 15: CPT

## 2020-11-19 PROCEDURE — 97110 THERAPEUTIC EXERCISES: CPT

## 2020-11-28 ENCOUNTER — HOSPITAL ENCOUNTER (EMERGENCY)
Age: 65
Discharge: HOME | End: 2020-11-28
Payer: MEDICARE

## 2020-11-28 VITALS
OXYGEN SATURATION: 97 % | TEMPERATURE: 98.06 F | DIASTOLIC BLOOD PRESSURE: 79 MMHG | RESPIRATION RATE: 18 BRPM | SYSTOLIC BLOOD PRESSURE: 156 MMHG | HEART RATE: 82 BPM

## 2020-11-28 VITALS
DIASTOLIC BLOOD PRESSURE: 79 MMHG | TEMPERATURE: 98.06 F | SYSTOLIC BLOOD PRESSURE: 156 MMHG | OXYGEN SATURATION: 97 % | RESPIRATION RATE: 18 BRPM | HEART RATE: 82 BPM

## 2020-11-28 VITALS — BODY MASS INDEX: 32.3 KG/M2

## 2020-11-28 DIAGNOSIS — E11.9: ICD-10-CM

## 2020-11-28 DIAGNOSIS — Z88.8: ICD-10-CM

## 2020-11-28 DIAGNOSIS — Z88.0: ICD-10-CM

## 2020-11-28 DIAGNOSIS — I10: ICD-10-CM

## 2020-11-28 DIAGNOSIS — Z79.899: ICD-10-CM

## 2020-11-28 DIAGNOSIS — U07.1: Primary | ICD-10-CM

## 2020-11-28 PROCEDURE — 99201: CPT

## 2020-11-28 PROCEDURE — U0003 INFECTIOUS AGENT DETECTION BY NUCLEIC ACID (DNA OR RNA); SEVERE ACUTE RESPIRATORY SYNDROME CORONAVIRUS 2 (SARS-COV-2) (CORONAVIRUS DISEASE [COVID-19]), AMPLIFIED PROBE TECHNIQUE, MAKING USE OF HIGH THROUGHPUT TECHNOLOGIES AS DESCRIBED BY CMS-2020-01-R: HCPCS

## 2020-11-30 ENCOUNTER — TELEPHONE (OUTPATIENT)
Dept: INTERNAL MEDICINE | Facility: CLINIC | Age: 65
End: 2020-11-30

## 2020-12-01 ENCOUNTER — OFFICE VISIT (OUTPATIENT)
Dept: INTERNAL MEDICINE | Facility: CLINIC | Age: 65
End: 2020-12-01

## 2020-12-01 DIAGNOSIS — U07.1 COVID-19: Primary | ICD-10-CM

## 2020-12-01 DIAGNOSIS — R05.9 COUGH: ICD-10-CM

## 2020-12-01 PROCEDURE — 99442 PR PHYS/QHP TELEPHONE EVALUATION 11-20 MIN: CPT | Performed by: NURSE PRACTITIONER

## 2020-12-01 RX ORDER — DEXAMETHASONE 6 MG/1
6 TABLET ORAL
Qty: 5 TABLET | Refills: 0 | Status: SHIPPED | OUTPATIENT
Start: 2020-12-01 | End: 2020-12-06

## 2020-12-01 NOTE — PROGRESS NOTES
Chief Complaint   Patient presents with   • Cough     Covid positive       History of Present Illness  65 y.o.female presents for cough covid positive.    Recent dx with covid 19 Saturday nov 28th. Has had cough sinus symptoms for about a week prior to. Her  has also been sick hospitalized with covid pneumonia. Pt cough has continued to worsen; cough recurrent all the time.  Feels like bronchitis wheezing, no short of air.  O2 sat 93-94%. Deep cough mostly nonproductive. Positive headaches. No fever since early on. Has tried albuterol, cold meds for sx.     Review of Systems   Constitutional: Negative for chills and fever.   HENT: Positive for congestion, rhinorrhea and sinus pressure. Negative for ear pain, sneezing, sore throat, swollen glands and tinnitus.    Respiratory: Positive for cough and wheezing. Negative for shortness of breath.    Musculoskeletal: Negative for myalgias.       PMSFH  The following portions of the patient's history were reviewed and updated as appropriate: allergies, current medications, past family history, past medical history, past social history, past surgical history and problem list.     Past Medical History:   Diagnosis Date   • Anxiety    • Benign essential hypertension    • Cervical disc disorder    • Chronic pain disorder    • Colon polyps    • Complication of device    • Decreased libido    • Diabetes mellitus (CMS/[facility])    • Dizziness    • Encounter for long-term (current) use of medications    • Extremity pain    • Fatigue    • Hip pain    • History of alopecia    • History of backache    • History of colonoscopy     Fiberoptic   • History of diabetes mellitus    • History of insomnia    • History of mastoiditis    • History of migraine headaches    • History of urinary stone    • Infection of kidney    • Insomnia    • Joint pain    • Kidney infection    • Low back pain    • Lumbar radiculopathy    • Lumbar radiculopathy    • Lumbosacral disc disease    • Migraine    •  Myofascial pain syndrome    • Neck pain    • Palpitations    • Sleep apnea    • Spinal cord stimulator status    • Stress reaction, emotional    • Urinary incontinence    • Urinary tract infection    • Visit for screening mammogram     Description: 08/28/2009   • Vitamin D deficiency       Past Surgical History:   Procedure Laterality Date   • BLADDER SURGERY     • BREAST EXCISIONAL BIOPSY Left     Benign 2009   • COLONOSCOPY  2019    Complete Colonoscopy   • ELBOW ARTHROPLASTY Left    • HYSTERECTOMY      Total Abdominal Hysterectomy (Description: BSO)   • LUMBAR DISCECTOMY  01/28/2013    Lt- L4/5 discectomy redo Lt- L5/S1 foraminotomy -Dr. Von Croft   • LUMBAR DISCECTOMY  03/18/2013    Re-do Lt- L4/5 discectomy Dr. Von Croft    • OOPHORECTOMY     • OTHER SURGICAL HISTORY      Spinal Sterotaxis Stimulation Of Cord   • SPINAL CORD STIMULATOR IMPLANT  08/11/201410/01/2015    placement 2014, replacement 2015. Dr. Von Croft    • TUBAL ABDOMINAL LIGATION        Allergies   Allergen Reactions   • Ace Inhibitors Cough   • Amlodipine Diarrhea   • Beta Adrenergic Blockers Rash   • Cyclobenzaprine Rash   • Hydrochlorothiazide Rash   • Hyzaar [Losartan Potassium-Hctz] Rash   • Latex Rash   • Losartan Rash   • Metformin Rash   • Penicillins Rash   • Pioglitazone Rash   • Spironolactone Rash      Social History     Socioeconomic History   • Marital status:    Tobacco Use   • Smoking status: Never Smoker   • Smokeless tobacco: Never Used   Substance and Sexual Activity   • Alcohol use: No   • Drug use: No   • Sexual activity: Defer     Family History   Problem Relation Age of Onset   • Hypertension Mother    • Peripheral vascular disease Mother    • Ulcers Mother    • Kidney failure Mother    • Lung disease Father    • Diabetes Other         type 2   • Kidney failure Maternal Grandmother             Current Outpatient Medications:   •  albuterol sulfate  (90 Base) MCG/ACT inhaler, Inhale 2 puffs Every 6  (Six) Hours As Needed for Wheezing or Shortness of Air., Disp: 1 inhaler, Rfl: 11  •  atorvastatin (LIPITOR) 10 MG tablet, Take 1 tablet by mouth Every Night., Disp: 30 tablet, Rfl: 5  •  bethanechol (URECHOLINE) 50 MG tablet, Take 50 mg by mouth 3 (Three) Times a Day., Disp: , Rfl:   •  bisoprolol (ZEBeta) 5 MG tablet, Take 5 mg by mouth Daily., Disp: , Rfl:   •  cyclobenzaprine (FLEXERIL) 10 MG tablet, Take 10 mg by mouth 3 (Three) Times a Day As Needed for Muscle Spasms., Disp: , Rfl:   •  docusate sodium (COLACE) 100 MG capsule, Take 100 mg by mouth 2 (Two) Times a Day., Disp: , Rfl:   •  Empagliflozin (Jardiance) 25 MG tablet, Take 25 mg by mouth Daily., Disp: 90 tablet, Rfl: 1  •  estradiol (ESTRACE VAGINAL) 0.1 MG/GM vaginal cream, Insert 2 g into the vagina 1 (One) Time Per Week., Disp: 42.5 g, Rfl: 5  •  furosemide (LASIX) 20 MG tablet, Take 20 mg by mouth 2 (Two) Times a Day As Needed., Disp: , Rfl:   •  glimepiride (Amaryl) 2 MG tablet, Take 1/2 pill before breakfast and dinner, Disp: 90 tablet, Rfl: 1  •  losartan (COZAAR) 100 MG tablet, Take 100 mg by mouth Daily., Disp: , Rfl:   •  NIFEdipine CC (ADALAT CC) 90 MG 24 hr tablet, Take 60 mg by mouth Daily., Disp: , Rfl:   •  omeprazole (priLOSEC) 40 MG capsule, Take 1 capsule by mouth Daily., Disp: 90 capsule, Rfl: 3  •  rOPINIRole (REQUIP) 1 MG tablet, Take 1 tablet by mouth Every Night. Take 1 hour before bedtime., Disp: 90 tablet, Rfl: 3  •  SITagliptin (Januvia) 100 MG tablet, Take 1 tablet by mouth Daily., Disp: 90 tablet, Rfl: 1  •  topiramate (TOPAMAX) 50 MG tablet, Take 1 tablet by mouth Daily As Needed (migraine)., Disp: 90 tablet, Rfl: 3  •  traMADol (ULTRAM) 50 MG tablet, TAKE 1 TABLET PO TID PRN FOR SEVERE BREAKTHROUGH PAIN, Disp: 90 tablet, Rfl: 2  •  traZODone (DESYREL) 50 MG tablet, Take 1 to 2 tabs po hs prn, Disp: 180 tablet, Rfl: 3  •  TRULANCE 3 MG tablet, , Disp: , Rfl:   •  vitamin B-6 (PYRIDOXINE) 100 MG tablet, pyridoxine (vitamin  B6), Disp: , Rfl:     VITALS:  Physical Exam  Telehealth; able to carry on conversation without obvious labor breathing; has occasional cough.  LABS  No new labs      ASSESSMENT/PLAN  Diagnoses and all orders for this visit:    1. COVID-19 (Primary)  -     dexamethasone (DECADRON) 6 MG tablet; Take 1 tablet by mouth Daily With Breakfast for 5 days.  Dispense: 5 tablet; Refill: 0    2. Cough  -     dexamethasone (DECADRON) 6 MG tablet; Take 1 tablet by mouth Daily With Breakfast for 5 days.  Dispense: 5 tablet; Refill: 0    discussed treatment options such as oral antihistamines, otc cough meds/syrup, vit C Vit D and zinc. Will give short course steroids. Explained to pt that blood sugar will run higher on steroids.  If any worsening or if sats< 92%; pt needs to seek emergency care.     This visit has been rescheduled as a phone visit to comply with patient safety concerns in accordance with CDC recommendations. Total time of discussion was 15 minutes.      I discussed the patients findings and my recommendations with patient.  Patient was encouraged to keep me informed of any acute changes, lack of improvement, or any new concerning symptoms.  Patient voiced understanding of all instructions and denied further questions.      FOLLOW-UP  Return if symptoms worsen or fail to improve.    Electronically signed by:    OCTAVIANO Tovar  12/01/2020    EMR Dragon/Transcription Disclaimer:  Much of this encounter note is an electronic transcription/translation of spoken language to printed text.  The electronic translation of spoken language may permit erroneous, or at times, nonsensical words or phrases to be inadvertently transcribed.  Although I have reviewed the note for such errors, some may still exist

## 2021-01-03 NOTE — PATIENT INSTRUCTIONS
Diabetes Mellitus and Food  It is important for you to manage your blood sugar (glucose) level. Your blood glucose level can be greatly affected by what you eat. Eating healthier foods in the appropriate amounts throughout the day at about the same time each day will help you control your blood glucose level. It can also help slow or prevent worsening of your diabetes mellitus. Healthy eating may even help you improve the level of your blood pressure and reach or maintain a healthy weight.  General recommendations for healthful eating and cooking habits include:  · Eating meals and snacks regularly. Avoid going long periods of time without eating to lose weight.  · Eating a diet that consists mainly of plant-based foods, such as fruits, vegetables, nuts, legumes, and whole grains.  · Using low-heat cooking methods, such as baking, instead of high-heat cooking methods, such as deep frying.  Work with your dietitian to make sure you understand how to use the Nutrition Facts information on food labels.  How can food affect me?  Carbohydrates   Carbohydrates affect your blood glucose level more than any other type of food. Your dietitian will help you determine how many carbohydrates to eat at each meal and teach you how to count carbohydrates. Counting carbohydrates is important to keep your blood glucose at a healthy level, especially if you are using insulin or taking certain medicines for diabetes mellitus.  Alcohol   Alcohol can cause sudden decreases in blood glucose (hypoglycemia), especially if you use insulin or take certain medicines for diabetes mellitus. Hypoglycemia can be a life-threatening condition. Symptoms of hypoglycemia (sleepiness, dizziness, and disorientation) are similar to symptoms of having too much alcohol.  If your health care provider has given you approval to drink alcohol, do so in moderation and use the following guidelines:  · Women should not have more than one drink per day, and men  should not have more than two drinks per day. One drink is equal to:  ¨ 12 oz of beer.  ¨ 5 oz of wine.  ¨ 1½ oz of hard liquor.  · Do not drink on an empty stomach.  · Keep yourself hydrated. Have water, diet soda, or unsweetened iced tea.  · Regular soda, juice, and other mixers might contain a lot of carbohydrates and should be counted.  What foods are not recommended?  As you make food choices, it is important to remember that all foods are not the same. Some foods have fewer nutrients per serving than other foods, even though they might have the same number of calories or carbohydrates. It is difficult to get your body what it needs when you eat foods with fewer nutrients. Examples of foods that you should avoid that are high in calories and carbohydrates but low in nutrients include:  · Trans fats (most processed foods list trans fats on the Nutrition Facts label).  · Regular soda.  · Juice.  · Candy.  · Sweets, such as cake, pie, doughnuts, and cookies.  · Fried foods.  What foods can I eat?  Eat nutrient-rich foods, which will nourish your body and keep you healthy. The food you should eat also will depend on several factors, including:  · The calories you need.  · The medicines you take.  · Your weight.  · Your blood glucose level.  · Your blood pressure level.  · Your cholesterol level.  You should eat a variety of foods, including:  · Protein.  ¨ Lean cuts of meat.  ¨ Proteins low in saturated fats, such as fish, egg whites, and beans. Avoid processed meats.  · Fruits and vegetables.  ¨ Fruits and vegetables that may help control blood glucose levels, such as apples, mangoes, and yams.  · Dairy products.  ¨ Choose fat-free or low-fat dairy products, such as milk, yogurt, and cheese.  · Grains, bread, pasta, and rice.  ¨ Choose whole grain products, such as multigrain bread, whole oats, and brown rice. These foods may help control blood pressure.  · Fats.  ¨ Foods containing healthful fats, such as nuts,  avocado, olive oil, canola oil, and fish.  Does everyone with diabetes mellitus have the same meal plan?  Because every person with diabetes mellitus is different, there is not one meal plan that works for everyone. It is very important that you meet with a dietitian who will help you create a meal plan that is just right for you.  This information is not intended to replace advice given to you by your health care provider. Make sure you discuss any questions you have with your health care provider.  Document Released: 09/14/2006 Document Revised: 05/25/2017 Document Reviewed: 11/14/2014  [employer] Interactive Patient Education © 2017 [employer]     well nourished

## 2021-01-04 DIAGNOSIS — G25.81 RLS (RESTLESS LEGS SYNDROME): ICD-10-CM

## 2021-01-04 DIAGNOSIS — M96.1 POSTLAMINECTOMY SYNDROME OF LUMBAR REGION: ICD-10-CM

## 2021-01-04 DIAGNOSIS — M51.26 HERNIATED LUMBAR INTERVERTEBRAL DISC: ICD-10-CM

## 2021-01-05 RX ORDER — ROPINIROLE 1 MG/1
TABLET, FILM COATED ORAL
Qty: 90 TABLET | Refills: 3 | Status: SHIPPED | OUTPATIENT
Start: 2021-01-05 | End: 2022-03-17

## 2021-01-05 RX ORDER — GLIMEPIRIDE 4 MG/1
2 TABLET ORAL
Qty: 90 TABLET | Refills: 1 | Status: SHIPPED | OUTPATIENT
Start: 2021-01-05 | End: 2021-02-15 | Stop reason: ALTCHOICE

## 2021-01-05 RX ORDER — TRAMADOL HYDROCHLORIDE 50 MG/1
TABLET ORAL
Qty: 90 TABLET | Refills: 0 | Status: SHIPPED | OUTPATIENT
Start: 2021-01-05 | End: 2021-03-04

## 2021-01-05 NOTE — TELEPHONE ENCOUNTER
Last Office Visit: 12/1/20  Next Office Visit: 4/16/21    Labs completed in past 6 months? yes  Labs completed in past year? yes    Last Refill Date: 1/21/20  Quantity:   Refills:    Pharmacy:

## 2021-02-15 ENCOUNTER — LAB (OUTPATIENT)
Dept: LAB | Facility: HOSPITAL | Age: 66
End: 2021-02-15

## 2021-02-15 ENCOUNTER — OFFICE VISIT (OUTPATIENT)
Dept: ENDOCRINOLOGY | Facility: CLINIC | Age: 66
End: 2021-02-15

## 2021-02-15 VITALS
OXYGEN SATURATION: 98 % | SYSTOLIC BLOOD PRESSURE: 122 MMHG | HEART RATE: 76 BPM | DIASTOLIC BLOOD PRESSURE: 80 MMHG | WEIGHT: 181.4 LBS | BODY MASS INDEX: 34.28 KG/M2

## 2021-02-15 DIAGNOSIS — E11.9 CONTROLLED TYPE 2 DIABETES MELLITUS WITHOUT COMPLICATION, WITHOUT LONG-TERM CURRENT USE OF INSULIN (HCC): Primary | ICD-10-CM

## 2021-02-15 DIAGNOSIS — E11.69 TYPE 2 DIABETES MELLITUS WITH OTHER SPECIFIED COMPLICATION, UNSPECIFIED WHETHER LONG TERM INSULIN USE (HCC): Primary | ICD-10-CM

## 2021-02-15 DIAGNOSIS — E78.2 MIXED HYPERLIPIDEMIA: Chronic | ICD-10-CM

## 2021-02-15 LAB
ALBUMIN SERPL-MCNC: 4.5 G/DL (ref 3.5–5.2)
ALBUMIN/GLOB SERPL: 1.6 G/DL
ALP SERPL-CCNC: 146 U/L (ref 39–117)
ALT SERPL W P-5'-P-CCNC: 56 U/L (ref 1–33)
ANION GAP SERPL CALCULATED.3IONS-SCNC: 10 MMOL/L (ref 5–15)
AST SERPL-CCNC: 52 U/L (ref 1–32)
BILIRUB SERPL-MCNC: 0.6 MG/DL (ref 0–1.2)
BUN SERPL-MCNC: 19 MG/DL (ref 8–23)
BUN/CREAT SERPL: 30.6 (ref 7–25)
CALCIUM SPEC-SCNC: 9.7 MG/DL (ref 8.6–10.5)
CHLORIDE SERPL-SCNC: 102 MMOL/L (ref 98–107)
CHOLEST SERPL-MCNC: 181 MG/DL (ref 0–200)
CO2 SERPL-SCNC: 28 MMOL/L (ref 22–29)
CREAT SERPL-MCNC: 0.62 MG/DL (ref 0.57–1)
GFR SERPL CREATININE-BSD FRML MDRD: 97 ML/MIN/1.73
GLOBULIN UR ELPH-MCNC: 2.9 GM/DL
GLUCOSE BLDC GLUCOMTR-MCNC: 135 MG/DL (ref 70–130)
GLUCOSE SERPL-MCNC: 130 MG/DL (ref 65–99)
HBA1C MFR BLD: 7.3 %
HDLC SERPL-MCNC: 57 MG/DL (ref 40–60)
LDLC SERPL CALC-MCNC: 101 MG/DL (ref 0–100)
LDLC/HDLC SERPL: 1.72 {RATIO}
POTASSIUM SERPL-SCNC: 4.2 MMOL/L (ref 3.5–5.2)
PROT SERPL-MCNC: 7.4 G/DL (ref 6–8.5)
SODIUM SERPL-SCNC: 140 MMOL/L (ref 136–145)
TRIGL SERPL-MCNC: 129 MG/DL (ref 0–150)
TSH SERPL DL<=0.05 MIU/L-ACNC: 2.9 UIU/ML (ref 0.27–4.2)
VLDLC SERPL-MCNC: 23 MG/DL (ref 5–40)

## 2021-02-15 PROCEDURE — 82043 UR ALBUMIN QUANTITATIVE: CPT | Performed by: PHYSICIAN ASSISTANT

## 2021-02-15 PROCEDURE — 99214 OFFICE O/P EST MOD 30 MIN: CPT | Performed by: PHYSICIAN ASSISTANT

## 2021-02-15 PROCEDURE — 84443 ASSAY THYROID STIM HORMONE: CPT

## 2021-02-15 PROCEDURE — 82947 ASSAY GLUCOSE BLOOD QUANT: CPT | Performed by: PHYSICIAN ASSISTANT

## 2021-02-15 PROCEDURE — 80061 LIPID PANEL: CPT

## 2021-02-15 PROCEDURE — 82570 ASSAY OF URINE CREATININE: CPT | Performed by: PHYSICIAN ASSISTANT

## 2021-02-15 PROCEDURE — 83036 HEMOGLOBIN GLYCOSYLATED A1C: CPT | Performed by: PHYSICIAN ASSISTANT

## 2021-02-15 PROCEDURE — 80053 COMPREHEN METABOLIC PANEL: CPT

## 2021-02-15 PROCEDURE — 36415 COLL VENOUS BLD VENIPUNCTURE: CPT

## 2021-02-15 RX ORDER — METOCLOPRAMIDE 5 MG/1
5 TABLET ORAL 3 TIMES DAILY
COMMUNITY
Start: 2021-01-05

## 2021-02-15 RX ORDER — GLIPIZIDE 5 MG/1
5 TABLET, FILM COATED, EXTENDED RELEASE ORAL DAILY
Qty: 90 TABLET | Refills: 1 | Status: SHIPPED | OUTPATIENT
Start: 2021-02-15 | End: 2021-05-18 | Stop reason: SDUPTHER

## 2021-02-15 NOTE — PROGRESS NOTES
Chief Complaint  F/u for Diabetes Mellitus.    HPI   Yi Garcia is a 65 y.o. female with chronic back pain and HTN- on multiple antihypertensives followed by cardiology in [place], who is here today for f/u of Diabetes Mellitus type 2. The initial diagnosis of diabetes was made approximately 2016.    She is having some symptoms of hypoglycemia multiple times per week, usually during the day. Sugar is typically in the mid 100s when she has symptoms. Regular soda does resolve the symptoms.    A1C- 7.4 (10/16/2020), 8.5 (7/28/2020), 6.8 (1/17/2020), 7.4 (10/14/19), 7.4 (7/12/19), 7.6 (4/15/19), 7.2 (1/7/19), 7.7 (9/14/18), 6.7 (6/13/18), 8.9 (2/2018)    Diabetic complications: gastroparesis followed by GI at [facility]  Eye exam current (within one year): utd summer 2020,   Foot care and dental care: discussed    Current diabetic medications include:  Glimepiride 2mg 1/2 tab before breakfast and dinner  Jardiance 25mg daily - takes in the morning  Januvia 100mg daily - takes in the morning  ACEI/ARB: losartan-hctz  Statin: intolerant to statins, including crestor 5mg, Repatha and Praluent not covered by insurance.     Past medications: metformin (???caused blurred vision, went away when stopped taking metformin), invokana (insurance didn't cover but thinks this controlled glucose better), actos (rash)  No GLP1 due to gastroparesis    Diabetic Monitoring  -   No meter or log for review. She reports fbs 130-180s, doesn't check later in the day often. No hypoglycemia.     Nutrition:     Current diet: unhealthy  Current exercise: none  Seen RD in past: yes    The following portions of the patient's history were reviewed and updated by me as appropriate: allergies, current medications, past family history, past social history, past surgical history and problem list.      Past Medical History:   Diagnosis Date   • Anxiety    • Benign essential hypertension    • Cervical disc disorder    • Chronic pain disorder    • Colon  polyps    • Complication of device    • Decreased libido    • Diabetes mellitus (CMS/HCC)    • Dizziness    • Encounter for long-term (current) use of medications    • Extremity pain    • Fatigue    • Hip pain    • History of alopecia    • History of backache    • History of colonoscopy     Fiberoptic   • History of diabetes mellitus    • History of insomnia    • History of mastoiditis    • History of migraine headaches    • History of urinary stone    • Infection of kidney    • Insomnia    • Joint pain    • Kidney infection    • Low back pain    • Lumbar radiculopathy    • Lumbar radiculopathy    • Lumbosacral disc disease    • Migraine    • Myofascial pain syndrome    • Neck pain    • Palpitations    • Sleep apnea    • Spinal cord stimulator status    • Stress reaction, emotional    • Urinary incontinence    • Urinary tract infection    • Visit for screening mammogram     Description: 08/28/2009   • Vitamin D deficiency        Medications    Current Outpatient Medications:   •  albuterol sulfate  (90 Base) MCG/ACT inhaler, Inhale 2 puffs Every 6 (Six) Hours As Needed for Wheezing or Shortness of Air., Disp: 1 inhaler, Rfl: 11  •  atorvastatin (LIPITOR) 10 MG tablet, Take 1 tablet by mouth Every Night., Disp: 30 tablet, Rfl: 5  •  bethanechol (URECHOLINE) 50 MG tablet, Take 50 mg by mouth 3 (Three) Times a Day., Disp: , Rfl:   •  bisoprolol (ZEBeta) 5 MG tablet, Take 5 mg by mouth Daily., Disp: , Rfl:   •  cyclobenzaprine (FLEXERIL) 10 MG tablet, Take 10 mg by mouth 3 (Three) Times a Day As Needed for Muscle Spasms., Disp: , Rfl:   •  docusate sodium (COLACE) 100 MG capsule, Take 100 mg by mouth 2 (Two) Times a Day., Disp: , Rfl:   •  Empagliflozin (Jardiance) 25 MG tablet, Take 25 mg by mouth Daily., Disp: 90 tablet, Rfl: 1  •  estradiol (ESTRACE VAGINAL) 0.1 MG/GM vaginal cream, Insert 2 g into the vagina 1 (One) Time Per Week., Disp: 42.5 g, Rfl: 5  •  furosemide (LASIX) 20 MG tablet, Take 20 mg by mouth  2 (Two) Times a Day As Needed., Disp: , Rfl:   •  losartan (COZAAR) 100 MG tablet, Take 100 mg by mouth Daily., Disp: , Rfl:   •  metoclopramide (REGLAN) 5 MG tablet, Take 5 mg by mouth 3 (Three) Times a Day., Disp: , Rfl:   •  NIFEdipine CC (ADALAT CC) 90 MG 24 hr tablet, Take 60 mg by mouth Daily., Disp: , Rfl:   •  omeprazole (priLOSEC) 40 MG capsule, Take 1 capsule by mouth Daily., Disp: 90 capsule, Rfl: 3  •  rOPINIRole (REQUIP) 1 MG tablet, TAKE 1 TABLET EVERY NIGHT, 60 MINUTES BEFORE BEDTIME, Disp: 90 tablet, Rfl: 3  •  SITagliptin (Januvia) 100 MG tablet, Take 1 tablet by mouth Daily., Disp: 90 tablet, Rfl: 1  •  topiramate (TOPAMAX) 50 MG tablet, Take 1 tablet by mouth Daily As Needed (migraine)., Disp: 90 tablet, Rfl: 3  •  traMADol (ULTRAM) 50 MG tablet, TAKE 1 TABLET THREE TIMES A DAY AS NEEDED FOR SEVERE BREAKTHROUGH PAIN, Disp: 90 tablet, Rfl: 0  •  traZODone (DESYREL) 50 MG tablet, Take 1 to 2 tabs po hs prn, Disp: 180 tablet, Rfl: 3  •  TRULANCE 3 MG tablet, , Disp: , Rfl:   •  vitamin B-6 (PYRIDOXINE) 100 MG tablet, pyridoxine (vitamin B6), Disp: , Rfl:   •  glipizide (GLUCOTROL XL) 5 MG ER tablet, Take 1 tablet by mouth Daily., Disp: 90 tablet, Rfl: 1    Review of Systems  Review of Systems   Constitutional: Positive for fatigue. Negative for chills, fever and unexpected weight change.   HENT: Negative for ear pain, hearing loss, nosebleeds, rhinorrhea and sore throat.    Eyes: Negative for pain, discharge, redness and itching.   Respiratory: Negative for cough, shortness of breath and wheezing.    Cardiovascular: Negative for chest pain and palpitations.   Gastrointestinal: Negative for abdominal pain, blood in stool, constipation and diarrhea.   Endocrine: Negative for cold intolerance, heat intolerance and polydipsia.   Genitourinary: Negative for dysuria, frequency, hematuria, pelvic pain, vaginal bleeding and vaginal discharge.   Musculoskeletal: Positive for back pain. Negative for  arthralgias, gait problem, joint swelling and myalgias.   Skin: Negative for rash.   Allergic/Immunologic: Negative.    Neurological: Negative for dizziness, syncope, weakness, numbness and headaches.   Hematological: Negative for adenopathy. Does not bruise/bleed easily.   Psychiatric/Behavioral: Negative for sleep disturbance and suicidal ideas. The patient is not nervous/anxious.         Physical Exam    /80   Pulse 76   Wt 82.3 kg (181 lb 6.4 oz)   LMP  (LMP Unknown)   SpO2 98%   BMI 34.28 kg/m² Body mass index is 34.28 kg/m².  Physical Exam   Constitutional: She is oriented to person, place, and time. She appears well-developed. No distress.   AAOx3   HENT:   Head: Normocephalic.   Right Ear: External ear normal.   Left Ear: External ear normal.   Mouth/Throat: No oropharyngeal exudate.   Eyes: Conjunctivae and lids are normal. Right eye exhibits no discharge. Left eye exhibits no discharge. Right pupil is reactive. Left pupil is reactive.   Neck: No JVD present. No tracheal deviation present. No thyroid mass and no thyromegaly present.   Cardiovascular: Normal rate, regular rhythm and normal heart sounds.   No murmur heard.  Pulmonary/Chest: Effort normal and breath sounds normal. No respiratory distress. She has no wheezes.   Abdominal: Soft. Bowel sounds are normal. There is no abdominal tenderness.   Musculoskeletal: No tenderness.   Lymphadenopathy:     She has no cervical adenopathy.   Neurological: She is alert and oriented to person, place, and time.   Skin: Skin is warm and dry. No rash noted. She is not diaphoretic. No erythema.   Psychiatric: Her speech is normal and behavior is normal. Judgment and thought content normal.       Labs and Imaging   Lab Results   Component Value Date    HGBA1C 7.3 02/15/2021    HGBA1C 7.4 10/16/2020    HGBA1C 8.50 (H) 07/20/2020     Office Visit on 02/15/2021   Component Date Value Ref Range Status   • Glucose 02/15/2021 135* 70 - 130 mg/dL Final   •  Hemoglobin A1C 02/15/2021 7.3  % Final   Lab on 02/15/2021   Component Date Value Ref Range Status   • TSH 02/15/2021 2.900  0.270 - 4.200 uIU/mL Final   • Total Cholesterol 02/15/2021 181  0 - 200 mg/dL Final   • Triglycerides 02/15/2021 129  0 - 150 mg/dL Final   • HDL Cholesterol 02/15/2021 57  40 - 60 mg/dL Final   • LDL Cholesterol  02/15/2021 101* 0 - 100 mg/dL Final   • VLDL Cholesterol 02/15/2021 23  5 - 40 mg/dL Final   • LDL/HDL Ratio 02/15/2021 1.72   Final   • Glucose 02/15/2021 130* 65 - 99 mg/dL Final   • BUN 02/15/2021 19  8 - 23 mg/dL Final   • Creatinine 02/15/2021 0.62  0.57 - 1.00 mg/dL Final   • Sodium 02/15/2021 140  136 - 145 mmol/L Final   • Potassium 02/15/2021 4.2  3.5 - 5.2 mmol/L Final   • Chloride 02/15/2021 102  98 - 107 mmol/L Final   • CO2 02/15/2021 28.0  22.0 - 29.0 mmol/L Final   • Calcium 02/15/2021 9.7  8.6 - 10.5 mg/dL Final   • Total Protein 02/15/2021 7.4  6.0 - 8.5 g/dL Final   • Albumin 02/15/2021 4.50  3.50 - 5.20 g/dL Final   • ALT (SGPT) 02/15/2021 56* 1 - 33 U/L Final   • AST (SGOT) 02/15/2021 52* 1 - 32 U/L Final   • Alkaline Phosphatase 02/15/2021 146* 39 - 117 U/L Final   • Total Bilirubin 02/15/2021 0.6  0.0 - 1.2 mg/dL Final   • eGFR Non African Amer 02/15/2021 97  >60 mL/min/1.73 Final   • Globulin 02/15/2021 2.9  gm/dL Final   • A/G Ratio 02/15/2021 1.6  g/dL Final   • BUN/Creatinine Ratio 02/15/2021 30.6* 7.0 - 25.0 Final   • Anion Gap 02/15/2021 10.0  5.0 - 15.0 mmol/L Final       Assessment / Plan   Diagnoses and all orders for this visit:    1. Controlled type 2 diabetes mellitus without complication, without long-term current use of insulin (CMS/[facility]) (Primary)  -     POC Glucose, Blood  -     POC Glycosylated Hemoglobin (Hb A1C)  -     glipizide (GLUCOTROL XL) 5 MG ER tablet; Take 1 tablet by mouth Daily.  Dispense: 90 tablet; Refill: 1  -     Microalbumin / Creatinine Urine Ratio - Urine, Clean Catch  -     Cancel: Comprehensive Metabolic Panel  -     Cancel:  Lipid Panel  -     Cancel: TSH    2. Mixed hyperlipidemia  -     Cancel: Comprehensive Metabolic Panel  -     Cancel: Lipid Panel        Diabetes Mellitus 2 is under inadequate control.  -A1c 7.3  -she reports symptoms of hypoglycemia in the mid 150s that resolve with regular soda  -choice of medications is limited- intolerant to metformin, actos (rash), no GLP1 agonist due to gastroparesis  -change glimepiride to glipizide xl 5 mg daily (in the morning)  -continue jardiance to 25mg daily.   -continue januvia 100mg daily  -bring meter to all visits  -asked pt to call if she continues to have symptoms of hypoglycemia      1.  Diet: 3-4 carb servings per meal for females, 4-5 carb servings per meal for males  Spread carb intake throughout the day  Increase lean protein and vegetable intake  Avoid sugary drinks and processed carbs including crackers, cookies, cakes  2.  Exercise: Recommend at least 30 minutes of exercise daily, at least 5 days per week. Increase exercise gradually.   3.  Blood Glucose Goal: Blood glucose goal <150 fasting, <180 2 hr postprandial  4.  Microalbumin due 1/2021  5.  Education performed during this visit: long term diabetic complications discussed. , annual eye examinations at Ophthalmology discussed, dental hygiene discussed  and foot care reviewed., home glucose monitoring emphasized, all medications, side effects and compliance discussed carefully and Hypoglycemia management and prevention reviewed. Reviewed ‘ABCs’ of diabetes management (respective goals in parentheses):  A1C (<7), blood pressure (<130/80), and cholesterol (LDL <100, if CVD <70).    Hyperlipidemia  -intolerant to most statins due to myalgias, did not tolerate crestor 5mg  -she is tolerating lipitor 10 mg with minimal myalgias currently  -praluent/repatha not covered by insurance  -on fenofibrate  -repeat lipids today          There are no Patient Instructions on file for this visit.    Follow up: Return in about 3  months (around 5/15/2021).    Discussed the nature of the disease including, risks, complications, implications, management, safe and proper use of medications. Encouraged therapeutic lifestyle changes including low calorie diet with plenty of fruits and vegetables, daily exercise, medication compliance, and keeping scheduled follow up appointments with me and any other providers. Encouraged patient to have appointment for complete physical, fasting labs, appropriate screenings, and immunizations on an annual basis.        Wero Hernandez PA-C

## 2021-02-17 LAB
ALBUMIN UR-MCNC: <1.2 MG/DL
CREAT UR-MCNC: 71.1 MG/DL
MICROALBUMIN/CREAT UR: NORMAL MG/G{CREAT}

## 2021-02-26 ENCOUNTER — LAB (OUTPATIENT)
Dept: LAB | Facility: HOSPITAL | Age: 66
End: 2021-02-26

## 2021-02-26 ENCOUNTER — OFFICE VISIT (OUTPATIENT)
Dept: ONCOLOGY | Facility: CLINIC | Age: 66
End: 2021-02-26

## 2021-02-26 VITALS
SYSTOLIC BLOOD PRESSURE: 121 MMHG | DIASTOLIC BLOOD PRESSURE: 68 MMHG | OXYGEN SATURATION: 92 % | HEART RATE: 71 BPM | BODY MASS INDEX: 34.36 KG/M2 | HEIGHT: 61 IN | TEMPERATURE: 96.8 F | WEIGHT: 182 LBS | RESPIRATION RATE: 18 BRPM

## 2021-02-26 DIAGNOSIS — R79.89 ELEVATED FERRITIN: ICD-10-CM

## 2021-02-26 DIAGNOSIS — D75.1 ERYTHROCYTOSIS: ICD-10-CM

## 2021-02-26 DIAGNOSIS — R71.8 ELEVATED FERRITIN, HEMOGLOBIN, AND RED BLOOD CELL COUNT (HCC): ICD-10-CM

## 2021-02-26 DIAGNOSIS — D58.2 ELEVATED FERRITIN, HEMOGLOBIN, AND RED BLOOD CELL COUNT (HCC): ICD-10-CM

## 2021-02-26 DIAGNOSIS — D75.1 ERYTHROCYTOSIS: Primary | ICD-10-CM

## 2021-02-26 DIAGNOSIS — R79.89 ELEVATED FERRITIN, HEMOGLOBIN, AND RED BLOOD CELL COUNT (HCC): ICD-10-CM

## 2021-02-26 LAB
ERYTHROCYTE [DISTWIDTH] IN BLOOD BY AUTOMATED COUNT: 13.4 % (ref 12.3–15.4)
FERRITIN SERPL-MCNC: 164.1 NG/ML (ref 13–150)
HCT VFR BLD AUTO: 49.9 % (ref 34–46.6)
HGB BLD-MCNC: 16.4 G/DL (ref 12–15.9)
IRON 24H UR-MRATE: 68 MCG/DL (ref 37–145)
IRON SATN MFR SERPL: 16 % (ref 20–50)
LYMPHOCYTES # BLD AUTO: 2.1 10*3/MM3 (ref 0.7–3.1)
LYMPHOCYTES NFR BLD AUTO: 26.3 % (ref 19.6–45.3)
MCH RBC QN AUTO: 28.8 PG (ref 26.6–33)
MCHC RBC AUTO-ENTMCNC: 32.9 G/DL (ref 31.5–35.7)
MCV RBC AUTO: 87.6 FL (ref 79–97)
MONOCYTES # BLD AUTO: 0.4 10*3/MM3 (ref 0.1–0.9)
MONOCYTES NFR BLD AUTO: 5.4 % (ref 5–12)
NEUTROPHILS NFR BLD AUTO: 5.3 10*3/MM3 (ref 1.7–7)
NEUTROPHILS NFR BLD AUTO: 68.3 % (ref 42.7–76)
PLATELET # BLD AUTO: 254 10*3/MM3 (ref 140–450)
PMV BLD AUTO: 8.5 FL (ref 6–12)
RBC # BLD AUTO: 5.7 10*6/MM3 (ref 3.77–5.28)
TIBC SERPL-MCNC: 417 MCG/DL (ref 298–536)
TRANSFERRIN SERPL-MCNC: 280 MG/DL (ref 200–360)
WBC # BLD AUTO: 7.8 10*3/MM3 (ref 3.4–10.8)

## 2021-02-26 PROCEDURE — 85025 COMPLETE CBC W/AUTO DIFF WBC: CPT

## 2021-02-26 PROCEDURE — 83540 ASSAY OF IRON: CPT

## 2021-02-26 PROCEDURE — 99213 OFFICE O/P EST LOW 20 MIN: CPT | Performed by: NURSE PRACTITIONER

## 2021-02-26 PROCEDURE — 36415 COLL VENOUS BLD VENIPUNCTURE: CPT

## 2021-02-26 PROCEDURE — 82728 ASSAY OF FERRITIN: CPT

## 2021-02-26 PROCEDURE — 84466 ASSAY OF TRANSFERRIN: CPT

## 2021-02-26 NOTE — PROGRESS NOTES
"      PROBLEM LIST:  1. Elevated ferritin  2. NAFLD  3. Gastroparesis  4. DM2    Subjective     CHIEF COMPLAINT: elevated ferritin and hgb    HISTORY OF PRESENT ILLNESS:   Yi Garcia returns for follow-up.   Overall she has been well.  She denies any recurrent infections or shortness of breath no joint swelling or erythema.  No headaches or vision changes.  She continues to have untreated sleep apnea.  She does not wear her CPAP machine.        Past Medical History, Past Surgical History, Social History, Family History have been reviewed and are without significant changes except as mentioned.    Review of Systems   A comprehensive 14 point review of systems was performed and was negative except as mentioned.    Medications:  The current medication list was reviewed in the EMR    ALLERGIES:    Allergies   Allergen Reactions   • Ace Inhibitors Cough   • Amlodipine Diarrhea   • Beta Adrenergic Blockers Rash   • Cyclobenzaprine Rash   • Hydrochlorothiazide Rash   • Hyzaar [Losartan Potassium-Hctz] Rash   • Latex Rash   • Losartan Rash   • Metformin Rash   • Penicillins Rash   • Pioglitazone Rash   • Spironolactone Rash       Objective      /68   Pulse 71   Temp 96.8 °F (36 °C)   Resp 18   Ht 154.9 cm (61\")   Wt 82.6 kg (182 lb)   LMP  (LMP Unknown)   SpO2 92%   BMI 34.39 kg/m²    Vitals:    02/26/21 0944   PainSc: 0-No pain  Comment: No new pain             Performance Status: 1  General: well appearing female in no acute distress  Neuro: alert and oriented  HEENT: sclerae anicteric, oropharynx clear  Lymphatics: no cervical, supraclavicular, or axillary adenopathy  Cardiovascular: regular rate and rhythm, no murmurs  Lungs: clear to auscultation bilaterally  Abdomen: soft, nontender, nondistended.  No palpable organomegaly  Extremities: no lower extremity edema  Skin: no rashes, lesions, bruising, or petechiae  Psych: mood and affect appropriate      RECENT LABS:  WBC   Date Value Ref Range Status "   02/26/2021 7.80 3.40 - 10.80 10*3/mm3 Final     RBC   Date Value Ref Range Status   02/26/2021 5.70 (H) 3.77 - 5.28 10*6/mm3 Final     Hemoglobin   Date Value Ref Range Status   02/26/2021 16.4 (H) 12.0 - 15.9 g/dL Final     Hematocrit   Date Value Ref Range Status   02/26/2021 49.9 (H) 34.0 - 46.6 % Final     MCV   Date Value Ref Range Status   02/26/2021 87.6 79.0 - 97.0 fL Final     MCH   Date Value Ref Range Status   02/26/2021 28.8 26.6 - 33.0 pg Final     MCHC   Date Value Ref Range Status   02/26/2021 32.9 31.5 - 35.7 g/dL Final     RDW   Date Value Ref Range Status   02/26/2021 13.4 12.3 - 15.4 % Final     RDW-SD   Date Value Ref Range Status   07/20/2020 43.1 37.0 - 54.0 fl Final     MPV   Date Value Ref Range Status   02/26/2021 8.5 6.0 - 12.0 fL Final     Platelets   Date Value Ref Range Status   02/26/2021 254 140 - 450 10*3/mm3 Final     Neutrophil %   Date Value Ref Range Status   02/26/2021 68.3 42.7 - 76.0 % Final     Lymphocyte %   Date Value Ref Range Status   02/26/2021 26.3 19.6 - 45.3 % Final     Monocyte %   Date Value Ref Range Status   02/26/2021 5.4 5.0 - 12.0 % Final     Eosinophil %   Date Value Ref Range Status   07/20/2020 1.6 0.3 - 6.2 % Final     Basophil %   Date Value Ref Range Status   07/20/2020 1.1 0.0 - 1.5 % Final     Immature Grans %   Date Value Ref Range Status   07/20/2020 0.6 (H) 0.0 - 0.5 % Final     Neutrophils, Absolute   Date Value Ref Range Status   02/26/2021 5.30 1.70 - 7.00 10*3/mm3 Final     Lymphocytes, Absolute   Date Value Ref Range Status   02/26/2021 2.10 0.70 - 3.10 10*3/mm3 Final     Monocytes, Absolute   Date Value Ref Range Status   02/26/2021 0.40 0.10 - 0.90 10*3/mm3 Final     Eosinophils, Absolute   Date Value Ref Range Status   07/20/2020 0.10 0.00 - 0.40 10*3/mm3 Final     Basophils, Absolute   Date Value Ref Range Status   07/20/2020 0.07 0.00 - 0.20 10*3/mm3 Final     Immature Grans, Absolute   Date Value Ref Range Status   07/20/2020 0.04 0.00 -  0.05 10*3/mm3 Final     [facility]   Date Value Ref Range Status   07/20/2020 0.2 0.0 - 0.2 /100 WBC Final         Hemochromatosis Gene   Hemochromatosis Mutation   Collected: 08/29/19 1350   Resulting lab: [id number]   Value: Comment   Comment: NO MUTATION IDENTIFIED        Assessment/Plan   Yi Garcia is a 65 y.o. year old female with elevated ferritin and erythrocytosis.    Elevated ferritin: No evidence of hereditary hemochromatosis based on genetic testing.    Ferritin level today is pending.  Ferritin level 6 months ago was normal 124.70 along with normal iron levels as well.    Erythrocytosis: She had a mildly elevated hematocrit 49.9% with a slightly elevated hemoglobin of 16.4 g/dL today.   She has untreated sleep apnea.  She is still not using CPAP for sleep apnea.     I will see her back in 6 months to recheck labs.                  I spent 24 minutes caring for Yi on this date of service. This time includes time spent by me in the following activities: preparing for the visit, reviewing tests, obtaining and/or reviewing a separately obtained history, performing a medically appropriate examination and/or evaluation, counseling and educating the patient/family/caregiver and documenting information in the medical record.             Savita Schulz APRN  [facility]    2/26/2021          CC:

## 2021-03-03 DIAGNOSIS — M51.26 HERNIATED LUMBAR INTERVERTEBRAL DISC: ICD-10-CM

## 2021-03-03 DIAGNOSIS — M96.1 POSTLAMINECTOMY SYNDROME OF LUMBAR REGION: ICD-10-CM

## 2021-03-03 NOTE — TELEPHONE ENCOUNTER
Last appointment: 12/1/20  Next appointment: 4/16/21  Bernabe: 7/20/20  UDS:3/15/19  CSA: 7/20/20    Last Refill: 1/5/21  Quantity: 90 3 times daily prn  Refills: 0

## 2021-03-04 RX ORDER — TRAMADOL HYDROCHLORIDE 50 MG/1
TABLET ORAL
Qty: 90 TABLET | Refills: 0 | Status: SHIPPED | OUTPATIENT
Start: 2021-03-04 | End: 2021-04-16 | Stop reason: SDUPTHER

## 2021-04-16 ENCOUNTER — OFFICE VISIT (OUTPATIENT)
Dept: INTERNAL MEDICINE | Facility: CLINIC | Age: 66
End: 2021-04-16

## 2021-04-16 VITALS
WEIGHT: 180.4 LBS | DIASTOLIC BLOOD PRESSURE: 84 MMHG | TEMPERATURE: 97.1 F | HEART RATE: 80 BPM | BODY MASS INDEX: 34.09 KG/M2 | SYSTOLIC BLOOD PRESSURE: 152 MMHG | OXYGEN SATURATION: 97 %

## 2021-04-16 DIAGNOSIS — F51.01 PRIMARY INSOMNIA: ICD-10-CM

## 2021-04-16 DIAGNOSIS — M96.1 POSTLAMINECTOMY SYNDROME OF LUMBAR REGION: ICD-10-CM

## 2021-04-16 DIAGNOSIS — M51.26 HERNIATED LUMBAR INTERVERTEBRAL DISC: ICD-10-CM

## 2021-04-16 DIAGNOSIS — L98.9 PERINEAL IRRITATION IN FEMALE: ICD-10-CM

## 2021-04-16 DIAGNOSIS — Z51.81 MEDICATION MONITORING ENCOUNTER: ICD-10-CM

## 2021-04-16 DIAGNOSIS — E78.2 MIXED HYPERLIPIDEMIA: Primary | ICD-10-CM

## 2021-04-16 LAB
BILIRUB BLD-MCNC: NEGATIVE MG/DL
CLARITY, POC: ABNORMAL
COLOR UR: YELLOW
GLUCOSE UR STRIP-MCNC: ABNORMAL MG/DL
KETONES UR QL: NEGATIVE
LEUKOCYTE EST, POC: NEGATIVE
NITRITE UR-MCNC: NEGATIVE MG/ML
PH UR: 6 [PH] (ref 5–8)
PROT UR STRIP-MCNC: NEGATIVE MG/DL
RBC # UR STRIP: NEGATIVE /UL
SP GR UR: 1.02 (ref 1–1.03)
UROBILINOGEN UR QL: NORMAL

## 2021-04-16 PROCEDURE — 99214 OFFICE O/P EST MOD 30 MIN: CPT | Performed by: NURSE PRACTITIONER

## 2021-04-16 PROCEDURE — 81003 URINALYSIS AUTO W/O SCOPE: CPT | Performed by: NURSE PRACTITIONER

## 2021-04-16 RX ORDER — NYSTATIN 100000 U/G
CREAM TOPICAL 2 TIMES DAILY PRN
Qty: 30 G | Refills: 2 | Status: SHIPPED | OUTPATIENT
Start: 2021-04-16 | End: 2021-08-10

## 2021-04-16 RX ORDER — TRAMADOL HYDROCHLORIDE 50 MG/1
TABLET ORAL
Qty: 90 TABLET | Refills: 0 | OUTPATIENT
Start: 2021-04-16

## 2021-04-16 RX ORDER — TRAMADOL HYDROCHLORIDE 50 MG/1
50 TABLET ORAL EVERY 8 HOURS PRN
Qty: 90 TABLET | Refills: 2 | Status: SHIPPED | OUTPATIENT
Start: 2021-04-16 | End: 2021-10-11

## 2021-04-16 RX ORDER — CICLOPIROX 7.7 MG/G
GEL TOPICAL
COMMUNITY
Start: 2021-03-23 | End: 2022-11-18

## 2021-04-16 RX ORDER — PRAVASTATIN SODIUM 10 MG
10 TABLET ORAL NIGHTLY
Qty: 30 TABLET | Refills: 5 | Status: SHIPPED | OUTPATIENT
Start: 2021-04-16 | End: 2021-08-10 | Stop reason: SDUPTHER

## 2021-04-16 RX ORDER — TRAZODONE HYDROCHLORIDE 50 MG/1
TABLET ORAL
Qty: 180 TABLET | Refills: 3 | Status: SHIPPED | OUTPATIENT
Start: 2021-04-16 | End: 2023-01-19 | Stop reason: SDUPTHER

## 2021-04-16 NOTE — PROGRESS NOTES
Chief Complaint   Patient presents with   • Follow-up   • Hyperlipidemia   • Insomnia   • Back Pain   • Difficulty Urinating     with kallie irritation       History of Present Illness    65 y.o.female presents for follow-up hyperlipidemia, insomnia, and back pain.  Periirritation.    Hyperlipidemia  This is a chronic problem. The current episode started more than 1 year ago. The problem is controlled. Factors aggravating her hyperlipidemia include fatty foods. Associated symptoms include myalgias. Pertinent negatives include no chest pain, focal weakness or shortness of breath. Current antihyperlipidemic treatment includes statins and diet change. Compliance problems include medication side effects (Gets leg cramps and myalgias from atorvastatin).    Insomnia  This is a chronic problem. The current episode started more than 1 year ago. The problem occurs daily. The problem has been gradually improving. Associated symptoms include arthralgias, myalgias, numbness and weakness. Pertinent negatives include no abdominal pain, chest pain, chills, fever, joint swelling, nausea or vomiting. Treatments tried: Trazodone. The treatment provided moderate relief.   Back Pain  This is a chronic (With post laminectomy syndrome of lumbar region and herniated lumbar disc) problem. The current episode started more than 1 year ago. The problem occurs daily. The problem has been waxing and waning since onset. The pain is present in the lumbar spine. The quality of the pain is described as aching. The pain is worse during the day. The symptoms are aggravated by bending and position. Associated symptoms include numbness and weakness. Pertinent negatives include no abdominal pain, bladder incontinence, chest pain, fever or pelvic pain. (Has difficulty with bowel and bladder after surgery.  In and out caths.  Frequent constipation) Treatments tried: Tramadol as needed. The treatment provided mild relief.     C/o periirritation; has to  self cath. Tried nystatin cream and helps need refill.  Brings a urine sample today.    Did see dermatology  For lipoma infront of left clavicle. Was told could call surgery center to schedule appt for surgical removal. She tried calling and was told provider had to schedule. Pt reports has left 3 messages with derm and has not heard back. Lipoma causes her pain and would like ot have removed.      Review of Systems   Constitutional: Negative for chills and fever.   Respiratory: Negative for shortness of breath.    Cardiovascular: Negative for chest pain.   Gastrointestinal: Positive for constipation. Negative for abdominal pain, diarrhea, nausea and vomiting.   Genitourinary: Positive for difficulty urinating and vaginal pain. Negative for hematuria, pelvic pain, urinary incontinence, vaginal bleeding and vaginal discharge.   Musculoskeletal: Positive for arthralgias, back pain and myalgias. Negative for joint swelling.   Skin: Positive for skin lesions.   Neurological: Positive for weakness and numbness. Negative for focal weakness.   Psychiatric/Behavioral: Positive for sleep disturbance. Negative for depressed mood. The patient has insomnia. The patient is not nervous/anxious.            [facility]  The following portions of the patient's history were reviewed and updated as appropriate: allergies, current medications, past family history, past medical history, past social history, past surgical history and problem list.     Past Medical History:   Diagnosis Date   • Anxiety    • Benign essential hypertension    • Cervical disc disorder    • Chronic pain disorder    • Colon polyps    • Complication of device    • Decreased libido    • Diabetes mellitus (CMS/HCC)    • Dizziness    • Encounter for long-term (current) use of medications    • Extremity pain    • Fatigue    • Hip pain    • History of alopecia    • History of backache    • History of colonoscopy     Fiberoptic   • History of diabetes mellitus    • History  of insomnia    • History of mastoiditis    • History of migraine headaches    • History of urinary stone    • Infection of kidney    • Insomnia    • Joint pain    • Kidney infection    • Low back pain    • Lumbar radiculopathy    • Lumbar radiculopathy    • Lumbosacral disc disease    • Migraine    • Myofascial pain syndrome    • Neck pain    • Palpitations    • Sleep apnea    • Spinal cord stimulator status    • Stress reaction, emotional    • Urinary incontinence    • Urinary tract infection    • Visit for screening mammogram     Description: 08/28/2009   • Vitamin D deficiency       Allergies   Allergen Reactions   • Ace Inhibitors Cough   • Amlodipine Diarrhea   • Beta Adrenergic Blockers Rash   • Cyclobenzaprine Rash   • Hydrochlorothiazide Rash   • Hyzaar [Losartan Potassium-Hctz] Rash   • Latex Rash   • Losartan Rash   • Metformin Rash   • Penicillins Rash   • Pioglitazone Rash   • Spironolactone Rash      Social History     Tobacco Use   • Smoking status: Never Smoker   • Smokeless tobacco: Never Used   Substance Use Topics   • Alcohol use: No   • Drug use: No         Current Outpatient Medications:   •  albuterol sulfate  (90 Base) MCG/ACT inhaler, Inhale 2 puffs Every 6 (Six) Hours As Needed for Wheezing or Shortness of Air., Disp: 1 inhaler, Rfl: 11  •  atorvastatin (LIPITOR) 10 MG tablet, Take 1 tablet by mouth Every Night., Disp: 30 tablet, Rfl: 5  •  bethanechol (URECHOLINE) 50 MG tablet, Take 50 mg by mouth 3 (Three) Times a Day., Disp: , Rfl:   •  bisoprolol (ZEBeta) 5 MG tablet, Take 5 mg by mouth Daily., Disp: , Rfl:   •  ciclopirox (LOPROX) 0.77 % gel, , Disp: , Rfl:   •  cyclobenzaprine (FLEXERIL) 10 MG tablet, Take 10 mg by mouth 3 (Three) Times a Day As Needed for Muscle Spasms., Disp: , Rfl:   •  docusate sodium (COLACE) 100 MG capsule, Take 100 mg by mouth 2 (Two) Times a Day., Disp: , Rfl:   •  Empagliflozin (Jardiance) 25 MG tablet, Take 25 mg by mouth Daily. (Patient taking  differently: Take 1 tablet by mouth Every Other Day.), Disp: 90 tablet, Rfl: 1  •  estradiol (ESTRACE VAGINAL) 0.1 MG/GM vaginal cream, Insert 2 g into the vagina 1 (One) Time Per Week., Disp: 42.5 g, Rfl: 5  •  furosemide (LASIX) 20 MG tablet, Take 20 mg by mouth 2 (Two) Times a Day As Needed., Disp: , Rfl:   •  glipizide (GLUCOTROL XL) 5 MG ER tablet, Take 1 tablet by mouth Daily., Disp: 90 tablet, Rfl: 1  •  losartan (COZAAR) 100 MG tablet, Take 100 mg by mouth Daily., Disp: , Rfl:   •  metoclopramide (REGLAN) 5 MG tablet, Take 5 mg by mouth 3 (Three) Times a Day., Disp: , Rfl:   •  NIFEdipine CC (ADALAT CC) 90 MG 24 hr tablet, Take 60 mg by mouth Daily., Disp: , Rfl:   •  omeprazole (priLOSEC) 40 MG capsule, Take 1 capsule by mouth Daily., Disp: 90 capsule, Rfl: 3  •  rOPINIRole (REQUIP) 1 MG tablet, TAKE 1 TABLET EVERY NIGHT, 60 MINUTES BEFORE BEDTIME, Disp: 90 tablet, Rfl: 3  •  SITagliptin (Januvia) 100 MG tablet, Take 1 tablet by mouth Daily., Disp: 90 tablet, Rfl: 1  •  topiramate (TOPAMAX) 50 MG tablet, Take 1 tablet by mouth Daily As Needed (migraine)., Disp: 90 tablet, Rfl: 3  •  traMADol (ULTRAM) 50 MG tablet, TAKE 1 TABLET THREE TIMES A DAY AS NEEDED FOR SEVERE BREAKTHROUGH PAIN, Disp: 90 tablet, Rfl: 0  •  traZODone (DESYREL) 50 MG tablet, Take 1 to 2 tabs po hs prn, Disp: 180 tablet, Rfl: 3  •  TRULANCE 3 MG tablet, , Disp: , Rfl:   •  vitamin B-6 (PYRIDOXINE) 100 MG tablet, pyridoxine (vitamin B6), Disp: , Rfl:     VITALS:  /84   Pulse 80   Temp 97.1 °F (36.2 °C)   Wt 81.8 kg (180 lb 6.4 oz)   LMP  (LMP Unknown)   SpO2 97%   Breastfeeding No   BMI 34.09 kg/m²     Physical Exam  HENT:      Head: Normocephalic.      Nose: Nose normal.      Mouth/Throat:      Mouth: Mucous membranes are moist.   Cardiovascular:      Rate and Rhythm: Normal rate and regular rhythm.      Heart sounds: Normal heart sounds.   Pulmonary:      Effort: Pulmonary effort is normal. No respiratory distress.       Breath sounds: Normal breath sounds.   Abdominal:      General: Bowel sounds are normal.      Palpations: Abdomen is soft.      Tenderness: There is no abdominal tenderness.   Skin:     General: Skin is warm and dry.   Neurological:      General: No focal deficit present.      Mental Status: She is alert and oriented to person, place, and time.   Psychiatric:         Mood and Affect: Mood normal.         Behavior: Behavior normal.         Result Review :            Assessment and Plan    Diagnoses and all orders for this visit:    1. Mixed hyperlipidemia (Primary)  -     pravastatin (PRAVACHOL) 10 MG tablet; Take 1 tablet by mouth Every Night.  Dispense: 30 tablet; Refill: 5    2. Primary insomnia  -     traZODone (DESYREL) 50 MG tablet; Take 1 to 2 tabs po hs prn  Dispense: 180 tablet; Refill: 3    3. Perineal irritation in female  -     nystatin (MYCOSTATIN) 479292 UNIT/GM cream; Apply  topically to the appropriate area as directed 2 (Two) Times a Day As Needed (kallie irritation).  Dispense: 30 g; Refill: 2  -     POC Urinalysis Dipstick, Automated    4. Postlaminectomy syndrome of lumbar region  -     traMADol (ULTRAM) 50 MG tablet; Take 1 tablet by mouth Every 8 (Eight) Hours As Needed for Moderate Pain .  Dispense: 90 tablet; Refill: 2    5. Herniated lumbar intervertebral disc  -     traMADol (ULTRAM) 50 MG tablet; Take 1 tablet by mouth Every 8 (Eight) Hours As Needed for Moderate Pain .  Dispense: 90 tablet; Refill: 2    6. Medication monitoring encounter  -     Urine Drug Screen - Urine, Clean Catch; Future  As part of patient's treatment plan I am prescribing a controlled substance.  The patient has been made aware of the appropriate use of such medications, including potential risk of somnolence, limited ability to drive and/or work safely, and potential for dependence and/or overdose.  It has also been made clear that these medications are for use by this patient only, without concomitant use of alcohol  or other substances, unless prescribed.  The patient has read and signed the [facility] Controlled Substance Contract.  I will continue to see patient for regular follow up appointments.  They are well controlled on their medication.  JEAN-CLAUDE is updated today; appears appropriate for fill. The patient is aware of the potential for addiction and dependence.  csa 7-20-20    Will have someone from our office contact derm to see about helping pt get lipoma surgical removal scheduled or does this need different referral.      Follow Up   Return in about 6 months (around 10/16/2021), or if symptoms worsen or fail to improve, for Annual.      I discussed the patients findings and my recommendations with patient.  Patient was encouraged to keep me informed of any acute changes, lack of improvement, or any new concerning symptoms.  Patient voiced understanding of all instructions and denied further questions.    Electronically signed by:    OCTAVIANO Tovar  04/16/2021    EMR Dragon/Transcription Disclaimer:  Much of this encounter note is an electronic transcription/translation of spoken language to printed text.  The electronic translation of spoken language may permit erroneous, or at times, nonsensical words or phrases to be inadvertently transcribed.  Although I have reviewed the note for such errors, some may still exist

## 2021-04-22 DIAGNOSIS — Z51.81 MEDICATION MONITORING ENCOUNTER: ICD-10-CM

## 2021-05-14 ENCOUNTER — TELEPHONE (OUTPATIENT)
Dept: INTERNAL MEDICINE | Facility: CLINIC | Age: 66
End: 2021-05-14

## 2021-05-14 NOTE — TELEPHONE ENCOUNTER
Please return call to pt. I did check on this after pt was in to see me last.  The dermatologist is the one that would schedule the procedure for lipoma removal, since the dermatologist would be the one performing.  We are not able to schedule the procedure.  She would need to contact the dermatology office.

## 2021-05-14 NOTE — TELEPHONE ENCOUNTER
Provider: STEWART FREEMAN  Caller: KATERINA NAYAK  Relationship to Patient: SELF  Phone Number: [phone]  Reason for Call: PATIENT IS CALLING TO CHECK AND SEE WHAT'S GOING ON WITH HER REFERRAL TO GET HER TUMOR REMOVED. SHE TRIED MAKING AN APPT AT THE [facility] BUT THEY ARE STILL WAITING TO HER SOMETHING BACKFROM     [phone]

## 2021-05-18 ENCOUNTER — OFFICE VISIT (OUTPATIENT)
Dept: ENDOCRINOLOGY | Facility: CLINIC | Age: 66
End: 2021-05-18

## 2021-05-18 VITALS
TEMPERATURE: 97.1 F | HEIGHT: 61 IN | WEIGHT: 184 LBS | DIASTOLIC BLOOD PRESSURE: 84 MMHG | OXYGEN SATURATION: 97 % | HEART RATE: 75 BPM | SYSTOLIC BLOOD PRESSURE: 140 MMHG | BODY MASS INDEX: 34.74 KG/M2

## 2021-05-18 DIAGNOSIS — E11.65 UNCONTROLLED TYPE 2 DIABETES MELLITUS WITH HYPERGLYCEMIA (HCC): Chronic | ICD-10-CM

## 2021-05-18 DIAGNOSIS — E78.2 MIXED HYPERLIPIDEMIA: Primary | Chronic | ICD-10-CM

## 2021-05-18 LAB
GLUCOSE BLDC GLUCOMTR-MCNC: 192 MG/DL (ref 70–130)
HBA1C MFR BLD: 7.8 %

## 2021-05-18 PROCEDURE — 83036 HEMOGLOBIN GLYCOSYLATED A1C: CPT | Performed by: PHYSICIAN ASSISTANT

## 2021-05-18 PROCEDURE — 99214 OFFICE O/P EST MOD 30 MIN: CPT | Performed by: PHYSICIAN ASSISTANT

## 2021-05-18 PROCEDURE — 82947 ASSAY GLUCOSE BLOOD QUANT: CPT | Performed by: PHYSICIAN ASSISTANT

## 2021-05-18 RX ORDER — GLIPIZIDE 10 MG/1
10 TABLET, FILM COATED, EXTENDED RELEASE ORAL DAILY
Qty: 90 TABLET | Refills: 1 | Status: SHIPPED | OUTPATIENT
Start: 2021-05-18 | End: 2021-06-09 | Stop reason: ALTCHOICE

## 2021-05-18 NOTE — PROGRESS NOTES
Chief Complaint  F/u for Diabetes Mellitus.    HPI   Yi Garcia is a 65 y.o. female with chronic back pain and HTN- on multiple antihypertensives followed by cardiology in [place], who is here today for f/u of Diabetes Mellitus type 2. The initial diagnosis of diabetes was made approximately 2016.    Off Jardiance due to vaginal irritation.   She continues to have episodes where she gets shaky and confused and symptoms resolve with soda or cake. She has checked her BG when this happens it is typically anywhere from 150 to 200.     A1C- 7.8 (5/18/2021), 7.4 (10/16/2020), 8.5 (7/28/2020), 6.8 (1/17/2020), 7.4 (10/14/19), 7.4 (7/12/19), 7.6 (4/15/19), 7.2 (1/7/19), 7.7 (9/14/18), 6.7 (6/13/18), 8.9 (2/2018)    Diabetic complications: gastroparesis followed by GI at [facility]  Eye exam current (within one year): [facility] summer 2020, [facility]   Foot care and dental care: discussed    Current diabetic medications include:  Glipizide XL 5 mg qAM  Januvia 100mg daily - takes in the morning    ACEI/ARB: losartan-hctz  Statin: pravastatin 10 mg, did not tolerate crestor 5mg, lipitor 10 mg, Repatha and Praluent not covered by insurance.     Past medications: metformin (???caused blurred vision, went away when stopped taking metformin), invokana (insurance didn't cover but thinks this controlled glucose better), actos (rash), jardiance (yeast infections)  No GLP1 due to gastroparesis    Diabetic Monitoring  -   No meter or log for review. She reports fbs 130-180s, doesn't check later in the day often. No hypoglycemia.     Nutrition:     Current diet: unhealthy  Current exercise: none  Seen RD in past: yes    The following portions of the patient's history were reviewed and updated by me as appropriate: allergies, current medications, past family history, past social history, past surgical history and problem list.      Past Medical History:   Diagnosis Date   • Anxiety    • Benign essential hypertension    • Cervical  disc disorder    • Chronic pain disorder    • Colon polyps    • Complication of device    • Decreased libido    • Diabetes mellitus (CMS/HCC)    • Dizziness    • Encounter for long-term (current) use of medications    • Extremity pain    • Fatigue    • Hip pain    • History of alopecia    • History of backache    • History of colonoscopy     Fiberoptic   • History of diabetes mellitus    • History of insomnia    • History of mastoiditis    • History of migraine headaches    • History of urinary stone    • Infection of kidney    • Insomnia    • Joint pain    • Kidney infection    • Low back pain    • Lumbar radiculopathy    • Lumbar radiculopathy    • Lumbosacral disc disease    • Migraine    • Myofascial pain syndrome    • Neck pain    • Palpitations    • Sleep apnea    • Spinal cord stimulator status    • Stress reaction, emotional    • Urinary incontinence    • Urinary tract infection    • Visit for screening mammogram     Description: 08/28/2009   • Vitamin D deficiency        Medications    Current Outpatient Medications:   •  albuterol sulfate  (90 Base) MCG/ACT inhaler, Inhale 2 puffs Every 6 (Six) Hours As Needed for Wheezing or Shortness of Air., Disp: 1 inhaler, Rfl: 11  •  bethanechol (URECHOLINE) 50 MG tablet, Take 50 mg by mouth 3 (Three) Times a Day., Disp: , Rfl:   •  bisoprolol (ZEBeta) 5 MG tablet, Take 5 mg by mouth Daily., Disp: , Rfl:   •  ciclopirox (LOPROX) 0.77 % gel, , Disp: , Rfl:   •  cyclobenzaprine (FLEXERIL) 10 MG tablet, Take 10 mg by mouth 3 (Three) Times a Day As Needed for Muscle Spasms., Disp: , Rfl:   •  docusate sodium (COLACE) 100 MG capsule, Take 100 mg by mouth 2 (Two) Times a Day., Disp: , Rfl:   •  estradiol (ESTRACE VAGINAL) 0.1 MG/GM vaginal cream, Insert 2 g into the vagina 1 (One) Time Per Week., Disp: 42.5 g, Rfl: 5  •  furosemide (LASIX) 20 MG tablet, Take 20 mg by mouth 2 (Two) Times a Day As Needed., Disp: , Rfl:   •  glipizide (GLUCOTROL XL) 10 MG 24 hr tablet,  Take 1 tablet by mouth Daily. Dose increase, Disp: 90 tablet, Rfl: 1  •  losartan (COZAAR) 100 MG tablet, Take 100 mg by mouth Daily., Disp: , Rfl:   •  metoclopramide (REGLAN) 5 MG tablet, Take 5 mg by mouth 3 (Three) Times a Day., Disp: , Rfl:   •  NIFEdipine CC (ADALAT CC) 90 MG 24 hr tablet, Take 60 mg by mouth Daily., Disp: , Rfl:   •  nystatin (MYCOSTATIN) 564640 UNIT/GM cream, Apply  topically to the appropriate area as directed 2 (Two) Times a Day As Needed (kallie irritation)., Disp: 30 g, Rfl: 2  •  omeprazole (priLOSEC) 40 MG capsule, Take 1 capsule by mouth Daily., Disp: 90 capsule, Rfl: 3  •  pravastatin (PRAVACHOL) 10 MG tablet, Take 1 tablet by mouth Every Night., Disp: 30 tablet, Rfl: 5  •  rOPINIRole (REQUIP) 1 MG tablet, TAKE 1 TABLET EVERY NIGHT, 60 MINUTES BEFORE BEDTIME, Disp: 90 tablet, Rfl: 3  •  SITagliptin (Januvia) 100 MG tablet, Take 1 tablet by mouth Daily., Disp: 90 tablet, Rfl: 1  •  topiramate (TOPAMAX) 50 MG tablet, Take 1 tablet by mouth Daily As Needed (migraine)., Disp: 90 tablet, Rfl: 3  •  traMADol (ULTRAM) 50 MG tablet, Take 1 tablet by mouth Every 8 (Eight) Hours As Needed for Moderate Pain ., Disp: 90 tablet, Rfl: 2  •  traZODone (DESYREL) 50 MG tablet, Take 1 to 2 tabs po hs prn, Disp: 180 tablet, Rfl: 3  •  TRULANCE 3 MG tablet, , Disp: , Rfl:   •  vitamin B-6 (PYRIDOXINE) 100 MG tablet, pyridoxine (vitamin B6), Disp: , Rfl:     Review of Systems  Review of Systems   Constitutional: Positive for fatigue. Negative for chills, fever and unexpected weight change.   HENT: Negative for ear pain, hearing loss, nosebleeds, rhinorrhea and sore throat.    Eyes: Negative for pain, discharge, redness and itching.   Respiratory: Negative for cough, shortness of breath and wheezing.    Cardiovascular: Negative for chest pain and palpitations.   Gastrointestinal: Negative for abdominal pain, blood in stool, constipation and diarrhea.   Endocrine: Negative for cold intolerance, heat  "intolerance and polydipsia.   Genitourinary: Negative for dysuria, frequency, hematuria, pelvic pain, vaginal bleeding and vaginal discharge.   Musculoskeletal: Positive for back pain. Negative for arthralgias, gait problem, joint swelling and myalgias.   Skin: Negative for rash.   Allergic/Immunologic: Negative.    Neurological: Negative for dizziness, syncope, weakness, numbness and headaches.   Hematological: Negative for adenopathy. Does not bruise/bleed easily.   Psychiatric/Behavioral: Negative for sleep disturbance and suicidal ideas. The patient is not nervous/anxious.         Physical Exam    /84   Pulse 75   Temp 97.1 °F (36.2 °C)   Ht 154.9 cm (61\")   Wt 83.5 kg (184 lb)   LMP  (LMP Unknown)   SpO2 97%   BMI 34.77 kg/m² Body mass index is 34.77 kg/m².  Physical Exam   Constitutional: She is oriented to person, place, and time. She appears well-developed. No distress.   AAOx3   HENT:   Head: Normocephalic.   Right Ear: External ear normal.   Left Ear: External ear normal.   Mouth/Throat: No oropharyngeal exudate.   Eyes: Conjunctivae and lids are normal. Right eye exhibits no discharge. Left eye exhibits no discharge. Right pupil is reactive. Left pupil is reactive.   Neck: No JVD present. No tracheal deviation present. No thyroid mass and no thyromegaly present.   Cardiovascular: Normal rate, regular rhythm and normal heart sounds.   No murmur heard.  Pulmonary/Chest: Effort normal and breath sounds normal. No respiratory distress. She has no wheezes.   Abdominal: Soft. Bowel sounds are normal. There is no abdominal tenderness.   Musculoskeletal: No tenderness.   Lymphadenopathy:     She has no cervical adenopathy.   Neurological: She is alert and oriented to person, place, and time.   Skin: Skin is warm and dry. No rash noted. She is not diaphoretic. No erythema.   Psychiatric: Her speech is normal and behavior is normal. Judgment and thought content normal.       Labs and Imaging   Lab " Results   Component Value Date    HGBA1C 7.8 05/18/2021    HGBA1C 7.3 02/15/2021    HGBA1C 7.4 10/16/2020     Office Visit on 05/18/2021   Component Date Value Ref Range Status   • Glucose 05/18/2021 192* 70 - 130 mg/dL Final   • Hemoglobin A1C 05/18/2021 7.8  % Final       Assessment / Plan   Diagnoses and all orders for this visit:    1. Mixed hyperlipidemia (Primary)    2. Uncontrolled type 2 diabetes mellitus with hyperglycemia (CMS/[facility])  -     POC Glucose, Blood  -     POC Glycosylated Hemoglobin (Hb A1C)  -     glipizide (GLUCOTROL XL) 10 MG 24 hr tablet; Take 1 tablet by mouth Daily. Dose increase  Dispense: 90 tablet; Refill: 1        Diabetes Mellitus 2 is under inadequate control.  -A1c 7.8  -choice of medications is limited- intolerant to metformin, actos (rash), no GLP1 agonist due to gastroparesis, did not tolerate jardiance  -with limited medication she would benefit from basal insulin but she is hesitant to start this  -increase glipizide xl to 10 mg daily (in the morning)  -continue januvia 100mg daily  -watch carb intake  -place freestyle angel 2 sensor on her today, she will write down times of her episodes of confusion and shakiness to correlate to BG readings  -labs were done 2/2021, foot exam due 10/2021, eye exam should be updated yearly      1.  Diet: 3-4 carb servings per meal for females, 4-5 carb servings per meal for males  Spread carb intake throughout the day  Increase lean protein and vegetable intake  Avoid sugary drinks and processed carbs including crackers, cookies, cakes  2.  Exercise: Recommend at least 30 minutes of exercise daily, at least 5 days per week. Increase exercise gradually.   3.  Blood Glucose Goal: Blood glucose goal <150 fasting, <180 2 hr postprandial  4.  Microalbumin due 2/2021  5.  Education performed during this visit: long term diabetic complications discussed. , annual eye examinations at Ophthalmology discussed, dental hygiene discussed  and foot care  reviewed., home glucose monitoring emphasized, all medications, side effects and compliance discussed carefully and Hypoglycemia management and prevention reviewed. Reviewed ‘ABCs’ of diabetes management (respective goals in parentheses):  A1C (<7), blood pressure (<130/80), and cholesterol (LDL <100, if CVD <70).    Hyperlipidemia  -did not tolerate lipitor 10 mg, crestor mg  -praluent/repatha not covered by insurance  -on fenofibrate and pravastatin 10 mg  -lipid panel was updated 2/2021          There are no Patient Instructions on file for this visit.    Follow up: Return in about 3 months (around 8/18/2021).    Discussed the nature of the disease including, risks, complications, implications, management, safe and proper use of medications. Encouraged therapeutic lifestyle changes including low calorie diet with plenty of fruits and vegetables, daily exercise, medication compliance, and keeping scheduled follow up appointments with me and any other providers. Encouraged patient to have appointment for complete physical, fasting labs, appropriate screenings, and immunizations on an annual basis.        Wero Hernandez PA-C

## 2021-05-19 ENCOUNTER — TELEPHONE (OUTPATIENT)
Dept: INTERNAL MEDICINE | Facility: CLINIC | Age: 66
End: 2021-05-19

## 2021-05-19 NOTE — TELEPHONE ENCOUNTER
Caller: [facility]     Relationship:     Best call back number: [phone]    Additional notes: PATIENT WAS SEEN BY CARDIOLOGY OFFICE IN November AND WOULD LIKE TO KNOW IF PATIENT HAS RECEIVED ANY LIVER AND LIPID PANELS MORE RECENTLY THAT CAN BE FAXED TO THE OFFICE PRIOR TO HER APPOINTMENT 5/20/21.     FAX NUMBER: [phone]

## 2021-05-20 ENCOUNTER — TELEPHONE (OUTPATIENT)
Dept: INTERNAL MEDICINE | Facility: CLINIC | Age: 66
End: 2021-05-20

## 2021-05-20 NOTE — TELEPHONE ENCOUNTER
Maeve @ [facility] called to get some labs on pt; Faxed labs to [phone].

## 2021-06-09 DIAGNOSIS — E11.65 UNCONTROLLED TYPE 2 DIABETES MELLITUS WITH HYPERGLYCEMIA (HCC): Primary | ICD-10-CM

## 2021-06-09 RX ORDER — GLIPIZIDE 10 MG/1
10 TABLET ORAL
Qty: 180 TABLET | Refills: 1 | Status: SHIPPED | OUTPATIENT
Start: 2021-06-09 | End: 2022-01-24

## 2021-06-09 RX ORDER — INSULIN GLARGINE 100 [IU]/ML
INJECTION, SOLUTION SUBCUTANEOUS
Qty: 27 ML | Refills: 1 | Status: SHIPPED | OUTPATIENT
Start: 2021-06-09 | End: 2021-08-10

## 2021-06-09 NOTE — TELEPHONE ENCOUNTER
Please call pt.  Freestyle Enmanuel reviewed.   We were checked for hypoglycemia but there isn't any.  Her BG is staying high, mostly >180 (180-300s). Our medication choice is limited as she is intolerant to metformin, actos (rash), no GLP1 agonist due to gastroparesis. She needs insulin and this has been discussed in the past but she did not want to add it. Based on her blood sugar readings she needs long acting and meal time insulin, but we could start with long acting (once a day inj) at first. Let me know what patient says.   Her A1C appears to be falsely low, it was 7.8 last month, but her actual blood sugar readings are more consistent with at least a 9. For future will need to check sugar at least fasting and bedtime and bring readings to every visit since the a1c is not as reliable for her.

## 2021-06-09 NOTE — TELEPHONE ENCOUNTER
PT was informed of results of CGM readings. She was informed of the need for insulin and agreed to start a once a day long acting insulin. She would like a 90 day supply of whatever medication [facility] to [facility] and wants to know if she should continue the rest of her diabetic medications. I informed her that if there were any other changes I would call her back.

## 2021-06-09 NOTE — TELEPHONE ENCOUNTER
Spoke with pt and went over thoroughly the medications that she is to take, the dosage and the how and when to take it. She was obviously still taking glimepiride with glipizide, however I read her the office note from February where Wero stated to change glimepiride to glipizide. I also instructed her on when to check her blood sugar and to call with readings in 2 weeks and to always bring her meter to her appointments. She voiced understanding after we went over medications one more time and I had her write everything down. PT requested that we send a Rx for the freestyle Enmanuel, which I will pend in her chart.

## 2021-06-15 ENCOUNTER — TELEPHONE (OUTPATIENT)
Dept: ENDOCRINOLOGY | Facility: CLINIC | Age: 66
End: 2021-06-15

## 2021-06-15 NOTE — TELEPHONE ENCOUNTER
[facility] called and needs clarification of what we would like to use besides Basaglar. Please call them back at [phone]     Reference [id number]

## 2021-06-16 ENCOUNTER — HOSPITAL ENCOUNTER (OUTPATIENT)
Age: 66
End: 2021-06-16
Payer: MEDICARE

## 2021-06-16 DIAGNOSIS — M25.372: ICD-10-CM

## 2021-06-16 DIAGNOSIS — M79.672: ICD-10-CM

## 2021-06-16 DIAGNOSIS — M21.372: Primary | ICD-10-CM

## 2021-06-16 DIAGNOSIS — M25.572: ICD-10-CM

## 2021-06-16 PROCEDURE — 73630 X-RAY EXAM OF FOOT: CPT

## 2021-07-13 ENCOUNTER — HOSPITAL ENCOUNTER (OUTPATIENT)
Dept: HOSPITAL 22 - PT | Age: 66
Discharge: HOME | End: 2021-07-13
Payer: MEDICARE

## 2021-07-13 DIAGNOSIS — M25.372: Primary | ICD-10-CM

## 2021-07-13 PROCEDURE — 97163 PT EVAL HIGH COMPLEX 45 MIN: CPT

## 2021-07-16 ENCOUNTER — APPOINTMENT (OUTPATIENT)
Dept: PREADMISSION TESTING | Facility: HOSPITAL | Age: 66
End: 2021-07-16

## 2021-07-16 ENCOUNTER — TELEPHONE (OUTPATIENT)
Dept: ENDOCRINOLOGY | Facility: CLINIC | Age: 66
End: 2021-07-16

## 2021-07-16 LAB — SARS-COV-2 RNA PNL SPEC NAA+PROBE: NOT DETECTED

## 2021-07-16 PROCEDURE — C9803 HOPD COVID-19 SPEC COLLECT: HCPCS

## 2021-07-16 PROCEDURE — U0004 COV-19 TEST NON-CDC HGH THRU: HCPCS

## 2021-07-16 NOTE — TELEPHONE ENCOUNTER
Patient should increase to 22 units tonight.     She was given instructions to titrate basal every 3 days by 2 units at last visit. Thus, if fasting glucose elevated tomorrow and Sunday, she should further increase to 24 units Sunday night.

## 2021-07-16 NOTE — TELEPHONE ENCOUNTER
Please advise on if she needs to Increase her dose.  I will explain about carb eating when I speak with her.

## 2021-07-16 NOTE — TELEPHONE ENCOUNTER
Pt called her blood sugar this morning was 206 she really is not eating too much yesterday for lunch she had a half of hamburger last night checked blood sugar was 207 potpie water and 5 cherries  when she went to bed she gave herself 19 units of insulin before bed. She wants to know is she can increase insulin she has surgery on Monday blood sugar need to be under 200

## 2021-07-19 ENCOUNTER — LAB REQUISITION (OUTPATIENT)
Dept: LAB | Facility: HOSPITAL | Age: 66
End: 2021-07-19

## 2021-07-19 DIAGNOSIS — D19.1: ICD-10-CM

## 2021-07-19 PROCEDURE — 88304 TISSUE EXAM BY PATHOLOGIST: CPT | Performed by: SURGERY

## 2021-08-08 DIAGNOSIS — E11.65 UNCONTROLLED TYPE 2 DIABETES MELLITUS WITH HYPERGLYCEMIA (HCC): ICD-10-CM

## 2021-08-08 DIAGNOSIS — K21.9 GASTROESOPHAGEAL REFLUX DISEASE WITHOUT ESOPHAGITIS: ICD-10-CM

## 2021-08-09 RX ORDER — SITAGLIPTIN 100 MG/1
TABLET, FILM COATED ORAL
Qty: 90 TABLET | Refills: 3 | Status: SHIPPED | OUTPATIENT
Start: 2021-08-09 | End: 2022-08-10

## 2021-08-09 RX ORDER — OMEPRAZOLE 40 MG/1
CAPSULE, DELAYED RELEASE ORAL
Qty: 90 CAPSULE | Refills: 3 | Status: SHIPPED | OUTPATIENT
Start: 2021-08-09 | End: 2022-10-07 | Stop reason: SDUPTHER

## 2021-08-09 NOTE — TELEPHONE ENCOUNTER
Last Office Visit: 4/16/2021  Next Office Visit:  8/10/2021    Labs completed in past 6 months? yes  Labs completed in past year? yes    Last Refill Date:  7/20/2020  Quantity:  90  Refills:  3    Pharmacy: on file    Medication protocols met and medication refilled to pharmacy on file.

## 2021-08-10 ENCOUNTER — LAB (OUTPATIENT)
Dept: LAB | Facility: HOSPITAL | Age: 66
End: 2021-08-10

## 2021-08-10 ENCOUNTER — OFFICE VISIT (OUTPATIENT)
Dept: INTERNAL MEDICINE | Facility: CLINIC | Age: 66
End: 2021-08-10

## 2021-08-10 VITALS
BODY MASS INDEX: 34.56 KG/M2 | HEART RATE: 72 BPM | DIASTOLIC BLOOD PRESSURE: 66 MMHG | SYSTOLIC BLOOD PRESSURE: 138 MMHG | HEIGHT: 62 IN | OXYGEN SATURATION: 95 % | TEMPERATURE: 97.2 F | WEIGHT: 187.8 LBS

## 2021-08-10 DIAGNOSIS — M25.572 CHRONIC PAIN OF LEFT ANKLE: ICD-10-CM

## 2021-08-10 DIAGNOSIS — Z00.00 MEDICARE ANNUAL WELLNESS VISIT, SUBSEQUENT: Primary | ICD-10-CM

## 2021-08-10 DIAGNOSIS — E78.2 MIXED HYPERLIPIDEMIA: ICD-10-CM

## 2021-08-10 DIAGNOSIS — R05.9 COUGH: ICD-10-CM

## 2021-08-10 DIAGNOSIS — N95.2 VAGINAL ATROPHY: ICD-10-CM

## 2021-08-10 DIAGNOSIS — M25.50 ARTHRALGIA, UNSPECIFIED JOINT: ICD-10-CM

## 2021-08-10 DIAGNOSIS — G89.29 CHRONIC PAIN OF LEFT ANKLE: ICD-10-CM

## 2021-08-10 DIAGNOSIS — G43.709 CHRONIC MIGRAINE WITHOUT AURA WITHOUT STATUS MIGRAINOSUS, NOT INTRACTABLE: ICD-10-CM

## 2021-08-10 LAB — ERYTHROCYTE [SEDIMENTATION RATE] IN BLOOD: 26 MM/HR (ref 0–30)

## 2021-08-10 PROCEDURE — 84550 ASSAY OF BLOOD/URIC ACID: CPT

## 2021-08-10 PROCEDURE — G0439 PPPS, SUBSEQ VISIT: HCPCS | Performed by: NURSE PRACTITIONER

## 2021-08-10 PROCEDURE — 86038 ANTINUCLEAR ANTIBODIES: CPT

## 2021-08-10 PROCEDURE — 85652 RBC SED RATE AUTOMATED: CPT

## 2021-08-10 PROCEDURE — 86431 RHEUMATOID FACTOR QUANT: CPT

## 2021-08-10 RX ORDER — INSULIN GLARGINE 100 [IU]/ML
32 INJECTION, SOLUTION SUBCUTANEOUS DAILY
COMMUNITY
End: 2022-01-24

## 2021-08-10 RX ORDER — MELOXICAM 7.5 MG/1
TABLET ORAL
COMMUNITY
Start: 2021-07-31 | End: 2022-06-06 | Stop reason: SDUPTHER

## 2021-08-10 RX ORDER — NIFEDIPINE 60 MG/1
TABLET, FILM COATED, EXTENDED RELEASE ORAL
COMMUNITY
Start: 2021-05-21

## 2021-08-10 RX ORDER — ESTRADIOL 0.1 MG/G
2 CREAM VAGINAL WEEKLY
Qty: 42.5 G | Refills: 5 | Status: SHIPPED | OUTPATIENT
Start: 2021-08-10

## 2021-08-10 RX ORDER — INSULIN GLARGINE 100 [IU]/ML
INJECTION, SOLUTION SUBCUTANEOUS
COMMUNITY
Start: 2021-06-16 | End: 2022-01-24 | Stop reason: SDUPTHER

## 2021-08-10 RX ORDER — PRAVASTATIN SODIUM 10 MG
10 TABLET ORAL NIGHTLY
Qty: 90 TABLET | Refills: 3 | Status: SHIPPED | OUTPATIENT
Start: 2021-08-10 | End: 2022-01-25 | Stop reason: DRUGHIGH

## 2021-08-10 RX ORDER — TOPIRAMATE 50 MG/1
50 TABLET, FILM COATED ORAL DAILY PRN
Qty: 90 TABLET | Refills: 3 | Status: SHIPPED | OUTPATIENT
Start: 2021-08-10 | End: 2023-02-20 | Stop reason: SDUPTHER

## 2021-08-10 RX ORDER — ALBUTEROL SULFATE 90 UG/1
2 AEROSOL, METERED RESPIRATORY (INHALATION) EVERY 6 HOURS PRN
Qty: 18 G | Refills: 11 | Status: SHIPPED | OUTPATIENT
Start: 2021-08-10 | End: 2022-08-10

## 2021-08-10 NOTE — PROGRESS NOTES
QUICK REFERENCE INFORMATION:  The ABCs of the Annual Wellness Visit    Subsequent Medicare Wellness Visit    HEALTH RISK ASSESSMENT    1955    Recent Hospitalizations:  No hospitalization(s) within the last year..      Current Medical Providers:  Patient Care Team:  Renetta Aguero APRN as PCP - General (Internal Medicine)  Rafael Velasquez MD as Consulting Physician (Pain Medicine)  Von Croft MD as Consulting Physician (Neurosurgery)  Emmie Stallworth MD as Consulting Physician (Internal Medicine)  Gigi Rojo PA-C as Physician Assistant (Physician Assistant)  Zaira Briggs APRN as Nurse Practitioner (Gastroenterology)  Wero Hernandez PA-C as Physician Assistant (Family Medicine)  Maurilio Simmons MD as Consulting Physician (Gastroenterology)        Smoking Status:  Social History     Tobacco Use   Smoking Status Never Smoker   Smokeless Tobacco Never Used       Alcohol Consumption:  Social History     Substance and Sexual Activity   Alcohol Use No       Depression Screen:   PHQ-2/PHQ-9 Depression Screening 8/10/2021   Little interest or pleasure in doing things 1   Feeling down, depressed, or hopeless 1   Trouble falling or staying asleep, or sleeping too much 0   Feeling tired or having little energy 2   Poor appetite or overeating 3   Feeling bad about yourself - or that you are a failure or have let yourself or your family down 1   Trouble concentrating on things, such as reading the newspaper or watching television 2   Moving or speaking so slowly that other people could have noticed. Or the opposite - being so fidgety or restless that you have been moving around a lot more than usual 0   Thoughts that you would be better off dead, or of hurting yourself in some way 0   Total Score 10   If you checked off any problems, how difficult have these problems made it for you to do your work, take care of things at home, or get along with other people? Somewhat difficult        Health Habits and Functional and Cognitive Screening:  Functional & Cognitive Status 8/10/2021   Do you have difficulty preparing food and eating? No   Do you have difficulty bathing yourself, getting dressed or grooming yourself? No   Do you have difficulty using the toilet? No   Do you have difficulty moving around from place to place? Yes   Do you have trouble with steps or getting out of a bed or a chair? Yes   Current Diet Unhealthy Diet   Dental Exam -   Eye Exam -   Exercise (times per week) 0 times per week   Current Exercises Include No Regular Exercise   Current Exercise Activities Include -   Do you need help using the phone?  No   Are you deaf or do you have serious difficulty hearing?  No   Do you need help with transportation? Yes   Do you need help shopping? Yes   Do you need help preparing meals?  No   Do you need help with housework?  Yes   Do you need help with laundry? Yes   Do you need help taking your medications? No   Do you need help managing money? No   Do you ever drive or ride in a car without wearing a seat belt? No   Have you felt unusual stress, anger or loneliness in the last month? Yes   Who do you live with? Spouse   If you need help, do you have trouble finding someone available to you? Yes   Have you been bothered in the last four weeks by sexual problems? No   Do you have difficulty concentrating, remembering or making decisions? No         Does the patient have evidence of cognitive impairment? No    Aspirin use counseling: Does not need ASA (and currently is not on it)      Recent Lab Results:  CMP:  Lab Results   Component Value Date    BUN 19 02/15/2021    CREATININE 0.62 02/15/2021    EGFRIFNONA 97 02/15/2021    BCR 30.6 (H) 02/15/2021     02/15/2021    K 4.2 02/15/2021    CO2 28.0 02/15/2021    CALCIUM 9.7 02/15/2021    ALBUMIN 4.50 02/15/2021    BILITOT 0.6 02/15/2021    ALKPHOS 146 (H) 02/15/2021    AST 52 (H) 02/15/2021    ALT 56 (H) 02/15/2021     Lipid  Panel:  Lab Results   Component Value Date    CHOL 181 02/15/2021    TRIG 129 02/15/2021    HDL 57 02/15/2021    VLDL 23 02/15/2021    LDLHDL 1.72 02/15/2021     HbA1c:  Lab Results   Component Value Date    HGBA1C 7.8 05/18/2021       Age-appropriate Screening Schedule:  Refer to the list below for future screening recommendations based on patient's age, sex and/or medical conditions. Orders for these recommended tests are listed in the plan section. The patient has been provided with a written plan.    Health Maintenance   Topic Date Due   • ZOSTER VACCINE (1 of 2) 10/16/2021 (Originally 9/24/2007)   • INFLUENZA VACCINE  10/01/2021   • DIABETIC FOOT EXAM  10/16/2021   • MAMMOGRAM  10/30/2021   • HEMOGLOBIN A1C  11/18/2021   • LIPID PANEL  02/15/2022   • URINE MICROALBUMIN  02/15/2022   • DIABETIC EYE EXAM  06/03/2022   • TDAP/TD VACCINES (2 - Td or Tdap) 02/05/2026   • PAP SMEAR  Discontinued        Subjective   History of Present Illness    Yi Garcia is a 65 y.o. female who presents for an Subsequent Wellness Visit.  C/o arthralgia and chronic left ankle pain. Has been seeing a podiatrist who did steroid injections for bone spur in heel. Has told her she has bone on bone in ankle joint and joint replacement recommended. Pt want to try to not have surgery. Wants to see ortho on what other options she has. Also has general arthralgia multi joints and asking about checking for rheumatoid.  Gets an intermittent cough; uses prn inhaler.  See's endo for diabetes mgt.   HLD chronic. On statin.    The following portions of the patient's history were reviewed and updated as appropriate: allergies, current medications, past family history, past medical history, past social history, past surgical history and problem list.    Outpatient Medications Prior to Visit   Medication Sig Dispense Refill   • albuterol sulfate  (90 Base) MCG/ACT inhaler Inhale 2 puffs Every 6 (Six) Hours As Needed for Wheezing or  Shortness of Air. 1 inhaler 11   • bethanechol (URECHOLINE) 50 MG tablet Take 50 mg by mouth 3 (Three) Times a Day.     • bisoprolol (ZEBeta) 5 MG tablet Take 5 mg by mouth Daily.     • ciclopirox (LOPROX) 0.77 % gel      • Continuous Blood Gluc  (FreeStyle Enmanuel 2 [place]) device 1 Device Continuous. 1 each 0   • Continuous Blood Gluc Sensor (FreeStyle Enmanuel 2 Sensor) misc 1 each Every 14 (Fourteen) Days. 6 each 3   • cyclobenzaprine (FLEXERIL) 10 MG tablet Take 10 mg by mouth 3 (Three) Times a Day As Needed for Muscle Spasms.     • docusate sodium (COLACE) 100 MG capsule Take 100 mg by mouth 2 (Two) Times a Day.     • estradiol (ESTRACE VAGINAL) 0.1 MG/GM vaginal cream Insert 2 g into the vagina 1 (One) Time Per Week. 42.5 g 5   • furosemide (LASIX) 20 MG tablet Take 20 mg by mouth 2 (Two) Times a Day As Needed.     • glipizide (GLUCOTROL) 10 MG tablet Take 1 tablet by mouth 2 (Two) Times a Day Before Meals. Must be taken AC to avoid hypoglycemia 180 tablet 1   • insulin glargine (LANTUS, SEMGLEE) 100 UNIT/ML injection Inject 32 Units under the skin into the appropriate area as directed Daily.     • Insulin Pen Needle 32G X 4 MM misc Use daily with insulin 100 each 3   • Januvia 100 MG tablet TAKE 1 TABLET DAILY 90 tablet 3   • losartan (COZAAR) 100 MG tablet Take 100 mg by mouth Daily.     • metoclopramide (REGLAN) 5 MG tablet Take 5 mg by mouth 3 (Three) Times a Day.     • NIFEdipine CC (ADALAT CC) 90 MG 24 hr tablet Take 60 mg by mouth Daily.     • omeprazole (priLOSEC) 40 MG capsule TAKE 1 CAPSULE DAILY 90 capsule 3   • pravastatin (PRAVACHOL) 10 MG tablet Take 1 tablet by mouth Every Night. 30 tablet 5   • rOPINIRole (REQUIP) 1 MG tablet TAKE 1 TABLET EVERY NIGHT, 60 MINUTES BEFORE BEDTIME 90 tablet 3   • topiramate (TOPAMAX) 50 MG tablet Take 1 tablet by mouth Daily As Needed (migraine). 90 tablet 3   • traMADol (ULTRAM) 50 MG tablet Take 1 tablet by mouth Every 8 (Eight) Hours As Needed for Moderate  Pain . 90 tablet 2   • traZODone (DESYREL) 50 MG tablet Take 1 to 2 tabs po hs prn 180 tablet 3   • Lantus SoloStar 100 UNIT/ML injection pen      • meloxicam (MOBIC) 7.5 MG tablet      • NIFEdipine CC (ADALAT CC) 60 MG 24 hr tablet      • Insulin Glargine (BASAGLAR KWIKPEN) 100 UNIT/ML injection pen Start with 10 units qHS, increase by 2 units every 3 days until fasting sugar consistently <150, can increase up to 30 units if needed 27 mL 1   • nystatin (MYCOSTATIN) 902064 UNIT/GM cream Apply  topically to the appropriate area as directed 2 (Two) Times a Day As Needed (kallie irritation). 30 g 2   • oxyCODONE-acetaminophen (PERCOCET) 5-325 MG per tablet Take 1 tablet by mouth Every 4 (Four) Hours As Needed. 5 tablet 0   • TRULANCE 3 MG tablet      • vitamin B-6 (PYRIDOXINE) 100 MG tablet pyridoxine (vitamin B6)       No facility-administered medications prior to visit.       Patient Active Problem List   Diagnosis   • Anxiety and depression   • Fatigue   • Mixed hyperlipidemia   • Herniated lumbar intervertebral disc   • Vaginal atrophy   • Postlaminectomy syndrome of lumbar region   • Meningeal adhesions/lumbar arachnoiditis   • Lumbar facet arthropathy/lumbar spondylosis without myelopathy   • Sacroiliac joint dysfunction of left side   • Obesity with sleep apnea   • Physical deconditioning   • Moderate obesity   • Insomnia   • History of migraine headaches   • Essential hypertension   • Myofascial pain syndrome   • Controlled type 2 diabetes mellitus without complication, without long-term current use of insulin (CMS/[facility])   • Elevated liver enzymes   • Pain of upper abdomen   • Steatosis of liver   • Obstructive sleep apnea, adult   • Encounter 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  Patient does not have an advance directive, information provided.    Identification of Risk Factors:  Risk factors include: Advance Directive Discussion  Breast Cancer/Mammogram Screening  Cardiovascular risk  Colon Cancer Screening  Diabetic Lab Screening   Obesity/Overweight   Osteoporosis Risk.    Review of Systems   Constitutional: Negative for chills and fever.   Eyes: Negative for visual disturbance.   Respiratory: Negative for shortness of breath.    Cardiovascular: Negative for chest pain, palpitations and leg swelling.   Gastrointestinal: Negative for abdominal pain, constipation, diarrhea and nausea.   Genitourinary: Negative for difficulty urinating.   Musculoskeletal: Positive for arthralgias.   Skin: Negative for rash.   Neurological: Negative for headaches.   Psychiatric/Behavioral: Negative for dysphoric mood. The patient is not nervous/anxious.        Compared to one year ago, the patient feels her physical health is the same.  Compared to one year ago, the patient feels her mental health is the same.    Objective     Physical Exam  Vitals reviewed.   Constitutional:       General: She is not in acute distress.     Appearance: She is well-developed. She is not diaphoretic.   HENT:      Head: Normocephalic.   Eyes:      General: Lids are normal.         Right eye: No discharge.         Left eye: No discharge.      Extraocular Movements: Extraocular movements intact.      Conjunctiva/sclera: Conjunctivae normal.      Pupils: Pupils are equal, round, and reactive to light.   Neck:      Vascular: No JVD.   Cardiovascular:      Rate and Rhythm: Normal rate and regular rhythm.      Heart sounds: Normal heart sounds.      Comments: No edema  Pulmonary:      Effort: Pulmonary effort is normal. No respiratory distress.      Breath sounds: Normal breath sounds.   Abdominal:      General: Bowel sounds are normal. There is no abdominal bruit.      Palpations: Abdomen is soft. There is no hepatomegaly or  "splenomegaly.      Tenderness: There is no abdominal tenderness.   Musculoskeletal:      Cervical back: Normal range of motion and neck supple.      Left ankle: No swelling or deformity. No tenderness. Decreased range of motion.   Lymphadenopathy:      Head:      Right side of head: No submental, submandibular or tonsillar adenopathy.      Left side of head: No submental, submandibular or tonsillar adenopathy.   Skin:     General: Skin is warm and dry.      Capillary Refill: Capillary refill takes less than 2 seconds.      Findings: No rash.   Neurological:      General: No focal deficit present.      Mental Status: She is alert and oriented to person, place, and time.      Cranial Nerves: No cranial nerve deficit.      Coordination: Coordination normal.      Gait: Gait normal.   Psychiatric:         Mood and Affect: Mood normal.         Speech: Speech normal.         Behavior: Behavior normal.         Vitals:    08/10/21 1104   BP: 138/66   Pulse: 72   Temp: 97.2 °F (36.2 °C)   SpO2: 95%   Weight: 85.2 kg (187 lb 12.8 oz)   Height: 157.5 cm (62\")   PainSc: 0-No pain       Patient's Body mass index is 34.35 kg/m². indicating that she is obese (BMI >30). Obesity-related health conditions include the following: hypertension, diabetes mellitus and dyslipidemias. Obesity is unchanged. BMI is is above average; BMI management plan is completed. We discussed portion control and increasing exercise..      Assessment/Plan   Patient Self-Management and Personalized Health Advice  The patient has been provided with information about: diet, exercise, weight management and designing advance directives and preventive services including:   · Annual Wellness Visit (AWV).  Colonoscopy, mammogram current. Vaccines current.  Reviewed cardiac risk factors; discussed updated ekg for screening purposes. Pt declines states has had recent [facility]kg[facility]. No chest pain.  Visit Diagnoses:    ICD-10-CM ICD-9-CM   1. Medicare annual " wellness visit, subsequent  Z00.00 V70.0   2. Cough  R05 786.2   3. Vaginal atrophy  N95.2 627.3   4. Mixed hyperlipidemia  E78.2 272.2   5. Chronic migraine without aura without status migrainosus, not intractable  G43.709 346.70   6. Chronic pain of left ankle  M25.572 719.47    G89.29 338.29   7. Arthralgia, unspecified joint  M25.50 719.40       Orders Placed This Encounter   Procedures   • HAMZAH     Standing Status:   Future     Number of Occurrences:   1     Standing Expiration Date:   8/10/2022     Order Specific Question:   Release to patient     Answer:   Immediate   • RHEUMATOID FACTOR     Standing Status:   Future     Number of Occurrences:   1     Standing Expiration Date:   8/10/2022     Order Specific Question:   Release to patient     Answer:   Immediate   • Sedimentation Rate     Standing Status:   Future     Number of Occurrences:   1     Standing Expiration Date:   8/10/2022     Order Specific Question:   Release to patient     Answer:   Immediate   • Uric acid     Standing Status:   Future     Number of Occurrences:   1     Standing Expiration Date:   8/10/2022     Order Specific Question:   Release to patient     Answer:   Immediate   • Ambulatory Referral to Orthopedic Surgery     Referral Priority:   Routine     Referral Type:   Consultation     Referral Reason:   Specialty Services Required     Requested Specialty:   Orthopedic Surgery     Number of Visits Requested:   1       Outpatient Encounter Medications as of 8/10/2021   Medication Sig Dispense Refill   • albuterol sulfate  (90 Base) MCG/ACT inhaler Inhale 2 puffs Every 6 (Six) Hours As Needed for Wheezing or Shortness of Air. 1 inhaler 11   • bethanechol (URECHOLINE) 50 MG tablet Take 50 mg by mouth 3 (Three) Times a Day.     • bisoprolol (ZEBeta) 5 MG tablet Take 5 mg by mouth Daily.     • ciclopirox (LOPROX) 0.77 % gel      • Continuous Blood Gluc  (HexAirbotyle Enmanuel 2 [place]) device 1 Device Continuous. 1 each 0   •  Continuous Blood Gluc Sensor (FreeStyle Enmanuel 2 Sensor) misc 1 each Every 14 (Fourteen) Days. 6 each 3   • cyclobenzaprine (FLEXERIL) 10 MG tablet Take 10 mg by mouth 3 (Three) Times a Day As Needed for Muscle Spasms.     • docusate sodium (COLACE) 100 MG capsule Take 100 mg by mouth 2 (Two) Times a Day.     • estradiol (ESTRACE VAGINAL) 0.1 MG/GM vaginal cream Insert 2 g into the vagina 1 (One) Time Per Week. 42.5 g 5   • furosemide (LASIX) 20 MG tablet Take 20 mg by mouth 2 (Two) Times a Day As Needed.     • glipizide (GLUCOTROL) 10 MG tablet Take 1 tablet by mouth 2 (Two) Times a Day Before Meals. Must be taken AC to avoid hypoglycemia 180 tablet 1   • insulin glargine (LANTUS, SEMGLEE) 100 UNIT/ML injection Inject 32 Units under the skin into the appropriate area as directed Daily.     • Insulin Pen Needle 32G X 4 MM misc Use daily with insulin 100 each 3   • Januvia 100 MG tablet TAKE 1 TABLET DAILY 90 tablet 3   • losartan (COZAAR) 100 MG tablet Take 100 mg by mouth Daily.     • metoclopramide (REGLAN) 5 MG tablet Take 5 mg by mouth 3 (Three) Times a Day.     • NIFEdipine CC (ADALAT CC) 90 MG 24 hr tablet Take 60 mg by mouth Daily.     • omeprazole (priLOSEC) 40 MG capsule TAKE 1 CAPSULE DAILY 90 capsule 3   • pravastatin (PRAVACHOL) 10 MG tablet Take 1 tablet by mouth Every Night. 30 tablet 5   • rOPINIRole (REQUIP) 1 MG tablet TAKE 1 TABLET EVERY NIGHT, 60 MINUTES BEFORE BEDTIME 90 tablet 3   • topiramate (TOPAMAX) 50 MG tablet Take 1 tablet by mouth Daily As Needed (migraine). 90 tablet 3   • traMADol (ULTRAM) 50 MG tablet Take 1 tablet by mouth Every 8 (Eight) Hours As Needed for Moderate Pain . 90 tablet 2   • traZODone (DESYREL) 50 MG tablet Take 1 to 2 tabs po hs prn 180 tablet 3   • Lantus SoloStar 100 UNIT/ML injection pen      • meloxicam (MOBIC) 7.5 MG tablet      • NIFEdipine CC (ADALAT CC) 60 MG 24 hr tablet      • [DISCONTINUED] Insulin Glargine (BASAGLAR KWIKPEN) 100 UNIT/ML injection pen Start  with 10 units qHS, increase by 2 units every 3 days until fasting sugar consistently <150, can increase up to 30 units if needed 27 mL 1   • [DISCONTINUED] nystatin (MYCOSTATIN) 757109 UNIT/GM cream Apply  topically to the appropriate area as directed 2 (Two) Times a Day As Needed (kallie irritation). 30 g 2   • [DISCONTINUED] oxyCODONE-acetaminophen (PERCOCET) 5-325 MG per tablet Take 1 tablet by mouth Every 4 (Four) Hours As Needed. 5 tablet 0   • [DISCONTINUED] TRULANCE 3 MG tablet      • [DISCONTINUED] vitamin B-6 (PYRIDOXINE) 100 MG tablet pyridoxine (vitamin B6)       No facility-administered encounter medications on file as of 8/10/2021.       Reviewed use of high risk medication in the elderly: yes  Reviewed for potential of harmful drug interactions in the elderly: yes    Follow Up:  Return in about 1 year (around 8/10/2022), or if symptoms worsen or fail to improve, for Medicare Wellness.     An After Visit Summary and PPPS with all of these plans were given to the patient.         Renetta Coleman, APRN

## 2021-08-11 LAB
ANA SER QL: NEGATIVE
CHROMATIN AB SERPL-ACNC: <10 IU/ML (ref 0–14)
URATE SERPL-MCNC: 3.4 MG/DL (ref 2.4–5.7)

## 2021-09-02 ENCOUNTER — OFFICE VISIT (OUTPATIENT)
Dept: ONCOLOGY | Facility: CLINIC | Age: 66
End: 2021-09-02

## 2021-09-02 ENCOUNTER — LAB (OUTPATIENT)
Dept: LAB | Facility: HOSPITAL | Age: 66
End: 2021-09-02

## 2021-09-02 VITALS
HEIGHT: 62 IN | HEART RATE: 113 BPM | OXYGEN SATURATION: 98 % | TEMPERATURE: 97.1 F | BODY MASS INDEX: 34.96 KG/M2 | RESPIRATION RATE: 18 BRPM | SYSTOLIC BLOOD PRESSURE: 144 MMHG | WEIGHT: 190 LBS | DIASTOLIC BLOOD PRESSURE: 68 MMHG

## 2021-09-02 DIAGNOSIS — R71.8 ELEVATED FERRITIN, HEMOGLOBIN, AND RED BLOOD CELL COUNT (HCC): ICD-10-CM

## 2021-09-02 DIAGNOSIS — D58.2 ELEVATED FERRITIN, HEMOGLOBIN, AND RED BLOOD CELL COUNT (HCC): ICD-10-CM

## 2021-09-02 DIAGNOSIS — D75.1 ERYTHROCYTOSIS: Primary | ICD-10-CM

## 2021-09-02 DIAGNOSIS — R79.89 ELEVATED FERRITIN, HEMOGLOBIN, AND RED BLOOD CELL COUNT (HCC): ICD-10-CM

## 2021-09-02 DIAGNOSIS — D75.1 ERYTHROCYTOSIS: ICD-10-CM

## 2021-09-02 LAB
ERYTHROCYTE [DISTWIDTH] IN BLOOD BY AUTOMATED COUNT: 13.4 % (ref 12.3–15.4)
FERRITIN SERPL-MCNC: 293.6 NG/ML (ref 13–150)
HCT VFR BLD AUTO: 45.2 % (ref 34–46.6)
HGB BLD-MCNC: 14.8 G/DL (ref 12–15.9)
IRON 24H UR-MRATE: 71 MCG/DL (ref 37–145)
IRON SATN MFR SERPL: 18 % (ref 20–50)
LYMPHOCYTES # BLD AUTO: 2 10*3/MM3 (ref 0.7–3.1)
LYMPHOCYTES NFR BLD AUTO: 32.5 % (ref 19.6–45.3)
MCH RBC QN AUTO: 29 PG (ref 26.6–33)
MCHC RBC AUTO-ENTMCNC: 32.8 G/DL (ref 31.5–35.7)
MCV RBC AUTO: 88.2 FL (ref 79–97)
MONOCYTES # BLD AUTO: 0.2 10*3/MM3 (ref 0.1–0.9)
MONOCYTES NFR BLD AUTO: 3.6 % (ref 5–12)
NEUTROPHILS NFR BLD AUTO: 4 10*3/MM3 (ref 1.7–7)
NEUTROPHILS NFR BLD AUTO: 63.9 % (ref 42.7–76)
PLATELET # BLD AUTO: 248 10*3/MM3 (ref 140–450)
PMV BLD AUTO: 8 FL (ref 6–12)
RBC # BLD AUTO: 5.12 10*6/MM3 (ref 3.77–5.28)
TIBC SERPL-MCNC: 402 MCG/DL (ref 298–536)
TRANSFERRIN SERPL-MCNC: 270 MG/DL (ref 200–360)
WBC # BLD AUTO: 6.3 10*3/MM3 (ref 3.4–10.8)

## 2021-09-02 PROCEDURE — 99213 OFFICE O/P EST LOW 20 MIN: CPT | Performed by: INTERNAL MEDICINE

## 2021-09-02 PROCEDURE — 85025 COMPLETE CBC W/AUTO DIFF WBC: CPT

## 2021-09-02 PROCEDURE — 36415 COLL VENOUS BLD VENIPUNCTURE: CPT

## 2021-09-02 PROCEDURE — 83540 ASSAY OF IRON: CPT

## 2021-09-02 PROCEDURE — 84466 ASSAY OF TRANSFERRIN: CPT

## 2021-09-02 PROCEDURE — 82728 ASSAY OF FERRITIN: CPT

## 2021-09-02 RX ORDER — NYSTATIN 100000 U/G
CREAM TOPICAL
COMMUNITY
End: 2022-08-10

## 2021-09-02 NOTE — PROGRESS NOTES
"      PROBLEM LIST:  1. Elevated ferritin  2. NAFLD  3. Gastroparesis  4. DM2    Subjective     CHIEF COMPLAINT: elevated ferritin and hgb    HISTORY OF PRESENT ILLNESS:   Yi Garcia returns for follow-up.   She says she is struggling to manage her diabetes.  Otherwise no new complaints.        Objective      /68   Pulse 113   Temp 97.1 °F (36.2 °C) (Temporal)   Resp 18   Ht 157.5 cm (62.01\")   Wt 86.2 kg (190 lb)   LMP  (LMP Unknown)   SpO2 98%   BMI 34.74 kg/m²    Vitals:    09/02/21 1048   PainSc: 0-No pain             Performance Status: 1  General: well appearing female in no acute distress  Neuro: alert and oriented  HEENT: sclerae anicteric, oropharynx clear  Extremities: no lower extremity edema  Skin: no rashes, lesions, bruising, or petechiae  Psych: mood and affect appropriate      RECENT LABS:  Lab Results   Component Value Date    WBC 6.30 09/02/2021    HGB 14.8 09/02/2021    HCT 45.2 09/02/2021    MCV 88.2 09/02/2021     09/02/2021     Lab Results   Component Value Date    IRON 68 02/26/2021    TIBC 417 02/26/2021    FERRITIN 164.10 (H) 02/26/2021           Hemochromatosis Gene   Hemochromatosis Mutation   Collected: 08/29/19 1350   Resulting lab: [facility]   Value: Comment   Comment: NO MUTATION IDENTIFIED        Assessment/Plan   Yi Garcia is a 65 y.o. year old female with elevated ferritin and erythrocytosis.    Elevated ferritin: No evidence of hereditary hemochromatosis based on genetic testing.      Ferritin level has been very mildly elevated.  Today's level is pending.  Assuming no significant change on today's level I do not think this needs further monitoring.    Erythrocytosis: hgb and Hct are normal today.  She still has untreated sleep apnea, and I would not be surprised if she has mildly elevated red blood cells in the future.    She can continue to follow-up with her primary care provider.  I would recommend checking the ferritin every 6 to 12 months, " and CBC as indicated.  We are happy to see her in the future if needed.                         Lexie Robledo MD  [facility]    9/2/2021          CC:

## 2021-09-13 ENCOUNTER — OFFICE VISIT (OUTPATIENT)
Dept: ORTHOPEDIC SURGERY | Facility: CLINIC | Age: 66
End: 2021-09-13

## 2021-09-13 VITALS
HEART RATE: 86 BPM | BODY MASS INDEX: 33.51 KG/M2 | HEIGHT: 62 IN | DIASTOLIC BLOOD PRESSURE: 83 MMHG | SYSTOLIC BLOOD PRESSURE: 154 MMHG | WEIGHT: 182.1 LBS

## 2021-09-13 DIAGNOSIS — M25.572 LEFT ANKLE PAIN, UNSPECIFIED CHRONICITY: Primary | ICD-10-CM

## 2021-09-13 DIAGNOSIS — M79.672 LEFT FOOT PAIN: ICD-10-CM

## 2021-09-13 DIAGNOSIS — E11.65 UNCONTROLLED TYPE 2 DIABETES MELLITUS WITH HYPERGLYCEMIA (HCC): ICD-10-CM

## 2021-09-13 DIAGNOSIS — R29.898 LEFT LEG WEAKNESS: ICD-10-CM

## 2021-09-13 PROCEDURE — 99204 OFFICE O/P NEW MOD 45 MIN: CPT | Performed by: ORTHOPAEDIC SURGERY

## 2021-09-13 NOTE — PROGRESS NOTES
NEW PATIENT    Patient: Yi Garcia  : 1955    Primary Care Provider: Renetta Aguero APRN    Requesting Provider: As above    Pain of the Left Ankle      History    Chief Complaint: Left leg problem    History of Present Illness: This is a very pleasant 65-year-old woman with a complex medical history.  She is extremely pleasant but a somewhat vague historian.  I looked through the multiple records in her chart.  This is where I obtained a lot of the history.  Her history is she reports that about 2 years ago she had some type of tumor removed from her back and has had left leg weakness with some numbness.  She does not remember what type of tumor.  She does not remember the doctor's name.  She also had bladder and bowel difficulties.      She reports that subsequently she had an inversion injury to her left ankle, with a tiny avulsion at the tip of the fibula.  This was treated by podiatry in [place].  She ultimately did physical therapy.  She was prescribed an AFO brace.  She is not exactly certain why she wears the brace.  She thinks she has a foot drop.  She also thought she might be wearing the brace to prevent a foot drop.  She has x-rays from [facility] of the left ankle 3 views dated 2020, showing a tiny avulsion at the tip of the fibula.  She has weightbearing foot x-rays from 2021 which shows possibly an old healed second metatarsal fracture, but no acute fractures.  I reviewed the films and the report.  My interpretation of the foot x-rays is that some old periosteal thickening.  I am not necessarily certain it was a fracture.  There is significant osteopenia on the films.  The note that I have from podiatry is dated 3/23/2021, on that note they talk about plantar fasciitis and they injected the plantar fascia.  They discussed the foot drop, the brace and physical therapy.    She is here with a question as to whether she needs the brace.  She also was  "told by her report that the ankle joint has \"bone-on-bone \"and she needs an ankle replacement.  I do not have the specific records, she reports that a practitioner at the podiatrist office told her this.  She has diffuse pain in the left leg, some pain in the right.  It is intermittently sharp and shooting.    Medical history is significant for diabetes.  She started taking medication about 3 years ago, but probably has had diabetes for much longer because she has gastroparesis.  The GI notes in the chart indicate it is diabetic gastroparesis.  Her most recent hemoglobin A1c was 7.8 on 5/18/2021.  She reports she has a hard time controlling her glucose, she is confused about what foods are okay to eat.  Yesterday evening she had breaded fried fish, 2 pieces, and her glucose was elevated to 129 this morning.  We had a very long discussion about carbohydrates.  She has been to diabetes education in the past but I encouraged her to go back.  We talked about weight watchers.  We talked about the various apps that can be obtained on a smart phone for carb counting and diabetes control.  She has a smart phone    In reviewing the medical record I found that she was seen by Dr. Ybarra of neurosurgery at [place].  In February 2020 he excised a schwannoma from the cauda equina.  His last note that I have available in the chart was from 3/19/2020.  It notes that she has weakness on the left as well as bladder and bowel problems.  It was noted that the bladder and bowel problems seemed to be improving.  There are no further neurosurgery notes in the chart.  I wrote down the schwannoma diagnosis for her so that she could remember this.    Current Outpatient Medications on File Prior to Visit   Medication Sig Dispense Refill   • albuterol sulfate  (90 Base) MCG/ACT inhaler Inhale 2 puffs Every 6 (Six) Hours As Needed for Wheezing or Shortness of Air. 18 g 11   • bethanechol (URECHOLINE) 50 MG tablet Take 50 mg by mouth 3 " (Three) Times a Day.     • bisoprolol (ZEBeta) 5 MG tablet Take 5 mg by mouth Daily.     • ciclopirox (LOPROX) 0.77 % gel      • Continuous Blood Gluc  (FreeStyle Enmanuel 2 Streetman) device 1 Device Continuous. 1 each 0   • Continuous Blood Gluc Sensor (FreeStyle Enmanuel 2 Sensor) misc 1 each Every 14 (Fourteen) Days. 6 each 3   • cyclobenzaprine (FLEXERIL) 10 MG tablet Take 10 mg by mouth 3 (Three) Times a Day As Needed for Muscle Spasms.     • docusate sodium (COLACE) 100 MG capsule Take 100 mg by mouth 2 (Two) Times a Day.     • estradiol (ESTRACE VAGINAL) 0.1 MG/GM vaginal cream Insert 2 g into the vagina 1 (One) Time Per Week. 42.5 g 5   • furosemide (LASIX) 20 MG tablet Take 20 mg by mouth 2 (Two) Times a Day As Needed.     • glipizide (GLUCOTROL) 10 MG tablet Take 1 tablet by mouth 2 (Two) Times a Day Before Meals. Must be taken AC to avoid hypoglycemia 180 tablet 1   • insulin glargine (LANTUS, SEMGLEE) 100 UNIT/ML injection Inject 32 Units under the skin into the appropriate area as directed Daily.     • Insulin Pen Needle 32G X 4 MM misc Use daily with insulin 100 each 3   • Januvia 100 MG tablet TAKE 1 TABLET DAILY 90 tablet 3   • Lantus SoloStar 100 UNIT/ML injection pen      • losartan (COZAAR) 100 MG tablet Take 100 mg by mouth Daily.     • meloxicam (MOBIC) 7.5 MG tablet      • metoclopramide (REGLAN) 5 MG tablet Take 5 mg by mouth 3 (Three) Times a Day.     • NIFEdipine CC (ADALAT CC) 60 MG 24 hr tablet      • NIFEdipine CC (ADALAT CC) 90 MG 24 hr tablet Take 60 mg by mouth Daily.     • nystatin (MYCOSTATIN) 252379 UNIT/GM cream nystatin 100,000 unit/gram topical cream     • omeprazole (priLOSEC) 40 MG capsule TAKE 1 CAPSULE DAILY 90 capsule 3   • pravastatin (PRAVACHOL) 10 MG tablet Take 1 tablet by mouth Every Night. 90 tablet 3   • rOPINIRole (REQUIP) 1 MG tablet TAKE 1 TABLET EVERY NIGHT, 60 MINUTES BEFORE BEDTIME 90 tablet 3   • topiramate (TOPAMAX) 50 MG tablet Take 1 tablet by mouth Daily As  Needed (migraine). 90 tablet 3   • traMADol (ULTRAM) 50 MG tablet Take 1 tablet by mouth Every 8 (Eight) Hours As Needed for Moderate Pain . 90 tablet 2   • traZODone (DESYREL) 50 MG tablet Take 1 to 2 tabs po hs prn 180 tablet 3     No current facility-administered medications on file prior to visit.      Allergies   Allergen Reactions   • Ace Inhibitors Cough   • Amlodipine Diarrhea   • Beta Adrenergic Blockers Rash   • Cyclobenzaprine Rash   • Hydrochlorothiazide Rash   • Hyzaar [Losartan Potassium-Hctz] Rash   • Latex Rash   • Losartan Rash   • Metformin Rash   • Penicillins Rash   • Pioglitazone Rash   • Spironolactone Rash      Past Medical History:   Diagnosis Date   • Anxiety    • Benign essential hypertension    • Cervical disc disorder    • Chronic pain disorder    • Colon polyps    • Complication of device    • Decreased libido    • Diabetes mellitus (CMS/HCC)    • Dizziness    • Encounter for long-term (current) use of medications    • Extremity pain    • Fatigue    • Hip pain    • History of alopecia    • History of backache    • History of colonoscopy     Fiberoptic   • History of diabetes mellitus    • History of insomnia    • History of mastoiditis    • History of migraine headaches    • History of urinary stone    • Infection of kidney    • Insomnia    • Joint pain    • Kidney infection    • Low back pain    • Lumbar radiculopathy    • Lumbar radiculopathy    • Lumbosacral disc disease    • Migraine    • Myofascial pain syndrome    • Neck pain    • Palpitations    • Sleep apnea    • Spinal cord stimulator status    • Stress reaction, emotional    • Urinary incontinence    • Urinary tract infection    • Visit for screening mammogram     Description: 08/28/2009   • Vitamin D deficiency      Past Surgical History:   Procedure Laterality Date   • BLADDER SURGERY     • BREAST EXCISIONAL BIOPSY Left     Benign 2009   • COLONOSCOPY  2019    Complete Colonoscopy   • ELBOW ARTHROPLASTY Left    • HYSTERECTOMY       Total Abdominal Hysterectomy (Description: BSO)   • LUMBAR DISCECTOMY  01/28/2013    Lt- L4/5 discectomy redo Lt- L5/S1 foraminotomy -Dr. Von Croft   • LUMBAR DISCECTOMY  03/18/2013    Re-do Lt- L4/5 discectomy Dr. Von Croft    • OOPHORECTOMY     • OTHER SURGICAL HISTORY      Spinal Sterotaxis Stimulation Of Cord   • SPINAL CORD STIMULATOR IMPLANT  08/11/201410/01/2015    placement 2014, replacement 2015. Dr. Von Croft    • TUBAL ABDOMINAL LIGATION       Family History   Problem Relation Age of Onset   • Hypertension Mother    • Peripheral vascular disease Mother    • Ulcers Mother    • Kidney failure Mother    • Lung disease Father    • Diabetes Other         type 2   • Kidney failure Maternal Grandmother       Social History     Socioeconomic History   • Marital status:      Spouse name: Not on file   • Number of children: Not on file   • Years of education: Not on file   • Highest education level: Not on file   Tobacco Use   • Smoking status: Never Smoker   • Smokeless tobacco: Never Used   Vaping Use   • Vaping Use: Never used   Substance and Sexual Activity   • Alcohol use: No   • Drug use: No   • Sexual activity: Defer        Review of Systems   Constitutional: Positive for activity change and fatigue. Negative for appetite change, chills, diaphoresis, fever and unexpected weight change.   HENT: Negative for congestion, dental problem, drooling, ear discharge, ear pain, facial swelling, hearing loss, mouth sores, nosebleeds, postnasal drip, rhinorrhea, sinus pressure, sneezing, sore throat, tinnitus, trouble swallowing and voice change.    Eyes: Negative for photophobia, pain, discharge, redness, itching and visual disturbance.   Respiratory: Negative for apnea, cough, choking, chest tightness, shortness of breath, wheezing and stridor.    Cardiovascular: Negative for chest pain, palpitations and leg swelling.   Gastrointestinal: Positive for abdominal distention and constipation.  "Negative for abdominal pain, anal bleeding, blood in stool, diarrhea, nausea, rectal pain and vomiting.   Endocrine: Positive for cold intolerance and heat intolerance. Negative for polydipsia, polyphagia and polyuria.   Genitourinary: Positive for difficulty urinating. Negative for decreased urine volume, dysuria, enuresis, flank pain, frequency, genital sores, hematuria and urgency.   Musculoskeletal: Positive for arthralgias, back pain and gait problem. Negative for joint swelling, myalgias, neck pain and neck stiffness.   Skin: Negative for color change, pallor, rash and wound.   Allergic/Immunologic: Positive for environmental allergies. Negative for food allergies and immunocompromised state.   Neurological: Negative for dizziness, tremors, seizures, syncope, facial asymmetry, speech difficulty, weakness, light-headedness, numbness and headaches.   Hematological: Negative for adenopathy. Does not bruise/bleed easily.   Psychiatric/Behavioral: Negative for agitation, behavioral problems, confusion, decreased concentration, dysphoric mood, hallucinations, self-injury, sleep disturbance and suicidal ideas. The patient is not nervous/anxious and is not hyperactive.        The following portions of the patient's history were reviewed and updated as appropriate: allergies, current medications, past family history, past medical history, past social history, past surgical history and problem list.    Physical Exam:   /83   Pulse 86   Ht 157.5 cm (62.01\")   Wt 82.6 kg (182 lb 1.6 oz)   LMP  (LMP Unknown)   BMI 33.30 kg/m²   GENERAL: Body habitus: obese    Lower extremity edema: Right: trace; Left: trace    Varicose veins:  Right: none; Left: none    Gait: She has a calcaneus gait on the left, with short stride, decreased pushoff     Mental Status:  awake and alert; oriented to person, place, and time, a vague historian    Voice:  clear  SKIN:  Lower extremity: Normal    Hair Growth(lower extremity):  " Right:normal; Left:  normal  NAILS: Toenails: thick  HEENT: Head: Normocephalic, atraumatic,  without obvious abnormality.  eye: normal external eye, no icterus  ears:normal external ears  PULM:  Repiratory effort normal  CV:  Dorsalis Pedis:  Right: 2+; Left:2+    Posterior Tibial: Right:2+; Left:2+    Capillary Refill:  Brisk  MSK:  Hand:mild arthritis, Heberden's nodes      Tibia:  Right:  tender over subcutaneous border; Left:  tender over subcutaneous border      Ankle:  Right: non tender, ROM  normal and motor function  normal; Left:  Mildly tender diffusely around the entire lower leg, no focal tenderness.  Passive range of motion of the ankle shows more dorsiflexion compared with the right, plantarflexion to 20 degrees only, a few degrees inversion eversion.  She has minimal active plantar flexion.  At most the gastrocsoleus is 2+ out of 5.  No function of the toe flexors.  Peroneals are 3+ out of 5 anterior tib and posterior tib are 4+ out of 5, EHL 4 out of 5 EDL 4 out of 5      Foot:  Right:  non tender; Left:  See ankle exam, no focal tenderness, mild diffuse tenderness, decreased plantarflexion      NEURO: Heel Walking:  Right:  normal; Left:  normal    Toe Walking:  Right:  normal; Left:  She is completely unable to get the heel off the floor     [employer] 5.07 monofilament test: Surprisingly intact to the [employer] fiber under metatarsal heads on both feet, and sensate on the heels bilaterally plantar surface    Lower extremity sensation: diminished     Reflexes:  Biceps:  Right:  not tested; Left:  not tested           Quads:  Right:  not tested; Left:  not tested           Ankle:  Right:  not tested; Left:  not tested      Calf Atrophy:4.5 cm of calf atrophy on the left compared with the right    Motor Function: See ankle exam         Medical Decision Making    Data Review:   ordered and reviewed x-rays today, reviewed prior lab results, reviewed radiology images, reviewed radiology  "results and reviewed outside records    Assessment and Plan/ Diagnosis/Treatment options:   1. Left leg weakness, left foot and leg pain  As above I had to dig through the chart extensively to sort through her multiple problems.  She has had a lot of unusual information given to her and I tried to sort out for her the factual problems..  I wrote things down for her so she can keep the paper and refer to it.  I explained to her she does not have a foot drop.  She has what we call a \"calcaneus\" gait, which is essentially the opposite of a foot drop.  She has absolutely no pushoff.  I do think the brace needs to be permanent.  I also think the pain in her leg is neurogenic, I do not think there is any focal bone abnormality as a source of the pain.  The tiny avulsion at the tip of the fibula healed.  I explained that when the muscles in the leg are very weak, everything can be painful.  The effort to walk is increased and this can increase pain in all the other muscles that have to try to work harder.  She reports that the brace sometimes makes her on even and causes right hip pain, and I suggested she use an over-the-counter Dr. Huitron's pad in her right shoe to level her up.  I explained the reason to wear the brace is because of the weakness, its not going to prevent a foot drop.  I think it would be unlikely to develop a foot drop unless she had recurrence of the schwannoma which would be rare.  Her neurologic deficit is below the level of the nerves that supply of the anterior tib etc. that are involved in the classic foot drop deficit.  She asked me if the weakness would get better, I explained I think that is doubtful, but that is a question she should ask her neurosurgeon Dr. Ybarra.    I also explained that she does not have severe arthritis in the ankle, it is not \"bone-on-bone\".  She does not need a fusion and she does not need a replacement.  The ankle joint cartilage is actually normal on the standing x-ray. "  Again I think the pain is neurogenic.  She needs the brace to improve her gait deficiency.    From an orthopedic standpoint unfortunately I do not have anything else to offer her.  I hope I helped clarify the problem for her.      2.  Uncontrolled type 2 diabetes mellitus with hyperglycemia (CMS/[facility])  As above we had a long discussion about diabetes, glucose control, carbohydrates etc.  I encouraged her to improve glucose control to help slow down the progression of her gastroparesis and other diabetic complications.            Radiology Ordered []  Radiology Reports Reviewed []      Radiology Images Reviewed []   Labs Reviewed []    Labs Ordered []   PCP Records Reviewed []    Provider Records Reviewed []    ER Records Reviewed []    Hospital Records Reviewed []    History Obtained From Family []    Phone conversation with Provider []    Records Requested []        Lola Chappell MD

## 2021-09-29 ENCOUNTER — OFFICE VISIT (OUTPATIENT)
Dept: ENDOCRINOLOGY | Facility: CLINIC | Age: 66
End: 2021-09-29

## 2021-09-29 VITALS
OXYGEN SATURATION: 97 % | SYSTOLIC BLOOD PRESSURE: 162 MMHG | DIASTOLIC BLOOD PRESSURE: 90 MMHG | WEIGHT: 189.8 LBS | BODY MASS INDEX: 34.93 KG/M2 | HEIGHT: 62 IN | HEART RATE: 55 BPM

## 2021-09-29 DIAGNOSIS — E11.9 CONTROLLED TYPE 2 DIABETES MELLITUS WITHOUT COMPLICATION, WITHOUT LONG-TERM CURRENT USE OF INSULIN (HCC): Primary | ICD-10-CM

## 2021-09-29 LAB
GLUCOSE BLDC GLUCOMTR-MCNC: 98 MG/DL (ref 70–130)
HBA1C MFR BLD: 8.1 %

## 2021-09-29 PROCEDURE — 83036 HEMOGLOBIN GLYCOSYLATED A1C: CPT | Performed by: PHYSICIAN ASSISTANT

## 2021-09-29 PROCEDURE — 3052F HG A1C>EQUAL 8.0%<EQUAL 9.0%: CPT | Performed by: PHYSICIAN ASSISTANT

## 2021-09-29 PROCEDURE — 99214 OFFICE O/P EST MOD 30 MIN: CPT | Performed by: PHYSICIAN ASSISTANT

## 2021-09-29 PROCEDURE — 82947 ASSAY GLUCOSE BLOOD QUANT: CPT | Performed by: PHYSICIAN ASSISTANT

## 2021-09-29 RX ORDER — SUB-Q INSULIN DEVICE, 40 UNIT
1 EACH MISCELLANEOUS DAILY
Qty: 90 EACH | Refills: 1 | Status: SHIPPED | OUTPATIENT
Start: 2021-09-29 | End: 2022-01-24 | Stop reason: SINTOL

## 2021-09-29 NOTE — PROGRESS NOTES
Chief Complaint  F/u for Diabetes Mellitus.    YUNIER Garcia is a 66 y.o. female with chronic back pain and HTN- on multiple antihypertensives followed by cardiology in [place], who is here today for f/u of Diabetes Mellitus type 2. The initial diagnosis of diabetes was made approximately 2016.    She continues to have episodes where she gets shaky and confused and symptoms resolve with soda or cake. She has checked her BG when this happens it is typically anywhere from 150 to 200. Feels like sometimes it happens after she takes glipizide in the morning (though sugar is not low at the time), but does not have the symptoms after the evening glipizide.  BG 98 (94 per her meter) during visit today and she is asymptomatic. BG was 118 this morning and she was asymptomatic as well.     Had 2 steroid injections last week.     A1C- 7.8 (5/18/2021), 7.4 (10/16/2020), 8.5 (7/28/2020), 6.8 (1/17/2020), 7.4 (10/14/19), 7.4 (7/12/19), 7.6 (4/15/19), 7.2 (1/7/19), 7.7 (9/14/18), 6.7 (6/13/18), 8.9 (2/2018)    Diabetic complications: gastroparesis followed by GI at [facility]  Eye exam current (within one year): [facility] summer 2020, [facility]   Foot care and dental care: discussed    Current diabetic medications include:  Glipizide XL 5 mg qAM  Januvia 100mg daily - takes in the morning  Lantus 40 units qHS    ACEI/ARB: losartan-hctz  Statin: pravastatin 10 mg, did not tolerate crestor 5mg, lipitor 10 mg, Repatha and Praluent not covered by insurance.     Past medications: metformin (???caused blurred vision, went away when stopped taking metformin), invokana (insurance didn't cover but thinks this controlled glucose better), actos (rash), jardiance (yeast infections)  No GLP1 due to gastroparesis    Diabetic Monitoring  -   BG log reviewed- she is testing BID, AM -200s, in the evening frequently 200s, no hypoglycemia     The following portions of the patient's history were reviewed and updated by me as  appropriate: allergies, current medications, past family history, past social history, past surgical history and problem list.        Past Medical History:   Diagnosis Date   • Anxiety    • Benign essential hypertension    • Cervical disc disorder    • Chronic pain disorder    • Colon polyps    • Complication of device    • Decreased libido    • Diabetes mellitus (CMS/HCC)    • Dizziness    • Encounter for long-term (current) use of medications    • Extremity pain    • Fatigue    • Hip pain    • History of alopecia    • History of backache    • History of colonoscopy     Fiberoptic   • History of diabetes mellitus    • History of insomnia    • History of mastoiditis    • History of migraine headaches    • History of urinary stone    • Infection of kidney    • Insomnia    • Joint pain    • Kidney infection    • Low back pain    • Lumbar radiculopathy    • Lumbar radiculopathy    • Lumbosacral disc disease    • Migraine    • Myofascial pain syndrome    • Neck pain    • Palpitations    • Sleep apnea    • Spinal cord stimulator status    • Stress reaction, emotional    • Urinary incontinence    • Urinary tract infection    • Visit for screening mammogram     Description: 08/28/2009   • Vitamin D deficiency        Medications    Current Outpatient Medications:   •  albuterol sulfate  (90 Base) MCG/ACT inhaler, Inhale 2 puffs Every 6 (Six) Hours As Needed for Wheezing or Shortness of Air., Disp: 18 g, Rfl: 11  •  bethanechol (URECHOLINE) 50 MG tablet, Take 50 mg by mouth 3 (Three) Times a Day., Disp: , Rfl:   •  bisoprolol (ZEBeta) 5 MG tablet, Take 5 mg by mouth Daily., Disp: , Rfl:   •  ciclopirox (LOPROX) 0.77 % gel, , Disp: , Rfl:   •  Continuous Blood Gluc  (FreeStyle Enmanuel 2 [place]) device, 1 Device Continuous., Disp: 1 each, Rfl: 0  •  Continuous Blood Gluc Sensor (FreeStyle Enmanuel 2 Sensor) misc, 1 each Every 14 (Fourteen) Days., Disp: 6 each, Rfl: 3  •  cyclobenzaprine (FLEXERIL) 10 MG tablet, Take  10 mg by mouth 3 (Three) Times a Day As Needed for Muscle Spasms., Disp: , Rfl:   •  docusate sodium (COLACE) 100 MG capsule, Take 100 mg by mouth 2 (Two) Times a Day., Disp: , Rfl:   •  estradiol (ESTRACE VAGINAL) 0.1 MG/GM vaginal cream, Insert 2 g into the vagina 1 (One) Time Per Week., Disp: 42.5 g, Rfl: 5  •  furosemide (LASIX) 20 MG tablet, Take 20 mg by mouth 2 (Two) Times a Day As Needed., Disp: , Rfl:   •  glipizide (GLUCOTROL) 10 MG tablet, Take 1 tablet by mouth 2 (Two) Times a Day Before Meals. Must be taken AC to avoid hypoglycemia, Disp: 180 tablet, Rfl: 1  •  insulin glargine (LANTUS, SEMGLEE) 100 UNIT/ML injection, Inject 32 Units under the skin into the appropriate area as directed Daily., Disp: , Rfl:   •  Insulin Pen Needle 32G X 4 MM misc, Use daily with insulin, Disp: 100 each, Rfl: 3  •  Januvia 100 MG tablet, TAKE 1 TABLET DAILY, Disp: 90 tablet, Rfl: 3  •  Lantus SoloStar 100 UNIT/ML injection pen, , Disp: , Rfl:   •  losartan (COZAAR) 100 MG tablet, Take 100 mg by mouth Daily., Disp: , Rfl:   •  meloxicam (MOBIC) 7.5 MG tablet, , Disp: , Rfl:   •  metoclopramide (REGLAN) 5 MG tablet, Take 5 mg by mouth 3 (Three) Times a Day., Disp: , Rfl:   •  NIFEdipine CC (ADALAT CC) 60 MG 24 hr tablet, , Disp: , Rfl:   •  nystatin (MYCOSTATIN) 650821 UNIT/GM cream, nystatin 100,000 unit/gram topical cream, Disp: , Rfl:   •  omeprazole (priLOSEC) 40 MG capsule, TAKE 1 CAPSULE DAILY, Disp: 90 capsule, Rfl: 3  •  pravastatin (PRAVACHOL) 10 MG tablet, Take 1 tablet by mouth Every Night., Disp: 90 tablet, Rfl: 3  •  rOPINIRole (REQUIP) 1 MG tablet, TAKE 1 TABLET EVERY NIGHT, 60 MINUTES BEFORE BEDTIME, Disp: 90 tablet, Rfl: 3  •  topiramate (TOPAMAX) 50 MG tablet, Take 1 tablet by mouth Daily As Needed (migraine)., Disp: 90 tablet, Rfl: 3  •  traMADol (ULTRAM) 50 MG tablet, Take 1 tablet by mouth Every 8 (Eight) Hours As Needed for Moderate Pain ., Disp: 90 tablet, Rfl: 2  •  traZODone (DESYREL) 50 MG tablet,  "Take 1 to 2 tabs po hs prn, Disp: 180 tablet, Rfl: 3  •  Insulin Disposable Pump (V-Go 30) kit, Inject 1 Units under the skin into the appropriate area as directed Daily., Disp: 90 each, Rfl: 1  •  insulin lispro (HumaLOG) 100 UNIT/ML injection, Use up to 80 units daily via v-go device, Disp: 60 mL, Rfl: 1    Review of Systems  Review of Systems   Constitutional: Positive for fatigue. Negative for chills, fever and unexpected weight change.   HENT: Negative for ear pain, hearing loss, nosebleeds, rhinorrhea and sore throat.    Eyes: Negative for pain, discharge, redness and itching.   Respiratory: Negative for cough, shortness of breath and wheezing.    Cardiovascular: Negative for chest pain and palpitations.   Gastrointestinal: Negative for abdominal pain, blood in stool, constipation and diarrhea.   Endocrine: Negative for cold intolerance, heat intolerance and polydipsia.   Genitourinary: Negative for dysuria, frequency, hematuria, pelvic pain, vaginal bleeding and vaginal discharge.   Musculoskeletal: Positive for back pain. Negative for arthralgias, gait problem, joint swelling and myalgias.   Skin: Negative for rash.   Allergic/Immunologic: Negative.    Neurological: Negative for dizziness, syncope, weakness, numbness and headaches.   Hematological: Negative for adenopathy. Does not bruise/bleed easily.   Psychiatric/Behavioral: Negative for sleep disturbance and suicidal ideas. The patient is not nervous/anxious.         Physical Exam    /90   Pulse 55   Ht 157.5 cm (62.01\")   Wt 86.1 kg (189 lb 12.8 oz)   LMP  (LMP Unknown)   SpO2 97%   BMI 34.70 kg/m² Body mass index is 34.7 kg/m².  Physical Exam   Constitutional: She is oriented to person, place, and time. She appears well-developed. No distress.   AAOx3   HENT:   Head: Normocephalic.   Right Ear: External ear normal.   Left Ear: External ear normal.   Mouth/Throat: No oropharyngeal exudate.   Eyes: Conjunctivae and lids are normal. Right eye " exhibits no discharge. Left eye exhibits no discharge. Right pupil is reactive. Left pupil is reactive.   Neck: No JVD present. No tracheal deviation present. No thyroid mass and no thyromegaly present.   Cardiovascular: Normal rate, regular rhythm and normal heart sounds.   No murmur heard.  Pulmonary/Chest: Effort normal and breath sounds normal. No respiratory distress. She has no wheezes.   Abdominal: Soft. Bowel sounds are normal. There is no abdominal tenderness.   Musculoskeletal: No tenderness.   Lymphadenopathy:     She has no cervical adenopathy.   Neurological: She is alert and oriented to person, place, and time.   Skin: Skin is warm and dry. No rash noted. She is not diaphoretic. No erythema.   Psychiatric: Her speech is normal and behavior is normal. Judgment and thought content normal.       Labs and Imaging   Lab Results   Component Value Date    HGBA1C 8.1 09/29/2021    HGBA1C 7.8 05/18/2021    HGBA1C 7.3 02/15/2021     Office Visit on 09/29/2021   Component Date Value Ref Range Status   • Glucose 09/29/2021 98  70 - 130 mg/dL Final   • Hemoglobin A1C 09/29/2021 8.1  % Final   Lab on 09/02/2021   Component Date Value Ref Range Status   • Ferritin 09/02/2021 293.60* 13.00 - 150.00 ng/mL Final   • Iron 09/02/2021 71  37 - 145 mcg/dL Final   • Iron Saturation 09/02/2021 18* 20 - 50 % Final   • Transferrin 09/02/2021 270  200 - 360 mg/dL Final   • TIBC 09/02/2021 402  298 - 536 mcg/dL Final   • WBC 09/02/2021 6.30  3.40 - 10.80 10*3/mm3 Final   • RBC 09/02/2021 5.12  3.77 - 5.28 10*6/mm3 Final   • Hemoglobin 09/02/2021 14.8  12.0 - 15.9 g/dL Final   • Hematocrit 09/02/2021 45.2  34.0 - 46.6 % Final   • RDW 09/02/2021 13.4  12.3 - 15.4 % Final   • MCV 09/02/2021 88.2  79.0 - 97.0 fL Final   • MCH 09/02/2021 29.0  26.6 - 33.0 pg Final   • MCHC 09/02/2021 32.8  31.5 - 35.7 g/dL Final   • MPV 09/02/2021 8.0  6.0 - 12.0 fL Final   • Platelets 09/02/2021 248  140 - 450 10*3/mm3 Final   • Neutrophil %  09/02/2021 63.9  42.7 - 76.0 % Final   • Lymphocyte % 09/02/2021 32.5  19.6 - 45.3 % Final   • Monocyte % 09/02/2021 3.6* 5.0 - 12.0 % Final   • Neutrophils, Absolute 09/02/2021 4.00  1.70 - 7.00 10*3/mm3 Final   • Lymphocytes, Absolute 09/02/2021 2.00  0.70 - 3.10 10*3/mm3 Final   • Monocytes, Absolute 09/02/2021 0.20  0.10 - 0.90 10*3/mm3 Final     Microalbumin / Creatinine Urine Ratio - Urine, Clean Catch (02/15/2021 13:21)  TSH (02/15/2021 13:21)  Lipid Panel (02/15/2021 13:21)  Comprehensive Metabolic Panel (02/15/2021 13:21)      Assessment / Plan   Diagnoses and all orders for this visit:    1. Controlled type 2 diabetes mellitus without complication, without long-term current use of insulin (CMS/[facility]) (Primary)  -     POC Glucose, Blood  -     POC Glycosylated Hemoglobin (Hb A1C)  -     Insulin Disposable Pump (V-Go 30) kit; Inject 1 Units under the skin into the appropriate area as directed Daily.  Dispense: 90 each; Refill: 1  -     insulin lispro (HumaLOG) 100 UNIT/ML injection; Use up to 80 units daily via v-go device  Dispense: 60 mL; Refill: 1  -     Ambulatory Referral to Diabetic Education        Diabetes Mellitus 2 is under inadequate control.  -A1c 8.1  -choice of medications is limited- intolerant to metformin, actos (rash), no GLP1 agonist due to gastroparesis, did not tolerate jardiance  -dc glipizide   -continue januvia 100mg daily  -dc lantus  -start v-go 30 with 5 clicks before meals  -referral to dm education for v-go start  -labs were done 2/2021, foot exam due 10/2021, eye exam should be updated yearly      1.  Diet: 3-4 carb servings per meal for females, 4-5 carb servings per meal for males  Spread carb intake throughout the day  Increase lean protein and vegetable intake  Avoid sugary drinks and processed carbs including crackers, cookies, cakes  2.  Exercise: Recommend at least 30 minutes of exercise daily, at least 5 days per week. Increase exercise gradually.   3.  Blood Glucose Goal:  Blood glucose goal <150 fasting, <180 2 hr postprandial  4.  Microalbumin due 2/2021  5.  Education performed during this visit: long term diabetic complications discussed. , annual eye examinations at Ophthalmology discussed, dental hygiene discussed  and foot care reviewed., home glucose monitoring emphasized, all medications, side effects and compliance discussed carefully and Hypoglycemia management and prevention reviewed. Reviewed ‘ABCs’ of diabetes management (respective goals in parentheses):  A1C (<7), blood pressure (<130/80), and cholesterol (LDL <100, if CVD <70).    Hyperlipidemia  -praluent/repatha not covered by insurance  -on pravastatin 10 mg - she reports she will be switched to crestor soon  -lipid panel was updated 2/2021          There are no Patient Instructions on file for this visit.    Follow up: Return in about 3 months (around 12/29/2021).    Discussed the nature of the disease including, risks, complications, implications, management, safe and proper use of medications. Encouraged therapeutic lifestyle changes including low calorie diet with plenty of fruits and vegetables, daily exercise, medication compliance, and keeping scheduled follow up appointments with me and any other providers. Encouraged patient to have appointment for complete physical, fasting labs, appropriate screenings, and immunizations on an annual basis.        Wero Hernandez PA-C

## 2021-10-05 ENCOUNTER — HOSPITAL ENCOUNTER (OUTPATIENT)
Age: 66
End: 2021-10-05
Payer: MEDICARE

## 2021-10-05 ENCOUNTER — HOSPITAL ENCOUNTER (OUTPATIENT)
Age: 66
End: 2021-10-05
Payer: SELF-PAY

## 2021-10-05 DIAGNOSIS — Z13.6: Primary | ICD-10-CM

## 2021-10-05 DIAGNOSIS — R06.09: Primary | ICD-10-CM

## 2021-10-05 DIAGNOSIS — I50.30: ICD-10-CM

## 2021-10-05 PROCEDURE — 78452 HT MUSCLE IMAGE SPECT MULT: CPT

## 2021-10-05 PROCEDURE — A9502 TC99M TETROFOSMIN: HCPCS

## 2021-10-05 PROCEDURE — 75571 CT HRT W/O DYE W/CA TEST: CPT

## 2021-10-05 PROCEDURE — 93306 TTE W/DOPPLER COMPLETE: CPT

## 2021-10-05 PROCEDURE — 93017 CV STRESS TEST TRACING ONLY: CPT

## 2021-10-10 DIAGNOSIS — M96.1 POSTLAMINECTOMY SYNDROME OF LUMBAR REGION: ICD-10-CM

## 2021-10-10 DIAGNOSIS — M51.26 HERNIATED LUMBAR INTERVERTEBRAL DISC: ICD-10-CM

## 2021-10-11 RX ORDER — TRAMADOL HYDROCHLORIDE 50 MG/1
TABLET ORAL
Qty: 90 TABLET | Refills: 2 | Status: SHIPPED | OUTPATIENT
Start: 2021-10-11 | End: 2022-06-06 | Stop reason: SDUPTHER

## 2021-10-28 ENCOUNTER — HOSPITAL ENCOUNTER (OUTPATIENT)
Age: 66
End: 2021-10-28
Payer: MEDICARE

## 2021-10-28 DIAGNOSIS — E78.2: Primary | ICD-10-CM

## 2021-10-28 DIAGNOSIS — G47.33: ICD-10-CM

## 2021-10-28 DIAGNOSIS — R06.00: ICD-10-CM

## 2021-10-28 DIAGNOSIS — R06.02: ICD-10-CM

## 2021-10-28 DIAGNOSIS — I11.9: ICD-10-CM

## 2021-10-28 LAB
ANION GAP SERPL CALC-SCNC: 14.3 MEQ/L (ref 5–15)
BUN SERPL-MCNC: 14 MG/DL (ref 7–17)
CALCIUM SPEC-MCNC: 9.9 MG/DL (ref 8.4–10.2)
CHLORIDE SPEC-SCNC: 102 MMOL/L (ref 98–107)
CO2 SERPL-SCNC: 28 MMOL/L (ref 22–30)
CREAT BLD-SCNC: 0.5 MG/DL (ref 0.52–1.04)
ESTIMATED GLOMERULAR FILT RATE: 124 ML/MIN (ref 60–?)
GFR (AFRICAN AMERICAN): 150 ML/MIN (ref 60–?)
GLUCOSE: 133 MG/DL (ref 74–100)
NT PRO BRAIN NATRIURETIC PEP.: 47.2 PG/ML (ref 0–125)
POTASSIUM: 4.3 MMOL/L (ref 3.5–5.1)
SODIUM SPEC-SCNC: 140 MMOL/L (ref 136–145)

## 2021-10-28 PROCEDURE — 83880 ASSAY OF NATRIURETIC PEPTIDE: CPT

## 2021-10-28 PROCEDURE — 36415 COLL VENOUS BLD VENIPUNCTURE: CPT

## 2021-10-28 PROCEDURE — 80048 BASIC METABOLIC PNL TOTAL CA: CPT

## 2021-11-02 ENCOUNTER — TELEPHONE (OUTPATIENT)
Dept: DIABETES SERVICES | Facility: HOSPITAL | Age: 66
End: 2021-11-02

## 2021-11-02 NOTE — TELEPHONE ENCOUNTER
Phone call to MsMarlon Radha to check in on Vgo supplies and scheduling appt for training. Pt states she received her Vgo supplies at the end of last week and is ready to schedule training. She does not have availability this week for training--scheduled for Monday 11/8. Pt was advised to bring all vgo supplies and vial of her insulin, which she confirms she has picked up from pharmacy.     Miracle Alegre RN,[facility]  Diabetes Educator   No

## 2021-11-08 ENCOUNTER — OFFICE VISIT (OUTPATIENT)
Dept: DIABETES SERVICES | Facility: HOSPITAL | Age: 66
End: 2021-11-08

## 2021-11-08 PROCEDURE — G0108 DIAB MANAGE TRN  PER INDIV: HCPCS

## 2021-11-08 NOTE — CONSULTS
Diabetes Education    Patient Name:  Yi Garcia  YOB: 1955  MRN: [id number]  Admit Date:  (Not on file)        Ms. Garcia attended initial diabetes education visit; referral noted in Epic, 60 minute visit with RN, SAMUEL including Vgo training and start. Please see media tab for full assessment and notes. Thank you for the referral!      Electronically signed by:  Miracle Alegre RN, SAMUEL  11/08/21 11:53 EST

## 2022-01-24 ENCOUNTER — OFFICE VISIT (OUTPATIENT)
Dept: ENDOCRINOLOGY | Facility: CLINIC | Age: 67
End: 2022-01-24

## 2022-01-24 ENCOUNTER — LAB (OUTPATIENT)
Dept: LAB | Facility: HOSPITAL | Age: 67
End: 2022-01-24

## 2022-01-24 ENCOUNTER — APPOINTMENT (OUTPATIENT)
Dept: DIABETES SERVICES | Facility: HOSPITAL | Age: 67
End: 2022-01-24

## 2022-01-24 VITALS
DIASTOLIC BLOOD PRESSURE: 72 MMHG | OXYGEN SATURATION: 94 % | WEIGHT: 186.4 LBS | BODY MASS INDEX: 34.3 KG/M2 | HEIGHT: 62 IN | SYSTOLIC BLOOD PRESSURE: 122 MMHG | HEART RATE: 83 BPM

## 2022-01-24 DIAGNOSIS — E78.2 MIXED HYPERLIPIDEMIA: Chronic | ICD-10-CM

## 2022-01-24 DIAGNOSIS — E11.65 UNCONTROLLED TYPE 2 DIABETES MELLITUS WITH HYPERGLYCEMIA: Primary | Chronic | ICD-10-CM

## 2022-01-24 LAB
ALBUMIN SERPL-MCNC: 4.2 G/DL (ref 3.5–5.2)
ALBUMIN/GLOB SERPL: 1.2 G/DL
ALP SERPL-CCNC: 173 U/L (ref 39–117)
ALT SERPL W P-5'-P-CCNC: 56 U/L (ref 1–33)
ANION GAP SERPL CALCULATED.3IONS-SCNC: 10.3 MMOL/L (ref 5–15)
AST SERPL-CCNC: 41 U/L (ref 1–32)
BILIRUB SERPL-MCNC: 0.5 MG/DL (ref 0–1.2)
BUN SERPL-MCNC: 21 MG/DL (ref 8–23)
BUN/CREAT SERPL: 25.9 (ref 7–25)
CALCIUM SPEC-SCNC: 10.1 MG/DL (ref 8.6–10.5)
CHLORIDE SERPL-SCNC: 98 MMOL/L (ref 98–107)
CHOLEST SERPL-MCNC: 212 MG/DL (ref 0–200)
CO2 SERPL-SCNC: 28.7 MMOL/L (ref 22–29)
CREAT SERPL-MCNC: 0.81 MG/DL (ref 0.57–1)
EXPIRATION DATE: NORMAL
GFR SERPL CREATININE-BSD FRML MDRD: 71 ML/MIN/1.73
GLOBULIN UR ELPH-MCNC: 3.4 GM/DL
GLUCOSE BLDC GLUCOMTR-MCNC: 382 MG/DL (ref 70–130)
GLUCOSE SERPL-MCNC: 359 MG/DL (ref 65–99)
HBA1C MFR BLD: 9.8 %
HDLC SERPL-MCNC: 39 MG/DL (ref 40–60)
LDLC SERPL CALC-MCNC: 137 MG/DL (ref 0–100)
LDLC/HDLC SERPL: 3.42 {RATIO}
Lab: NORMAL
POTASSIUM SERPL-SCNC: 4.1 MMOL/L (ref 3.5–5.2)
PROT SERPL-MCNC: 7.6 G/DL (ref 6–8.5)
SODIUM SERPL-SCNC: 137 MMOL/L (ref 136–145)
TRIGL SERPL-MCNC: 198 MG/DL (ref 0–150)
TSH SERPL DL<=0.05 MIU/L-ACNC: 2.28 UIU/ML (ref 0.27–4.2)
VLDLC SERPL-MCNC: 36 MG/DL (ref 5–40)

## 2022-01-24 PROCEDURE — 80053 COMPREHEN METABOLIC PANEL: CPT | Performed by: PHYSICIAN ASSISTANT

## 2022-01-24 PROCEDURE — 84443 ASSAY THYROID STIM HORMONE: CPT | Performed by: PHYSICIAN ASSISTANT

## 2022-01-24 PROCEDURE — 3046F HEMOGLOBIN A1C LEVEL >9.0%: CPT | Performed by: PHYSICIAN ASSISTANT

## 2022-01-24 PROCEDURE — 83036 HEMOGLOBIN GLYCOSYLATED A1C: CPT | Performed by: PHYSICIAN ASSISTANT

## 2022-01-24 PROCEDURE — 82043 UR ALBUMIN QUANTITATIVE: CPT | Performed by: PHYSICIAN ASSISTANT

## 2022-01-24 PROCEDURE — 82947 ASSAY GLUCOSE BLOOD QUANT: CPT | Performed by: PHYSICIAN ASSISTANT

## 2022-01-24 PROCEDURE — 80061 LIPID PANEL: CPT | Performed by: PHYSICIAN ASSISTANT

## 2022-01-24 PROCEDURE — 99214 OFFICE O/P EST MOD 30 MIN: CPT | Performed by: PHYSICIAN ASSISTANT

## 2022-01-24 PROCEDURE — 82570 ASSAY OF URINE CREATININE: CPT | Performed by: PHYSICIAN ASSISTANT

## 2022-01-24 RX ORDER — INSULIN LISPRO 100 [IU]/ML
INJECTION, SOLUTION INTRAVENOUS; SUBCUTANEOUS
Qty: 36 ML | Refills: 1 | Status: SHIPPED | OUTPATIENT
Start: 2022-01-24 | End: 2022-02-10 | Stop reason: SDUPTHER

## 2022-01-24 NOTE — PATIENT INSTRUCTIONS
Continue Januvia  Stay off glipizide    Insulin dosing:  Basal insulin - Resume Lantus at 25 units daily        Meal Insulin-  Start Humalog 10-12 units before meals. When pre-meal sugar is above 150 add extra Humalog based on chart below.          Correction insulin (add to meal insulin)   0 unit  if  Blood sugar (BS)  less than 150   2 unit   if BS  150 - 199   4 units if  - 249   6 units if  - 299   8 units if  - 349   10 units if -399   12 units if BS >400

## 2022-01-24 NOTE — PROGRESS NOTES
"Chief Complaint  F/u for Diabetes Mellitus.    HPI   Yi Garcia is a 66 y.o. female with chronic back pain and HTN- on multiple antihypertensives followed by cardiology in [place], who is here today for f/u of Diabetes Mellitus type 2. The initial diagnosis of diabetes was made approximately 2016.    She continues to have episodes where she gets shaky and confused and symptoms resolve with soda, cake or candy. She sometimes feels this way when her sugar is 300. Symptoms were more frequent when she was using V-GO. Much less frequent off V-GO.     Stopped using V-GO. Felt like she constantly felt \"bee stings\" and had swelling and redness of the skin. Resolved after stopping V-GO.     A1C- 9.8 (1/24/2022), 7.8 (5/18/2021), 7.4 (10/16/2020), 8.5 (7/28/2020), 6.8 (1/17/2020), 7.4 (10/14/19), 7.4 (7/12/19), 7.6 (4/15/19), 7.2 (1/7/19), 7.7 (9/14/18), 6.7 (6/13/18), 8.9 (2/2018)    Diabetic complications: gastroparesis followed by GI at [facility]  Eye exam current (within one year): utd June 2021, [facility] eye [facility]   Foot care and dental care: discussed    Current diabetic medications include:  Januvia 100mg daily - takes in the morning      ACEI/ARB: losartan-hctz  Statin: pravastatin 10 mg, did not tolerate crestor 5mg, lipitor 10 mg, Repatha and Praluent not covered by insurance.     Past medications: metformin (???caused blurred vision, went away when stopped taking metformin), invokana (insurance didn't cover but thinks this controlled glucose better), actos (rash), jardiance (yeast infections)  No GLP1 due to gastroparesis, glipizide    Diabetic Monitoring  -   BG log reviewed- she is testing 1-3x/day, BG mostly in the 200s    The following portions of the patient's history were reviewed and updated by me as appropriate: allergies, current medications, past family history, past social history, past surgical history and problem list.      Past Medical History:   Diagnosis Date   • Anxiety    • Benign essential " hypertension    • Cervical disc disorder    • Chronic pain disorder    • Colon polyps    • Complication of device    • Decreased libido    • Diabetes mellitus (HCC)    • Dizziness    • Encounter for long-term (current) use of medications    • Extremity pain    • Fatigue    • Hip pain    • History of alopecia    • History of backache    • History of colonoscopy     Fiberoptic   • History of diabetes mellitus    • History of insomnia    • History of mastoiditis    • History of migraine headaches    • History of urinary stone    • Infection of kidney    • Insomnia    • Joint pain    • Kidney infection    • Low back pain    • Lumbar radiculopathy    • Lumbar radiculopathy    • Lumbosacral disc disease    • Migraine    • Myofascial pain syndrome    • Neck pain    • Palpitations    • Sleep apnea    • Spinal cord stimulator status    • Stress reaction, emotional    • Urinary incontinence    • Urinary tract infection    • Visit for screening mammogram     Description: 08/28/2009   • Vitamin D deficiency        Medications    Current Outpatient Medications:   •  albuterol sulfate  (90 Base) MCG/ACT inhaler, Inhale 2 puffs Every 6 (Six) Hours As Needed for Wheezing or Shortness of Air., Disp: 18 g, Rfl: 11  •  bethanechol (URECHOLINE) 50 MG tablet, Take 50 mg by mouth 3 (Three) Times a Day., Disp: , Rfl:   •  bisoprolol (ZEBeta) 5 MG tablet, Take 5 mg by mouth Daily., Disp: , Rfl:   •  ciclopirox (LOPROX) 0.77 % gel, , Disp: , Rfl:   •  cyclobenzaprine (FLEXERIL) 10 MG tablet, Take 10 mg by mouth 3 (Three) Times a Day As Needed for Muscle Spasms., Disp: , Rfl:   •  docusate sodium (COLACE) 100 MG capsule, Take 100 mg by mouth 2 (Two) Times a Day., Disp: , Rfl:   •  estradiol (ESTRACE VAGINAL) 0.1 MG/GM vaginal cream, Insert 2 g into the vagina 1 (One) Time Per Week., Disp: 42.5 g, Rfl: 5  •  furosemide (LASIX) 20 MG tablet, Take 20 mg by mouth 2 (Two) Times a Day As Needed., Disp: , Rfl:   •  Insulin Pen Needle 32G X 4  MM misc, Use daily with insulin, Disp: 100 each, Rfl: 3  •  Januvia 100 MG tablet, TAKE 1 TABLET DAILY, Disp: 90 tablet, Rfl: 3  •  losartan (COZAAR) 100 MG tablet, Take 100 mg by mouth Daily., Disp: , Rfl:   •  meloxicam (MOBIC) 7.5 MG tablet, , Disp: , Rfl:   •  metoclopramide (REGLAN) 5 MG tablet, Take 5 mg by mouth 3 (Three) Times a Day., Disp: , Rfl:   •  NIFEdipine CC (ADALAT CC) 60 MG 24 hr tablet, , Disp: , Rfl:   •  nystatin (MYCOSTATIN) 665486 UNIT/GM cream, nystatin 100,000 unit/gram topical cream, Disp: , Rfl:   •  omeprazole (priLOSEC) 40 MG capsule, TAKE 1 CAPSULE DAILY, Disp: 90 capsule, Rfl: 3  •  pravastatin (PRAVACHOL) 10 MG tablet, Take 1 tablet by mouth Every Night., Disp: 90 tablet, Rfl: 3  •  rOPINIRole (REQUIP) 1 MG tablet, TAKE 1 TABLET EVERY NIGHT, 60 MINUTES BEFORE BEDTIME, Disp: 90 tablet, Rfl: 3  •  topiramate (TOPAMAX) 50 MG tablet, Take 1 tablet by mouth Daily As Needed (migraine)., Disp: 90 tablet, Rfl: 3  •  traMADol (ULTRAM) 50 MG tablet, TAKE 1 TABLET EVERY 8 HOURS AS NEEDED FOR MODERATE PAIN, Disp: 90 tablet, Rfl: 2  •  traZODone (DESYREL) 50 MG tablet, Take 1 to 2 tabs po hs prn, Disp: 180 tablet, Rfl: 3  •  Insulin Glargine (LANTUS SOLOSTAR) 100 UNIT/ML injection pen, Inject 25 Units under the skin into the appropriate area as directed Daily., Disp: 27 mL, Rfl: 1  •  Insulin Lispro, 1 Unit Dial, (HumaLOG KwikPen) 100 UNIT/ML solution pen-injector, Take 10-12 units before meals, correction 2:50 >150, MDD 40U, Disp: 36 mL, Rfl: 1    Review of Systems  Review of Systems   Constitutional: Positive for fatigue. Negative for chills, fever and unexpected weight change.   HENT: Negative for ear pain, hearing loss, nosebleeds, rhinorrhea and sore throat.    Eyes: Negative for pain, discharge, redness and itching.   Respiratory: Negative for cough, shortness of breath and wheezing.    Cardiovascular: Negative for chest pain and palpitations.   Gastrointestinal: Negative for abdominal pain,  "blood in stool, constipation and diarrhea.   Endocrine: Negative for cold intolerance, heat intolerance and polydipsia.   Genitourinary: Negative for dysuria, frequency, hematuria, pelvic pain, vaginal bleeding and vaginal discharge.   Musculoskeletal: Positive for back pain. Negative for arthralgias, gait problem, joint swelling and myalgias.   Skin: Negative for rash.   Allergic/Immunologic: Negative.    Neurological: Negative for dizziness, syncope, weakness, numbness and headaches.   Hematological: Negative for adenopathy. Does not bruise/bleed easily.   Psychiatric/Behavioral: Negative for sleep disturbance and suicidal ideas. The patient is not nervous/anxious.         Physical Exam    /72   Pulse 83   Ht 157.5 cm (62.01\")   Wt 84.6 kg (186 lb 6.4 oz)   LMP  (LMP Unknown)   SpO2 94%   BMI 34.08 kg/m² Body mass index is 34.08 kg/m².  Physical Exam   Constitutional: She is oriented to person, place, and time. She appears well-developed. No distress.   HENT:   Head: Normocephalic.   Right Ear: External ear normal.   Left Ear: External ear normal.   Mouth/Throat: No oropharyngeal exudate.   Eyes: Conjunctivae and lids are normal. Right eye exhibits no discharge. Left eye exhibits no discharge. Right pupil is reactive. Left pupil is reactive.   Neck: No JVD present. No tracheal deviation present. No thyroid mass and no thyromegaly present.   Cardiovascular: Normal rate, regular rhythm and normal heart sounds.   No murmur heard.  Pulmonary/Chest: Effort normal and breath sounds normal. No respiratory distress. She has no wheezes.   Abdominal: Soft. Bowel sounds are normal. There is no abdominal tenderness.   Musculoskeletal: No tenderness.    Yi had a diabetic foot exam performed today.   During the foot exam she had a monofilament test performed.    Neurological Sensory Findings - Altered hot/cold left ankle/foot discrimination.Unaltered hot/cold right ankle/foot discrimination. Altered sharp/dull " left ankle/foot discrimination. Unaltered sharp/dull right ankle/foot discrimination. Left ankle/foot altered proprioception. No right ankle/foot altered proprioception  Vascular Status -  Her right foot exhibits normal foot vasculature  and no edema. Her left foot exhibits normal foot vasculature  and no edema.  Skin Integrity  -  Her right foot skin is intact.  She has no right foot onychomycosis, no right foot ulcer and non-callous right foot.Her left foot skin is intact. She has no left foot onychomycosis, no left foot ulcer and non-callous left foot..  Lymphadenopathy:     She has no cervical adenopathy.   Neurological: She is alert and oriented to person, place, and time.   Skin: Skin is warm and dry. No rash noted. She is not diaphoretic. No erythema.   Psychiatric: Her speech is normal and behavior is normal. Thought content normal.       Labs and Imaging   Lab Results   Component Value Date    HGBA1C 9.8 01/24/2022    HGBA1C 8.1 09/29/2021    HGBA1C 7.8 05/18/2021     Office Visit on 01/24/2022   Component Date Value Ref Range Status   • Glucose 01/24/2022 382* 70 - 130 mg/dL Final   • Hemoglobin A1C 01/24/2022 9.8  % Final   • Lot Number 01/24/2022 [id number]   Final   • Expiration Date 01/24/2022 [id number]   Final     Microalbumin / Creatinine Urine Ratio - Urine, Clean Catch (02/15/2021 13:21)  TSH (02/15/2021 13:21)  Lipid Panel (02/15/2021 13:21)  Comprehensive Metabolic Panel (02/15/2021 13:21)      Assessment / Plan   Diagnoses and all orders for this visit:    1. Uncontrolled type 2 diabetes mellitus with hyperglycemia (HCC) (Primary)  -     POC Glucose, Blood  -     POC Glycosylated Hemoglobin (Hb A1C)  -     Insulin Lispro, 1 Unit Dial, (HumaLOG KwikPen) 100 UNIT/ML solution pen-injector; Take 10-12 units before meals, correction 2:50 >150, MDD 40U  Dispense: 36 mL; Refill: 1  -     Microalbumin / Creatinine Urine Ratio - Urine, Clean Catch  -     Comprehensive Metabolic Panel  -     Lipid  Panel  -     TSH  -     Insulin Glargine (LANTUS SOLOSTAR) 100 UNIT/ML injection pen; Inject 25 Units under the skin into the appropriate area as directed Daily.  Dispense: 27 mL; Refill: 1    2. Mixed hyperlipidemia  -     Comprehensive Metabolic Panel  -     Lipid Panel        Diabetes Mellitus 2 is under poor control.  -A1c 9.8, up from 8.1 last visit  -BG mostly in the 200s  -choice of medications is limited- intolerant to metformin, actos (rash), no GLP1 agonist due to gastroparesis, did not tolerate jardiance  -stay off glipizide   -continue januvia 100mg daily  -resume lantus at 25 u daily  -start humalog 10-12 u   -pt is on 3-4 insulin injections per day and is compliance with BG checks therefore CGM is medically necessary for her - sending Dexcom through DME  -labs updated today, foot exam updated today, eye exam updated 6/2021      1.  Diet: 3-4 carb servings per meal for females, 4-5 carb servings per meal for males  Spread carb intake throughout the day  Increase lean protein and vegetable intake  Avoid sugary drinks and processed carbs including crackers, cookies, cakes  2.  Exercise: Recommend at least 30 minutes of exercise daily, at least 5 days per week. Increase exercise gradually.   3.  Blood Glucose Goal: Blood glucose goal <150 fasting, <180 2 hr postprandial  4.  Microalbumin due 2/2021  5.  Education performed during this visit: long term diabetic complications discussed. , annual eye examinations at Ophthalmology discussed, dental hygiene discussed  and foot care reviewed., home glucose monitoring emphasized, all medications, side effects and compliance discussed carefully and Hypoglycemia management and prevention reviewed. Reviewed ‘ABCs’ of diabetes management (respective goals in parentheses):  A1C (<7), blood pressure (<130/80), and cholesterol (LDL <100, if CVD <70).    Hyperlipidemia  -praluent/repatha not covered by insurance  -on pravastatin 10 mg - she reports she will be switched  to crestor soon  -lipid panel today          Patient Instructions     Continue Januvia  Stay off glipizide    Insulin dosing:  Basal insulin - Resume Lantus at 25 units daily        Meal Insulin-  Start Humalog 10-12 units before meals. When pre-meal sugar is above 150 add extra Humalog based on chart below.          Correction insulin (add to meal insulin)   0 unit  if  Blood sugar (BS)  less than 150   2 unit   if BS  150 - 199   4 units if  - 249   6 units if  - 299   8 units if  - 349   10 units if -399   12 units if BS >400             Follow up: Return in about 3 months (around 4/24/2022).    Discussed the nature of the disease including, risks, complications, implications, management, safe and proper use of medications. Encouraged therapeutic lifestyle changes including low calorie diet with plenty of fruits and vegetables, daily exercise, medication compliance, and keeping scheduled follow up appointments with me and any other providers. Encouraged patient to have appointment for complete physical, fasting labs, appropriate screenings, and immunizations on an annual basis.        Wero Hernandez PA-C

## 2022-01-25 LAB
ALBUMIN UR-MCNC: 1.8 MG/DL
CREAT UR-MCNC: 99.3 MG/DL
MICROALBUMIN/CREAT UR: 18.1 MG/G

## 2022-01-25 RX ORDER — EZETIMIBE 10 MG/1
10 TABLET ORAL DAILY
Qty: 90 TABLET | Refills: 1 | Status: SHIPPED | OUTPATIENT
Start: 2022-01-25 | End: 2022-09-02

## 2022-01-25 RX ORDER — PRAVASTATIN SODIUM 20 MG
20 TABLET ORAL NIGHTLY
Qty: 90 TABLET | Refills: 1 | Status: SHIPPED | OUTPATIENT
Start: 2022-01-25 | End: 2022-08-10

## 2022-02-02 ENCOUNTER — TELEPHONE (OUTPATIENT)
Dept: ENDOCRINOLOGY | Facility: CLINIC | Age: 67
End: 2022-02-02

## 2022-02-02 DIAGNOSIS — E11.65 UNCONTROLLED TYPE 2 DIABETES MELLITUS WITH HYPERGLYCEMIA: Primary | ICD-10-CM

## 2022-02-02 NOTE — TELEPHONE ENCOUNTER
Pt called states she has Dexcom sensor pt needs some training on how to use. Pt last seen 01/24/22 pt next appt 04/26/22

## 2022-02-09 ENCOUNTER — APPOINTMENT (OUTPATIENT)
Dept: DIABETES SERVICES | Facility: HOSPITAL | Age: 67
End: 2022-02-09

## 2022-02-09 ENCOUNTER — DOCUMENTATION (OUTPATIENT)
Dept: DIABETES SERVICES | Facility: HOSPITAL | Age: 67
End: 2022-02-09

## 2022-02-09 PROCEDURE — G0108 DIAB MANAGE TRN  PER INDIV: HCPCS | Performed by: REGISTERED NURSE

## 2022-02-09 NOTE — PLAN OF CARE
60 minutes of face to face time spent with patient for Dexcom training. Patient was an active participant with using  guidelines was successfully able to apply, pair and start sensor without difficulty. She voices much satisfaction with training and product. Encouraged further education by reviewing educational materials or visiting dexcom website for educational tutorials. Thank you for this opportunity.

## 2022-02-10 DIAGNOSIS — E11.65 UNCONTROLLED TYPE 2 DIABETES MELLITUS WITH HYPERGLYCEMIA: Chronic | ICD-10-CM

## 2022-02-10 RX ORDER — INSULIN LISPRO 100 [IU]/ML
INJECTION, SOLUTION INTRAVENOUS; SUBCUTANEOUS
Qty: 36 ML | Refills: 1 | Status: SHIPPED | OUTPATIENT
Start: 2022-02-10 | End: 2022-04-26 | Stop reason: SDUPTHER

## 2022-02-10 NOTE — TELEPHONE ENCOUNTER
----- Message from Lesa Comer sent at 1/17/2017  3:48 PM CST -----  Contact: self  Pt returning a missed call, pt can be reached at [phone].   Patient called requesting a refill on Insulin Lispro, 1 Unit Dial, (HumaLOG KwikPen) 100 UNIT/ML solution pen-injector and Insulin Glargine (LANTUS SOLOSTAR) 100 UNIT/ML injection pen. She needs this sent to [facility].       She requested a 90 day supply.       Last ov 1/24/22  Follow up scheduled 4/26/22

## 2022-03-16 DIAGNOSIS — G25.81 RLS (RESTLESS LEGS SYNDROME): ICD-10-CM

## 2022-03-17 RX ORDER — ROPINIROLE 1 MG/1
TABLET, FILM COATED ORAL
Qty: 90 TABLET | Refills: 3 | Status: SHIPPED | OUTPATIENT
Start: 2022-03-17 | End: 2023-02-20 | Stop reason: SDUPTHER

## 2022-03-28 ENCOUNTER — TELEPHONE (OUTPATIENT)
Dept: ENDOCRINOLOGY | Facility: CLINIC | Age: 67
End: 2022-03-28

## 2022-03-28 DIAGNOSIS — E11.65 UNCONTROLLED TYPE 2 DIABETES MELLITUS WITH HYPERGLYCEMIA: ICD-10-CM

## 2022-03-28 NOTE — TELEPHONE ENCOUNTER
PATIENT IS REQUESTING A NEW RX FOR PEN NEEDLES TO REFLECT THAT SHE IS USING UP TO SIX PER DAY. PLEASE SEND TO [facility].

## 2022-04-21 DIAGNOSIS — M96.1 POSTLAMINECTOMY SYNDROME OF LUMBAR REGION: ICD-10-CM

## 2022-04-21 DIAGNOSIS — M51.26 HERNIATED LUMBAR INTERVERTEBRAL DISC: ICD-10-CM

## 2022-04-21 NOTE — TELEPHONE ENCOUNTER
Caller: Yi Garcia    Relationship: Self    Best call back number: [phone]    Requested Prescriptions:   Requested Prescriptions     Pending Prescriptions Disp Refills   • traMADol (ULTRAM) 50 MG tablet 90 tablet 2     Sig: Take 1 tablet by mouth Every 8 (Eight) Hours As Needed for Moderate Pain .        Pharmacy where request should be sent: [facility] [address] [phone] [facility] [phone]      Additional details provided by patient: PATIENT HAS CALLED REQUESTING A NEW 90 DAY PRESCRIPTION ON ABOVE MEDICATION    Does the patient have less than a 3 day supply:  [] Yes  [x] No    Liliane Frias   04/21/22 12:17 EDT

## 2022-04-22 RX ORDER — TRAMADOL HYDROCHLORIDE 50 MG/1
50 TABLET ORAL EVERY 8 HOURS PRN
Qty: 90 TABLET | Refills: 2 | OUTPATIENT
Start: 2022-04-22

## 2022-04-26 ENCOUNTER — LAB (OUTPATIENT)
Dept: LAB | Facility: HOSPITAL | Age: 67
End: 2022-04-26

## 2022-04-26 ENCOUNTER — OFFICE VISIT (OUTPATIENT)
Dept: ENDOCRINOLOGY | Facility: CLINIC | Age: 67
End: 2022-04-26

## 2022-04-26 VITALS
OXYGEN SATURATION: 94 % | BODY MASS INDEX: 34.52 KG/M2 | HEIGHT: 62 IN | WEIGHT: 187.6 LBS | DIASTOLIC BLOOD PRESSURE: 82 MMHG | HEART RATE: 76 BPM | SYSTOLIC BLOOD PRESSURE: 142 MMHG

## 2022-04-26 DIAGNOSIS — E11.65 UNCONTROLLED TYPE 2 DIABETES MELLITUS WITH HYPERGLYCEMIA: Primary | Chronic | ICD-10-CM

## 2022-04-26 DIAGNOSIS — E55.9 VITAMIN D DEFICIENCY: ICD-10-CM

## 2022-04-26 DIAGNOSIS — R74.8 ELEVATED ALKALINE PHOSPHATASE LEVEL: ICD-10-CM

## 2022-04-26 LAB
ALBUMIN SERPL-MCNC: 4.2 G/DL (ref 3.5–5.2)
ALBUMIN/GLOB SERPL: 1.4 G/DL
ALP SERPL-CCNC: 157 U/L (ref 39–117)
ALT SERPL W P-5'-P-CCNC: 63 U/L (ref 1–33)
ANION GAP SERPL CALCULATED.3IONS-SCNC: 13 MMOL/L (ref 5–15)
AST SERPL-CCNC: 53 U/L (ref 1–32)
BILIRUB SERPL-MCNC: 0.4 MG/DL (ref 0–1.2)
BUN SERPL-MCNC: 18 MG/DL (ref 8–23)
BUN/CREAT SERPL: 27.7 (ref 7–25)
CALCIUM SPEC-SCNC: 9.9 MG/DL (ref 8.6–10.5)
CHLORIDE SERPL-SCNC: 100 MMOL/L (ref 98–107)
CO2 SERPL-SCNC: 28 MMOL/L (ref 22–29)
CREAT SERPL-MCNC: 0.65 MG/DL (ref 0.57–1)
EGFRCR SERPLBLD CKD-EPI 2021: 98.5 ML/MIN/1.73
EXPIRATION DATE: NORMAL
GGT SERPL-CCNC: 44 U/L (ref 5–36)
GLOBULIN UR ELPH-MCNC: 3 GM/DL
GLUCOSE BLDC GLUCOMTR-MCNC: 186 MG/DL (ref 70–130)
GLUCOSE SERPL-MCNC: 150 MG/DL (ref 65–99)
HBA1C MFR BLD: 9.5 %
Lab: NORMAL
POTASSIUM SERPL-SCNC: 4.2 MMOL/L (ref 3.5–5.2)
PROT SERPL-MCNC: 7.2 G/DL (ref 6–8.5)
SODIUM SERPL-SCNC: 141 MMOL/L (ref 136–145)

## 2022-04-26 PROCEDURE — 80053 COMPREHEN METABOLIC PANEL: CPT | Performed by: PHYSICIAN ASSISTANT

## 2022-04-26 PROCEDURE — 84080 ASSAY ALKALINE PHOSPHATASES: CPT | Performed by: PHYSICIAN ASSISTANT

## 2022-04-26 PROCEDURE — 99214 OFFICE O/P EST MOD 30 MIN: CPT | Performed by: PHYSICIAN ASSISTANT

## 2022-04-26 PROCEDURE — 82306 VITAMIN D 25 HYDROXY: CPT | Performed by: PHYSICIAN ASSISTANT

## 2022-04-26 PROCEDURE — 95251 CONT GLUC MNTR ANALYSIS I&R: CPT | Performed by: PHYSICIAN ASSISTANT

## 2022-04-26 PROCEDURE — 83036 HEMOGLOBIN GLYCOSYLATED A1C: CPT | Performed by: PHYSICIAN ASSISTANT

## 2022-04-26 PROCEDURE — 82977 ASSAY OF GGT: CPT | Performed by: PHYSICIAN ASSISTANT

## 2022-04-26 PROCEDURE — 3046F HEMOGLOBIN A1C LEVEL >9.0%: CPT | Performed by: PHYSICIAN ASSISTANT

## 2022-04-26 RX ORDER — INSULIN LISPRO 100 [IU]/ML
INJECTION, SOLUTION INTRAVENOUS; SUBCUTANEOUS
Qty: 54 ML | Refills: 1 | Status: SHIPPED | OUTPATIENT
Start: 2022-04-26

## 2022-04-26 NOTE — PROGRESS NOTES
"Chief Complaint  F/u for Diabetes Mellitus.    HPI   Yi Garcia is a 66 y.o. female with chronic back pain and HTN- on multiple antihypertensives followed by cardiology in [place], who is here today for f/u of Diabetes Mellitus type 2. The initial diagnosis of diabetes was made approximately 2016.    Stopped taking pravastatin. It was increased from 10 mg to 20 mg and when she took the higher dose it causes increased sweating, so she stopped taking it.     A1C- 9.8 (1/24/2022), 7.8 (5/18/2021), 7.4 (10/16/2020), 8.5 (7/28/2020), 6.8 (1/17/2020), 7.4 (10/14/19), 7.4 (7/12/19), 7.6 (4/15/19), 7.2 (1/7/19), 7.7 (9/14/18), 6.7 (6/13/18), 8.9 (2/2018)    Diabetic complications: gastroparesis followed by GI at [facility]  Eye exam current (within one year): [facility] 3/2022, [facility]   Foot care and dental care: discussed    Current diabetic medications include:  Januvia 100mg daily - takes in the morning  Lantus 25 units daily  Humalog 12 units before meals     Off V-GO. Felt like she constantly felt \"bee stings\" and had swelling and redness of the skin. Resolved after stopping V-GO.       ACEI/ARB: losartan-hctz    Past medications: metformin (? caused blurred vision, went away when stopped taking metformin), invokana (insurance didn't cover but thinks this controlled glucose better), actos (rash), jardiance (yeast infections), glipizide    Diabetic Monitoring  -   Dexcom downloaded and reviewed: Time in range 30%, fasting blood sugar ranging from 120-200s, postprandial readings 200s to 300s, no hypoglycemia    The following portions of the patient's history were reviewed and updated by me as appropriate: allergies, current medications, past family history, past social history, past surgical history and problem list.      Past Medical History:   Diagnosis Date   • Anxiety    • Benign essential hypertension    • Cervical disc disorder    • Chronic pain disorder    • Colon polyps    • Complication of device    • " Decreased libido    • Diabetes mellitus (HCC)    • Dizziness    • Encounter for long-term (current) use of medications    • Extremity pain    • Fatigue    • Hip pain    • History of alopecia    • History of backache    • History of colonoscopy     Fiberoptic   • History of diabetes mellitus    • History of insomnia    • History of mastoiditis    • History of migraine headaches    • History of urinary stone    • Infection of kidney    • Insomnia    • Joint pain    • Kidney infection    • Low back pain    • Lumbar radiculopathy    • Lumbar radiculopathy    • Lumbosacral disc disease    • Migraine    • Myofascial pain syndrome    • Neck pain    • Palpitations    • Sleep apnea    • Spinal cord stimulator status    • Stress reaction, emotional    • Urinary incontinence    • Urinary tract infection    • Visit for screening mammogram     Description: 08/28/2009   • Vitamin D deficiency        Medications    Current Outpatient Medications:   •  albuterol sulfate  (90 Base) MCG/ACT inhaler, Inhale 2 puffs Every 6 (Six) Hours As Needed for Wheezing or Shortness of Air., Disp: 18 g, Rfl: 11  •  bethanechol (URECHOLINE) 50 MG tablet, Take 50 mg by mouth 3 (Three) Times a Day., Disp: , Rfl:   •  bisoprolol (ZEBeta) 5 MG tablet, Take 5 mg by mouth Daily., Disp: , Rfl:   •  ciclopirox (LOPROX) 0.77 % gel, , Disp: , Rfl:   •  cyclobenzaprine (FLEXERIL) 10 MG tablet, Take 10 mg by mouth 3 (Three) Times a Day As Needed for Muscle Spasms., Disp: , Rfl:   •  docusate sodium (COLACE) 100 MG capsule, Take 100 mg by mouth 2 (Two) Times a Day., Disp: , Rfl:   •  estradiol (ESTRACE VAGINAL) 0.1 MG/GM vaginal cream, Insert 2 g into the vagina 1 (One) Time Per Week., Disp: 42.5 g, Rfl: 5  •  ezetimibe (Zetia) 10 MG tablet, Take 1 tablet by mouth Daily., Disp: 90 tablet, Rfl: 1  •  furosemide (LASIX) 20 MG tablet, Take 20 mg by mouth 2 (Two) Times a Day As Needed., Disp: , Rfl:   •  Insulin Glargine (LANTUS SOLOSTAR) 100 UNIT/ML  "injection pen, Take up to 35 u daily as directed, Disp: 36 mL, Rfl: 1  •  Insulin Lispro, 1 Unit Dial, (HumaLOG KwikPen) 100 UNIT/ML solution pen-injector, Take 16 units before meals, correction 2:50 >150, MDD 60U, Disp: 54 mL, Rfl: 1  •  Insulin Pen Needle 32G X 4 MM misc, Use daily with insulin up to 6 times per day, Disp: 600 each, Rfl: 3  •  Januvia 100 MG tablet, TAKE 1 TABLET DAILY, Disp: 90 tablet, Rfl: 3  •  losartan (COZAAR) 100 MG tablet, Take 100 mg by mouth Daily., Disp: , Rfl:   •  meloxicam (MOBIC) 7.5 MG tablet, , Disp: , Rfl:   •  metoclopramide (REGLAN) 5 MG tablet, Take 5 mg by mouth 3 (Three) Times a Day., Disp: , Rfl:   •  NIFEdipine CC (ADALAT CC) 60 MG 24 hr tablet, , Disp: , Rfl:   •  nystatin (MYCOSTATIN) 126878 UNIT/GM cream, nystatin 100,000 unit/gram topical cream, Disp: , Rfl:   •  omeprazole (priLOSEC) 40 MG capsule, TAKE 1 CAPSULE DAILY, Disp: 90 capsule, Rfl: 3  •  rOPINIRole (REQUIP) 1 MG tablet, TAKE 1 TABLET EVERY NIGHT, 60 MINUTES BEFORE BEDTIME, Disp: 90 tablet, Rfl: 3  •  topiramate (TOPAMAX) 50 MG tablet, Take 1 tablet by mouth Daily As Needed (migraine)., Disp: 90 tablet, Rfl: 3  •  traMADol (ULTRAM) 50 MG tablet, TAKE 1 TABLET EVERY 8 HOURS AS NEEDED FOR MODERATE PAIN, Disp: 90 tablet, Rfl: 2  •  traZODone (DESYREL) 50 MG tablet, Take 1 to 2 tabs po hs prn, Disp: 180 tablet, Rfl: 3  •  pravastatin (PRAVACHOL) 20 MG tablet, Take 1 tablet by mouth Every Night., Disp: 90 tablet, Rfl: 1    Review of Systems  Review of Systems   All other systems reviewed and are negative.       Physical Exam    /82   Pulse 76   Ht 157.5 cm (62.01\")   Wt 85.1 kg (187 lb 9.6 oz)   LMP  (LMP Unknown)   SpO2 94%   BMI 34.30 kg/m² Body mass index is 34.3 kg/m².  Physical Exam   Constitutional: She is oriented to person, place, and time. She appears well-developed. No distress.   HENT:   Head: Normocephalic.   Right Ear: External ear normal.   Left Ear: External ear normal.   Mouth/Throat: " No oropharyngeal exudate.   Eyes: Conjunctivae and lids are normal. Right eye exhibits no discharge. Left eye exhibits no discharge. Right pupil is reactive. Left pupil is reactive.   Neck: No JVD present. No tracheal deviation present. No thyroid mass and no thyromegaly present.   Cardiovascular: Normal rate, regular rhythm and normal heart sounds.   No murmur heard.  Pulmonary/Chest: Effort normal and breath sounds normal. No respiratory distress. She has no wheezes.   Abdominal: Soft. Bowel sounds are normal. There is no abdominal tenderness.   Musculoskeletal: No tenderness.   Lymphadenopathy:     She has no cervical adenopathy.   Neurological: She is alert and oriented to person, place, and time.   Skin: Skin is warm and dry. No rash noted. She is not diaphoretic. No erythema.   Psychiatric: Her speech is normal and behavior is normal. Thought content normal.       Labs and Imaging   Lab Results   Component Value Date    HGBA1C 9.5 04/26/2022    HGBA1C 9.8 01/24/2022    HGBA1C 8.1 09/29/2021     Office Visit on 04/26/2022   Component Date Value Ref Range Status   • Glucose 04/26/2022 186 (A) 70 - 130 mg/dL Final   • Hemoglobin A1C 04/26/2022 9.5  % Final   • Lot Number 04/26/2022 [id number]   Final   • Expiration Date 04/26/2022 [id number]   Final     Microalbumin / Creatinine Urine Ratio - Urine, Clean Catch (01/24/2022 13:43)  Comprehensive Metabolic Panel (01/24/2022 13:43)  Lipid Panel (01/24/2022 13:43)  TSH (01/24/2022 13:43)      Assessment / Plan   Diagnoses and all orders for this visit:    1. Uncontrolled type 2 diabetes mellitus with hyperglycemia (HCC) (Primary)  -     POC Glucose, Blood  -     POC Glycosylated Hemoglobin (Hb A1C)  -     Insulin Lispro, 1 Unit Dial, (HumaLOG KwikPen) 100 UNIT/ML solution pen-injector; Take 16 units before meals, correction 2:50 >150, MDD 60U  Dispense: 54 mL; Refill: 1  -     Insulin Glargine (LANTUS SOLOSTAR) 100 UNIT/ML injection pen; Take up to 35 u daily as  directed  Dispense: 36 mL; Refill: 1    2. Elevated alkaline phosphatase level  -     Comprehensive Metabolic Panel  -     Vitamin D 25 Hydroxy  -     Gamma GT  -     Alkaline Phosphatase, Bone Specific    3. Vitamin D deficiency  -     Vitamin D 25 Hydroxy        Diabetes Mellitus 2 is under poor control.  -A1c 9.5  -Dexcom downloaded and reviewed: Time in range 30%, fasting blood sugar ranging from 120-200s, postprandial readings 200s to 300s, no hypoglycemia  -choice of medications is limited- intolerant to metformin, actos (rash), no GLP1 agonist due to gastroparesis, did not tolerate jardiance  -continue januvia 100mg daily  -Increase Lantus to 30 units daily and if fasting blood sugar remains above 150 then increase to 35 units daily  -Increase Humalog to 16 units plus correction 2:50 for >150 before meals  -labs updated 1/2022, foot exam updated 1/2022, eye exam updated 3/2022    Hyperlipidemia  -praluent/repatha not covered by insurance  -Resume pravastatin 20 mg to see if she has any side effects.  Discussed increased diaphoresis is typically not a side effect of pravastatin, however if she does have issues we will go back down to 10 mg    Elevated alk phos  -check vit d (is on vit d supplement, thinks 500 IU daily), ggt, bone specific alk phos       Patient Instructions     Insulin dosing:  Basal insulin - Increase Lantus to 30 units daily. After 3 days if fasting sugar is consistently about 150 then increase to 35 units      Meal Insulin-  Increase Humalog to 16 units (plus chart below) before meals         Correction insulin (add to meal insulin)   0 unit  if  Blood sugar (BS)  less than 150   2 unit   if BS  150 - 199   4 units if  - 249   6 units if  - 299   8 units if  - 349   10 units if -399   12 units if BS >400          Follow up: Return in about 3 months (around 7/26/2022).    Wero Hernandez PA-C

## 2022-04-26 NOTE — PATIENT INSTRUCTIONS
Insulin dosing:  Basal insulin - Increase Lantus to 30 units daily. After 3 days if fasting sugar is consistently about 150 then increase to 35 units      Meal Insulin-  Increase Humalog to 16 units (plus chart below) before meals         Correction insulin (add to meal insulin)   0 unit  if  Blood sugar (BS)  less than 150   2 unit   if BS  150 - 199   4 units if  - 249   6 units if  - 299   8 units if  - 349   10 units if -399   12 units if BS >400

## 2022-04-27 LAB — 25(OH)D3 SERPL-MCNC: 51.4 NG/ML (ref 30–100)

## 2022-04-28 PROBLEM — E11.3293 MILD NONPROLIFERATIVE DIABETIC RETINOPATHY OF BOTH EYES WITHOUT MACULAR EDEMA ASSOCIATED WITH TYPE 2 DIABETES MELLITUS: Status: ACTIVE | Noted: 2022-04-28

## 2022-05-03 DIAGNOSIS — M51.26 HERNIATED LUMBAR INTERVERTEBRAL DISC: ICD-10-CM

## 2022-05-03 DIAGNOSIS — M96.1 POSTLAMINECTOMY SYNDROME OF LUMBAR REGION: ICD-10-CM

## 2022-05-03 RX ORDER — TRAMADOL HYDROCHLORIDE 50 MG/1
50 TABLET ORAL EVERY 8 HOURS PRN
Qty: 30 TABLET | Refills: 0 | OUTPATIENT
Start: 2022-05-03

## 2022-05-03 NOTE — TELEPHONE ENCOUNTER
She has seen Carissa in the past and is establishing care in [facility] on 05/20/2022.    Last filled on 10/11/21 for 90 with 1 refill

## 2022-05-03 NOTE — TELEPHONE ENCOUNTER
She had not seen Carissa since Aug. Out of window to refill control. She has an appt 5/20 with new doctor

## 2022-05-06 LAB — ALP BONE SERPL-MCNC: 31.8 UG/L

## 2022-05-09 ENCOUNTER — TELEPHONE (OUTPATIENT)
Dept: ENDOCRINOLOGY | Facility: CLINIC | Age: 67
End: 2022-05-09

## 2022-06-06 ENCOUNTER — OFFICE VISIT (OUTPATIENT)
Dept: FAMILY MEDICINE CLINIC | Facility: CLINIC | Age: 67
End: 2022-06-06

## 2022-06-06 VITALS
RESPIRATION RATE: 14 BRPM | TEMPERATURE: 97.5 F | WEIGHT: 188.2 LBS | SYSTOLIC BLOOD PRESSURE: 142 MMHG | HEIGHT: 62 IN | OXYGEN SATURATION: 94 % | HEART RATE: 74 BPM | DIASTOLIC BLOOD PRESSURE: 82 MMHG | BODY MASS INDEX: 34.63 KG/M2

## 2022-06-06 DIAGNOSIS — M51.26 HERNIATED LUMBAR INTERVERTEBRAL DISC: ICD-10-CM

## 2022-06-06 DIAGNOSIS — K31.84 GASTROPARESIS: ICD-10-CM

## 2022-06-06 DIAGNOSIS — M96.1 POSTLAMINECTOMY SYNDROME OF LUMBAR REGION: ICD-10-CM

## 2022-06-06 DIAGNOSIS — E11.3553 CONTROLLED TYPE 2 DIABETES MELLITUS WITH STABLE PROLIFERATIVE RETINOPATHY OF BOTH EYES, WITHOUT LONG-TERM CURRENT USE OF INSULIN: Primary | ICD-10-CM

## 2022-06-06 DIAGNOSIS — R79.89 ELEVATED LFTS: ICD-10-CM

## 2022-06-06 DIAGNOSIS — R00.2 HEART PALPITATIONS: ICD-10-CM

## 2022-06-06 DIAGNOSIS — Z12.31 SCREENING MAMMOGRAM FOR BREAST CANCER: ICD-10-CM

## 2022-06-06 PROBLEM — D75.1 ERYTHROCYTOSIS: Status: RESOLVED | Noted: 2020-05-20 | Resolved: 2022-06-06

## 2022-06-06 PROBLEM — R10.10 PAIN OF UPPER ABDOMEN: Status: RESOLVED | Noted: 2018-09-18 | Resolved: 2022-06-06

## 2022-06-06 PROCEDURE — 99214 OFFICE O/P EST MOD 30 MIN: CPT | Performed by: FAMILY MEDICINE

## 2022-06-06 RX ORDER — TRAMADOL HYDROCHLORIDE 50 MG/1
50 TABLET ORAL EVERY 8 HOURS PRN
Qty: 90 TABLET | Refills: 2 | Status: SHIPPED | OUTPATIENT
Start: 2022-06-06 | End: 2022-11-01

## 2022-06-06 RX ORDER — MELOXICAM 7.5 MG/1
7.5 TABLET ORAL DAILY
Qty: 90 TABLET | Refills: 1 | Status: SHIPPED | OUTPATIENT
Start: 2022-06-06 | End: 2022-12-13

## 2022-06-06 NOTE — PROGRESS NOTES
Subjective   Yi Garcia is a 66 y.o. female.     History of Present Illness     She is taking 35 units of lantus and then sliding scale humalog 16 TID  She is on januvia  She is willing to take her lantus in the AM  They have not changed her lantus recently  dexcom goes off through the night as her sugars are high      She has a history of elevated LFTS and newer diagnosis of gastroparesis  She would like a local GI doctor to treat this      Her mood has been more stressed recently as well  Dealing with chronic pain and she sees Dr. See recently      She notes that since starting bisoprol she has felt worse, sugars have been higher, she has had more SOA  Her cardiologist started this but just feels worse with it.  Would like a local cardiologist with the [facility]      The following portions of the patient's history were reviewed and updated as appropriate: allergies, current medications, past family history, past medical history, past social history, past surgical history and problem list.    Review of Systems   Constitutional: Negative.    Psychiatric/Behavioral: Negative.        Objective   Physical Exam  Vitals and nursing note reviewed.   Constitutional:       General: She is not in acute distress.     Appearance: Normal appearance. She is well-developed.   Cardiovascular:      Rate and Rhythm: Normal rate and regular rhythm.      Heart sounds: Normal heart sounds.   Pulmonary:      Effort: Pulmonary effort is normal.      Breath sounds: Normal breath sounds.   Neurological:      Mental Status: She is alert and oriented to person, place, and time.   Psychiatric:         Mood and Affect: Mood normal.         Behavior: Behavior normal.         Thought Content: Thought content normal.         Judgment: Judgment normal.         Assessment & Plan   Diagnoses and all orders for this visit:    1. Controlled type 2 diabetes mellitus with stable proliferative retinopathy of both eyes, without long-term  current use of insulin (HCC) (Primary)  -     metFORMIN (Glucophage) 500 MG tablet; 1/2 PO BID 2 weeks then 1 PO BID  Dispense: 60 tablet; Refill: 3    2. Gastroparesis  -     Ambulatory Referral to Gastroenterology    3. Elevated LFTs  -     Ambulatory Referral to Gastroenterology    4. Heart palpitations  -     Ambulatory Referral to Cardiology    5. Screening mammogram for breast cancer  -     Mammo Screening Digital Tomosynthesis Bilateral With CAD; Future    6. Postlaminectomy syndrome of lumbar region  -     traMADol (ULTRAM) 50 MG tablet; Take 1 tablet by mouth Every 8 (Eight) Hours As Needed for Moderate Pain .  Dispense: 90 tablet; Refill: 2  -     meloxicam (MOBIC) 7.5 MG tablet; Take 1 tablet by mouth Daily.  Dispense: 90 tablet; Refill: 1    7. Herniated lumbar intervertebral disc  -     traMADol (ULTRAM) 50 MG tablet; Take 1 tablet by mouth Every 8 (Eight) Hours As Needed for Moderate Pain .  Dispense: 90 tablet; Refill: 2  -     meloxicam (MOBIC) 7.5 MG tablet; Take 1 tablet by mouth Daily.  Dispense: 90 tablet; Refill: 1      DM not controlled, will slowly add low dose metformin to see if control is better.  Written instructions on how to titrate her insulin given and she will start taking this in the AM>  She is taking fast acting without any directive at this time, may need sliding scale instructions in future.  Using dexcom an tessa continue this.  Will work on GI referral and cardiology referral for chronic issues.  Plan to stop zebeta as she felt more SE than bnenefits of this medicine  Ok tramadol for pain, JEAN-CLAUDE reviewed.  Refilled mobic as well.  She is seeing Dr. See for ongoing pain and he may take over meds in future.

## 2022-06-07 ENCOUNTER — TELEPHONE (OUTPATIENT)
Dept: FAMILY MEDICINE CLINIC | Facility: CLINIC | Age: 67
End: 2022-06-07

## 2022-06-07 DIAGNOSIS — E11.65 UNCONTROLLED TYPE 2 DIABETES MELLITUS WITH HYPERGLYCEMIA: Chronic | ICD-10-CM

## 2022-06-07 NOTE — TELEPHONE ENCOUNTER
I thought she had been taking 35 units, this is her starting dose and then titrated up from there based on written instructions she was given

## 2022-06-07 NOTE — TELEPHONE ENCOUNTER
Caller: Yi Garcia    Relationship: Self    Best call back number: [phone]    What is the best time to reach you: ANYTIME     Who are you requesting to speak with (clinical staff, provider,  specific staff member): CLINICAL STAFF     What was the call regarding: PATIENT WOULD LIKE SOME CLARIFICATION ON HER PRESCRIPTION FOR LANTUS. SHE IS NEEDING TO KNOW WHAT THE STARTING DOSE IS.     Do you require a callback: YES

## 2022-06-10 ENCOUNTER — TELEPHONE (OUTPATIENT)
Dept: FAMILY MEDICINE CLINIC | Facility: CLINIC | Age: 67
End: 2022-06-10

## 2022-06-10 NOTE — TELEPHONE ENCOUNTER
Pt stated having issues with sugar-feeling light headed and flushed-staying hydrated-used 16 units quick release insulin at 9:15 and long lasting at 8-8:30am-ate breakfast sandwich and diet mt dew about 91 or earlier-per Dr Augustin keep doing what she's been doing if not better by tomorrow go to [facility] or [facility]

## 2022-06-29 ENCOUNTER — HOSPITAL ENCOUNTER (OUTPATIENT)
Dept: MAMMOGRAPHY | Facility: HOSPITAL | Age: 67
Discharge: HOME | End: 2022-06-29
Admitting: FAMILY MEDICINE

## 2022-06-29 ENCOUNTER — OFFICE VISIT (OUTPATIENT)
Dept: GASTROENTEROLOGY | Facility: CLINIC | Age: 67
End: 2022-06-29

## 2022-06-29 ENCOUNTER — LAB (OUTPATIENT)
Dept: LAB | Facility: HOSPITAL | Age: 67
End: 2022-06-29

## 2022-06-29 VITALS
BODY MASS INDEX: 34.5 KG/M2 | SYSTOLIC BLOOD PRESSURE: 130 MMHG | TEMPERATURE: 97.6 F | WEIGHT: 187.5 LBS | HEIGHT: 62 IN | DIASTOLIC BLOOD PRESSURE: 60 MMHG

## 2022-06-29 DIAGNOSIS — R79.9 ABNORMAL FINDING OF BLOOD CHEMISTRY, UNSPECIFIED: ICD-10-CM

## 2022-06-29 DIAGNOSIS — E78.2 MIXED HYPERLIPIDEMIA: ICD-10-CM

## 2022-06-29 DIAGNOSIS — R74.8 ELEVATED LIVER ENZYMES: ICD-10-CM

## 2022-06-29 DIAGNOSIS — Z12.31 SCREENING MAMMOGRAM FOR BREAST CANCER: ICD-10-CM

## 2022-06-29 DIAGNOSIS — R74.8 ELEVATED LIVER ENZYMES: Primary | ICD-10-CM

## 2022-06-29 LAB
BASOPHILS # BLD AUTO: 0.07 10*3/MM3 (ref 0–0.2)
BASOPHILS NFR BLD AUTO: 0.9 % (ref 0–1.5)
DEPRECATED RDW RBC AUTO: 38.8 FL (ref 37–54)
EOSINOPHIL # BLD AUTO: 0.06 10*3/MM3 (ref 0–0.4)
EOSINOPHIL NFR BLD AUTO: 0.8 % (ref 0.3–6.2)
ERYTHROCYTE [DISTWIDTH] IN BLOOD BY AUTOMATED COUNT: 12 % (ref 12.3–15.4)
HCT VFR BLD AUTO: 43.4 % (ref 34–46.6)
HGB BLD-MCNC: 14.3 G/DL (ref 12–15.9)
IMM GRANULOCYTES # BLD AUTO: 0.03 10*3/MM3 (ref 0–0.05)
IMM GRANULOCYTES NFR BLD AUTO: 0.4 % (ref 0–0.5)
LYMPHOCYTES # BLD AUTO: 1.46 10*3/MM3 (ref 0.7–3.1)
LYMPHOCYTES NFR BLD AUTO: 19.1 % (ref 19.6–45.3)
MCH RBC QN AUTO: 29.1 PG (ref 26.6–33)
MCHC RBC AUTO-ENTMCNC: 32.9 G/DL (ref 31.5–35.7)
MCV RBC AUTO: 88.4 FL (ref 79–97)
MONOCYTES # BLD AUTO: 0.46 10*3/MM3 (ref 0.1–0.9)
MONOCYTES NFR BLD AUTO: 6 % (ref 5–12)
NEUTROPHILS NFR BLD AUTO: 5.55 10*3/MM3 (ref 1.7–7)
NEUTROPHILS NFR BLD AUTO: 72.8 % (ref 42.7–76)
NRBC BLD AUTO-RTO: 0 /100 WBC (ref 0–0.2)
PLATELET # BLD AUTO: 270 10*3/MM3 (ref 140–450)
PMV BLD AUTO: 11.4 FL (ref 6–12)
RBC # BLD AUTO: 4.91 10*6/MM3 (ref 3.77–5.28)
WBC NRBC COR # BLD: 7.63 10*3/MM3 (ref 3.4–10.8)

## 2022-06-29 PROCEDURE — 82103 ALPHA-1-ANTITRYPSIN TOTAL: CPT | Performed by: NURSE PRACTITIONER

## 2022-06-29 PROCEDURE — 85025 COMPLETE CBC W/AUTO DIFF WBC: CPT | Performed by: NURSE PRACTITIONER

## 2022-06-29 PROCEDURE — 82390 ASSAY OF CERULOPLASMIN: CPT | Performed by: NURSE PRACTITIONER

## 2022-06-29 PROCEDURE — 36415 COLL VENOUS BLD VENIPUNCTURE: CPT | Performed by: NURSE PRACTITIONER

## 2022-06-29 PROCEDURE — 77067 SCR MAMMO BI INCL CAD: CPT

## 2022-06-29 PROCEDURE — 82977 ASSAY OF GGT: CPT | Performed by: NURSE PRACTITIONER

## 2022-06-29 PROCEDURE — 99214 OFFICE O/P EST MOD 30 MIN: CPT | Performed by: NURSE PRACTITIONER

## 2022-06-29 PROCEDURE — 86376 MICROSOMAL ANTIBODY EACH: CPT | Performed by: NURSE PRACTITIONER

## 2022-06-29 PROCEDURE — 86015 ACTIN ANTIBODY EACH: CPT | Performed by: NURSE PRACTITIONER

## 2022-06-29 PROCEDURE — 80053 COMPREHEN METABOLIC PANEL: CPT | Performed by: NURSE PRACTITIONER

## 2022-06-29 PROCEDURE — 77063 BREAST TOMOSYNTHESIS BI: CPT | Performed by: RADIOLOGY

## 2022-06-29 PROCEDURE — 82465 ASSAY BLD/SERUM CHOLESTEROL: CPT | Performed by: NURSE PRACTITIONER

## 2022-06-29 PROCEDURE — 83540 ASSAY OF IRON: CPT | Performed by: NURSE PRACTITIONER

## 2022-06-29 PROCEDURE — 77067 SCR MAMMO BI INCL CAD: CPT | Performed by: RADIOLOGY

## 2022-06-29 PROCEDURE — 84478 ASSAY OF TRIGLYCERIDES: CPT | Performed by: NURSE PRACTITIONER

## 2022-06-29 PROCEDURE — 77063 BREAST TOMOSYNTHESIS BI: CPT

## 2022-06-29 PROCEDURE — 86381 MITOCHONDRIAL ANTIBODY EACH: CPT | Performed by: NURSE PRACTITIONER

## 2022-06-29 PROCEDURE — 86038 ANTINUCLEAR ANTIBODIES: CPT | Performed by: NURSE PRACTITIONER

## 2022-06-29 PROCEDURE — 82728 ASSAY OF FERRITIN: CPT | Performed by: NURSE PRACTITIONER

## 2022-06-29 PROCEDURE — 82784 ASSAY IGA/IGD/IGG/IGM EACH: CPT | Performed by: NURSE PRACTITIONER

## 2022-06-29 PROCEDURE — 82172 ASSAY OF APOLIPOPROTEIN: CPT | Performed by: NURSE PRACTITIONER

## 2022-06-29 PROCEDURE — 83883 ASSAY NEPHELOMETRY NOT SPEC: CPT | Performed by: NURSE PRACTITIONER

## 2022-06-29 PROCEDURE — 83010 ASSAY OF HAPTOGLOBIN QUANT: CPT | Performed by: NURSE PRACTITIONER

## 2022-07-01 LAB
A1AT SERPL-MCNC: 127 MG/DL (ref 101–187)
ALBUMIN SERPL-MCNC: 4.2 G/DL (ref 3.8–4.8)
ALBUMIN/GLOB SERPL: 1.4 {RATIO} (ref 1.2–2.2)
ALP SERPL-CCNC: 151 IU/L (ref 44–121)
ALT SERPL-CCNC: 69 IU/L (ref 0–32)
ANA SER QL: NEGATIVE
AST SERPL-CCNC: 56 IU/L (ref 0–40)
BILIRUB SERPL-MCNC: 0.3 MG/DL (ref 0–1.2)
BUN SERPL-MCNC: 19 MG/DL (ref 8–27)
BUN/CREAT SERPL: 31 (ref 12–28)
CALCIUM SERPL-MCNC: 9.5 MG/DL (ref 8.7–10.3)
CERULOPLASMIN SERPL-MCNC: 30.7 MG/DL (ref 19–39)
CHLORIDE SERPL-SCNC: 101 MMOL/L (ref 96–106)
CO2 SERPL-SCNC: 22 MMOL/L (ref 20–29)
CREAT SERPL-MCNC: 0.62 MG/DL (ref 0.57–1)
EGFRCR SERPLBLD CKD-EPI 2021: 99 ML/MIN/1.73
FERRITIN SERPL-MCNC: 292 NG/ML (ref 15–150)
GLOBULIN SER CALC-MCNC: 2.9 G/DL (ref 1.5–4.5)
GLUCOSE SERPL-MCNC: 184 MG/DL (ref 65–99)
IRON SERPL-MCNC: 93 UG/DL (ref 27–139)
LKM-1 AB SER-ACNC: 1.2 UNITS (ref 0–20)
MITOCHONDRIA M2 IGG SER-ACNC: <20 UNITS (ref 0–20)
POTASSIUM SERPL-SCNC: 4.8 MMOL/L (ref 3.5–5.2)
PROT SERPL-MCNC: 7.1 G/DL (ref 6–8.5)
SMA IGG SER-ACNC: 10 UNITS (ref 0–19)
SODIUM SERPL-SCNC: 139 MMOL/L (ref 134–144)

## 2022-07-05 LAB
A2 MACROGLOB SERPL-MCNC: 283 MG/DL (ref 110–276)
ALT SERPL W P-5'-P-CCNC: 80 IU/L (ref 0–40)
APO A-I SERPL-MCNC: 143 MG/DL (ref 116–209)
AST SERPL W P-5'-P-CCNC: 60 IU/L (ref 0–40)
BILIRUB SERPL-MCNC: 0.4 MG/DL (ref 0–1.2)
CHOLEST SERPL-MCNC: 209 MG/DL (ref 100–199)
FIBROSIS SCORING:: ABNORMAL
FIBROSIS STAGE SERPL QL: ABNORMAL
GGT SERPL-CCNC: 51 IU/L (ref 0–60)
GLUCOSE SERPL-MCNC: 186 MG/DL (ref 65–99)
HAPTOGLOB SERPL-MCNC: 211 MG/DL (ref 37–355)
INTERPRETATIONS: (REFERENCE): ABNORMAL
LABORATORY COMMENT REPORT: ABNORMAL
LIVER FIBR SCORE SERPL CALC.FIBROSURE: 0.34 (ref 0–0.21)
NASH SCORING (REFERENCE): ABNORMAL
NECROINFLAMMATORY ACT GRADE SERPL QL: ABNORMAL
NECROINFLAMMATORY ACT SCORE SERPL: 0.75
SERVICE CMNT-IMP: ABNORMAL
STEATOSIS GRADE (REFERENCE): ABNORMAL
STEATOSIS GRADING (REFERENCE): ABNORMAL
STEATOSIS SCORE (REFERENCE): 0.85 (ref 0–0.3)
TRIGL SERPL-MCNC: 221 MG/DL (ref 0–149)

## 2022-07-14 ENCOUNTER — LAB (OUTPATIENT)
Dept: LAB | Facility: HOSPITAL | Age: 67
End: 2022-07-14

## 2022-07-14 DIAGNOSIS — R74.8 ACID PHOSPHATASE ELEVATED: Primary | ICD-10-CM

## 2022-07-14 LAB
BASOPHILS # BLD AUTO: 0.07 10*3/MM3 (ref 0–0.2)
BASOPHILS NFR BLD AUTO: 1 % (ref 0–1.5)
DEPRECATED RDW RBC AUTO: 37.7 FL (ref 37–54)
EOSINOPHIL # BLD AUTO: 0.11 10*3/MM3 (ref 0–0.4)
EOSINOPHIL NFR BLD AUTO: 1.6 % (ref 0.3–6.2)
ERYTHROCYTE [DISTWIDTH] IN BLOOD BY AUTOMATED COUNT: 11.7 % (ref 12.3–15.4)
HCT VFR BLD AUTO: 45.4 % (ref 34–46.6)
HGB BLD-MCNC: 15.1 G/DL (ref 12–15.9)
IGA1 MFR SER: 249 MG/DL (ref 70–400)
IGG1 SER-MCNC: 1052 MG/DL (ref 700–1600)
IGM SERPL-MCNC: 137 MG/DL (ref 40–230)
IMM GRANULOCYTES # BLD AUTO: 0.03 10*3/MM3 (ref 0–0.05)
IMM GRANULOCYTES NFR BLD AUTO: 0.4 % (ref 0–0.5)
LYMPHOCYTES # BLD AUTO: 1.77 10*3/MM3 (ref 0.7–3.1)
LYMPHOCYTES NFR BLD AUTO: 25.2 % (ref 19.6–45.3)
MCH RBC QN AUTO: 29.4 PG (ref 26.6–33)
MCHC RBC AUTO-ENTMCNC: 33.3 G/DL (ref 31.5–35.7)
MCV RBC AUTO: 88.5 FL (ref 79–97)
MONOCYTES # BLD AUTO: 0.35 10*3/MM3 (ref 0.1–0.9)
MONOCYTES NFR BLD AUTO: 5 % (ref 5–12)
NEUTROPHILS NFR BLD AUTO: 4.69 10*3/MM3 (ref 1.7–7)
NEUTROPHILS NFR BLD AUTO: 66.8 % (ref 42.7–76)
NRBC BLD AUTO-RTO: 0 /100 WBC (ref 0–0.2)
PLATELET # BLD AUTO: 275 10*3/MM3 (ref 140–450)
PMV BLD AUTO: 11.5 FL (ref 6–12)
RBC # BLD AUTO: 5.13 10*6/MM3 (ref 3.77–5.28)
WBC NRBC COR # BLD: 7.02 10*3/MM3 (ref 3.4–10.8)

## 2022-07-14 PROCEDURE — 36415 COLL VENOUS BLD VENIPUNCTURE: CPT

## 2022-07-14 PROCEDURE — 82784 ASSAY IGA/IGD/IGG/IGM EACH: CPT

## 2022-07-14 PROCEDURE — 85025 COMPLETE CBC W/AUTO DIFF WBC: CPT

## 2022-08-10 ENCOUNTER — OFFICE VISIT (OUTPATIENT)
Dept: FAMILY MEDICINE CLINIC | Facility: CLINIC | Age: 67
End: 2022-08-10

## 2022-08-10 VITALS
HEIGHT: 62 IN | RESPIRATION RATE: 18 BRPM | BODY MASS INDEX: 34.16 KG/M2 | WEIGHT: 185.6 LBS | HEART RATE: 109 BPM | TEMPERATURE: 98.2 F | SYSTOLIC BLOOD PRESSURE: 138 MMHG | DIASTOLIC BLOOD PRESSURE: 76 MMHG | OXYGEN SATURATION: 94 %

## 2022-08-10 DIAGNOSIS — E11.3553 CONTROLLED TYPE 2 DIABETES MELLITUS WITH STABLE PROLIFERATIVE RETINOPATHY OF BOTH EYES, WITH LONG-TERM CURRENT USE OF INSULIN: Primary | ICD-10-CM

## 2022-08-10 DIAGNOSIS — E78.2 MIXED HYPERLIPIDEMIA: Chronic | ICD-10-CM

## 2022-08-10 DIAGNOSIS — I10 ESSENTIAL HYPERTENSION: ICD-10-CM

## 2022-08-10 DIAGNOSIS — Z79.4 CONTROLLED TYPE 2 DIABETES MELLITUS WITH STABLE PROLIFERATIVE RETINOPATHY OF BOTH EYES, WITH LONG-TERM CURRENT USE OF INSULIN: Primary | ICD-10-CM

## 2022-08-10 LAB
ALBUMIN SERPL-MCNC: 4.4 G/DL (ref 3.5–5.2)
ALBUMIN/GLOB SERPL: 1.7 G/DL
ALP SERPL-CCNC: 144 U/L (ref 39–117)
ALT SERPL-CCNC: 75 U/L (ref 1–33)
AST SERPL-CCNC: 69 U/L (ref 1–32)
BASOPHILS # BLD AUTO: 0.07 10*3/MM3 (ref 0–0.2)
BASOPHILS NFR BLD AUTO: 0.9 % (ref 0–1.5)
BILIRUB SERPL-MCNC: 0.5 MG/DL (ref 0–1.2)
BUN SERPL-MCNC: 15 MG/DL (ref 8–23)
BUN/CREAT SERPL: 20.8 (ref 7–25)
CALCIUM SERPL-MCNC: 10.3 MG/DL (ref 8.6–10.5)
CHLORIDE SERPL-SCNC: 101 MMOL/L (ref 98–107)
CHOLEST SERPL-MCNC: 197 MG/DL (ref 0–200)
CO2 SERPL-SCNC: 25.9 MMOL/L (ref 22–29)
CREAT SERPL-MCNC: 0.72 MG/DL (ref 0.57–1)
EGFRCR SERPLBLD CKD-EPI 2021: 93.5 ML/MIN/1.73
EOSINOPHIL # BLD AUTO: 0.09 10*3/MM3 (ref 0–0.4)
EOSINOPHIL NFR BLD AUTO: 1.2 % (ref 0.3–6.2)
ERYTHROCYTE [DISTWIDTH] IN BLOOD BY AUTOMATED COUNT: 12.3 % (ref 12.3–15.4)
GLOBULIN SER CALC-MCNC: 2.6 GM/DL
GLUCOSE SERPL-MCNC: 145 MG/DL (ref 65–99)
HBA1C MFR BLD: 7.8 % (ref 4.8–5.6)
HCT VFR BLD AUTO: 46.5 % (ref 34–46.6)
HDLC SERPL-MCNC: 50 MG/DL (ref 40–60)
HGB BLD-MCNC: 15 G/DL (ref 12–15.9)
IMM GRANULOCYTES # BLD AUTO: 0.03 10*3/MM3 (ref 0–0.05)
IMM GRANULOCYTES NFR BLD AUTO: 0.4 % (ref 0–0.5)
LDLC SERPL CALC-MCNC: 117 MG/DL (ref 0–100)
LYMPHOCYTES # BLD AUTO: 1.6 10*3/MM3 (ref 0.7–3.1)
LYMPHOCYTES NFR BLD AUTO: 21.3 % (ref 19.6–45.3)
MCH RBC QN AUTO: 28.5 PG (ref 26.6–33)
MCHC RBC AUTO-ENTMCNC: 32.3 G/DL (ref 31.5–35.7)
MCV RBC AUTO: 88.2 FL (ref 79–97)
MONOCYTES # BLD AUTO: 0.42 10*3/MM3 (ref 0.1–0.9)
MONOCYTES NFR BLD AUTO: 5.6 % (ref 5–12)
NEUTROPHILS # BLD AUTO: 5.29 10*3/MM3 (ref 1.7–7)
NEUTROPHILS NFR BLD AUTO: 70.6 % (ref 42.7–76)
NRBC BLD AUTO-RTO: 0.1 /100 WBC (ref 0–0.2)
PLATELET # BLD AUTO: 257 10*3/MM3 (ref 140–450)
POTASSIUM SERPL-SCNC: 4.6 MMOL/L (ref 3.5–5.2)
PROT SERPL-MCNC: 7 G/DL (ref 6–8.5)
RBC # BLD AUTO: 5.27 10*6/MM3 (ref 3.77–5.28)
SODIUM SERPL-SCNC: 140 MMOL/L (ref 136–145)
TRIGL SERPL-MCNC: 169 MG/DL (ref 0–150)
VLDLC SERPL CALC-MCNC: 30 MG/DL (ref 5–40)
WBC # BLD AUTO: 7.5 10*3/MM3 (ref 3.4–10.8)

## 2022-08-10 PROCEDURE — 99214 OFFICE O/P EST MOD 30 MIN: CPT | Performed by: FAMILY MEDICINE

## 2022-08-10 RX ORDER — LOSARTAN POTASSIUM 100 MG/1
100 TABLET ORAL DAILY
Qty: 90 TABLET | Refills: 1 | Status: SHIPPED | OUTPATIENT
Start: 2022-08-10 | End: 2023-02-20 | Stop reason: SDUPTHER

## 2022-08-10 RX ORDER — SITAGLIPTIN AND METFORMIN HYDROCHLORIDE 1000; 100 MG/1; MG/1
1 TABLET, FILM COATED, EXTENDED RELEASE ORAL DAILY
Qty: 90 TABLET | Refills: 1 | Status: SHIPPED | OUTPATIENT
Start: 2022-08-10 | End: 2022-11-18 | Stop reason: SDUPTHER

## 2022-08-10 RX ORDER — ATORVASTATIN CALCIUM 40 MG/1
40 TABLET, FILM COATED ORAL NIGHTLY
Qty: 90 TABLET | Refills: 1 | Status: SHIPPED | OUTPATIENT
Start: 2022-08-10 | End: 2023-02-20 | Stop reason: SDUPTHER

## 2022-08-10 NOTE — PROGRESS NOTES
Subjective   Yi Garcia is a 66 y.o. female.     History of Present Illness     Diabetes Mellitus Type II, Follow-up:   Yi Garcia is a 66 y.o. female who is here for follow-up of Type 2 diabetes mellitus.  Current symptoms/problems include none and have been stable. Patient is adherent with medications.  Known diabetic complications: retinopathy  Cardiovascular risk factors: diabetes mellitus, dyslipidemia, hypertension and obesity (BMI >= 30 kg/m2)  Current diabetic medications include lantus 55 units, metformin 500 BID and januvia 100.   Current monitoring regimen: home blood tests - daily  Home blood sugar records: fasting range: 120-140  Any episodes of hypoglycemia? no  Eye exam current (within one year): yes  She is on ACE inhibitor or angiotensin II receptor blocker. Patient is on a statin.       Yi Garcia  is here for follow-up of hypertension of several years duration. She is not exercising and is not adherent to a low-salt diet. Patient does not check her blood pressure.   She is compliant with meds.        Yi Garcia returns today for follow up of Hyperlipidemia  Yi indicates her exercise level as irregularly.  Diet: trying to eat better  Patient is compliant with medications   Any side effects to medications:   chest pain No myalgia No memory change No  Pt is due for labs        The following portions of the patient's history were reviewed and updated as appropriate: allergies, current medications, past family history, past medical history, past social history, past surgical history and problem list.    Review of Systems   Constitutional: Negative.    Psychiatric/Behavioral: Negative.        Objective   Physical Exam  Vitals and nursing note reviewed.   Constitutional:       General: She is not in acute distress.     Appearance: Normal appearance. She is well-developed.   Cardiovascular:      Rate and Rhythm: Normal rate and regular rhythm.      Heart sounds: Normal heart  sounds.   Pulmonary:      Effort: Pulmonary effort is normal.      Breath sounds: Normal breath sounds.   Neurological:      Mental Status: She is alert and oriented to person, place, and time.   Psychiatric:         Mood and Affect: Mood normal.         Behavior: Behavior normal.         Thought Content: Thought content normal.         Judgment: Judgment normal.         Assessment & Plan   Diagnoses and all orders for this visit:    1. Controlled type 2 diabetes mellitus with stable proliferative retinopathy of both eyes, with long-term current use of insulin (HCC) (Primary)  -     Insulin Glargine (LANTUS SOLOSTAR) 100 UNIT/ML injection pen; Inject 60 Units under the skin into the appropriate area as directed Daily.  Dispense: 30 pen; Refill: 1  -     SITagliptin-metFORMIN HCl ER (Janumet XR) 100-1000 MG tablet; Take 1 tablet by mouth Daily.  Dispense: 90 tablet; Refill: 1  -     Comprehensive Metabolic Panel  -     Hemoglobin A1c    2. Essential hypertension  -     CBC & Differential  -     Comprehensive Metabolic Panel  -     losartan (COZAAR) 100 MG tablet; Take 1 tablet by mouth Daily.  Dispense: 90 tablet; Refill: 1    3. Mixed hyperlipidemia  -     Comprehensive Metabolic Panel  -     Lipid Panel  -     atorvastatin (Lipitor) 40 MG tablet; Take 1 tablet by mouth Every Night.  Dispense: 90 tablet; Refill: 1    her glucose numbers have improved at home, she is currently on 55 units lantus and the januvia and metformin.  Will combine this to one pill and discussed further titration of her insulin.  recheck labs today but expect even better numbers in 3 months!  BP stable on cozaar, will continue this  Refilled lipitor and lipids ordered

## 2022-08-22 RX ORDER — GLIPIZIDE 5 MG/1
5 TABLET ORAL
Qty: 60 TABLET | Refills: 3 | Status: SHIPPED | OUTPATIENT
Start: 2022-08-22 | End: 2022-11-18

## 2022-09-02 DIAGNOSIS — K21.9 GASTROESOPHAGEAL REFLUX DISEASE WITHOUT ESOPHAGITIS: ICD-10-CM

## 2022-09-02 RX ORDER — EZETIMIBE 10 MG/1
TABLET ORAL
Qty: 90 TABLET | Refills: 3 | Status: SHIPPED | OUTPATIENT
Start: 2022-09-02

## 2022-09-02 RX ORDER — OMEPRAZOLE 40 MG/1
CAPSULE, DELAYED RELEASE ORAL
Qty: 90 CAPSULE | Refills: 3 | OUTPATIENT
Start: 2022-09-02

## 2022-09-16 NOTE — TELEPHONE ENCOUNTER
1/26/17    Pharmacy sent a fax requesting to change over to a 90-day supply.    Rx was re-sent to pharmacy.   16-Sep-2022 02:28

## 2022-10-07 DIAGNOSIS — K21.9 GASTROESOPHAGEAL REFLUX DISEASE WITHOUT ESOPHAGITIS: ICD-10-CM

## 2022-10-07 NOTE — TELEPHONE ENCOUNTER
Caller: Radha Yi JYOTI    Relationship: Self    Best call back number: [phone]    Requested Prescriptions:   Requested Prescriptions     Pending Prescriptions Disp Refills   • omeprazole (priLOSEC) 40 MG capsule 90 capsule 3     Sig: Take 1 capsule by mouth Daily.        Pharmacy where request should be sent: [facility] - [address] [phone] [facility] [phone]      Additional details provided by patient: THE PATIENTS REPORTS SHE IS OUT OF  MEDICATION AND REQUESTING 90 DAY SUPPLY WITH ADDITIONAL REFILLS     Does the patient have less than a 3 day supply:  [x] Yes  [] No    Venancio Murray Rep   10/07/22 15:10 EDT

## 2022-10-11 RX ORDER — OMEPRAZOLE 40 MG/1
40 CAPSULE, DELAYED RELEASE ORAL DAILY
Qty: 90 CAPSULE | Refills: 0 | Status: SHIPPED | OUTPATIENT
Start: 2022-10-11 | End: 2022-10-13

## 2022-10-13 ENCOUNTER — OFFICE VISIT (OUTPATIENT)
Dept: CARDIOLOGY | Facility: CLINIC | Age: 67
End: 2022-10-13

## 2022-10-13 VITALS
WEIGHT: 184 LBS | HEART RATE: 92 BPM | SYSTOLIC BLOOD PRESSURE: 138 MMHG | DIASTOLIC BLOOD PRESSURE: 68 MMHG | HEIGHT: 62 IN | BODY MASS INDEX: 33.86 KG/M2 | OXYGEN SATURATION: 94 %

## 2022-10-13 DIAGNOSIS — R42 POSTURAL DIZZINESS WITH PRESYNCOPE: Primary | ICD-10-CM

## 2022-10-13 DIAGNOSIS — E78.2 MIXED HYPERLIPIDEMIA: ICD-10-CM

## 2022-10-13 DIAGNOSIS — R55 POSTURAL DIZZINESS WITH PRESYNCOPE: Primary | ICD-10-CM

## 2022-10-13 DIAGNOSIS — E11.65 TYPE 2 DIABETES MELLITUS WITH HYPERGLYCEMIA, WITHOUT LONG-TERM CURRENT USE OF INSULIN: ICD-10-CM

## 2022-10-13 DIAGNOSIS — I10 PRIMARY HYPERTENSION: ICD-10-CM

## 2022-10-13 PROCEDURE — 99203 OFFICE O/P NEW LOW 30 MIN: CPT | Performed by: INTERNAL MEDICINE

## 2022-10-13 PROCEDURE — 93000 ELECTROCARDIOGRAM COMPLETE: CPT | Performed by: INTERNAL MEDICINE

## 2022-10-13 NOTE — PROGRESS NOTES
[facility] Cardiology  Consultation H&P  Yi L Garcia  1955  [address] [place] [address] [place] [address]     VISIT DATE:  10/13/22    PCP: Davin Augustin  [address] [place] [address]    IDENTIFICATION: A 67 y.o. 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 Dial      CC:  Chief Complaint   Patient presents with   • Dizziness     Consult       Allergies  Allergies   Allergen Reactions   • Ace Inhibitors Cough   • Amlodipine Diarrhea   • Beta Adrenergic Blockers Rash   • Cyclobenzaprine Rash   • Hydrochlorothiazide Rash   • Hyzaar [Losartan Potassium-Hctz] Rash   • Jardiance [Empagliflozin] Rash   • Latex Rash   • Losartan Rash   • Penicillins Rash   • Pioglitazone Rash   • Spironolactone Rash       Current Medications    Current Outpatient Medications:   •  atorvastatin (Lipitor) 40 MG tablet, Take 1 tablet by mouth Every Night., Disp: 90 tablet, Rfl: 1  •  bethanechol (URECHOLINE) 50 MG tablet, Take 1 tablet by mouth 2 (Two) Times a Day., Disp: , Rfl:   •  ciclopirox (LOPROX) 0.77 % gel, , Disp: , Rfl:   •  cyclobenzaprine (FLEXERIL) 10 MG tablet, Take 10 mg by mouth 3 (Three) Times a Day As Needed for Muscle Spasms., Disp: , Rfl:   •  docusate sodium (COLACE) 100 MG capsule, Take 100 mg by mouth 2 (Two) Times a Day., Disp: , Rfl:   •  estradiol (ESTRACE VAGINAL) 0.1 MG/GM vaginal cream, Insert 2 g into the vagina 1 (One) Time Per Week., Disp: 42.5 g, Rfl: 5  •  ezetimibe (ZETIA) 10 MG tablet, TAKE 1 TABLET DAILY, Disp: 90 tablet, Rfl: 3  •  furosemide (LASIX) 20 MG tablet, Take 1 tablet by mouth As Needed., Disp: , Rfl:   •  glipizide (Glucotrol) 5 MG tablet, Take 1 tablet by  mouth 2 (Two) Times a Day Before Meals. (Patient taking differently: Take 1 tablet by mouth Daily.), Disp: 60 tablet, Rfl: 3  •  Insulin Glargine (LANTUS SOLOSTAR) 100 UNIT/ML injection pen, Inject 60 Units under the skin into the appropriate area as directed Daily., Disp: 30 pen, Rfl: 1  •  Insulin Lispro, 1 Unit Dial, (HumaLOG KwikPen) 100 UNIT/ML solution pen-injector, Take 16 units before meals, correction 2:50 >150, MDD 60U, Disp: 54 mL, Rfl: 1  •  Insulin Pen Needle 32G X 4 MM misc, Use daily with insulin up to 6 times per day, Disp: 600 each, Rfl: 3  •  losartan (COZAAR) 100 MG tablet, Take 1 tablet by mouth Daily., Disp: 90 tablet, Rfl: 1  •  meloxicam (MOBIC) 7.5 MG tablet, Take 1 tablet by mouth Daily., Disp: 90 tablet, Rfl: 1  •  metoclopramide (REGLAN) 5 MG tablet, Take 5 mg by mouth 3 (Three) Times a Day., Disp: , Rfl:   •  NIFEdipine CC (ADALAT CC) 60 MG 24 hr tablet, , Disp: , Rfl:   •  rOPINIRole (REQUIP) 1 MG tablet, TAKE 1 TABLET EVERY NIGHT, 60 MINUTES BEFORE BEDTIME, Disp: 90 tablet, Rfl: 3  •  SITagliptin-metFORMIN HCl ER (Janumet XR) 100-1000 MG tablet, Take 1 tablet by mouth Daily., Disp: 90 tablet, Rfl: 1  •  topiramate (TOPAMAX) 50 MG tablet, Take 1 tablet by mouth Daily As Needed (migraine)., Disp: 90 tablet, Rfl: 3  •  traMADol (ULTRAM) 50 MG tablet, Take 1 tablet by mouth Every 8 (Eight) Hours As Needed for Moderate Pain ., Disp: 90 tablet, Rfl: 2  •  traZODone (DESYREL) 50 MG tablet, Take 1 to 2 tabs po hs prn, Disp: 180 tablet, Rfl: 3     History of Present Illness   YUNIER Garcia is a 67 y.o. year old female with the above mentioned PMH who presents for consult from Davin Augustin MD for evaluation of cardiac disease.  She had been followed in [place] with cardiology until this year.  She states that she has had chronic dizzy spells that may have been worsened this year.  She has adjusted medications to help control her diabetes but still not ideal.  She has recently obtained  "a Dexcom monitor with tighter observation of her blood sugar levels.  Been followed in [place] for diabetic gastroparesis and has been on Reglan for some time.  She is noticing some \" tremor\" has discussed with her most recent gastroenterologist about discontinuing the medication  She does activities as tolerated states her back and hip issues are primary limitation.  Her  does most of their housework she states        ROS  Review of Systems   Constitutional: Negative for chills, fever, malaise/fatigue, night sweats, weight gain and weight loss.   HENT: Negative for hearing loss and nosebleeds.    Eyes: Negative for blurred vision, vision loss in left eye, vision loss in right eye, visual disturbance and visual halos.   Cardiovascular: Positive for dyspnea on exertion. Negative for chest pain, claudication, cyanosis, irregular heartbeat, leg swelling, near-syncope, orthopnea, palpitations, paroxysmal nocturnal dyspnea and syncope.   Respiratory: Negative for cough, hemoptysis, shortness of breath, snoring and wheezing.    Endocrine: Negative for cold intolerance, heat intolerance, polydipsia, polyphagia and polyuria.   Hematologic/Lymphatic: Negative for adenopathy and bleeding problem. Does not bruise/bleed easily.   Skin: Negative for dry skin, poor wound healing and rash.   Musculoskeletal: Negative for falls, joint pain, joint swelling, muscle cramps, muscle weakness, myalgias and neck pain.   Gastrointestinal: Negative for bloating, abdominal pain, change in bowel habit, bowel incontinence, constipation, diarrhea, dysphagia, excessive appetite, heartburn, hematemesis, hematochezia, jaundice, melena, nausea and vomiting.   Genitourinary: Negative for bladder incontinence, dysuria, flank pain, hematuria, hesitancy and nocturia.   Neurological: Positive for light-headedness. Negative for aphonia, excessive daytime sleepiness, dizziness, focal weakness, headaches, loss of balance, seizures, sensory " "change, tremors, vertigo and weakness.   Psychiatric/Behavioral: Negative for altered mental status, depression, memory loss, substance abuse and suicidal ideas. The patient is not nervous/anxious.        SOCIAL HX  Social History     Socioeconomic History   • Marital status:    Tobacco Use   • Smoking status: Never   • Smokeless tobacco: Never   Vaping Use   • Vaping Use: Never used   Substance and Sexual Activity   • Alcohol use: No   • Drug use: No   • Sexual activity: Yes     Partners: Male     Comment:        FAMILY HX  Family History   Problem Relation Age of Onset   • Kidney disease Mother    • Hypertension Mother    • Peripheral vascular disease Mother    • Ulcers Mother    • Kidney failure Mother    • Lung disease Father    • Kidney failure Maternal Grandmother    • Diabetes Other         type 2   • Colon cancer Neg Hx    • Breast cancer Neg Hx    • Ovarian cancer Neg Hx        Vitals:    10/13/22 0929   BP: 138/68   BP Location: Left arm   Patient Position: Sitting   Pulse: 92   SpO2: 94%   Weight: 83.5 kg (184 lb)   Height: 157.5 cm (62\")     Body mass index is 33.65 kg/m².     PHYSICAL EXAMINATION:  Constitutional:       Appearance: Healthy appearance. Not in distress.   Neck:      Vascular: No JVR. JVD normal.   Pulmonary:      Effort: Pulmonary effort is normal.      Breath sounds: Normal breath sounds. No wheezing. No rhonchi. No rales.   Chest:      Chest wall: Not tender to palpatation.   Cardiovascular:      PMI at left midclavicular line. Normal rate. Regular rhythm. Normal S1. Normal S2.      Murmurs: There is no murmur.      No gallop. No click. No rub.   Pulses:     Intact distal pulses.   Edema:     Peripheral edema absent.   Abdominal:      General: Bowel sounds are normal.      Palpations: Abdomen is soft.      Tenderness: There is no abdominal tenderness.   Musculoskeletal: Normal range of motion.         General: No tenderness. Skin:     General: Skin is warm and dry. "   Neurological:      General: No focal deficit present.      Mental Status: Alert and oriented to person, place and time.         Diagnostic Data:    ECG 12 Lead    Date/Time: 10/13/2022 9:58 AM  Performed by: Rory Bautista MD  Authorized by: Rory Bautista MD   Rhythm: sinus rhythm  BPM: 92    Clinical impression: non-specific ECG             Lab Results   Component Value Date    CHLPL 197 08/10/2022    TRIG 169 (H) 08/10/2022    HDL 50 08/10/2022     Lab Results   Component Value Date    GLUCOSE 145 (H) 08/10/2022    BUN 15 08/10/2022    CREATININE 0.72 08/10/2022     08/10/2022    K 4.6 08/10/2022     08/10/2022    CO2 25.9 08/10/2022     Lab Results   Component Value Date    HGBA1C 7.80 (H) 08/10/2022     Lab Results   Component Value Date    WBC 7.50 08/10/2022    HGB 15.0 08/10/2022    HCT 46.5 08/10/2022     08/10/2022       ASSESSMENT:   Diagnosis Plan   1. Postural dizziness with presyncope        2. Primary hypertension        3. Mixed hyperlipidemia        4. Type 2 diabetes mellitus with hyperglycemia, without long-term current use of insulin (HCC)            PLAN:  Postural dizziness in the setting of uncontrolled diabetes and longstanding diabetic complications including gastroparesis.  Appears to be an element of autonomic neuropathy including gastroparesis and chronic constipation- on concomitant trazodone and Reglan have recommended attempt to consolidate medications.  I have provided her a [facility] handout regarding autonomic neuropathy and exercises to assist with such    Hypertension controlled currently losartan.  Recommended utmost attempts to wean and discontinue nonsteroidal anti-inflammatory    Mixed dyslipidemia controlled on statin therapy    Diabetes poorly controlled with recent transition of medication and addition of Dexcom device        Davin Augustin MD, thank you for referring Ms. Garcia for evaluation.  I have forwarded my electronically generated  recommendations to you for review.  Please do not hesitate to call with any questions.      Rory Bautista MD, FACC

## 2022-10-24 DIAGNOSIS — M51.26 HERNIATED LUMBAR INTERVERTEBRAL DISC: ICD-10-CM

## 2022-10-24 DIAGNOSIS — M96.1 POSTLAMINECTOMY SYNDROME OF LUMBAR REGION: ICD-10-CM

## 2022-11-01 RX ORDER — TRAMADOL HYDROCHLORIDE 50 MG/1
TABLET ORAL
Qty: 90 TABLET | Refills: 2 | Status: SHIPPED | OUTPATIENT
Start: 2022-11-01 | End: 2023-01-19 | Stop reason: SDUPTHER

## 2022-11-18 ENCOUNTER — OFFICE VISIT (OUTPATIENT)
Dept: FAMILY MEDICINE CLINIC | Facility: CLINIC | Age: 67
End: 2022-11-18

## 2022-11-18 VITALS
BODY MASS INDEX: 33.86 KG/M2 | DIASTOLIC BLOOD PRESSURE: 70 MMHG | RESPIRATION RATE: 18 BRPM | HEIGHT: 62 IN | WEIGHT: 184 LBS | HEART RATE: 110 BPM | TEMPERATURE: 97.5 F | SYSTOLIC BLOOD PRESSURE: 140 MMHG

## 2022-11-18 DIAGNOSIS — Z79.4 CONTROLLED TYPE 2 DIABETES MELLITUS WITH STABLE PROLIFERATIVE RETINOPATHY OF BOTH EYES, WITH LONG-TERM CURRENT USE OF INSULIN: ICD-10-CM

## 2022-11-18 DIAGNOSIS — E11.3553 CONTROLLED TYPE 2 DIABETES MELLITUS WITH STABLE PROLIFERATIVE RETINOPATHY OF BOTH EYES, WITH LONG-TERM CURRENT USE OF INSULIN: ICD-10-CM

## 2022-11-18 DIAGNOSIS — E78.2 MIXED HYPERLIPIDEMIA: ICD-10-CM

## 2022-11-18 DIAGNOSIS — Z00.00 MEDICARE ANNUAL WELLNESS VISIT, SUBSEQUENT: Primary | ICD-10-CM

## 2022-11-18 PROCEDURE — G0439 PPPS, SUBSEQ VISIT: HCPCS | Performed by: FAMILY MEDICINE

## 2022-11-18 PROCEDURE — 1159F MED LIST DOCD IN RCRD: CPT | Performed by: FAMILY MEDICINE

## 2022-11-18 PROCEDURE — 99397 PER PM REEVAL EST PAT 65+ YR: CPT | Performed by: FAMILY MEDICINE

## 2022-11-18 PROCEDURE — 1170F FXNL STATUS ASSESSED: CPT | Performed by: FAMILY MEDICINE

## 2022-11-18 RX ORDER — SITAGLIPTIN AND METFORMIN HYDROCHLORIDE 1000; 100 MG/1; MG/1
1 TABLET, FILM COATED, EXTENDED RELEASE ORAL DAILY
Qty: 90 TABLET | Refills: 1 | Status: SHIPPED | OUTPATIENT
Start: 2022-11-18 | End: 2023-02-20 | Stop reason: SDUPTHER

## 2022-11-18 NOTE — PROGRESS NOTES
The ABCs of the Annual Wellness Visit  Subsequent Medicare Wellness Visit    Chief Complaint   Patient presents with   • Medicare Wellness-subsequent   • Diabetes      Subjective    History of Present Illness:  Yi Garcia is a 67 y.o. female who presents for a Subsequent Medicare Wellness Visit.    The following portions of the patient's history were reviewed and   updated as appropriate: allergies, current medications, past family history, past medical history, past social history, past surgical history and problem list.      Diabetes Mellitus Type II, Follow-up:   Yi Garcia is a 67 y.o. female who is here for follow-up of Type 2 diabetes mellitus.  Current symptoms/problems include none and have been stable. Patient is adherent with medications.  Known diabetic complications: none  Cardiovascular risk factors: diabetes mellitus, dyslipidemia, hypertension and obesity (BMI >= 30 kg/m2)  Current diabetic medications include lantus 60 units and janumet.   Current monitoring regimen: home blood tests - multiple times daily  Home blood sugar records: fasting range: doing better  Any episodes of hypoglycemia? no  Eye exam current (within one year): yes  She is on ACE inhibitor or angiotensin II receptor blocker. Patient is on a statin.         Compared to one year ago, the patient feels her physical   health is better.    Compared to one year ago, the patient feels her mental   health is better.    Recent Hospitalizations:  She was not admitted to the hospital during the last year.       Current Medical Providers:  Patient Care Team:  Davin Augustin MD as PCP - General (Family Medicine)  Rafael Velasquez MD as Consulting Physician (Pain Medicine)  Von Croft MD as Consulting Physician (Neurosurgery)  Emmie Stallworth MD as Consulting Physician (Internal Medicine)  Gigi Rojo PA-C as Physician Assistant (Physician Assistant)  Zaira Briggs APRN as Nurse Practitioner  (Gastroenterology)  Wero Hernandez PA-C as Physician Assistant (Family Medicine)  Maurilio Simmons MD as Consulting Physician (Gastroenterology)  Rory Bautista MD as Consulting Physician (Cardiology)    Outpatient Medications Prior to Visit   Medication Sig Dispense Refill   • atorvastatin (Lipitor) 40 MG tablet Take 1 tablet by mouth Every Night. 90 tablet 1   • bethanechol (URECHOLINE) 50 MG tablet Take 1 tablet by mouth 2 (Two) Times a Day.     • docusate sodium (COLACE) 100 MG capsule Take 100 mg by mouth 2 (Two) Times a Day.     • estradiol (ESTRACE VAGINAL) 0.1 MG/GM vaginal cream Insert 2 g into the vagina 1 (One) Time Per Week. 42.5 g 5   • ezetimibe (ZETIA) 10 MG tablet TAKE 1 TABLET DAILY 90 tablet 3   • furosemide (LASIX) 20 MG tablet Take 1 tablet by mouth As Needed.     • Insulin Lispro, 1 Unit Dial, (HumaLOG KwikPen) 100 UNIT/ML solution pen-injector Take 16 units before meals, correction 2:50 >150, MDD 60U 54 mL 1   • Insulin Pen Needle 32G X 4 MM misc Use daily with insulin up to 6 times per day 600 each 3   • losartan (COZAAR) 100 MG tablet Take 1 tablet by mouth Daily. 90 tablet 1   • meloxicam (MOBIC) 7.5 MG tablet Take 1 tablet by mouth Daily. 90 tablet 1   • metoclopramide (REGLAN) 5 MG tablet Take 5 mg by mouth 3 (Three) Times a Day.     • NIFEdipine CC (ADALAT CC) 60 MG 24 hr tablet      • rOPINIRole (REQUIP) 1 MG tablet TAKE 1 TABLET EVERY NIGHT, 60 MINUTES BEFORE BEDTIME 90 tablet 3   • topiramate (TOPAMAX) 50 MG tablet Take 1 tablet by mouth Daily As Needed (migraine). 90 tablet 3   • traMADol (ULTRAM) 50 MG tablet TAKE 1 TABLET EVERY 8 HOURS AS NEEDED FOR MODERATE PAIN 90 tablet 2   • traZODone (DESYREL) 50 MG tablet Take 1 to 2 tabs po hs prn 180 tablet 3   • ciclopirox (LOPROX) 0.77 % gel      • cyclobenzaprine (FLEXERIL) 10 MG tablet Take 10 mg by mouth 3 (Three) Times a Day As Needed for Muscle Spasms.     • glipizide (Glucotrol) 5 MG tablet Take 1 tablet by mouth 2 (Two)  Times a Day Before Meals. (Patient taking differently: Take 1 tablet by mouth Daily.) 60 tablet 3   • Insulin Glargine (LANTUS SOLOSTAR) 100 UNIT/ML injection pen Inject 60 Units under the skin into the appropriate area as directed Daily. 30 pen 1   • SITagliptin-metFORMIN HCl ER (Janumet XR) 100-1000 MG tablet Take 1 tablet by mouth Daily. 90 tablet 1     No facility-administered medications prior to visit.       Opioid medication/s are on active medication list.  and I have evaluated her active treatment plan and pain score trends (see table).  There were no vitals filed for this visit.  I have reviewed the chart for potential of high risk medication and harmful drug interactions in the elderly.                Patient Active Problem List   Diagnosis   • Anxiety and depression   • Fatigue   • Mixed hyperlipidemia   • Herniated lumbar intervertebral disc   • Vaginal atrophy   • Postlaminectomy syndrome of lumbar region   • Meningeal adhesions/lumbar arachnoiditis   • Lumbar facet arthropathy/lumbar spondylosis without myelopathy   • Sacroiliac joint dysfunction of left side   • Physical deconditioning   • Insomnia   • History of migraine headaches   • Essential hypertension   • Myofascial pain syndrome   • Controlled type 2 diabetes mellitus with stable proliferative retinopathy of both eyes, with long-term current use of insulin (HCC)   • Elevated liver enzymes   • Steatosis of liver   • Obstructive sleep apnea, adult   • Encounter for fitting and adjustment of neuropacemaker of spinal cord   • Migration of spinal cord stimulator (HCC)   • Battery end of life of spinal cord stimulator   • Mild nonproliferative diabetic retinopathy of both eyes without macular edema associated with type 2 diabetes mellitus (HCC)     Advance Care Planning  Advance Directive is not on file.  ACP discussion was held with the patient during this visit. Patient has an advance directive (not in EMR), copy requested.    Review of Systems  "  Constitutional: Negative.    Respiratory: Negative.    Cardiovascular: Negative.    Psychiatric/Behavioral: Negative.         Objective    Vitals:    11/18/22 1046   BP: 140/70   Pulse: 110   Resp: 18   Temp: 97.5 °F (36.4 °C)   Weight: 83.5 kg (184 lb)   Height: 157.5 cm (62\")     Estimated body mass index is 33.65 kg/m² as calculated from the following:    Height as of this encounter: 157.5 cm (62\").    Weight as of this encounter: 83.5 kg (184 lb).    BMI is >= 30 and <35. (Class 1 Obesity). The following options were offered after discussion;: exercise counseling/recommendations and nutrition counseling/recommendations      Does the patient have evidence of cognitive impairment? No    Physical Exam  Vitals and nursing note reviewed.   Constitutional:       General: She is not in acute distress.     Appearance: Normal appearance. She is well-developed.   HENT:      Head: Normocephalic and atraumatic.   Eyes:      Extraocular Movements: Extraocular movements intact.      Conjunctiva/sclera: Conjunctivae normal.   Cardiovascular:      Rate and Rhythm: Normal rate and regular rhythm.      Heart sounds: Normal heart sounds.   Pulmonary:      Effort: Pulmonary effort is normal.      Breath sounds: Normal breath sounds.   Neurological:      Mental Status: She is alert and oriented to person, place, and time.   Psychiatric:         Mood and Affect: Mood normal.         Behavior: Behavior normal.         Thought Content: Thought content normal.         Judgment: Judgment normal.                 HEALTH RISK ASSESSMENT    Smoking Status:  Social History     Tobacco Use   Smoking Status Never   Smokeless Tobacco Never     Alcohol Consumption:  Social History     Substance and Sexual Activity   Alcohol Use No     Fall Risk Screen:    STEADI Fall Risk Assessment was completed, and patient is at LOW risk for falls.Assessment completed on:11/18/2022    Depression Screening:  PHQ-2/PHQ-9 Depression Screening 11/18/2022 "   Retired PHQ-9 Total Score -   Retired Total Score -   Little Interest or Pleasure in Doing Things 0-->not at all   Feeling Down, Depressed or Hopeless 0-->not at all   PHQ-9: Brief Depression Severity Measure Score 0       Health Habits and Functional and Cognitive Screening:  Functional & Cognitive Status 11/18/2022   Do you have difficulty preparing food and eating? No   Do you have difficulty bathing yourself, getting dressed or grooming yourself? No   Do you have difficulty using the toilet? No   Do you have difficulty moving around from place to place? No   Do you have trouble with steps or getting out of a bed or a chair? No   Current Diet Well Balanced Diet   Dental Exam Up to date   Eye Exam Up to date   Exercise (times per week) 0 times per week   Current Exercises Include No Regular Exercise   Current Exercise Activities Include -   Do you need help using the phone?  No   Are you deaf or do you have serious difficulty hearing?  No   Do you need help with transportation? No   Do you need help shopping? No   Do you need help preparing meals?  No   Do you need help with housework?  No   Do you need help with laundry? No   Do you need help taking your medications? No   Do you need help managing money? No   Do you ever drive or ride in a car without wearing a seat belt? No   Have you felt unusual stress, anger or loneliness in the last month? No   Who do you live with? Spouse   If you need help, do you have trouble finding someone available to you? No   Have you been bothered in the last four weeks by sexual problems? No   Do you have difficulty concentrating, remembering or making decisions? No       Age-appropriate Screening Schedule:  Refer to the list below for future screening recommendations based on patient's age, sex and/or medical conditions. Orders for these recommended tests are listed in the plan section. The patient has been provided with a written plan.    Health Maintenance   Topic Date Due   •  DXA SCAN  Never done   • ZOSTER VACCINE (1 of 2) Never done   • INFLUENZA VACCINE  08/01/2022   • URINE MICROALBUMIN  01/24/2023   • HEMOGLOBIN A1C  02/10/2023   • DIABETIC EYE EXAM  03/24/2023   • DIABETIC FOOT EXAM  04/26/2023   • MAMMOGRAM  06/29/2023   • LIPID PANEL  08/10/2023   • TDAP/TD VACCINES (2 - Td or Tdap) 02/05/2026   • PAP SMEAR  Discontinued              Assessment & Plan   CMS Preventative Services Quick Reference  Risk Factors Identified During Encounter  Chronic Pain   Depression/Dysphoria  Fall Risk-High or Moderate  Inactivity/Sedentary  Obesity/Overweight   The above risks/problems have been discussed with the patient.  Follow up actions/plans if indicated are seen below in the Assessment/Plan Section.  Pertinent information has been shared with the patient in the After Visit Summary.    Diagnoses and all orders for this visit:    1. Medicare annual wellness visit, subsequent (Primary)    2. Controlled type 2 diabetes mellitus with stable proliferative retinopathy of both eyes, with long-term current use of insulin (HCC)  -     Insulin Glargine (LANTUS SOLOSTAR) 100 UNIT/ML injection pen; Inject 60 Units under the skin into the appropriate area as directed Daily. Disp quant suff  Dispense: 30 mL; Refill: 5  -     SITagliptin-metFORMIN HCl ER (Janumet XR) 100-1000 MG tablet; Take 1 tablet by mouth Daily.  Dispense: 90 tablet; Refill: 1  -     CBC & Differential  -     Comprehensive Metabolic Panel  -     Hemoglobin A1c    3. Mixed hyperlipidemia  -     Comprehensive Metabolic Panel  -     Lipid Panel    medicare wellness completed and pt advised of well woman care including diet and exercise.  She will work on this.    Will recheck DM and f/u pending labs.  Glipizide cuased her to have too many low blood glucose levels so this was stopped.  Recheck lipids today and continue statin.      Follow Up:   Return in about 3 months (around 2/18/2023).     An After Visit Summary and PPPS were made  available to the patient.

## 2022-11-19 LAB
ALBUMIN SERPL-MCNC: 4.1 G/DL (ref 3.5–5.2)
ALBUMIN/GLOB SERPL: 1.3 G/DL
ALP SERPL-CCNC: 139 U/L (ref 39–117)
ALT SERPL-CCNC: 53 U/L (ref 1–33)
AST SERPL-CCNC: 31 U/L (ref 1–32)
BASOPHILS # BLD AUTO: 0.03 10*3/MM3 (ref 0–0.2)
BASOPHILS NFR BLD AUTO: 0.6 % (ref 0–1.5)
BILIRUB SERPL-MCNC: 0.5 MG/DL (ref 0–1.2)
BUN SERPL-MCNC: 14 MG/DL (ref 8–23)
BUN/CREAT SERPL: 20.3 (ref 7–25)
CALCIUM SERPL-MCNC: 10.2 MG/DL (ref 8.6–10.5)
CHLORIDE SERPL-SCNC: 103 MMOL/L (ref 98–107)
CHOLEST SERPL-MCNC: 173 MG/DL (ref 0–200)
CO2 SERPL-SCNC: 29.6 MMOL/L (ref 22–29)
CREAT SERPL-MCNC: 0.69 MG/DL (ref 0.57–1)
EGFRCR SERPLBLD CKD-EPI 2021: 96.5 ML/MIN/1.73
EOSINOPHIL # BLD AUTO: 0.15 10*3/MM3 (ref 0–0.4)
EOSINOPHIL NFR BLD AUTO: 3 % (ref 0.3–6.2)
ERYTHROCYTE [DISTWIDTH] IN BLOOD BY AUTOMATED COUNT: 13 % (ref 12.3–15.4)
GLOBULIN SER CALC-MCNC: 3.1 GM/DL
GLUCOSE SERPL-MCNC: 112 MG/DL (ref 65–99)
HBA1C MFR BLD: 6.9 % (ref 4.8–5.6)
HCT VFR BLD AUTO: 43.3 % (ref 34–46.6)
HDLC SERPL-MCNC: 42 MG/DL (ref 40–60)
HGB BLD-MCNC: 14.4 G/DL (ref 12–15.9)
IMM GRANULOCYTES # BLD AUTO: 0.02 10*3/MM3 (ref 0–0.05)
IMM GRANULOCYTES NFR BLD AUTO: 0.4 % (ref 0–0.5)
LDLC SERPL CALC-MCNC: 107 MG/DL (ref 0–100)
LYMPHOCYTES # BLD AUTO: 1.52 10*3/MM3 (ref 0.7–3.1)
LYMPHOCYTES NFR BLD AUTO: 30.2 % (ref 19.6–45.3)
MCH RBC QN AUTO: 29.5 PG (ref 26.6–33)
MCHC RBC AUTO-ENTMCNC: 33.3 G/DL (ref 31.5–35.7)
MCV RBC AUTO: 88.7 FL (ref 79–97)
MONOCYTES # BLD AUTO: 0.43 10*3/MM3 (ref 0.1–0.9)
MONOCYTES NFR BLD AUTO: 8.5 % (ref 5–12)
NEUTROPHILS # BLD AUTO: 2.88 10*3/MM3 (ref 1.7–7)
NEUTROPHILS NFR BLD AUTO: 57.3 % (ref 42.7–76)
NRBC BLD AUTO-RTO: 0 /100 WBC (ref 0–0.2)
PLATELET # BLD AUTO: 226 10*3/MM3 (ref 140–450)
POTASSIUM SERPL-SCNC: 4.1 MMOL/L (ref 3.5–5.2)
PROT SERPL-MCNC: 7.2 G/DL (ref 6–8.5)
RBC # BLD AUTO: 4.88 10*6/MM3 (ref 3.77–5.28)
SODIUM SERPL-SCNC: 144 MMOL/L (ref 136–145)
TRIGL SERPL-MCNC: 137 MG/DL (ref 0–150)
VLDLC SERPL CALC-MCNC: 24 MG/DL (ref 5–40)
WBC # BLD AUTO: 5.03 10*3/MM3 (ref 3.4–10.8)

## 2022-12-13 DIAGNOSIS — M51.26 HERNIATED LUMBAR INTERVERTEBRAL DISC: ICD-10-CM

## 2022-12-13 DIAGNOSIS — M96.1 POSTLAMINECTOMY SYNDROME OF LUMBAR REGION: ICD-10-CM

## 2022-12-13 RX ORDER — MELOXICAM 7.5 MG/1
TABLET ORAL
Qty: 90 TABLET | Refills: 0 | Status: SHIPPED | OUTPATIENT
Start: 2022-12-13 | End: 2023-02-20 | Stop reason: SDUPTHER

## 2022-12-20 RX ORDER — OMEPRAZOLE 40 MG/1
40 CAPSULE, DELAYED RELEASE ORAL DAILY
COMMUNITY
End: 2022-12-20 | Stop reason: SDUPTHER

## 2022-12-20 NOTE — TELEPHONE ENCOUNTER
Caller: Yi Garcia JYOTI    Relationship: Self    Best call back number: [phone]    Requested Prescriptions:   Requested Prescriptions     Pending Prescriptions Disp Refills   • omeprazole (priLOSEC) 40 MG capsule       Sig: Take 1 capsule by mouth Daily.        Pharmacy where request should be sent: [facility] - [address] [phone] [facility] [phone] FX     Additional details provided by patient: PLEASE REFILL   Does the patient have less than a 3 day supply:  [] Yes  [x] No    Would you like a call back once the refill request has been completed: [] Yes [x] No    If the office needs to give you a call back, can they leave a voicemail: [] Yes [x] No    Venancio Looney Rep   12/20/22 15:46 EST

## 2022-12-21 RX ORDER — OMEPRAZOLE 40 MG/1
40 CAPSULE, DELAYED RELEASE ORAL DAILY
Qty: 90 CAPSULE | Refills: 0 | Status: SHIPPED | OUTPATIENT
Start: 2022-12-21

## 2023-01-19 DIAGNOSIS — F51.01 PRIMARY INSOMNIA: ICD-10-CM

## 2023-01-19 DIAGNOSIS — M96.1 POSTLAMINECTOMY SYNDROME OF LUMBAR REGION: ICD-10-CM

## 2023-01-19 DIAGNOSIS — M51.26 HERNIATED LUMBAR INTERVERTEBRAL DISC: ICD-10-CM

## 2023-01-19 NOTE — TELEPHONE ENCOUNTER
Caller: Yi Garcia JYOTI    Relationship: Self    Best call back number: [phone]    Requested Prescriptions:   Requested Prescriptions     Pending Prescriptions Disp Refills   • traMADol (ULTRAM) 50 MG tablet 90 tablet 2     Sig: Take 1 tablet by mouth Every 8 (Eight) Hours As Needed for Moderate Pain.   • traZODone (DESYREL) 50 MG tablet 180 tablet 3     Sig: Take 1 to 2 tabs po hs prn        Pharmacy where request should be sent: [facility] - [facility] [address] [address] [phone] [facility] [phone] FX     Additional details provided by patient:     Does the patient have less than a 3 day supply:  [x] Yes  [x] No    Would you like a call back once the refill request has been completed: [x] Yes [] No    If the office needs to give you a call back, can they leave a voicemail: [x] Yes [] No    Venancio Solomon Rep   01/19/23 14:52 EST

## 2023-01-22 RX ORDER — TRAZODONE HYDROCHLORIDE 50 MG/1
TABLET ORAL
Qty: 60 TABLET | Refills: 0 | Status: SHIPPED | OUTPATIENT
Start: 2023-01-22 | End: 2023-02-13

## 2023-01-22 RX ORDER — TRAMADOL HYDROCHLORIDE 50 MG/1
50 TABLET ORAL EVERY 8 HOURS PRN
Qty: 90 TABLET | Refills: 0 | Status: SHIPPED | OUTPATIENT
Start: 2023-01-22 | End: 2023-02-20 | Stop reason: SDUPTHER

## 2023-01-23 ENCOUNTER — HOSPITAL ENCOUNTER (OUTPATIENT)
Age: 68
End: 2023-01-23
Payer: MEDICARE

## 2023-01-23 DIAGNOSIS — R05.9: Primary | ICD-10-CM

## 2023-01-23 DIAGNOSIS — R06.00: ICD-10-CM

## 2023-01-23 PROCEDURE — 71046 X-RAY EXAM CHEST 2 VIEWS: CPT

## 2023-02-02 ENCOUNTER — HOSPITAL ENCOUNTER (OUTPATIENT)
Age: 68
End: 2023-02-02
Payer: MEDICARE

## 2023-02-02 DIAGNOSIS — I11.9: ICD-10-CM

## 2023-02-02 DIAGNOSIS — E11.65: Primary | ICD-10-CM

## 2023-02-02 DIAGNOSIS — E66.9: ICD-10-CM

## 2023-02-02 DIAGNOSIS — R00.2: ICD-10-CM

## 2023-02-02 DIAGNOSIS — Z79.4: ICD-10-CM

## 2023-02-02 LAB
ALBUMIN LEVEL: 3.9 G/DL (ref 3.5–5)
ALBUMIN/GLOB SERPL: 1.4 {RATIO} (ref 1.1–1.8)
ALP ISO SERPL-ACNC: 134 U/L (ref 38–126)
ALT SERPLBLD-CCNC: 92 U/L (ref 12–78)
ANION GAP SERPL CALC-SCNC: 8 MEQ/L (ref 5–15)
AST SERPL QL: 54 U/L (ref 14–36)
BILIRUBIN,TOTAL: 0.7 MG/DL (ref 0.2–1.3)
BUN SERPL-MCNC: 16 MG/DL (ref 7–17)
CALCIUM SPEC-MCNC: 8.9 MG/DL (ref 8.4–10.2)
CHLORIDE SPEC-SCNC: 105 MMOL/L (ref 98–107)
CHOLEST SPEC-SCNC: 216 MG/DL (ref 140–200)
CO2 SERPL-SCNC: 32 MMOL/L (ref 22–30)
CREAT BLD-SCNC: 0.6 MG/DL (ref 0.52–1.04)
ESTIMATED GLOMERULAR FILT RATE: 100 ML/MIN (ref 60–?)
GFR (AFRICAN AMERICAN): 121 ML/MIN (ref 60–?)
GLOBULIN SER CALC-MCNC: 2.7 G/DL (ref 1.3–3.2)
GLUCOSE: 131 MG/DL (ref 74–100)
HBA1C MFR BLD: 8.9 % (ref 4–6)
HCT VFR BLD CALC: 48.1 % (ref 37–47)
HDLC SERPL-MCNC: 48 MG/DL (ref 40–60)
HGB BLD-MCNC: 15.1 G/DL (ref 12.2–16.2)
MCHC RBC-ENTMCNC: 31.4 G/DL (ref 31.8–35.4)
MCV RBC: 91.5 FL (ref 81–99)
MEAN CORPUSCULAR HEMOGLOBIN: 28.8 PG (ref 27–31.2)
PLATELET # BLD: 353 K/MM3 (ref 142–424)
POTASSIUM: 4 MMOL/L (ref 3.5–5.1)
PROT SERPL-MCNC: 6.6 G/DL (ref 6.3–8.2)
RBC # BLD AUTO: 5.26 M/MM3 (ref 4.2–5.4)
SODIUM SPEC-SCNC: 141 MMOL/L (ref 136–145)
TRIGLYCERIDES: 245 MG/DL (ref 30–150)
TSH SERPL-ACNC: 4.15 UIU/ML (ref 0.47–4.68)
WBC # BLD AUTO: 7.8 K/MM3 (ref 4.8–10.8)

## 2023-02-02 PROCEDURE — 83036 HEMOGLOBIN GLYCOSYLATED A1C: CPT

## 2023-02-02 PROCEDURE — 80061 LIPID PANEL: CPT

## 2023-02-02 PROCEDURE — 36415 COLL VENOUS BLD VENIPUNCTURE: CPT

## 2023-02-02 PROCEDURE — 84443 ASSAY THYROID STIM HORMONE: CPT

## 2023-02-02 PROCEDURE — 85025 COMPLETE CBC W/AUTO DIFF WBC: CPT

## 2023-02-02 PROCEDURE — 80053 COMPREHEN METABOLIC PANEL: CPT

## 2023-02-09 ENCOUNTER — TELEPHONE (OUTPATIENT)
Dept: FAMILY MEDICINE CLINIC | Facility: CLINIC | Age: 68
End: 2023-02-09
Payer: MEDICARE

## 2023-02-09 NOTE — TELEPHONE ENCOUNTER
PHONE CALL FROM [facility].  THEY NEED TO KNOW THE LAST 2 A1C'S.      PLEASE CALL [phone]  PHILIPP DE TO LEAVE A MESSAGE WITH NAME AND DATE OF BIRTH

## 2023-02-13 ENCOUNTER — TELEPHONE (OUTPATIENT)
Dept: FAMILY MEDICINE CLINIC | Facility: CLINIC | Age: 68
End: 2023-02-13
Payer: MEDICARE

## 2023-02-13 DIAGNOSIS — F51.01 PRIMARY INSOMNIA: ICD-10-CM

## 2023-02-13 RX ORDER — TRAZODONE HYDROCHLORIDE 50 MG/1
TABLET ORAL
Qty: 60 TABLET | Refills: 0 | Status: SHIPPED | OUTPATIENT
Start: 2023-02-13 | End: 2023-02-20 | Stop reason: SDUPTHER

## 2023-02-13 NOTE — TELEPHONE ENCOUNTER
Caller: PHILIPP    Relationship: Other    Best call back number:[phone]    What was the call regarding: [facility] CALLING ABOUT PATIENT RECENT A1C LEVELS FOR THE DIABETES PROGRAM.     PHILIPP FROM [facility] JUST NEEDS THE LAST 2 A1C LEVELS.     Do you require a callback: YES

## 2023-02-15 ENCOUNTER — TELEPHONE (OUTPATIENT)
Dept: FAMILY MEDICINE CLINIC | Facility: CLINIC | Age: 68
End: 2023-02-15

## 2023-02-15 DIAGNOSIS — Z79.4 CONTROLLED TYPE 2 DIABETES MELLITUS WITH STABLE PROLIFERATIVE RETINOPATHY OF BOTH EYES, WITH LONG-TERM CURRENT USE OF INSULIN: ICD-10-CM

## 2023-02-15 DIAGNOSIS — E11.3553 CONTROLLED TYPE 2 DIABETES MELLITUS WITH STABLE PROLIFERATIVE RETINOPATHY OF BOTH EYES, WITH LONG-TERM CURRENT USE OF INSULIN: ICD-10-CM

## 2023-02-15 RX ORDER — PROCHLORPERAZINE 25 MG/1
SUPPOSITORY RECTAL
Qty: 9 EACH | Refills: 5 | Status: SHIPPED | OUTPATIENT
Start: 2023-02-15 | End: 2023-02-20 | Stop reason: SDUPTHER

## 2023-02-15 NOTE — TELEPHONE ENCOUNTER
Caller: Yi Garcia    Relationship: Self    Best call back number: [phone]    Requested Prescriptions:   Requested Prescriptions     Pending Prescriptions Disp Refills   • Insulin Glargine (LANTUS SOLOSTAR) 100 UNIT/ML injection pen 30 mL 5     Sig: Inject 60 Units under the skin into the appropriate area as directed Daily. Disp quant suff        Pharmacy where request should be sent: [facility] - [facility] [address] [phone] [facility] [phone] FX     Additional details provided by patient: PATIENT IS ASKING FOR DEXCOM SENSOR     Does the patient have less than a 3 day supply:  [x] Yes  [] No    Venancio Marr Rep   02/15/23 10:03 EST

## 2023-02-20 ENCOUNTER — OFFICE VISIT (OUTPATIENT)
Dept: FAMILY MEDICINE CLINIC | Facility: CLINIC | Age: 68
End: 2023-02-20
Payer: MEDICARE

## 2023-02-20 VITALS
OXYGEN SATURATION: 98 % | SYSTOLIC BLOOD PRESSURE: 150 MMHG | BODY MASS INDEX: 33.95 KG/M2 | DIASTOLIC BLOOD PRESSURE: 84 MMHG | TEMPERATURE: 97.1 F | WEIGHT: 185.6 LBS | HEART RATE: 97 BPM

## 2023-02-20 DIAGNOSIS — M51.26 HERNIATED LUMBAR INTERVERTEBRAL DISC: ICD-10-CM

## 2023-02-20 DIAGNOSIS — I10 ESSENTIAL HYPERTENSION: ICD-10-CM

## 2023-02-20 DIAGNOSIS — E11.3553 CONTROLLED TYPE 2 DIABETES MELLITUS WITH STABLE PROLIFERATIVE RETINOPATHY OF BOTH EYES, WITH LONG-TERM CURRENT USE OF INSULIN: Primary | ICD-10-CM

## 2023-02-20 DIAGNOSIS — G25.81 RLS (RESTLESS LEGS SYNDROME): ICD-10-CM

## 2023-02-20 DIAGNOSIS — E78.2 MIXED HYPERLIPIDEMIA: Chronic | ICD-10-CM

## 2023-02-20 DIAGNOSIS — Z79.4 CONTROLLED TYPE 2 DIABETES MELLITUS WITH STABLE PROLIFERATIVE RETINOPATHY OF BOTH EYES, WITH LONG-TERM CURRENT USE OF INSULIN: Primary | ICD-10-CM

## 2023-02-20 DIAGNOSIS — R13.19 ESOPHAGEAL DYSPHAGIA: ICD-10-CM

## 2023-02-20 DIAGNOSIS — G43.709 CHRONIC MIGRAINE WITHOUT AURA WITHOUT STATUS MIGRAINOSUS, NOT INTRACTABLE: ICD-10-CM

## 2023-02-20 DIAGNOSIS — M96.1 POSTLAMINECTOMY SYNDROME OF LUMBAR REGION: ICD-10-CM

## 2023-02-20 DIAGNOSIS — F51.01 PRIMARY INSOMNIA: ICD-10-CM

## 2023-02-20 PROCEDURE — 99214 OFFICE O/P EST MOD 30 MIN: CPT | Performed by: FAMILY MEDICINE

## 2023-02-20 RX ORDER — ROPINIROLE 1 MG/1
1 TABLET, FILM COATED ORAL NIGHTLY
Qty: 90 TABLET | Refills: 1 | Status: SHIPPED | OUTPATIENT
Start: 2023-02-20

## 2023-02-20 RX ORDER — LOSARTAN POTASSIUM 100 MG/1
100 TABLET ORAL DAILY
Qty: 90 TABLET | Refills: 1 | Status: SHIPPED | OUTPATIENT
Start: 2023-02-20

## 2023-02-20 RX ORDER — PROCHLORPERAZINE 25 MG/1
SUPPOSITORY RECTAL
Qty: 9 EACH | Refills: 5 | Status: SHIPPED | OUTPATIENT
Start: 2023-02-20

## 2023-02-20 RX ORDER — TRAZODONE HYDROCHLORIDE 50 MG/1
TABLET ORAL
Qty: 180 TABLET | Refills: 1 | Status: SHIPPED | OUTPATIENT
Start: 2023-02-20

## 2023-02-20 RX ORDER — MELOXICAM 7.5 MG/1
7.5 TABLET ORAL DAILY
Qty: 90 TABLET | Refills: 1 | Status: SHIPPED | OUTPATIENT
Start: 2023-02-20

## 2023-02-20 RX ORDER — TOPIRAMATE 50 MG/1
50 TABLET, FILM COATED ORAL DAILY PRN
Qty: 90 TABLET | Refills: 1 | Status: SHIPPED | OUTPATIENT
Start: 2023-02-20

## 2023-02-20 RX ORDER — SITAGLIPTIN AND METFORMIN HYDROCHLORIDE 1000; 100 MG/1; MG/1
1 TABLET, FILM COATED, EXTENDED RELEASE ORAL DAILY
Qty: 90 TABLET | Refills: 1 | Status: SHIPPED | OUTPATIENT
Start: 2023-02-20

## 2023-02-20 RX ORDER — ATORVASTATIN CALCIUM 40 MG/1
40 TABLET, FILM COATED ORAL NIGHTLY
Qty: 90 TABLET | Refills: 1 | Status: SHIPPED | OUTPATIENT
Start: 2023-02-20

## 2023-02-20 RX ORDER — TRAMADOL HYDROCHLORIDE 50 MG/1
50 TABLET ORAL EVERY 8 HOURS PRN
Qty: 90 TABLET | Refills: 2 | Status: SHIPPED | OUTPATIENT
Start: 2023-02-20

## 2023-02-20 NOTE — PROGRESS NOTES
Subjective   Yi Garcia is a 67 y.o. female.     History of Present Illness     Diabetes Mellitus Type II, Follow-up:   Yi Garcia is a 67 y.o. female who is here for follow-up of Type 2 diabetes mellitus.  Current symptoms/problems include none and have been stable. Patient is adherent with medications.  Known diabetic complications: none  Cardiovascular risk factors: diabetes mellitus, dyslipidemia, hypertension and obesity (BMI >= 30 kg/m2)  Current diabetic medications include lantus and janumet.   Current monitoring regimen: home blood tests - multiple times daily  Home blood sugar records: fasting range: checking with dexcom on regular basis  Any episodes of hypoglycemia? no  Eye exam current (within one year): yes  She is on ACE inhibitor or angiotensin II receptor blocker. Patient is on a statin.       Yi Garcia  is here for follow-up of hypertension of several years duration. She is not exercising and is not adherent to a low-salt diet. Patient does not check her blood pressure.   She is compliant with meds.        Yi Garcia returns today for follow up of Hyperlipidemia  Yi indicates her exercise level as not at all.  Diet: trying to eat better  Patient is compliant with medications   Any side effects to medications:   chest pain No myalgia No memory change No  Pt is due for labs        The following portions of the patient's history were reviewed and updated as appropriate: allergies, current medications, past family history, past medical history, past social history, past surgical history and problem list.    Review of Systems   Constitutional: Negative.    Psychiatric/Behavioral: Negative.        Objective   Physical Exam  Vitals and nursing note reviewed.   Constitutional:       General: She is not in acute distress.     Appearance: Normal appearance. She is well-developed.   Cardiovascular:      Rate and Rhythm: Normal rate and regular rhythm.      Heart sounds: Normal heart  sounds.   Pulmonary:      Effort: Pulmonary effort is normal.      Breath sounds: Normal breath sounds.   Neurological:      Mental Status: She is alert and oriented to person, place, and time.   Psychiatric:         Mood and Affect: Mood normal.         Behavior: Behavior normal.         Thought Content: Thought content normal.         Judgment: Judgment normal.         Assessment & Plan   Diagnoses and all orders for this visit:    1. Controlled type 2 diabetes mellitus with stable proliferative retinopathy of both eyes, with long-term current use of insulin (HCC) (Primary)  -     SITagliptin-metFORMIN HCl ER (Janumet XR) 100-1000 MG tablet; Take 1 tablet by mouth Daily.  Dispense: 90 tablet; Refill: 1  -     Insulin Glargine (LANTUS SOLOSTAR) 100 UNIT/ML injection pen; Inject 60 Units under the skin into the appropriate area as directed Daily. Disp quant suff  Dispense: 30 mL; Refill: 5  -     Continuous Blood Gluc Sensor (Dexcom G6 Sensor); Every 10 (Ten) Days.  Dispense: 9 each; Refill: 5  -     CBC & Differential  -     Comprehensive Metabolic Panel  -     Hemoglobin A1c    2. Mixed hyperlipidemia  -     atorvastatin (Lipitor) 40 MG tablet; Take 1 tablet by mouth Every Night.  Dispense: 90 tablet; Refill: 1  -     Comprehensive Metabolic Panel  -     Lipid Panel    3. Essential hypertension  -     losartan (COZAAR) 100 MG tablet; Take 1 tablet by mouth Daily.  Dispense: 90 tablet; Refill: 1  -     CBC & Differential  -     Comprehensive Metabolic Panel    4. Esophageal dysphagia  -     Ambulatory referral for Screening EGD    5. Chronic migraine without aura without status migrainosus, not intractable  -     topiramate (TOPAMAX) 50 MG tablet; Take 1 tablet by mouth Daily As Needed (migraine).  Dispense: 90 tablet; Refill: 1    6. Primary insomnia  -     traZODone (DESYREL) 50 MG tablet; TAKE 1 TO 2 TABLETS BY MOUTH AT  BEDTIME AS NEEDED  Dispense: 180 tablet; Refill: 1    7. RLS (restless legs syndrome)  -      rOPINIRole (REQUIP) 1 MG tablet; Take 1 tablet by mouth Every Night. Take 1 hour before bedtime.  Dispense: 90 tablet; Refill: 1    8. Postlaminectomy syndrome of lumbar region  -     meloxicam (MOBIC) 7.5 MG tablet; Take 1 tablet by mouth Daily.  Dispense: 90 tablet; Refill: 1    9. Herniated lumbar intervertebral disc  -     meloxicam (MOBIC) 7.5 MG tablet; Take 1 tablet by mouth Daily.  Dispense: 90 tablet; Refill: 1    recheck DM ahnd continue lantus and janumet.  Using dexcom to more closely monitor her glucose levels  BP stable on losartan, no change   Recheck lipids and refilled her statin  Will work on EGD for dysphagia and she will reach out to her GI to discuss gastroparesis and follow up with them  Ok trazodone for sleep and requip for RLS, no changes today

## 2023-02-21 LAB
ALBUMIN SERPL-MCNC: 4.7 G/DL (ref 3.5–5.2)
ALBUMIN/GLOB SERPL: 1.6 G/DL
ALP SERPL-CCNC: 138 U/L (ref 39–117)
ALT SERPL-CCNC: 61 U/L (ref 1–33)
AST SERPL-CCNC: 47 U/L (ref 1–32)
BASOPHILS # BLD AUTO: 0.07 10*3/MM3 (ref 0–0.2)
BASOPHILS NFR BLD AUTO: 1.3 % (ref 0–1.5)
BILIRUB SERPL-MCNC: 0.4 MG/DL (ref 0–1.2)
BUN SERPL-MCNC: 19 MG/DL (ref 8–23)
BUN/CREAT SERPL: 28.8 (ref 7–25)
CALCIUM SERPL-MCNC: 10.3 MG/DL (ref 8.6–10.5)
CHLORIDE SERPL-SCNC: 101 MMOL/L (ref 98–107)
CHOLEST SERPL-MCNC: 226 MG/DL (ref 0–200)
CO2 SERPL-SCNC: 30.4 MMOL/L (ref 22–29)
CREAT SERPL-MCNC: 0.66 MG/DL (ref 0.57–1)
EGFRCR SERPLBLD CKD-EPI 2021: 97.5 ML/MIN/1.73
EOSINOPHIL # BLD AUTO: 0.18 10*3/MM3 (ref 0–0.4)
EOSINOPHIL NFR BLD AUTO: 3.3 % (ref 0.3–6.2)
ERYTHROCYTE [DISTWIDTH] IN BLOOD BY AUTOMATED COUNT: 12.6 % (ref 12.3–15.4)
GLOBULIN SER CALC-MCNC: 2.9 GM/DL
GLUCOSE SERPL-MCNC: 159 MG/DL (ref 65–99)
HBA1C MFR BLD: 7.7 % (ref 4.8–5.6)
HCT VFR BLD AUTO: 44.9 % (ref 34–46.6)
HDLC SERPL-MCNC: 58 MG/DL (ref 40–60)
HGB BLD-MCNC: 14.8 G/DL (ref 12–15.9)
IMM GRANULOCYTES # BLD AUTO: 0.02 10*3/MM3 (ref 0–0.05)
IMM GRANULOCYTES NFR BLD AUTO: 0.4 % (ref 0–0.5)
LDLC SERPL CALC-MCNC: 131 MG/DL (ref 0–100)
LYMPHOCYTES # BLD AUTO: 1.84 10*3/MM3 (ref 0.7–3.1)
LYMPHOCYTES NFR BLD AUTO: 33.9 % (ref 19.6–45.3)
MCH RBC QN AUTO: 29.1 PG (ref 26.6–33)
MCHC RBC AUTO-ENTMCNC: 33 G/DL (ref 31.5–35.7)
MCV RBC AUTO: 88.4 FL (ref 79–97)
MONOCYTES # BLD AUTO: 0.43 10*3/MM3 (ref 0.1–0.9)
MONOCYTES NFR BLD AUTO: 7.9 % (ref 5–12)
NEUTROPHILS # BLD AUTO: 2.88 10*3/MM3 (ref 1.7–7)
NEUTROPHILS NFR BLD AUTO: 53.2 % (ref 42.7–76)
NRBC BLD AUTO-RTO: 0 /100 WBC (ref 0–0.2)
PLATELET # BLD AUTO: 244 10*3/MM3 (ref 140–450)
POTASSIUM SERPL-SCNC: 4.4 MMOL/L (ref 3.5–5.2)
PROT SERPL-MCNC: 7.6 G/DL (ref 6–8.5)
RBC # BLD AUTO: 5.08 10*6/MM3 (ref 3.77–5.28)
SODIUM SERPL-SCNC: 143 MMOL/L (ref 136–145)
TRIGL SERPL-MCNC: 212 MG/DL (ref 0–150)
VLDLC SERPL CALC-MCNC: 37 MG/DL (ref 5–40)
WBC # BLD AUTO: 5.42 10*3/MM3 (ref 3.4–10.8)

## 2023-03-08 ENCOUNTER — HOSPITAL ENCOUNTER (OUTPATIENT)
Age: 68
Discharge: HOME | End: 2023-03-08
Payer: MEDICARE

## 2023-03-08 VITALS
OXYGEN SATURATION: 93 % | HEART RATE: 82 BPM | RESPIRATION RATE: 16 BRPM | DIASTOLIC BLOOD PRESSURE: 84 MMHG | SYSTOLIC BLOOD PRESSURE: 155 MMHG

## 2023-03-08 VITALS
HEART RATE: 96 BPM | RESPIRATION RATE: 14 BRPM | OXYGEN SATURATION: 96 % | SYSTOLIC BLOOD PRESSURE: 161 MMHG | DIASTOLIC BLOOD PRESSURE: 81 MMHG | TEMPERATURE: 97.7 F

## 2023-03-08 VITALS
HEART RATE: 84 BPM | SYSTOLIC BLOOD PRESSURE: 149 MMHG | RESPIRATION RATE: 17 BRPM | OXYGEN SATURATION: 92 % | DIASTOLIC BLOOD PRESSURE: 81 MMHG

## 2023-03-08 VITALS
DIASTOLIC BLOOD PRESSURE: 81 MMHG | RESPIRATION RATE: 17 BRPM | OXYGEN SATURATION: 94 % | SYSTOLIC BLOOD PRESSURE: 139 MMHG | HEART RATE: 83 BPM

## 2023-03-08 VITALS
OXYGEN SATURATION: 95 % | TEMPERATURE: 97.2 F | RESPIRATION RATE: 18 BRPM | HEART RATE: 94 BPM | DIASTOLIC BLOOD PRESSURE: 83 MMHG | SYSTOLIC BLOOD PRESSURE: 173 MMHG

## 2023-03-08 VITALS — BODY MASS INDEX: 34.2 KG/M2

## 2023-03-08 VITALS — OXYGEN SATURATION: 95 %

## 2023-03-08 DIAGNOSIS — K29.70: ICD-10-CM

## 2023-03-08 DIAGNOSIS — K44.9: ICD-10-CM

## 2023-03-08 DIAGNOSIS — R10.13: ICD-10-CM

## 2023-03-08 DIAGNOSIS — E11.9: ICD-10-CM

## 2023-03-08 DIAGNOSIS — Z79.899: ICD-10-CM

## 2023-03-08 DIAGNOSIS — K21.9: Primary | ICD-10-CM

## 2023-03-08 PROCEDURE — 0DJ08ZZ INSPECTION OF UPPER INTESTINAL TRACT, VIA NATURAL OR ARTIFICIAL OPENING ENDOSCOPIC: ICD-10-PCS | Performed by: INTERNAL MEDICINE

## 2023-03-08 PROCEDURE — 88305 TISSUE EXAM BY PATHOLOGIST: CPT

## 2023-03-08 PROCEDURE — 82962 GLUCOSE BLOOD TEST: CPT

## 2023-03-08 PROCEDURE — 43239 EGD BIOPSY SINGLE/MULTIPLE: CPT

## 2023-03-08 PROCEDURE — 43248 EGD GUIDE WIRE INSERTION: CPT

## 2023-03-09 LAB — GLUCOSE BLDC GLUCOMTR-MCNC: 161 MG/DL (ref 70–110)

## 2023-03-13 ENCOUNTER — TELEPHONE (OUTPATIENT)
Dept: FAMILY MEDICINE CLINIC | Facility: CLINIC | Age: 68
End: 2023-03-13
Payer: MEDICARE

## 2023-03-13 DIAGNOSIS — Z79.4 CONTROLLED TYPE 2 DIABETES MELLITUS WITH STABLE PROLIFERATIVE RETINOPATHY OF BOTH EYES, WITH LONG-TERM CURRENT USE OF INSULIN: Primary | ICD-10-CM

## 2023-03-13 DIAGNOSIS — E11.3553 CONTROLLED TYPE 2 DIABETES MELLITUS WITH STABLE PROLIFERATIVE RETINOPATHY OF BOTH EYES, WITH LONG-TERM CURRENT USE OF INSULIN: Primary | ICD-10-CM

## 2023-03-13 RX ORDER — BLOOD-GLUCOSE TRANSMITTER
1 EACH MISCELLANEOUS DAILY
Qty: 1 EACH | Refills: 3 | Status: SHIPPED | OUTPATIENT
Start: 2023-03-13

## 2023-03-13 NOTE — TELEPHONE ENCOUNTER
Caller: Yi Garcia    Relationship: Self    Best call back number: [phone]    Requested Prescriptions:   DEXCOM G6 TRANSMITTER     Pharmacy where request should be sent:    [facility] - [facility] [address] [phone] [facility] [phone] FX      Additional details provided by patient: STATE THAT SHE WILL BE OUT TODAY    Does the patient have less than a 3 day supply:  [x] Yes  [] No    Would you like a call back once the refill request has been completed: [x] Yes [] No    If the office needs to give you a call back, can they leave a voicemail: [x] Yes [] No    Venancio Sheridan Rep   03/13/23 10:42 EDT

## 2023-03-16 DIAGNOSIS — E11.65 UNCONTROLLED TYPE 2 DIABETES MELLITUS WITH HYPERGLYCEMIA: Chronic | ICD-10-CM

## 2023-03-16 RX ORDER — INSULIN LISPRO 100 [IU]/ML
INJECTION, SOLUTION INTRAVENOUS; SUBCUTANEOUS
Qty: 45 ML | Refills: 3 | OUTPATIENT
Start: 2023-03-16

## 2023-03-21 ENCOUNTER — HOSPITAL ENCOUNTER (OUTPATIENT)
Age: 68
End: 2023-03-21
Payer: MEDICARE

## 2023-03-21 DIAGNOSIS — R79.89: Primary | ICD-10-CM

## 2023-03-21 DIAGNOSIS — K76.0: ICD-10-CM

## 2023-03-21 PROCEDURE — 76705 ECHO EXAM OF ABDOMEN: CPT

## 2023-05-01 NOTE — PROGRESS NOTES
Chief Complaint   Patient presents with   • Urinary Tract Infection       History of Present Illness  64 y.o.female presents for UTI.  The patient describes dysuria milky bloody urine onset since back surgery jan 24, not improving with home care. Had pagan cath; Cath out this morning. Has not voided yet. Some right flank pain. No fever. The patient continues to hydrate well.  Did urine at [facility] thru home health    Review of Systems   Constitutional: Negative for chills, fatigue and fever.   Respiratory: Negative for shortness of breath.    Cardiovascular: Negative for chest pain.   Gastrointestinal: Positive for abdominal pain. Negative for constipation, diarrhea, nausea and vomiting.   Genitourinary: Positive for dysuria, frequency and urgency. Negative for difficulty urinating, flank pain and hematuria.   Musculoskeletal: Negative for myalgias.     [facility]  The following portions of the patient's history were reviewed and updated as appropriate: allergies, current medications, past family history, past medical history, past social history, past surgical history and problem list.     Past Medical History:   Diagnosis Date   • Anxiety    • Benign essential hypertension    • Cervical disc disorder    • Chronic pain disorder    • Colon polyps    • Complication of device    • Decreased libido    • Diabetes mellitus (CMS/[facility])    • Dizziness    • Encounter for long-term (current) use of medications    • Extremity pain    • Fatigue    • Hip pain    • History of alopecia    • History of backache    • History of colonoscopy     Fiberoptic   • History of diabetes mellitus    • History of insomnia    • History of mastoiditis    • History of migraine headaches    • History of urinary stone    • Infection of kidney    • Insomnia    • Joint pain    • Kidney infection    • Low back pain    • Lumbar radiculopathy    • Lumbar radiculopathy    • Lumbosacral disc disease    • Migraine    • Myofascial pain syndrome    • Neck  pain    • Palpitations    • Sleep apnea    • Spinal cord stimulator status    • Stress reaction, emotional    • Urinary incontinence    • Urinary tract infection    • Visit for screening mammogram     Description: 08/28/2009   • Vitamin D deficiency       Past Surgical History:   Procedure Laterality Date   • BLADDER SURGERY     • BREAST EXCISIONAL BIOPSY Left     Benign 2009   • COLONOSCOPY  2019    Complete Colonoscopy   • ELBOW ARTHROPLASTY Left    • HYSTERECTOMY      Total Abdominal Hysterectomy (Description: BSO)   • LUMBAR DISCECTOMY  01/28/2013    Lt- L4/5 discectomy redo Lt- L5/S1 foraminotomy -Dr. Von Croft   • LUMBAR DISCECTOMY  03/18/2013    Re-do Lt- L4/5 discectomy Dr. Von Croft    • OOPHORECTOMY     • OTHER SURGICAL HISTORY      Spinal Sterotaxis Stimulation Of Cord   • SPINAL CORD STIMULATOR IMPLANT  08/11/201410/01/2015    placement 2014, replacement 2015. Dr. Von Croft    • TUBAL ABDOMINAL LIGATION        Allergies   Allergen Reactions   • Ace Inhibitors Cough   • Amlodipine Diarrhea   • Beta Adrenergic Blockers Rash   • Cyclobenzaprine Rash   • Hydrochlorothiazide Rash   • Hyzaar [Losartan Potassium-Hctz] Rash   • Latex Rash   • Losartan Rash   • Metformin Rash   • Penicillins Rash   • Pioglitazone Rash   • Spironolactone Rash      Social History     Socioeconomic History   • Marital status:      Spouse name: Not on file   • Number of children: Not on file   • Years of education: Not on file   • Highest education level: Not on file   Tobacco Use   • Smoking status: Never Smoker   • Smokeless tobacco: Never Used   Substance and Sexual Activity   • Alcohol use: No   • Drug use: No   • Sexual activity: Defer     Family History   Problem Relation Age of Onset   • Hypertension Mother    • Peripheral vascular disease Mother    • Ulcers Mother    • Kidney failure Mother    • Lung disease Father    • Diabetes Other         type 2   • Kidney failure Maternal Grandmother              Current Outpatient Medications:   •  albuterol sulfate  (90 Base) MCG/ACT inhaler, Inhale 2 puffs Every 6 (Six) Hours As Needed for Wheezing or Shortness of Air., Disp: 1 inhaler, Rfl: 11  •  bisoprolol (ZEBeta) 5 MG tablet, Take 5 mg by mouth Daily., Disp: , Rfl:   •  Desvenlafaxine Succinate ER (PRISTIQ) 25 MG tablet sustained-release 24 hour, Take 1 tablet by mouth Daily., Disp: 30 tablet, Rfl: 5  •  Empagliflozin (JARDIANCE) 25 MG tablet, Take 25 mg by mouth Daily., Disp: 90 tablet, Rfl: 1  •  estradiol (ESTRACE VAGINAL) 0.1 MG/GM vaginal cream, Insert 2 g into the vagina 1 (One) Time Per Week., Disp: 42.5 g, Rfl: 5  •  furosemide (LASIX) 20 MG tablet, Take 20 mg by mouth 2 (Two) Times a Day., Disp: , Rfl:   •  glimepiride (AMARYL) 4 MG tablet, Take 1 po qd before a meal, Disp: 90 tablet, Rfl: 1  •  Liniments (SALONPAS) pads, Apply  topically. prn, Disp: , Rfl:   •  losartan (COZAAR) 100 MG tablet, Take 100 mg by mouth Daily., Disp: , Rfl:   •  lubiprostone (AMITIZA) 24 MCG capsule, Take 1 capsule by mouth 2 (Two) Times a Day With Meals., Disp: 180 capsule, Rfl: 1  •  NIFEdipine CC (ADALAT CC) 90 MG 24 hr tablet, Take 60 mg by mouth Daily., Disp: , Rfl:   •  omeprazole (priLOSEC) 40 MG capsule, Take 1 capsule by mouth Daily., Disp: 90 capsule, Rfl: 3  •  rOPINIRole (REQUIP) 1 MG tablet, Take 1 tablet by mouth Every Night. Take 1 hour before bedtime., Disp: 90 tablet, Rfl: 3  •  SITagliptin (JANUVIA) 100 MG tablet, Take 1 tablet by mouth Daily., Disp: 90 tablet, Rfl: 1  •  topiramate (TOPAMAX) 50 MG tablet, TAKE ONE TABLET BY MOUTH TWICE DAILY (Patient taking differently: TAKE ONE TABLET BY MOUTH as needed), Disp: 180 tablet, Rfl: 0  •  traMADol (ULTRAM) 50 MG tablet, TAKE 1 TABLET PO TID PRN FOR SEVERE BREAKTHROUGH PAIN, Disp: 270 tablet, Rfl: 0  •  traZODone (DESYREL) 50 MG tablet, Take 1 to 2 tabs po hs prn, Disp: 180 tablet, Rfl: 3    VITALS:  /70   Pulse 78   Temp 98.5 °F (36.9 °C)    "Ht 154.9 cm (61\")   Wt 83.9 kg (185 lb)   LMP  (LMP Unknown)   SpO2 96%   BMI 34.96 kg/m²     Physical Exam   Constitutional: She appears well-developed and well-nourished. No distress.   HENT:   Mouth/Throat: Oropharynx is clear and moist.   Pulmonary/Chest: Effort normal.   Neurological: She is alert.   Skin: Skin is warm and dry.   Vitals reviewed.      LABS  Reviewed UA and culture 2-5-20  25 leuks  2+ bact  > 100,000 gram negative rods  Pending final    ASSESSMENT/PLAN  Yi was seen today for urinary tract infection.    Diagnoses and all orders for this visit:    Acute cystitis with hematuria  -     Discontinue: sulfamethoxazole-trimethoprim (BACTRIM DS,SEPTRA DS) 800-160 MG per tablet; Take 1 tablet by mouth 2 (Two) Times a Day for 7 days.  -     nitrofurantoin, macrocrystal-monohydrate, (MACROBID) 100 MG capsule; Take 1 capsule by mouth Every 12 (Twelve) Hours for 7 days.      Drink plenty water.  If has bladder spasms can try OTC AZO; if uses AZO does turn urine orange.  Complete all medication as instructed.  Will follow up on urine culture and notify patient if antibiotic prescribed does not cover identified bacteria. If patient has worsening of symptoms or no improvement of fever >101.5 or farzad flank pain, pt should notify our office for reeval or seek emergency care.    ADDENDUM: received urine culture results with sensitivity. Not sensitive to bactrim.  Changed to macrobid.  I called pt and updated tx plan.    I discussed the patients findings and my recommendations with patient.  Patient was encouraged to keep me informed of any acute changes, lack of improvement, or any new concerning symptoms.  Patient voiced understanding of all instructions and denied further questions.      FOLLOW-UP  Return if symptoms worsen or fail to improve.    Electronically signed by:    OCTAVIANO Tovar  02/07/2020    EMR Dragon/Transcription Disclaimer:  Much of this encounter note is an electronic " Will cont to monitor. transcription/translation of spoken language to printed text.  The electronic translation of spoken language may permit erroneous, or at times, nonsensical words or phrases to be inadvertently transcribed.  Although I have reviewed the note for such errors, some may still exist     MD made aware. MD states will come to bedside Will cont to monitor.

## 2023-05-01 NOTE — TELEPHONE ENCOUNTER
Caller: Yi Garcia JYOTI    Relationship: Self    Best call back number: [phone]    Requested Prescriptions:   Requested Prescriptions     Pending Prescriptions Disp Refills   • omeprazole (priLOSEC) 40 MG capsule 90 capsule 0     Sig: Take 1 capsule by mouth Daily.   • NIFEdipine CC (ADALAT CC) 60 MG 24 hr tablet          Pharmacy where request should be sent: [facility] - [facility] [address] [phone] [facility] [phone] FX     Last office visit with prescribing clinician: 2/20/2023   Last telemedicine visit with prescribing clinician: 5/22/2023   Next office visit with prescribing clinician: 5/22/2023     Additional details provided by patient: OUT OF MEDICATION     Does the patient have less than a 3 day supply:  [x] Yes  [] No    Would you like a call back once the refill request has been completed: [] Yes [x] No    If the office needs to give you a call back, can they leave a voicemail: [] Yes [x] No    Venancio Looney Rep   05/01/23 12:14 EDT

## 2023-05-02 RX ORDER — OMEPRAZOLE 40 MG/1
40 CAPSULE, DELAYED RELEASE ORAL DAILY
Qty: 90 CAPSULE | Refills: 0 | Status: SHIPPED | OUTPATIENT
Start: 2023-05-02

## 2023-05-02 RX ORDER — NIFEDIPINE 60 MG/1
60 TABLET, FILM COATED, EXTENDED RELEASE ORAL DAILY
Qty: 30 TABLET | Refills: 1 | Status: SHIPPED | OUTPATIENT
Start: 2023-05-02

## 2023-05-15 ENCOUNTER — TELEPHONE (OUTPATIENT)
Dept: FAMILY MEDICINE CLINIC | Facility: CLINIC | Age: 68
End: 2023-05-15
Payer: MEDICARE

## 2023-05-15 NOTE — TELEPHONE ENCOUNTER
Caller: PHILIPP - [facility]    Relationship: Other    Best call back number: [phone]    What form or medical record are you requesting: A1C LAB NOTES    Who is requesting this form or medical record from you: PHILIPP - [facility] INSURANCE    How would you like to receive the form or medical records (pick-up, mail, fax):     PLEASE FAX.    ATTN: [employer]  FAX NUMBER: [phone]    Timeframe paperwork needed: ASAP    Additional notes:     PHILIPP IS REQUESTING ANY RECENT A1C LAB RESULTS FROM FEB 2023 UP CURRENT DAY.

## 2023-05-25 NOTE — TELEPHONE ENCOUNTER
Evidence of esophagitis on CT  No noted history    Plan:  PPI  Patient does have appointment with outpatient GI for issue with chronic diarrhea, can follow-up bout this esophagitis at that time PATIENT STATES HER JANUVIA RX WILL COST MORE THAN $200 PER MONTH AND SHE WOULD LIKE TO KNOW IF LILI CAN ORDER SOMETHING THAT HER INSURANCE WILL PAY FOR. SHE STATES SHE THINKS SHE HAS USED [facility] BEFORE. PLEASE CALL IN NEW RX TO [facility] IN [place]

## 2023-05-26 ENCOUNTER — OFFICE VISIT (OUTPATIENT)
Dept: FAMILY MEDICINE CLINIC | Facility: CLINIC | Age: 68
End: 2023-05-26
Payer: MEDICARE

## 2023-05-26 VITALS
WEIGHT: 185.4 LBS | HEART RATE: 89 BPM | BODY MASS INDEX: 34.12 KG/M2 | HEIGHT: 62 IN | DIASTOLIC BLOOD PRESSURE: 82 MMHG | RESPIRATION RATE: 18 BRPM | SYSTOLIC BLOOD PRESSURE: 142 MMHG | OXYGEN SATURATION: 98 % | TEMPERATURE: 97.8 F

## 2023-05-26 DIAGNOSIS — I10 ESSENTIAL HYPERTENSION: ICD-10-CM

## 2023-05-26 DIAGNOSIS — Z79.4 CONTROLLED TYPE 2 DIABETES MELLITUS WITH STABLE PROLIFERATIVE RETINOPATHY OF BOTH EYES, WITH LONG-TERM CURRENT USE OF INSULIN: Primary | ICD-10-CM

## 2023-05-26 DIAGNOSIS — J40 BRONCHITIS: ICD-10-CM

## 2023-05-26 DIAGNOSIS — E11.3553 CONTROLLED TYPE 2 DIABETES MELLITUS WITH STABLE PROLIFERATIVE RETINOPATHY OF BOTH EYES, WITH LONG-TERM CURRENT USE OF INSULIN: Primary | ICD-10-CM

## 2023-05-26 LAB
EXPIRATION DATE: NORMAL
Lab: NORMAL
POC CREATININE URINE: 200
POC MICROALBUMIN URINE: 80

## 2023-05-26 PROCEDURE — 1160F RVW MEDS BY RX/DR IN RCRD: CPT | Performed by: FAMILY MEDICINE

## 2023-05-26 PROCEDURE — 1159F MED LIST DOCD IN RCRD: CPT | Performed by: FAMILY MEDICINE

## 2023-05-26 PROCEDURE — 3051F HG A1C>EQUAL 7.0%<8.0%: CPT | Performed by: FAMILY MEDICINE

## 2023-05-26 PROCEDURE — 3079F DIAST BP 80-89 MM HG: CPT | Performed by: FAMILY MEDICINE

## 2023-05-26 PROCEDURE — 3077F SYST BP >= 140 MM HG: CPT | Performed by: FAMILY MEDICINE

## 2023-05-26 PROCEDURE — 99214 OFFICE O/P EST MOD 30 MIN: CPT | Performed by: FAMILY MEDICINE

## 2023-05-26 RX ORDER — AZITHROMYCIN 250 MG/1
TABLET, FILM COATED ORAL
Qty: 6 TABLET | Refills: 0 | Status: SHIPPED | OUTPATIENT
Start: 2023-05-26

## 2023-05-26 RX ORDER — SEMAGLUTIDE 1.34 MG/ML
INJECTION, SOLUTION SUBCUTANEOUS
Qty: 3 ML | Refills: 2 | Status: SHIPPED | OUTPATIENT
Start: 2023-05-26

## 2023-05-26 RX ORDER — NALOXEGOL OXALATE 25 MG/1
TABLET, FILM COATED ORAL
COMMUNITY
Start: 2023-04-26

## 2023-05-26 RX ORDER — METFORMIN HYDROCHLORIDE 500 MG/1
1000 TABLET, EXTENDED RELEASE ORAL
Qty: 60 TABLET | Refills: 3 | Status: SHIPPED | OUTPATIENT
Start: 2023-05-26

## 2023-05-26 NOTE — PROGRESS NOTES
Subjective   Yi Garcia is a 67 y.o. female.     History of Present Illness     Diabetes Mellitus Type II, Follow-up:   Yi Garcia is a 67 y.o. female who is here for follow-up of Type 2 diabetes mellitus.  Current symptoms/problems include none and have been stable. Patient is adherent with medications.  Known diabetic complications: none  Cardiovascular risk factors: diabetes mellitus, dyslipidemia, hypertension and obesity (BMI >= 30 kg/m2)  Current diabetic medications include janumet and lantus 68  Current monitoring regimen: home blood tests - multiple times daily  Home blood sugar records: fasting range: doing ok  Any episodes of hypoglycemia? no  Eye exam current (within one year): yes  She is on ACE inhibitor or angiotensin II receptor blocker. Patient is on a statin.       Yi Garcia  is here for follow-up of hypertension of several years duration. She is not exercising and is not adherent to a low-salt diet. Patient does not check her blood pressure.   She} is compliant with meds.        Yi Garcia returns today for follow up of Hyperlipidemia  Yi indicates her exercise level as not at all.  Diet: eating the same  Patient is compliant with medications   Any side effects to medications:   chest pain No myalgia No memory change No  Pt is not due for labs        The following portions of the patient's history were reviewed and updated as appropriate: allergies, current medications, past family history, past medical history, past social history, past surgical history and problem list.    Review of Systems    Objective   Physical Exam  Vitals and nursing note reviewed.   Constitutional:       General: She is not in acute distress.     Appearance: Normal appearance. She is well-developed.   Cardiovascular:      Rate and Rhythm: Normal rate and regular rhythm.      Heart sounds: Normal heart sounds.   Pulmonary:      Effort: Pulmonary effort is normal.      Breath sounds: Rhonchi present.    Neurological:      Mental Status: She is alert and oriented to person, place, and time.   Psychiatric:         Mood and Affect: Mood normal.         Behavior: Behavior normal.         Thought Content: Thought content normal.         Judgment: Judgment normal.         Assessment & Plan   Diagnoses and all orders for this visit:    1. Controlled type 2 diabetes mellitus with stable proliferative retinopathy of both eyes, with long-term current use of insulin (Primary)  -     Semaglutide,0.25 or 0.5MG/DOS, (Ozempic, 0.25 or 0.5 MG/DOSE,) 2 MG/1.5ML solution pen-injector; 0.25mg SQ weekly 4 weeks then 0.5mg SQ weekly  Dispense: 3 mL; Refill: 2  -     metFORMIN ER (GLUCOPHAGE-XR) 500 MG 24 hr tablet; Take 2 tablets by mouth Daily With Breakfast.  Dispense: 60 tablet; Refill: 3  -     CBC & Differential  -     Comprehensive Metabolic Panel  -     Hemoglobin A1c  -     POCT microalbumin    2. Essential hypertension    3. Bronchitis  -     azithromycin (Zithromax) 250 MG tablet; Take 2 tablets the first day, then 1 tablet daily for 4 days.  Dispense: 6 tablet; Refill: 0    stop janumet and use metformin and ozempic in addition to insulin.  Pros/cons/SE discussed with pt and she will watch and call with issues.  Recheck in 3 months  continue losartan for BP.  It is high today but weight loss from ozempic would help this!  Recheck in future  [facility] for lungs.

## 2023-05-27 LAB
ALBUMIN SERPL-MCNC: 4.7 G/DL (ref 3.8–4.8)
ALBUMIN/GLOB SERPL: 1.8 {RATIO} (ref 1.2–2.2)
ALP SERPL-CCNC: 144 IU/L (ref 44–121)
ALT SERPL-CCNC: 73 IU/L (ref 0–32)
AST SERPL-CCNC: 57 IU/L (ref 0–40)
BASOPHILS # BLD AUTO: 0.1 X10E3/UL (ref 0–0.2)
BASOPHILS NFR BLD AUTO: 1 %
BILIRUB SERPL-MCNC: 0.4 MG/DL (ref 0–1.2)
BUN SERPL-MCNC: 18 MG/DL (ref 8–27)
BUN/CREAT SERPL: 31 (ref 12–28)
CALCIUM SERPL-MCNC: 10.1 MG/DL (ref 8.7–10.3)
CHLORIDE SERPL-SCNC: 98 MMOL/L (ref 96–106)
CO2 SERPL-SCNC: 28 MMOL/L (ref 20–29)
CREAT SERPL-MCNC: 0.59 MG/DL (ref 0.57–1)
EGFRCR SERPLBLD CKD-EPI 2021: 100 ML/MIN/1.73
EOSINOPHIL # BLD AUTO: 0.2 X10E3/UL (ref 0–0.4)
EOSINOPHIL NFR BLD AUTO: 2 %
ERYTHROCYTE [DISTWIDTH] IN BLOOD BY AUTOMATED COUNT: 12.7 % (ref 11.7–15.4)
GLOBULIN SER CALC-MCNC: 2.6 G/DL (ref 1.5–4.5)
GLUCOSE SERPL-MCNC: 143 MG/DL (ref 70–99)
HBA1C MFR BLD: 8 % (ref 4.8–5.6)
HCT VFR BLD AUTO: 45.2 % (ref 34–46.6)
HGB BLD-MCNC: 15.3 G/DL (ref 11.1–15.9)
IMM GRANULOCYTES # BLD AUTO: 0 X10E3/UL (ref 0–0.1)
IMM GRANULOCYTES NFR BLD AUTO: 0 %
LYMPHOCYTES # BLD AUTO: 1.9 X10E3/UL (ref 0.7–3.1)
LYMPHOCYTES NFR BLD AUTO: 27 %
MCH RBC QN AUTO: 28.9 PG (ref 26.6–33)
MCHC RBC AUTO-ENTMCNC: 33.8 G/DL (ref 31.5–35.7)
MCV RBC AUTO: 85 FL (ref 79–97)
MONOCYTES # BLD AUTO: 0.5 X10E3/UL (ref 0.1–0.9)
MONOCYTES NFR BLD AUTO: 6 %
NEUTROPHILS # BLD AUTO: 4.4 X10E3/UL (ref 1.4–7)
NEUTROPHILS NFR BLD AUTO: 64 %
PLATELET # BLD AUTO: 275 X10E3/UL (ref 150–450)
POTASSIUM SERPL-SCNC: 4.2 MMOL/L (ref 3.5–5.2)
PROT SERPL-MCNC: 7.3 G/DL (ref 6–8.5)
RBC # BLD AUTO: 5.3 X10E6/UL (ref 3.77–5.28)
SODIUM SERPL-SCNC: 139 MMOL/L (ref 134–144)
WBC # BLD AUTO: 7 X10E3/UL (ref 3.4–10.8)

## 2023-06-12 DIAGNOSIS — E11.3553 CONTROLLED TYPE 2 DIABETES MELLITUS WITH STABLE PROLIFERATIVE RETINOPATHY OF BOTH EYES, WITH LONG-TERM CURRENT USE OF INSULIN: ICD-10-CM

## 2023-06-12 DIAGNOSIS — Z79.4 CONTROLLED TYPE 2 DIABETES MELLITUS WITH STABLE PROLIFERATIVE RETINOPATHY OF BOTH EYES, WITH LONG-TERM CURRENT USE OF INSULIN: ICD-10-CM

## 2023-06-12 RX ORDER — SEMAGLUTIDE 1.34 MG/ML
INJECTION, SOLUTION SUBCUTANEOUS
Qty: 3 ML | Refills: 2 | OUTPATIENT
Start: 2023-06-12

## 2023-06-12 NOTE — TELEPHONE ENCOUNTER
Caller: Radha Yi JYOTI    Relationship: Self    Best call back number: [phone]    Requested Prescriptions:   Requested Prescriptions     Pending Prescriptions Disp Refills    Semaglutide,0.25 or 0.5MG/DOS, (Ozempic, 0.25 or 0.5 MG/DOSE,) 2 MG/1.5ML solution pen-injector 3 mL 2     Si.25mg SQ weekly 4 weeks then 0.5mg SQ weekly        Pharmacy where request should be sent:  [facility] - [place], [place] - [address] [facility] [phone] [facility] [phone] FX     Last office visit with prescribing clinician: 2023   Last telemedicine visit with prescribing clinician: Visit date not found   Next office visit with prescribing clinician: 2023     Additional details provided by patient:     Does the patient have less than a 3 day supply:  [x] Yes  [] No    Would you like a call back once the refill request has been completed: [] Yes [x] No    If the office needs to give you a call back, can they leave a voicemail: [] Yes [x] No    Venancio Solomon   23 11:57 EDT

## 2023-06-19 ENCOUNTER — TELEPHONE (OUTPATIENT)
Dept: FAMILY MEDICINE CLINIC | Facility: CLINIC | Age: 68
End: 2023-06-19
Payer: MEDICARE

## 2023-06-19 DIAGNOSIS — E11.3553 CONTROLLED TYPE 2 DIABETES MELLITUS WITH STABLE PROLIFERATIVE RETINOPATHY OF BOTH EYES, WITH LONG-TERM CURRENT USE OF INSULIN: ICD-10-CM

## 2023-06-19 DIAGNOSIS — Z79.4 CONTROLLED TYPE 2 DIABETES MELLITUS WITH STABLE PROLIFERATIVE RETINOPATHY OF BOTH EYES, WITH LONG-TERM CURRENT USE OF INSULIN: ICD-10-CM

## 2023-06-19 RX ORDER — NIFEDIPINE 60 MG/1
60 TABLET, FILM COATED, EXTENDED RELEASE ORAL DAILY
Qty: 90 TABLET | Refills: 3 | Status: SHIPPED | OUTPATIENT
Start: 2023-06-19

## 2023-06-19 RX ORDER — SEMAGLUTIDE 1.34 MG/ML
INJECTION, SOLUTION SUBCUTANEOUS
Qty: 3 ML | Refills: 3 | Status: SHIPPED | OUTPATIENT
Start: 2023-06-19

## 2023-06-19 RX ORDER — METFORMIN HYDROCHLORIDE 500 MG/1
1000 TABLET, EXTENDED RELEASE ORAL
Qty: 180 TABLET | Refills: 3 | Status: SHIPPED | OUTPATIENT
Start: 2023-06-19

## 2023-06-19 RX ORDER — NIFEDIPINE 60 MG/1
60 TABLET, FILM COATED, EXTENDED RELEASE ORAL DAILY
Qty: 60 TABLET | Refills: 1 | Status: SHIPPED | OUTPATIENT
Start: 2023-06-19 | End: 2023-06-19 | Stop reason: SDUPTHER

## 2023-06-19 NOTE — TELEPHONE ENCOUNTER
I am not sure how I can help. Not sure where would have this. Can see if current supplier can make suggestions of where we can try

## 2023-06-19 NOTE — TELEPHONE ENCOUNTER
Dexcom transmitter was send to [facility] in march w/ 3 refills. Patient advised to call [facility] to have them refill script. Needing Metformin, Ozempic, and Nifedipine sent to [facility] w/ 90 day supply.

## 2023-06-19 NOTE — TELEPHONE ENCOUNTER
Caller: Yi Garcia    Relationship to patient: Self    Best call back number: [phone]     Patient is needing: PATIENT STATES SHE RECEIVED A NOTIFICATION THAT SHE NEEDED TO ORDER A NEW TRANSMITTER FOR HER DEXCOM 6 WITHIN 8 DAYS. PATIENT STATES SHE CALLED THE SUPPLIER SHE IS USED TO ORDERING FROM BUT THEY TOLD HER THEY NO LONGER SUPPLIED THE TRANSMITTER.     PATIENT IS REQUESTING TO KNOW WHERE TO ORDER A TRANSMITTER FROM.     PLEASE CALL PATIENT BACK, IF NO ANSWER LEAVE A DETAILED MESSAGE.

## 2023-07-19 ENCOUNTER — TELEPHONE (OUTPATIENT)
Dept: FAMILY MEDICINE CLINIC | Facility: CLINIC | Age: 68
End: 2023-07-19
Payer: MEDICARE

## 2023-07-19 NOTE — TELEPHONE ENCOUNTER
Caller: STEVEN WITH [facility]    Relationship:     Best call back number: [phone]    What medications are you currently taking:   Current Outpatient Medications on File Prior to Visit   Medication Sig Dispense Refill    atorvastatin (Lipitor) 40 MG tablet Take 1 tablet by mouth Every Night. 90 tablet 1    azithromycin (Zithromax) 250 MG tablet Take 2 tablets the first day, then 1 tablet daily for 4 days. 6 tablet 0    bethanechol (URECHOLINE) 50 MG tablet Take 1 tablet by mouth 2 (Two) Times a Day.      Continuous Blood Gluc Sensor (Dexcom G6 Sensor) Every 10 (Ten) Days. 9 each 5    Continuous Blood Gluc Transmit (Dexcom G5 Mobile Transmitter) misc 1 each Daily. 1 each 3    docusate sodium (COLACE) 100 MG capsule Take 1 capsule by mouth 2 (Two) Times a Day.      estradiol (ESTRACE VAGINAL) 0.1 MG/GM vaginal cream Insert 2 g into the vagina 1 (One) Time Per Week. 42.5 g 5    ezetimibe (ZETIA) 10 MG tablet TAKE 1 TABLET DAILY 90 tablet 3    furosemide (LASIX) 20 MG tablet Take 1 tablet by mouth As Needed.      Insulin Glargine (LANTUS SOLOSTAR) 100 UNIT/ML injection pen Inject 60 Units under the skin into the appropriate area as directed Daily. Disp quant suff 30 mL 5    Insulin Lispro, 1 Unit Dial, (HumaLOG KwikPen) 100 UNIT/ML solution pen-injector Take 16 units before meals, correction 2:50 >150, MDD 60U 54 mL 1    Insulin Pen Needle 32G X 4 MM misc Use daily with insulin up to 6 times per day 600 each 3    losartan (COZAAR) 100 MG tablet Take 1 tablet by mouth Daily. 90 tablet 1    meloxicam (MOBIC) 7.5 MG tablet Take 1 tablet by mouth Daily. 90 tablet 1    metFORMIN ER (GLUCOPHAGE-XR) 500 MG 24 hr tablet Take 2 tablets by mouth Daily With Breakfast. 180 tablet 3    metoclopramide (REGLAN) 5 MG tablet Take 1 tablet by mouth 3 (Three) Times a Day.      Movantik 25 MG tablet       NIFEdipine CC (ADALAT CC) 60 MG 24 hr tablet Take 1 tablet by mouth Daily. 90 tablet 3    omeprazole (priLOSEC) 40 MG capsule TAKE  1 CAPSULE BY MOUTH DAILY 90 capsule 3    rOPINIRole (REQUIP) 1 MG tablet Take 1 tablet by mouth Every Night. Take 1 hour before bedtime. 90 tablet 1    Semaglutide,0.25 or 0.5MG/DOS, (Ozempic, 0.25 or 0.5 MG/DOSE,) 2 MG/1.5ML solution pen-injector 0.25mg SQ weekly 4 weeks then 0.5mg SQ weekly 3 mL 3    topiramate (TOPAMAX) 50 MG tablet Take 1 tablet by mouth Daily As Needed (migraine). 90 tablet 1    traMADol (ULTRAM) 50 MG tablet Take 1 tablet by mouth Every 8 (Eight) Hours As Needed for Moderate Pain. 90 tablet 2    traZODone (DESYREL) 50 MG tablet TAKE 1 TO 2 TABLETS BY MOUTH AT  BEDTIME AS NEEDED 180 tablet 1     No current facility-administered medications on file prior to visit.        What are your concerns: CALLED TO REQUEST CLINICAL NOTES, DIAGNOSIS FOR RX  Insulin Pen Needle 32G X 4 MM mis

## 2023-07-25 NOTE — TELEPHONE ENCOUNTER
Notes with DX code faxed to [facility] [phone] which is the number provided when I called the kamilaber that was in the message

## 2023-07-27 DIAGNOSIS — M51.26 HERNIATED LUMBAR INTERVERTEBRAL DISC: ICD-10-CM

## 2023-07-27 DIAGNOSIS — M96.1 POSTLAMINECTOMY SYNDROME OF LUMBAR REGION: ICD-10-CM

## 2023-08-01 RX ORDER — TRAMADOL HYDROCHLORIDE 50 MG/1
TABLET ORAL
Qty: 90 TABLET | Refills: 0 | Status: SHIPPED | OUTPATIENT
Start: 2023-08-01

## 2023-08-22 DIAGNOSIS — M51.26 HERNIATED LUMBAR INTERVERTEBRAL DISC: ICD-10-CM

## 2023-08-22 DIAGNOSIS — G25.81 RLS (RESTLESS LEGS SYNDROME): ICD-10-CM

## 2023-08-22 DIAGNOSIS — M96.1 POSTLAMINECTOMY SYNDROME OF LUMBAR REGION: ICD-10-CM

## 2023-08-22 RX ORDER — ROPINIROLE 1 MG/1
TABLET, FILM COATED ORAL
Qty: 90 TABLET | Refills: 0 | Status: SHIPPED | OUTPATIENT
Start: 2023-08-22

## 2023-08-22 RX ORDER — MELOXICAM 7.5 MG/1
7.5 TABLET ORAL DAILY
Qty: 90 TABLET | Refills: 0 | Status: SHIPPED | OUTPATIENT
Start: 2023-08-22

## 2023-08-28 ENCOUNTER — OFFICE VISIT (OUTPATIENT)
Dept: FAMILY MEDICINE CLINIC | Facility: CLINIC | Age: 68
End: 2023-08-28
Payer: MEDICARE

## 2023-08-28 ENCOUNTER — TRANSCRIBE ORDERS (OUTPATIENT)
Dept: ADMINISTRATIVE | Facility: HOSPITAL | Age: 68
End: 2023-08-28
Payer: MEDICARE

## 2023-08-28 VITALS
WEIGHT: 183.8 LBS | OXYGEN SATURATION: 96 % | BODY MASS INDEX: 33.82 KG/M2 | HEIGHT: 62 IN | SYSTOLIC BLOOD PRESSURE: 150 MMHG | TEMPERATURE: 97.1 F | HEART RATE: 92 BPM | RESPIRATION RATE: 18 BRPM | DIASTOLIC BLOOD PRESSURE: 85 MMHG

## 2023-08-28 DIAGNOSIS — G43.709 CHRONIC MIGRAINE WITHOUT AURA WITHOUT STATUS MIGRAINOSUS, NOT INTRACTABLE: ICD-10-CM

## 2023-08-28 DIAGNOSIS — E11.3553 CONTROLLED TYPE 2 DIABETES MELLITUS WITH STABLE PROLIFERATIVE RETINOPATHY OF BOTH EYES, WITH LONG-TERM CURRENT USE OF INSULIN: ICD-10-CM

## 2023-08-28 DIAGNOSIS — F51.01 PRIMARY INSOMNIA: ICD-10-CM

## 2023-08-28 DIAGNOSIS — Z79.4 CONTROLLED TYPE 2 DIABETES MELLITUS WITH STABLE PROLIFERATIVE RETINOPATHY OF BOTH EYES, WITH LONG-TERM CURRENT USE OF INSULIN: ICD-10-CM

## 2023-08-28 DIAGNOSIS — I10 ESSENTIAL HYPERTENSION: ICD-10-CM

## 2023-08-28 DIAGNOSIS — E78.2 MIXED HYPERLIPIDEMIA: Chronic | ICD-10-CM

## 2023-08-28 DIAGNOSIS — M96.1 POSTLAMINECTOMY SYNDROME OF LUMBAR REGION: ICD-10-CM

## 2023-08-28 DIAGNOSIS — Z12.31 VISIT FOR SCREENING MAMMOGRAM: Primary | ICD-10-CM

## 2023-08-28 DIAGNOSIS — M51.26 HERNIATED LUMBAR INTERVERTEBRAL DISC: ICD-10-CM

## 2023-08-28 PROCEDURE — 99214 OFFICE O/P EST MOD 30 MIN: CPT | Performed by: FAMILY MEDICINE

## 2023-08-28 PROCEDURE — 1160F RVW MEDS BY RX/DR IN RCRD: CPT | Performed by: FAMILY MEDICINE

## 2023-08-28 PROCEDURE — 1159F MED LIST DOCD IN RCRD: CPT | Performed by: FAMILY MEDICINE

## 2023-08-28 PROCEDURE — 3079F DIAST BP 80-89 MM HG: CPT | Performed by: FAMILY MEDICINE

## 2023-08-28 PROCEDURE — 3077F SYST BP >= 140 MM HG: CPT | Performed by: FAMILY MEDICINE

## 2023-08-28 PROCEDURE — 3052F HG A1C>EQUAL 8.0%<EQUAL 9.0%: CPT | Performed by: FAMILY MEDICINE

## 2023-08-28 RX ORDER — TOPIRAMATE 50 MG/1
50 TABLET, FILM COATED ORAL DAILY PRN
Qty: 90 TABLET | Refills: 1 | Status: SHIPPED | OUTPATIENT
Start: 2023-08-28

## 2023-08-28 RX ORDER — SEMAGLUTIDE 1.34 MG/ML
INJECTION, SOLUTION SUBCUTANEOUS
Qty: 3 ML | Refills: 3 | Status: SHIPPED | OUTPATIENT
Start: 2023-08-28

## 2023-08-28 RX ORDER — LOSARTAN POTASSIUM 100 MG/1
100 TABLET ORAL DAILY
Qty: 90 TABLET | Refills: 1 | Status: SHIPPED | OUTPATIENT
Start: 2023-08-28

## 2023-08-28 RX ORDER — TRAZODONE HYDROCHLORIDE 50 MG/1
TABLET ORAL
Qty: 180 TABLET | Refills: 1 | Status: SHIPPED | OUTPATIENT
Start: 2023-08-28

## 2023-08-28 RX ORDER — TRAMADOL HYDROCHLORIDE 50 MG/1
50 TABLET ORAL EVERY 8 HOURS PRN
Qty: 90 TABLET | Refills: 0 | Status: SHIPPED | OUTPATIENT
Start: 2023-08-28

## 2023-08-28 RX ORDER — ATORVASTATIN CALCIUM 40 MG/1
40 TABLET, FILM COATED ORAL NIGHTLY
Qty: 90 TABLET | Refills: 1 | Status: SHIPPED | OUTPATIENT
Start: 2023-08-28

## 2023-08-28 NOTE — PROGRESS NOTES
Subjective   Yi Garcia is a 67 y.o. female.     History of Present Illness     Diabetes Mellitus Type II, Follow-up:   Yi Garcia is a 67 y.o. female who is here for follow-up of Type 2 diabetes mellitus.  Current symptoms/problems include none and have been stable. Patient is adherent with medications.  Known diabetic complications: retinopathy  Cardiovascular risk factors: diabetes mellitus, dyslipidemia, hypertension, and obesity (BMI >= 30 kg/m2)  Current diabetic medications include  ozempic 0.5 , lantus, metformin  She has been eating less  She is on ACE inhibitor or angiotensin II receptor blocker. Patient is on a statin.     She does have constipation  On colace as well as       Yi Garcia  is here for follow-up of hypertension of several years duration. She is not exercising and is not adherent to a low-salt diet. Patient does not check her blood pressure.    She is compliant with meds.        Yi Garcia returns today for follow up of Hyperlipidemia  Yi indicates her exercise level as not at all.  Diet: eaintg less with ozempic  Patient is compliant with medications   Any side effects to medications:   chest pain No myalgia No memory change No  Pt is due for labs      The following portions of the patient's history were reviewed and updated as appropriate: allergies, current medications, past family history, past medical history, past social history, past surgical history, and problem list.    Review of Systems   Constitutional: Negative.    Gastrointestinal:  Positive for constipation.     Objective   Physical Exam  Vitals and nursing note reviewed.   Constitutional:       General: She is not in acute distress.     Appearance: Normal appearance. She is well-developed.   Cardiovascular:      Rate and Rhythm: Normal rate and regular rhythm.      Heart sounds: Normal heart sounds.   Pulmonary:      Effort: Pulmonary effort is normal.      Breath sounds: Normal breath sounds.    Neurological:      Mental Status: She is alert and oriented to person, place, and time.   Psychiatric:         Mood and Affect: Mood normal.         Behavior: Behavior normal.         Thought Content: Thought content normal.         Judgment: Judgment normal.       Assessment & Plan   Diagnoses and all orders for this visit:    1. Controlled type 2 diabetes mellitus with stable proliferative retinopathy of both eyes, with long-term current use of insulin  -     Semaglutide,0.25 or 0.5MG/DOS, (Ozempic, 0.25 or 0.5 MG/DOSE,) 2 MG/1.5ML solution pen-injector; 0.5mg SQ weekly  Dispense: 3 mL; Refill: 3  -     CBC & Differential  -     Comprehensive Metabolic Panel  -     Hemoglobin A1c  -     Insulin Glargine (LANTUS SOLOSTAR) 100 UNIT/ML injection pen; Inject 60 Units under the skin into the appropriate area as directed Daily. Disp quant suff  Dispense: 30 mL; Refill: 5    2. Mixed hyperlipidemia  -     atorvastatin (Lipitor) 40 MG tablet; Take 1 tablet by mouth Every Night.  Dispense: 90 tablet; Refill: 1    3. Primary insomnia  -     traZODone (DESYREL) 50 MG tablet; TAKE 1 TO 2 TABLETS BY MOUTH AT  BEDTIME AS NEEDED  Dispense: 180 tablet; Refill: 1    4. Postlaminectomy syndrome of lumbar region  -     traMADol (ULTRAM) 50 MG tablet; Take 1 tablet by mouth Every 8 (Eight) Hours As Needed for Moderate Pain.  Dispense: 90 tablet; Refill: 0    5. Herniated lumbar intervertebral disc  -     traMADol (ULTRAM) 50 MG tablet; Take 1 tablet by mouth Every 8 (Eight) Hours As Needed for Moderate Pain.  Dispense: 90 tablet; Refill: 0    6. Essential hypertension  -     losartan (COZAAR) 100 MG tablet; Take 1 tablet by mouth Daily.  Dispense: 90 tablet; Refill: 1    7. Chronic migraine without aura without status migrainosus, not intractable  -     topiramate (TOPAMAX) 50 MG tablet; Take 1 tablet by mouth Daily As Needed (migraine).  Dispense: 90 tablet; Refill: 1    Recheck DM labs and continue ozempic 0.5 mg.  Watch  constipation  No change in lipitor  Ok tramadol BID PRN for pains, JEAN-CLAUDE reviewed      Samples trulance given for constipation. She will call with results.

## 2023-08-29 LAB
ALBUMIN SERPL-MCNC: 4.3 G/DL (ref 3.9–4.9)
ALBUMIN/GLOB SERPL: 1.7 {RATIO} (ref 1.2–2.2)
ALP SERPL-CCNC: 141 IU/L (ref 44–121)
ALT SERPL-CCNC: 40 IU/L (ref 0–32)
AST SERPL-CCNC: 36 IU/L (ref 0–40)
BASOPHILS # BLD AUTO: 0.1 X10E3/UL (ref 0–0.2)
BASOPHILS NFR BLD AUTO: 1 %
BILIRUB SERPL-MCNC: 0.3 MG/DL (ref 0–1.2)
BUN SERPL-MCNC: 15 MG/DL (ref 8–27)
BUN/CREAT SERPL: 22 (ref 12–28)
CALCIUM SERPL-MCNC: 10.4 MG/DL (ref 8.7–10.3)
CHLORIDE SERPL-SCNC: 101 MMOL/L (ref 96–106)
CO2 SERPL-SCNC: 28 MMOL/L (ref 20–29)
CREAT SERPL-MCNC: 0.69 MG/DL (ref 0.57–1)
EGFRCR SERPLBLD CKD-EPI 2021: 96 ML/MIN/1.73
EOSINOPHIL # BLD AUTO: 0.1 X10E3/UL (ref 0–0.4)
EOSINOPHIL NFR BLD AUTO: 2 %
ERYTHROCYTE [DISTWIDTH] IN BLOOD BY AUTOMATED COUNT: 12.9 % (ref 11.7–15.4)
GLOBULIN SER CALC-MCNC: 2.5 G/DL (ref 1.5–4.5)
GLUCOSE SERPL-MCNC: 159 MG/DL (ref 70–99)
HBA1C MFR BLD: 7.3 % (ref 4.8–5.6)
HCT VFR BLD AUTO: 43.9 % (ref 34–46.6)
HGB BLD-MCNC: 14.4 G/DL (ref 11.1–15.9)
IMM GRANULOCYTES # BLD AUTO: 0 X10E3/UL (ref 0–0.1)
IMM GRANULOCYTES NFR BLD AUTO: 0 %
LYMPHOCYTES # BLD AUTO: 1.9 X10E3/UL (ref 0.7–3.1)
LYMPHOCYTES NFR BLD AUTO: 32 %
MCH RBC QN AUTO: 29.1 PG (ref 26.6–33)
MCHC RBC AUTO-ENTMCNC: 32.8 G/DL (ref 31.5–35.7)
MCV RBC AUTO: 89 FL (ref 79–97)
MONOCYTES # BLD AUTO: 0.4 X10E3/UL (ref 0.1–0.9)
MONOCYTES NFR BLD AUTO: 7 %
NEUTROPHILS # BLD AUTO: 3.5 X10E3/UL (ref 1.4–7)
NEUTROPHILS NFR BLD AUTO: 58 %
PLATELET # BLD AUTO: 260 X10E3/UL (ref 150–450)
POTASSIUM SERPL-SCNC: 4.7 MMOL/L (ref 3.5–5.2)
PROT SERPL-MCNC: 6.8 G/DL (ref 6–8.5)
RBC # BLD AUTO: 4.95 X10E6/UL (ref 3.77–5.28)
SODIUM SERPL-SCNC: 142 MMOL/L (ref 134–144)
WBC # BLD AUTO: 6 X10E3/UL (ref 3.4–10.8)

## 2023-09-07 ENCOUNTER — TELEPHONE (OUTPATIENT)
Dept: FAMILY MEDICINE CLINIC | Facility: CLINIC | Age: 68
End: 2023-09-07
Payer: MEDICARE

## 2023-09-07 DIAGNOSIS — K59.04 CHRONIC IDIOPATHIC CONSTIPATION: Primary | ICD-10-CM

## 2023-09-07 RX ORDER — PLECANATIDE 3 MG/1
3 TABLET ORAL DAILY
Qty: 90 TABLET | Refills: 1 | Status: SHIPPED | OUTPATIENT
Start: 2023-09-07

## 2023-09-07 NOTE — TELEPHONE ENCOUNTER
Caller: Yi Garcia    Relationship: Self    Best call back number: [phone]     What medication are you requesting: TRUELANCE     If a prescription is needed, what is your preferred pharmacy and phone number: [facility] - [facility] [address] [phone] [facility] [phone]      Additional notes:  PATIENT STATES MEDICATION SAMPLES WORKED WELL.  WOULD LIKE 90 DAY SUPPLY OF MEDICATION.

## 2023-09-08 ENCOUNTER — TELEPHONE (OUTPATIENT)
Dept: FAMILY MEDICINE CLINIC | Facility: CLINIC | Age: 68
End: 2023-09-08
Payer: MEDICARE

## 2023-09-08 DIAGNOSIS — M96.1 POSTLAMINECTOMY SYNDROME OF LUMBAR REGION: ICD-10-CM

## 2023-09-08 DIAGNOSIS — M51.26 HERNIATED LUMBAR INTERVERTEBRAL DISC: ICD-10-CM

## 2023-09-08 RX ORDER — TRAMADOL HYDROCHLORIDE 50 MG/1
TABLET ORAL
Qty: 90 TABLET | OUTPATIENT
Start: 2023-09-08

## 2023-09-08 NOTE — TELEPHONE ENCOUNTER
Caller: Yi Garcia    Relationship: Self    Best call back number: [phone]     What orders are you requesting (i.e. lab or imaging): EKG     In what timeframe would the patient need to come in: ASAP     Where will you receive your lab/imaging services: [facility]IN [place]     Additional notes: PATIENT STATES HER DOCTOR IN [place] IS REQUIRING SHE GET AN EKG BEFORE SHE CAN GIVE HER MEDICATION. PATIENT IS REQUESTING [facility]UT THE ORDER IN FOR HER SO SHE CAN HAVE THIS TESTING COMPLETED.     PLEASE CALL PATIENT BACK, IF NO ANSWER LEAVE A DETAILED MESSAGE.

## 2023-09-13 DIAGNOSIS — K59.04 CHRONIC IDIOPATHIC CONSTIPATION: Primary | ICD-10-CM

## 2023-09-21 ENCOUNTER — TELEPHONE (OUTPATIENT)
Dept: FAMILY MEDICINE CLINIC | Facility: CLINIC | Age: 68
End: 2023-09-21
Payer: MEDICARE

## 2023-09-21 NOTE — TELEPHONE ENCOUNTER
Hub staff attempted to follow warm transfer process and was unsuccessful     Caller: Yi Garcia    Relationship to patient: Self    Best call back number: [phone]     Patient is needing: PATIENT CALLED TO CHECK THE STATUS OF AN APPOINTMENT FOR AN EKG.  PATIENT STATED THAT DR COOK WAS SUPPOSE TO HAVE THIS SCHEDULED.

## 2023-09-21 NOTE — TELEPHONE ENCOUNTER
12:46 PM  Note         Caller: Yi Garcia     Relationship: Self     Best call back number: [phone]      What medication are you requesting: TRUELANCE      If a prescription is needed, what is your preferred pharmacy and phone number: [facility]) - [facility] [address] [phone] [facility] [phone]       Additional notes:  PATIENT STATES MEDICATION SAMPLES WORKED WELL.  WOULD LIKE 90 DAY SUPPLY OF MEDICATION.  DOES NOT WANT LINZESS

## 2023-09-22 ENCOUNTER — TELEPHONE (OUTPATIENT)
Dept: FAMILY MEDICINE CLINIC | Facility: CLINIC | Age: 68
End: 2023-09-22

## 2023-09-22 ENCOUNTER — OFFICE VISIT (OUTPATIENT)
Dept: FAMILY MEDICINE CLINIC | Facility: CLINIC | Age: 68
End: 2023-09-22
Payer: MEDICARE

## 2023-09-22 VITALS
TEMPERATURE: 97.8 F | HEART RATE: 95 BPM | HEIGHT: 62 IN | RESPIRATION RATE: 18 BRPM | OXYGEN SATURATION: 92 % | BODY MASS INDEX: 33.97 KG/M2 | WEIGHT: 184.6 LBS | SYSTOLIC BLOOD PRESSURE: 142 MMHG | DIASTOLIC BLOOD PRESSURE: 70 MMHG

## 2023-09-22 DIAGNOSIS — K31.84 GASTROPARESIS: Primary | ICD-10-CM

## 2023-09-22 DIAGNOSIS — K59.04 CHRONIC IDIOPATHIC CONSTIPATION: ICD-10-CM

## 2023-09-22 DIAGNOSIS — E11.3553 CONTROLLED TYPE 2 DIABETES MELLITUS WITH STABLE PROLIFERATIVE RETINOPATHY OF BOTH EYES, WITH LONG-TERM CURRENT USE OF INSULIN: ICD-10-CM

## 2023-09-22 DIAGNOSIS — Z79.4 CONTROLLED TYPE 2 DIABETES MELLITUS WITH STABLE PROLIFERATIVE RETINOPATHY OF BOTH EYES, WITH LONG-TERM CURRENT USE OF INSULIN: ICD-10-CM

## 2023-09-22 PROCEDURE — 3078F DIAST BP <80 MM HG: CPT | Performed by: FAMILY MEDICINE

## 2023-09-22 PROCEDURE — 3051F HG A1C>EQUAL 7.0%<8.0%: CPT | Performed by: FAMILY MEDICINE

## 2023-09-22 PROCEDURE — 3077F SYST BP >= 140 MM HG: CPT | Performed by: FAMILY MEDICINE

## 2023-09-22 PROCEDURE — 93000 ELECTROCARDIOGRAM COMPLETE: CPT | Performed by: FAMILY MEDICINE

## 2023-09-22 PROCEDURE — 99214 OFFICE O/P EST MOD 30 MIN: CPT | Performed by: FAMILY MEDICINE

## 2023-09-22 RX ORDER — SEMAGLUTIDE 1.34 MG/ML
1 INJECTION, SOLUTION SUBCUTANEOUS WEEKLY
Qty: 9 ML | Refills: 1 | Status: SHIPPED | OUTPATIENT
Start: 2023-09-22

## 2023-09-22 RX ORDER — PLECANATIDE 3 MG/1
3 TABLET ORAL DAILY
Qty: 90 TABLET | Refills: 1 | Status: SHIPPED | OUTPATIENT
Start: 2023-09-22

## 2023-09-22 NOTE — PROGRESS NOTES
Subjective   Yi Gacria is a 67 y.o. female.     History of Present Illness     Pt was told by her GI doctor that an EKG was needed for the medicine, domperidone  Pt is not the best historian in regards to information  But GI doc wants an EKG      Pt has not lost more weight with the ozempic since last visit  DM control had improved and home numbers are better  Would like to increase dose of this medicine      She also notes that the trulance is working well for her constipaiton  Failed linzess in the past    Review of Systems   Constitutional: Negative.    Respiratory: Negative.     Psychiatric/Behavioral: Negative.       Objective   Physical Exam  Vitals and nursing note reviewed.   Constitutional:       General: She is not in acute distress.     Appearance: Normal appearance. She is well-developed.   Cardiovascular:      Rate and Rhythm: Normal rate and regular rhythm.      Heart sounds: Normal heart sounds.   Pulmonary:      Effort: Pulmonary effort is normal.      Breath sounds: Normal breath sounds.   Neurological:      Mental Status: She is alert and oriented to person, place, and time.   Psychiatric:         Mood and Affect: Mood normal.         Behavior: Behavior normal.         Thought Content: Thought content normal.         Judgment: Judgment normal.           ECG 12 Lead    Date/Time: 9/22/2023 4:06 PM  Performed by: Davin Augustin MD  Authorized by: Davin Augustin MD   Comparison: not compared with previous ECG   Previous ECG: no previous ECG available  Rhythm: sinus rhythm  Conduction: conduction normal  ST Segments: ST segments normal  T inversion: V1 and V2    Clinical impression: normal ECG        Assessment & Plan   Diagnoses and all orders for this visit:    1. Gastroparesis (Primary)    2. Controlled type 2 diabetes mellitus with stable proliferative retinopathy of both eyes, with long-term current use of insulin  -     Semaglutide, 1 MG/DOSE, (Ozempic, 1 MG/DOSE,) 4 MG/3ML solution  pen-injector; Inject 1 mg under the skin into the appropriate area as directed 1 (One) Time Per Week.  Dispense: 9 mL; Refill: 1    3. Chronic idiopathic constipation  -     Plecanatide (Trulance) 3 MG tablet; Take 1 tablet by mouth Daily.  Dispense: 90 tablet; Refill: 1    Other orders  -     ECG 12 Lead    Will await name for GI doc to send EKG to.  It is normal    Ok for ozempic increase for improved DM control.  1 mg dose sent in    Ok trulance since the linzess failed.  Script sent in

## 2023-10-26 ENCOUNTER — OFFICE VISIT (OUTPATIENT)
Dept: CARDIOLOGY | Facility: CLINIC | Age: 68
End: 2023-10-26
Payer: MEDICARE

## 2023-10-26 VITALS
HEIGHT: 63 IN | HEART RATE: 92 BPM | DIASTOLIC BLOOD PRESSURE: 72 MMHG | OXYGEN SATURATION: 95 % | BODY MASS INDEX: 32.25 KG/M2 | SYSTOLIC BLOOD PRESSURE: 138 MMHG | WEIGHT: 182 LBS

## 2023-10-26 DIAGNOSIS — R06.09 DOE (DYSPNEA ON EXERTION): Primary | ICD-10-CM

## 2023-10-26 DIAGNOSIS — E11.65 TYPE 2 DIABETES MELLITUS WITH HYPERGLYCEMIA, WITHOUT LONG-TERM CURRENT USE OF INSULIN: ICD-10-CM

## 2023-10-26 DIAGNOSIS — G25.81 RLS (RESTLESS LEGS SYNDROME): ICD-10-CM

## 2023-10-26 DIAGNOSIS — E78.2 MIXED HYPERLIPIDEMIA: ICD-10-CM

## 2023-10-26 DIAGNOSIS — I10 PRIMARY HYPERTENSION: ICD-10-CM

## 2023-10-26 PROCEDURE — 99214 OFFICE O/P EST MOD 30 MIN: CPT | Performed by: INTERNAL MEDICINE

## 2023-10-26 PROCEDURE — 3075F SYST BP GE 130 - 139MM HG: CPT | Performed by: INTERNAL MEDICINE

## 2023-10-26 PROCEDURE — 3078F DIAST BP <80 MM HG: CPT | Performed by: INTERNAL MEDICINE

## 2023-10-26 RX ORDER — EZETIMIBE 10 MG/1
10 TABLET ORAL DAILY
Qty: 90 TABLET | Refills: 3 | Status: SHIPPED | OUTPATIENT
Start: 2023-10-26

## 2023-10-26 RX ORDER — ROPINIROLE 1 MG/1
1 TABLET, FILM COATED ORAL NIGHTLY
Qty: 90 TABLET | Refills: 0 | Status: SHIPPED | OUTPATIENT
Start: 2023-10-26

## 2023-10-26 NOTE — PROGRESS NOTES
[facility] Cardiology  Consultation H&P  Yi MONTES Garcia  1955  [address] [place] [address]     VISIT DATE:  10/26/23    PCP: Davin Augustin  [address] [place] [address]    IDENTIFICATION: A 68 y.o. 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 Dial      CC:  Chief Complaint   Patient presents with    Postural dizziness with presyncope    Hypertension    Hyperlipidemia       Allergies  Allergies   Allergen Reactions    Ace Inhibitors Cough    Amlodipine Diarrhea    Beta Adrenergic Blockers Rash    Cyclobenzaprine Rash    Hydrochlorothiazide Rash    Hyzaar [Losartan Potassium-Hctz] Rash    Jardiance [Empagliflozin] Rash    Latex Rash    Losartan Rash    Penicillins Rash    Pioglitazone Rash    Spironolactone Rash       Current Medications    Current Outpatient Medications:     bethanechol (URECHOLINE) 50 MG tablet, Take 1 tablet by mouth 2 (Two) Times a Day., Disp: , Rfl:     Continuous Blood Gluc Sensor (Dexcom G6 Sensor), Every 10 (Ten) Days., Disp: 9 each, Rfl: 5    Continuous Blood Gluc Transmit (Dexcom G5 Mobile Transmitter) misc, 1 each Daily., Disp: 1 each, Rfl: 3    docusate sodium (COLACE) 100 MG capsule, Take 1 capsule by mouth 2 (Two) Times a Day., Disp: , Rfl:     ezetimibe (ZETIA) 10 MG tablet, Take 1 tablet by mouth Daily., Disp: 90 tablet, Rfl: 3    furosemide (LASIX) 20 MG tablet, Take 1 tablet by mouth As Needed., Disp: , Rfl:     Insulin Glargine (LANTUS SOLOSTAR) 100 UNIT/ML injection pen, Inject 60 Units under the skin into the appropriate area as directed Daily. Disp quant suff (Patient taking differently: Inject 68 Units under  the skin into the appropriate area as directed Daily. Disp quant suff), Disp: 30 mL, Rfl: 5    Insulin Lispro, 1 Unit Dial, (HumaLOG KwikPen) 100 UNIT/ML solution pen-injector, Take 16 units before meals, correction 2:50 >150, MDD 60U (Patient taking differently: As Needed. Take 16 units before meals, correction 2:50 >150, MDD 60U), Disp: 54 mL, Rfl: 1    Insulin Pen Needle 32G X 4 MM misc, Use daily with insulin up to 6 times per day, Disp: 600 each, Rfl: 3    losartan (COZAAR) 100 MG tablet, Take 1 tablet by mouth Daily., Disp: 90 tablet, Rfl: 1    meloxicam (MOBIC) 7.5 MG tablet, TAKE 1 TABLET BY MOUTH DAILY, Disp: 90 tablet, Rfl: 0    metFORMIN ER (GLUCOPHAGE-XR) 500 MG 24 hr tablet, Take 2 tablets by mouth Daily With Breakfast. (Patient taking differently: Take 1 tablet by mouth Daily With Breakfast.), Disp: 180 tablet, Rfl: 3    metoclopramide (REGLAN) 5 MG tablet, Take 1 tablet by mouth 3 (Three) Times a Day., Disp: , Rfl:     NIFEdipine CC (ADALAT CC) 60 MG 24 hr tablet, Take 1 tablet by mouth Daily., Disp: 90 tablet, Rfl: 3    omeprazole (priLOSEC) 40 MG capsule, TAKE 1 CAPSULE BY MOUTH DAILY, Disp: 90 capsule, Rfl: 3    Plecanatide (Trulance) 3 MG tablet, Take 1 tablet by mouth Daily., Disp: 90 tablet, Rfl: 1    rOPINIRole (REQUIP) 1 MG tablet, Take 1 tablet by mouth Every Night. Take 1 hour before bedtime., Disp: 90 tablet, Rfl: 0    Semaglutide, 1 MG/DOSE, (Ozempic, 1 MG/DOSE,) 4 MG/3ML solution pen-injector, Inject 1 mg under the skin into the appropriate area as directed 1 (One) Time Per Week., Disp: 9 mL, Rfl: 1    topiramate (TOPAMAX) 50 MG tablet, Take 1 tablet by mouth Daily As Needed (migraine)., Disp: 90 tablet, Rfl: 1    traMADol (ULTRAM) 50 MG tablet, Take 1 tablet by mouth Every 8 (Eight) Hours As Needed for Moderate Pain. (Patient taking differently: Take 1 tablet by mouth Every 8 (Eight) Hours.), Disp: 90 tablet, Rfl: 0    traZODone (DESYREL) 50 MG tablet, TAKE 1 TO 2 TABLETS BY MOUTH AT   "BEDTIME AS NEEDED (Patient taking differently: Take 1 tablet by mouth Every Night. TAKE 1 TO 2 TABLETS BY MOUTH AT  BEDTIME AS NEEDED), Disp: 180 tablet, Rfl: 1     History of Present Illness   HPI  Yi Garcia is a 68 y.o. year old female with the above mentioned PMH who presents for  fu.   Patient says she is feeling some better.  Ironically when she started Ozempic she states her appetite increased.  She notes no overt chest discomfort but her baseline shortness of breath is unchanged    Vitals:    10/26/23 1158   BP: 138/72   BP Location: Right arm   Patient Position: Sitting   Pulse: 92   SpO2: 95%   Weight: 82.6 kg (182 lb)   Height: 158.8 cm (62.5\")       Body mass index is 32.76 kg/m².     PHYSICAL EXAMINATION:  Constitutional:       Appearance: Healthy appearance. Not in distress.   Neck:      Vascular: No JVR. JVD normal.   Pulmonary:      Effort: Pulmonary effort is normal.      Breath sounds: Normal breath sounds. No wheezing. No rhonchi. No rales.   Chest:      Chest wall: Not tender to palpatation.   Cardiovascular:      PMI at left midclavicular line. Normal rate. Regular rhythm. Normal S1. Normal S2.       Murmurs: There is no murmur.      No gallop.  No click. No rub.   Pulses:     Intact distal pulses.   Edema:     Peripheral edema absent.   Abdominal:      General: Bowel sounds are normal.      Palpations: Abdomen is soft.      Tenderness: There is no abdominal tenderness.   Musculoskeletal: Normal range of motion.         General: No tenderness. Skin:     General: Skin is warm and dry.   Neurological:      General: No focal deficit present.      Mental Status: Alert and oriented to person, place and time.         Diagnostic Data:  Procedures     Lab Results   Component Value Date    CHLPL 226 (H) 02/20/2023    TRIG 212 (H) 02/20/2023    HDL 58 02/20/2023     Lab Results   Component Value Date    GLUCOSE 159 (H) 08/28/2023    BUN 15 08/28/2023    CREATININE 0.69 08/28/2023     08/28/2023 "    K 4.7 08/28/2023     08/28/2023    CO2 28 08/28/2023     Lab Results   Component Value Date    HGBA1C 7.3 (H) 08/28/2023     Lab Results   Component Value Date    WBC 6.0 08/28/2023    HGB 14.4 08/28/2023    HCT 43.9 08/28/2023     08/28/2023       ASSESSMENT:   Diagnosis Plan   1. BARRAGAN (dyspnea on exertion)        2. Primary hypertension        3. Mixed hyperlipidemia        4. Type 2 diabetes mellitus with hyperglycemia, without long-term current use of insulin              PLAN:   BARRAGAN preserved LV function.  With any chest pressure low threshold for ischemic evaluation.    Hypertension controlled currently losartan.  Recommended utmost attempts to wean and discontinue nonsteroidal anti-inflammatory    Mixed dyslipidemia intolerant to statin therapy discussed bempedoic acid and PCSK9 inhibitors.  At current she is willing to rechallenge with Zetia    Diabetes poorly controlled improving w transition of medication and addition of Dexcom device        No ref. provider found, thank you for referring Ms. Garcia for evaluation.  I have forwarded my electronically generated recommendations to you for review.  Please do not hesitate to call with any questions.      Rory Bautista MD, FACC

## 2023-11-03 ENCOUNTER — TELEPHONE (OUTPATIENT)
Dept: FAMILY MEDICINE CLINIC | Facility: CLINIC | Age: 68
End: 2023-11-03
Payer: MEDICARE

## 2023-11-03 DIAGNOSIS — M96.1 POSTLAMINECTOMY SYNDROME OF LUMBAR REGION: ICD-10-CM

## 2023-11-03 DIAGNOSIS — M51.26 HERNIATED LUMBAR INTERVERTEBRAL DISC: ICD-10-CM

## 2023-11-03 NOTE — TELEPHONE ENCOUNTER
Caller: Yi Garcia    Relationship: Self    Best call back number: [phone]     What was the call regarding: PLEASE FAX RECORDS OF EKG TO [facility] FAX: [phone]  DR COSME    Is it okay if the provider responds through MyChart:

## 2023-11-06 RX ORDER — MELOXICAM 7.5 MG/1
7.5 TABLET ORAL DAILY
Qty: 90 TABLET | Refills: 3 | Status: SHIPPED | OUTPATIENT
Start: 2023-11-06

## 2023-11-08 ENCOUNTER — HOSPITAL ENCOUNTER (OUTPATIENT)
Dept: GENERAL RADIOLOGY | Facility: HOSPITAL | Age: 68
Discharge: HOME | End: 2023-11-08
Admitting: NURSE PRACTITIONER
Payer: MEDICARE

## 2023-11-08 ENCOUNTER — OFFICE VISIT (OUTPATIENT)
Dept: FAMILY MEDICINE CLINIC | Facility: CLINIC | Age: 68
End: 2023-11-08
Payer: MEDICARE

## 2023-11-08 ENCOUNTER — TELEPHONE (OUTPATIENT)
Dept: FAMILY MEDICINE CLINIC | Facility: CLINIC | Age: 68
End: 2023-11-08
Payer: MEDICARE

## 2023-11-08 VITALS
OXYGEN SATURATION: 94 % | HEIGHT: 63 IN | BODY MASS INDEX: 31.71 KG/M2 | RESPIRATION RATE: 16 BRPM | SYSTOLIC BLOOD PRESSURE: 132 MMHG | HEART RATE: 86 BPM | DIASTOLIC BLOOD PRESSURE: 72 MMHG | TEMPERATURE: 97.7 F | WEIGHT: 179 LBS

## 2023-11-08 DIAGNOSIS — E11.40 TYPE 2 DIABETES MELLITUS WITH DIABETIC NEUROPATHY, WITH LONG-TERM CURRENT USE OF INSULIN: ICD-10-CM

## 2023-11-08 DIAGNOSIS — M79.672 PAIN OF LEFT HEEL: ICD-10-CM

## 2023-11-08 DIAGNOSIS — M79.672 PAIN OF LEFT HEEL: Primary | ICD-10-CM

## 2023-11-08 DIAGNOSIS — Z79.4 TYPE 2 DIABETES MELLITUS WITH DIABETIC NEUROPATHY, WITH LONG-TERM CURRENT USE OF INSULIN: ICD-10-CM

## 2023-11-08 PROCEDURE — 73630 X-RAY EXAM OF FOOT: CPT

## 2023-11-08 NOTE — PROGRESS NOTES
Date: 2023   Patient Name: Yi Garcia  : 1955   MRN: [id number]     Chief Complaint:    Chief Complaint   Patient presents with    Black spot on L heel      Friend noticed it few days ago. Pt doesn't have much feeling in her ft.        History of Present Illness: Yi Garcia is a 68 y.o. female who is here today for Spot on left heel that was noticed about 1 week ago.  Patient does experience neuropathy of bilateral feet due to diabetes.  She does follow podiatry but has not seen podiatry recently.  She is also having left heel pain that can be sharp and stabbing.  She denies any drainage from area.        Review of Systems:   Review of Systems   Constitutional:  Negative for activity change and fatigue.   Respiratory:  Negative for shortness of breath.    Cardiovascular:  Negative for chest pain.   Gastrointestinal:  Negative for abdominal pain, constipation and diarrhea.   Genitourinary:  Negative for difficulty urinating and urinary incontinence.   Musculoskeletal:  Positive for arthralgias (LEFT HEEL PAIN). Negative for back pain and gait problem.       Past Medical History:   Past Medical History:   Diagnosis Date    Anxiety     Benign essential hypertension     Chronic pain disorder     Colon polyps     Controlled type 2 diabetes mellitus with stable proliferative retinopathy of both eyes, with long-term current use of insulin 2018    Gastroparesis 2023    History of migraine headaches     Hyperlipidemia     Insomnia     Lumbosacral disc disease     Myofascial pain syndrome     Sleep apnea     Spinal cord stimulator status     Urinary incontinence     Vitamin D deficiency        Past Surgical History:   Past Surgical History:   Procedure Laterality Date    BLADDER SURGERY      BREAST EXCISIONAL BIOPSY Left     Benign 2009    COLONOSCOPY  2019    Complete Colonoscopy    ELBOW ARTHROPLASTY Left     HYSTERECTOMY      Total Abdominal Hysterectomy (Description: BSO)     LUMBAR DISCECTOMY  01/28/2013    Lt- L4/5 discectomy redo Lt- L5/S1 foraminotomy -Dr. Von Croft    LUMBAR DISCECTOMY  03/18/2013    Re-do Lt- L4/5 discectomy Dr. Von Croft     OOPHORECTOMY      OTHER SURGICAL HISTORY      Spinal Sterotaxis Stimulation Of Cord    SPINAL CORD STIMULATOR IMPLANT  08/11/201410/01/2015    placement 2014, replacement 2015. Dr. Von Croft     TUBAL ABDOMINAL LIGATION         Family History:   Family History   Problem Relation Age of Onset    Kidney disease Mother     Hypertension Mother     Peripheral vascular disease Mother     Ulcers Mother     Kidney failure Mother     Lung disease Father     Kidney failure Maternal Grandmother     Diabetes Other         type 2    Colon cancer Neg Hx     Breast cancer Neg Hx     Ovarian cancer Neg Hx        Social History:   Social History     Socioeconomic History    Marital status:    Tobacco Use    Smoking status: Never    Smokeless tobacco: Never   Vaping Use    Vaping Use: Never used   Substance and Sexual Activity    Alcohol use: No    Drug use: No    Sexual activity: Yes     Partners: Male     Comment:        Medications:     Current Outpatient Medications:     bethanechol (URECHOLINE) 50 MG tablet, Take 1 tablet by mouth 2 (Two) Times a Day., Disp: , Rfl:     Continuous Blood Gluc Sensor (Dexcom G6 Sensor), Every 10 (Ten) Days., Disp: 9 each, Rfl: 5    Continuous Blood Gluc Transmit (Dexcom G5 Mobile Transmitter) misc, 1 each Daily., Disp: 1 each, Rfl: 3    docusate sodium (COLACE) 100 MG capsule, Take 1 capsule by mouth 2 (Two) Times a Day., Disp: , Rfl:     ezetimibe (ZETIA) 10 MG tablet, Take 1 tablet by mouth Daily., Disp: 90 tablet, Rfl: 3    furosemide (LASIX) 20 MG tablet, Take 1 tablet by mouth As Needed., Disp: , Rfl:     Insulin Glargine (LANTUS SOLOSTAR) 100 UNIT/ML injection pen, Inject 60 Units under the skin into the appropriate area as directed Daily. Disp quant suff (Patient taking differently: Inject 68  Units under the skin into the appropriate area as directed Daily. Disp quant suff), Disp: 30 mL, Rfl: 5    Insulin Lispro, 1 Unit Dial, (HumaLOG KwikPen) 100 UNIT/ML solution pen-injector, Take 16 units before meals, correction 2:50 >150, MDD 60U (Patient taking differently: As Needed. Take 16 units before meals, correction 2:50 >150, MDD 60U), Disp: 54 mL, Rfl: 1    Insulin Pen Needle 32G X 4 MM misc, Use daily with insulin up to 6 times per day, Disp: 600 each, Rfl: 3    losartan (COZAAR) 100 MG tablet, Take 1 tablet by mouth Daily., Disp: 90 tablet, Rfl: 1    meloxicam (MOBIC) 7.5 MG tablet, TAKE 1 TABLET BY MOUTH DAILY, Disp: 90 tablet, Rfl: 3    metFORMIN ER (GLUCOPHAGE-XR) 500 MG 24 hr tablet, Take 2 tablets by mouth Daily With Breakfast. (Patient taking differently: Take 1 tablet by mouth Daily With Breakfast.), Disp: 180 tablet, Rfl: 3    metoclopramide (REGLAN) 5 MG tablet, Take 1 tablet by mouth 3 (Three) Times a Day., Disp: , Rfl:     NIFEdipine CC (ADALAT CC) 60 MG 24 hr tablet, Take 1 tablet by mouth Daily., Disp: 90 tablet, Rfl: 3    omeprazole (priLOSEC) 40 MG capsule, TAKE 1 CAPSULE BY MOUTH DAILY, Disp: 90 capsule, Rfl: 3    rOPINIRole (REQUIP) 1 MG tablet, Take 1 tablet by mouth Every Night. Take 1 hour before bedtime., Disp: 90 tablet, Rfl: 0    Semaglutide, 1 MG/DOSE, (Ozempic, 1 MG/DOSE,) 4 MG/3ML solution pen-injector, Inject 1 mg under the skin into the appropriate area as directed 1 (One) Time Per Week., Disp: 9 mL, Rfl: 1    topiramate (TOPAMAX) 50 MG tablet, Take 1 tablet by mouth Daily As Needed (migraine)., Disp: 90 tablet, Rfl: 1    traMADol (ULTRAM) 50 MG tablet, Take 1 tablet by mouth Every 8 (Eight) Hours As Needed for Moderate Pain. (Patient taking differently: Take 1 tablet by mouth Every 8 (Eight) Hours.), Disp: 90 tablet, Rfl: 0    traZODone (DESYREL) 50 MG tablet, TAKE 1 TO 2 TABLETS BY MOUTH AT  BEDTIME AS NEEDED (Patient taking differently: Take 1 tablet by mouth Every Night.  "TAKE 1 TO 2 TABLETS BY MOUTH AT  BEDTIME AS NEEDED), Disp: 180 tablet, Rfl: 1    Plecanatide (Trulance) 3 MG tablet, Take 1 tablet by mouth Daily. (Patient not taking: Reported on 11/8/2023), Disp: 90 tablet, Rfl: 1    Allergies:   Allergies   Allergen Reactions    Ace Inhibitors Cough    Amlodipine Diarrhea    Beta Adrenergic Blockers Rash    Cyclobenzaprine Rash    Hydrochlorothiazide Rash    Hyzaar [Losartan Potassium-Hctz] Rash    Jardiance [Empagliflozin] Rash    Latex Rash    Losartan Rash    Penicillins Rash    Pioglitazone Rash    Spironolactone Rash         Physical Exam:  Vital Signs:   Vitals:    11/08/23 1454   BP: 132/72   Pulse: 86   Resp: 16   Temp: 97.7 °F (36.5 °C)   SpO2: 94%   Weight: 81.2 kg (179 lb)   Height: 158.8 cm (62.5\")     Body mass index is 32.22 kg/m².     Physical Exam  Vitals and nursing note reviewed.   Constitutional:       Appearance: Normal appearance.   HENT:      Head: Normocephalic and atraumatic.   Cardiovascular:      Rate and Rhythm: Normal rate and regular rhythm.   Pulmonary:      Effort: Pulmonary effort is normal.      Breath sounds: Normal breath sounds.   Abdominal:      General: Bowel sounds are normal.   Musculoskeletal:      Right foot: Normal.        Feet:       Comments: On the left he will it is noted that patient had a blackened area after cleaning off with alcohol swab black area was removed.  There was no evidence of a puncture wound or foreign object in foot.  There still was some mild discomfort to the plantar fascia with palpation.   Skin:     General: Skin is warm.   Neurological:      General: No focal deficit present.      Mental Status: She is alert and oriented to person, place, and time.   Psychiatric:         Mood and Affect: Mood normal.           Assessment/Plan:   Diagnoses and all orders for this visit:    1. Pain of left heel (Primary)  -     XR Foot 3+ View Left; Future    2. Type 2 diabetes mellitus with diabetic neuropathy, with long-term " current use of insulin       Ordering x-ray of left foot and heel.  Monitor for worsening symptoms  Be sure to monitor feet nightly before bed to ensure there is no sores or lesions    Diabetes Education  Goal A1C 7 or less  Check glucose at least 1x per day unless otherwise specified  Yearly eye exams  Check feet daily  Eat low carb diet, low sugar  Increase water intake  Exercise 30-45 mins, 3-4x a week  Take meds as prescribed      Follow Up:   Return for Next scheduled follow up.    Sarah Rodriguez. OCTAVIANO   [facility]   15:48 EST 11/08/2023

## 2023-11-10 DIAGNOSIS — M77.32 HEEL SPUR, LEFT: Primary | ICD-10-CM

## 2023-11-10 DIAGNOSIS — M72.2 PLANTAR FASCIITIS OF LEFT FOOT: ICD-10-CM

## 2023-11-20 DIAGNOSIS — M51.26 HERNIATED LUMBAR INTERVERTEBRAL DISC: ICD-10-CM

## 2023-11-20 DIAGNOSIS — M96.1 POSTLAMINECTOMY SYNDROME OF LUMBAR REGION: ICD-10-CM

## 2023-11-20 RX ORDER — TRAMADOL HYDROCHLORIDE 50 MG/1
50 TABLET ORAL EVERY 8 HOURS PRN
Qty: 90 TABLET | Refills: 1 | Status: SHIPPED | OUTPATIENT
Start: 2023-11-20

## 2023-11-20 NOTE — TELEPHONE ENCOUNTER
Caller: Yi Garcia JYOTI    Relationship: Self    Best call back number: [phone]     Requested Prescriptions:   Requested Prescriptions     Pending Prescriptions Disp Refills    traMADol (ULTRAM) 50 MG tablet 90 tablet 0     Sig: Take 1 tablet by mouth Every 8 (Eight) Hours As Needed for Moderate Pain.        Pharmacy where request should be sent: [address] [phone] [facility] [phone]      Last office visit with prescribing clinician: 9/22/2023   Last telemedicine visit with prescribing clinician: Visit date not found   Next office visit with prescribing clinician: 11/28/2023     Additional details provided by patient:  PATIENT NEEDS A NEW PRESCRIPTION, REQUESTING 90 DAY SUPPLY.    Does the patient have less than a 3 day supply:  [x] Yes  [] No    Would you like a call back once the refill request has been completed: [] Yes [x] No    If the office needs to give you a call back, can they leave a voicemail: [] Yes [x] No    Venancio Zuluaga Rep   11/20/23 15:55 EST

## 2023-11-28 ENCOUNTER — OFFICE VISIT (OUTPATIENT)
Dept: FAMILY MEDICINE CLINIC | Facility: CLINIC | Age: 68
End: 2023-11-28
Payer: MEDICARE

## 2023-11-28 VITALS
OXYGEN SATURATION: 95 % | HEART RATE: 79 BPM | SYSTOLIC BLOOD PRESSURE: 160 MMHG | HEIGHT: 63 IN | BODY MASS INDEX: 31.86 KG/M2 | DIASTOLIC BLOOD PRESSURE: 90 MMHG | WEIGHT: 179.8 LBS | TEMPERATURE: 97.3 F

## 2023-11-28 DIAGNOSIS — Z79.4 TYPE 2 DIABETES MELLITUS WITH BOTH EYES AFFECTED BY RETINOPATHY WITHOUT MACULAR EDEMA, WITH LONG-TERM CURRENT USE OF INSULIN, UNSPECIFIED RETINOPATHY SEVERITY: ICD-10-CM

## 2023-11-28 DIAGNOSIS — Z12.31 SCREENING MAMMOGRAM FOR BREAST CANCER: ICD-10-CM

## 2023-11-28 DIAGNOSIS — F41.9 ANXIETY AND DEPRESSION: ICD-10-CM

## 2023-11-28 DIAGNOSIS — E78.2 MIXED HYPERLIPIDEMIA: Chronic | ICD-10-CM

## 2023-11-28 DIAGNOSIS — F32.A ANXIETY AND DEPRESSION: ICD-10-CM

## 2023-11-28 DIAGNOSIS — E11.319 TYPE 2 DIABETES MELLITUS WITH BOTH EYES AFFECTED BY RETINOPATHY WITHOUT MACULAR EDEMA, WITH LONG-TERM CURRENT USE OF INSULIN, UNSPECIFIED RETINOPATHY SEVERITY: ICD-10-CM

## 2023-11-28 DIAGNOSIS — Z00.00 MEDICARE ANNUAL WELLNESS VISIT, SUBSEQUENT: Primary | ICD-10-CM

## 2023-11-28 DIAGNOSIS — K59.04 CHRONIC IDIOPATHIC CONSTIPATION: ICD-10-CM

## 2023-11-28 DIAGNOSIS — I10 ESSENTIAL HYPERTENSION: ICD-10-CM

## 2023-11-28 PROBLEM — R74.8 ELEVATED LIVER ENZYMES: Status: RESOLVED | Noted: 2018-09-18 | Resolved: 2023-11-28

## 2023-11-28 RX ORDER — DESVENLAFAXINE SUCCINATE 50 MG/1
50 TABLET, EXTENDED RELEASE ORAL DAILY
Qty: 30 TABLET | Refills: 2 | Status: SHIPPED | OUTPATIENT
Start: 2023-11-28

## 2023-11-28 RX ORDER — EZETIMIBE 10 MG/1
10 TABLET ORAL DAILY
Qty: 90 TABLET | Refills: 1 | Status: SHIPPED | OUTPATIENT
Start: 2023-11-28

## 2023-11-28 RX ORDER — PLECANATIDE 3 MG/1
3 TABLET ORAL DAILY
Qty: 90 TABLET | Refills: 1 | Status: SHIPPED | OUTPATIENT
Start: 2023-11-28

## 2023-11-28 NOTE — PROGRESS NOTES
The ABCs of the Annual Wellness Visit  Subsequent Medicare Wellness Visit    Subjective    Yi Garcia is a 68 y.o. female who presents for a Subsequent Medicare Wellness Visit.    The following portions of the patient's history were reviewed and   updated as appropriate: allergies, current medications, past family history, past medical history, past social history, past surgical history, and problem list.    Compared to one year ago, the patient feels her physical   health is the same.    Compared to one year ago, the patient feels her mental   health is worse.    Recent Hospitalizations:  She was not admitted to the hospital during the last year.       Current Medical Providers:  Patient Care Team:  Davin Augustin MD as PCP - General (Family Medicine)  Rafael Velasquez MD as Consulting Physician (Pain Medicine)  Von Croft MD as Consulting Physician (Neurosurgery)  Emmie Stallworth MD as Consulting Physician (Internal Medicine)  Gigi Rojo PA-C as Physician Assistant (Physician Assistant)  Zaira Briggs APRN as Nurse Practitioner (Gastroenterology)  Wero Hernandez PA-C as Physician Assistant (Family Medicine)  Maurilio Simmons MD as Consulting Physician (Gastroenterology)  Rory Bautista MD as Consulting Physician (Cardiology)  Falguni Cote MD as Consulting Physician (Gastroenterology)    Outpatient Medications Prior to Visit   Medication Sig Dispense Refill    traMADol (ULTRAM) 50 MG tablet Take 1 tablet by mouth Every 8 (Eight) Hours As Needed for Moderate Pain. 90 tablet 1    bethanechol (URECHOLINE) 50 MG tablet Take 1 tablet by mouth 2 (Two) Times a Day.      Continuous Blood Gluc Sensor (Dexcom G6 Sensor) Every 10 (Ten) Days. 9 each 5    Continuous Blood Gluc Transmit (Dexcom G5 Mobile Transmitter) misc 1 each Daily. 1 each 3    docusate sodium (COLACE) 100 MG capsule Take 1 capsule by mouth 2 (Two) Times a Day.      furosemide (LASIX) 20 MG tablet Take  1 tablet by mouth As Needed.      Insulin Glargine (LANTUS SOLOSTAR) 100 UNIT/ML injection pen Inject 60 Units under the skin into the appropriate area as directed Daily. Disp quant suff (Patient taking differently: Inject 68 Units under the skin into the appropriate area as directed Daily. Disp quant suff) 30 mL 5    Insulin Lispro, 1 Unit Dial, (HumaLOG KwikPen) 100 UNIT/ML solution pen-injector Take 16 units before meals, correction 2:50 >150, MDD 60U (Patient taking differently: As Needed. Take 16 units before meals, correction 2:50 >150, MDD 60U) 54 mL 1    Insulin Pen Needle 32G X 4 MM misc Use daily with insulin up to 6 times per day 600 each 3    losartan (COZAAR) 100 MG tablet Take 1 tablet by mouth Daily. 90 tablet 1    meloxicam (MOBIC) 7.5 MG tablet TAKE 1 TABLET BY MOUTH DAILY 90 tablet 3    metFORMIN ER (GLUCOPHAGE-XR) 500 MG 24 hr tablet Take 2 tablets by mouth Daily With Breakfast. (Patient taking differently: Take 1 tablet by mouth Daily With Breakfast.) 180 tablet 3    metoclopramide (REGLAN) 5 MG tablet Take 1 tablet by mouth 3 (Three) Times a Day.      NIFEdipine CC (ADALAT CC) 60 MG 24 hr tablet Take 1 tablet by mouth Daily. 90 tablet 3    omeprazole (priLOSEC) 40 MG capsule TAKE 1 CAPSULE BY MOUTH DAILY 90 capsule 3    rOPINIRole (REQUIP) 1 MG tablet Take 1 tablet by mouth Every Night. Take 1 hour before bedtime. 90 tablet 0    Semaglutide, 1 MG/DOSE, (Ozempic, 1 MG/DOSE,) 4 MG/3ML solution pen-injector Inject 1 mg under the skin into the appropriate area as directed 1 (One) Time Per Week. 9 mL 1    topiramate (TOPAMAX) 50 MG tablet Take 1 tablet by mouth Daily As Needed (migraine). 90 tablet 1    traZODone (DESYREL) 50 MG tablet TAKE 1 TO 2 TABLETS BY MOUTH AT  BEDTIME AS NEEDED (Patient taking differently: Take 1 tablet by mouth Every Night. TAKE 1 TO 2 TABLETS BY MOUTH AT  BEDTIME AS NEEDED) 180 tablet 1    ezetimibe (ZETIA) 10 MG tablet Take 1 tablet by mouth Daily. 90 tablet 3     "Plecanatide (Trulance) 3 MG tablet Take 1 tablet by mouth Daily. (Patient not taking: Reported on 11/8/2023) 90 tablet 1     No facility-administered medications prior to visit.       Opioid medication/s are on active medication list.  and I have evaluated her active treatment plan and pain score trends (see table).  Vitals:    11/28/23 1103   PainSc:   4   PainLoc: Foot     I have reviewed the chart for potential of high risk medication and harmful drug interactions in the elderly.              Patient Active Problem List   Diagnosis    Anxiety and depression    Fatigue    Mixed hyperlipidemia    Herniated lumbar intervertebral disc    Vaginal atrophy    Postlaminectomy syndrome of lumbar region    Meningeal adhesions/lumbar arachnoiditis    Lumbar facet arthropathy/lumbar spondylosis without myelopathy    Sacroiliac joint dysfunction of left side    Physical deconditioning    Insomnia    History of migraine headaches    Essential hypertension    Myofascial pain syndrome    Controlled type 2 diabetes mellitus with stable proliferative retinopathy of both eyes, with long-term current use of insulin    Steatosis of liver    Obstructive sleep apnea, adult    Encounter for fitting and adjustment of neuropacemaker of spinal cord    Migration of spinal cord stimulator    Battery end of life of spinal cord stimulator    Mild nonproliferative diabetic retinopathy of both eyes without macular edema associated with type 2 diabetes mellitus    Gastroparesis     Advance Care Planning   Advance Care Planning     Advance Directive is not on file.  ACP discussion was held with the patient during this visit. Patient has an advance directive (not in EMR), copy requested.     Objective    Vitals:    11/28/23 1103   BP: 160/90   BP Location: Left arm   Patient Position: Sitting   Cuff Size: Adult   Pulse: 79   Temp: 97.3 °F (36.3 °C)   TempSrc: Infrared   SpO2: 95%   Weight: 81.6 kg (179 lb 12.8 oz)   Height: 158.8 cm (62.5\") " "  PainSc:   4   PainLoc: Foot     Estimated body mass index is 32.36 kg/m² as calculated from the following:    Height as of this encounter: 158.8 cm (62.5\").    Weight as of this encounter: 81.6 kg (179 lb 12.8 oz).           Does the patient have evidence of cognitive impairment? No          HEALTH RISK ASSESSMENT    Smoking Status:  Social History     Tobacco Use   Smoking Status Never   Smokeless Tobacco Never     Alcohol Consumption:  Social History     Substance and Sexual Activity   Alcohol Use No     Fall Risk Screen:    ALVINADI Fall Risk Assessment was completed, and patient is at LOW risk for falls.Assessment completed on:2023    Depression Screenin/28/2023    11:21 AM   PHQ-2/PHQ-9 Depression Screening   Little Interest or Pleasure in Doing Things 1-->several days   Feeling Down, Depressed or Hopeless 3-->nearly every day   PHQ-9: Brief Depression Severity Measure Score 4   If You Checked Off Any Problems, How Difficult Have These Problems Made It For You to Do Your Work, Take Care of Things at Home, or Get Along with Other People? somewhat difficult       Health Habits and Functional and Cognitive Screenin/28/2023    11:00 AM   Functional & Cognitive Status   Do you have difficulty preparing food and eating? No   Do you have difficulty bathing yourself, getting dressed or grooming yourself? No   Do you have difficulty using the toilet? No   Do you have difficulty moving around from place to place? No   Do you have trouble with steps or getting out of a bed or a chair? No   Current Diet Well Balanced Diet   Dental Exam Up to date   Eye Exam Not up to date   Exercise (times per week) 0 times per week   Current Exercises Include No Regular Exercise   Do you need help using the phone?  No   Are you deaf or do you have serious difficulty hearing?  Yes   Do you need help to go to places out of walking distance? No   Do you need help shopping? No   Do you need help preparing meals?  No "   Do you need help with housework?  No   Do you need help with laundry? No   Do you need help taking your medications? No   Do you need help managing money? No   Do you ever drive or ride in a car without wearing a seat belt? No   Have you felt unusual stress, anger or loneliness in the last month? Yes   Who do you live with? Spouse   If you need help, do you have trouble finding someone available to you? No   Have you been bothered in the last four weeks by sexual problems? No   Do you have difficulty concentrating, remembering or making decisions? No       Age-appropriate Screening Schedule:  Refer to the list below for future screening recommendations based on patient's age, sex and/or medical conditions. Orders for these recommended tests are listed in the plan section. The patient has been provided with a written plan.    Health Maintenance   Topic Date Due    DXA SCAN  Never done    COVID-19 Vaccine (1) Never done    ZOSTER VACCINE (1 of 2) Never done    Pneumococcal Vaccine 65+ (2 - PCV) 02/06/2018    DIABETIC EYE EXAM  03/24/2023    DIABETIC FOOT EXAM  04/26/2023    MAMMOGRAM  06/29/2023    ANNUAL WELLNESS VISIT  11/18/2023    COLORECTAL CANCER SCREENING  02/18/2024    LIPID PANEL  02/20/2024    HEMOGLOBIN A1C  02/28/2024    URINE MICROALBUMIN  05/26/2024    BMI FOLLOWUP  09/22/2024    TDAP/TD VACCINES (2 - Td or Tdap) 02/05/2026    HEPATITIS C SCREENING  Completed    INFLUENZA VACCINE  Completed    PAP SMEAR  Discontinued                  CMS Preventative Services Quick Reference  Risk Factors Identified During Encounter  Depression/Dysphoria: Prescribed new antidepressant medication treatment. Follow up visit planned.  Inactivity/Sedentary: Patient was advised to exercise at least 150 minutes a week per CDC recommendations.  Vision Screening Recommended  The above risks/problems have been discussed with the patient.  Pertinent information has been shared with the patient in the After Visit Summary.  An  After Visit Summary and PPPS were made available to the patient.    Follow Up:   Next Medicare Wellness visit to be scheduled in 1 year.       Additional E&M Note during same encounter follows:  Patient has multiple medical problems which are significant and separately identifiable that require additional work above and beyond the Medicare Wellness Visit.      Chief Complaint  Diabetes (The ozempic is causing emotional changes. She feels depressed. )    Subjective        Diabetes      Yi Garcia is also being seen today for DM    She has noted her moodh as been worse  She is more irritable  Feels sad a lot of the time      Diabetes Mellitus Type II, Follow-up:   Yi Garcia is a 68 y.o. female who is here for follow-up of Type 2 diabetes mellitus.  Current symptoms/problems include none and have been stable. Patient is adherent with medications.  Known diabetic complications: retinopathy  Cardiovascular risk factors: advanced age (older than 55 for men, 65 for women), diabetes mellitus, dyslipidemia, hypertension, and obesity (BMI >= 30 kg/m2)  Current diabetic medications include ozempic, lantus and metformin.   Current monitoring regimen: home blood tests - daily  Home blood sugar records: fasting range: dexcom  She is on ACE inhibitor or angiotensin II receptor blocker. Patient is on a statin.       Yi Garcia  is here for follow-up of hypertension of several years duration. She is not exercising and is not adherent to a low-salt diet. Patient does not check her blood pressure.    She is compliant with meds.        Yi Garcia returns today for follow up of Hyperlipidemia  Yi indicates her exercise level as not at all.  Diet: eating less with medicine  Patient is compliant with medications   Any side effects to medications:   chest pain No myalgia No memory change No  Pt is not due for labs      Still with constipation  Linzess did not work  She would like to try trulance  Review of Systems  "  Constitutional: Negative.    Respiratory: Negative.     Cardiovascular: Negative.    Gastrointestinal:  Positive for constipation.   Psychiatric/Behavioral:  Positive for agitation and dysphoric mood.        Objective   Vital Signs:  /90 (BP Location: Left arm, Patient Position: Sitting, Cuff Size: Adult)   Pulse 79   Temp 97.3 °F (36.3 °C) (Infrared)   Ht 158.8 cm (62.5\")   Wt 81.6 kg (179 lb 12.8 oz)   SpO2 95%   BMI 32.36 kg/m²     Physical Exam            Assessment and Plan   Diagnoses and all orders for this visit:    1. Medicare annual wellness visit, subsequent (Primary)    2. Type 2 diabetes mellitus with both eyes affected by retinopathy without macular edema, with long-term current use of insulin, unspecified retinopathy severity  -     CBC & Differential  -     Comprehensive Metabolic Panel  -     Hemoglobin A1c    3. Mixed hyperlipidemia  -     Comprehensive Metabolic Panel  -     Lipid Panel  -     ezetimibe (ZETIA) 10 MG tablet; Take 1 tablet by mouth Daily.  Dispense: 90 tablet; Refill: 1    4. Essential hypertension  -     CBC & Differential  -     Comprehensive Metabolic Panel    5. Anxiety and depression  -     desvenlafaxine (Pristiq) 50 MG 24 hr tablet; Take 1 tablet by mouth Daily.  Dispense: 30 tablet; Refill: 2    6. Chronic idiopathic constipation  -     Plecanatide (Trulance) 3 MG tablet; Take 1 tablet by mouth Daily.  Dispense: 90 tablet; Refill: 1    7. Screening mammogram for breast cancer  -     Mammo Screening Digital Tomosynthesis Bilateral With CAD; Future    Other orders  -     Fluzone High-Dose 65+yrs (3220-9620)    Medicare wellness completed as above.  Flu shot given and mammogram ordered.    Recheck DM labs and continue meds.  Will f/u pending  results.  She feels mood has been worse with ozempic but she is losing weight  Mood has had issues in past.  Will use pristiq for mood and recheck in future.  New med sent in  Continue zetia, statin intolerant in past.  F/u " pending cholesterol  Ok trulance for constipation.  Benigno has failed for her

## 2023-11-29 LAB
ALBUMIN SERPL-MCNC: 4.4 G/DL (ref 3.5–5.2)
ALBUMIN/GLOB SERPL: 1.8 G/DL
ALP SERPL-CCNC: 145 U/L (ref 39–117)
ALT SERPL-CCNC: 51 U/L (ref 1–33)
AST SERPL-CCNC: 46 U/L (ref 1–32)
BASOPHILS # BLD AUTO: 0.06 10*3/MM3 (ref 0–0.2)
BASOPHILS NFR BLD AUTO: 1 % (ref 0–1.5)
BILIRUB SERPL-MCNC: 0.4 MG/DL (ref 0–1.2)
BUN SERPL-MCNC: 14 MG/DL (ref 8–23)
BUN/CREAT SERPL: 21.5 (ref 7–25)
CALCIUM SERPL-MCNC: 10.2 MG/DL (ref 8.6–10.5)
CHLORIDE SERPL-SCNC: 101 MMOL/L (ref 98–107)
CHOLEST SERPL-MCNC: 201 MG/DL (ref 0–200)
CO2 SERPL-SCNC: 31.7 MMOL/L (ref 22–29)
CREAT SERPL-MCNC: 0.65 MG/DL (ref 0.57–1)
EGFRCR SERPLBLD CKD-EPI 2021: 97.2 ML/MIN/1.73
EOSINOPHIL # BLD AUTO: 0.15 10*3/MM3 (ref 0–0.4)
EOSINOPHIL NFR BLD AUTO: 2.4 % (ref 0.3–6.2)
ERYTHROCYTE [DISTWIDTH] IN BLOOD BY AUTOMATED COUNT: 12.5 % (ref 12.3–15.4)
GLOBULIN SER CALC-MCNC: 2.5 GM/DL
GLUCOSE SERPL-MCNC: 112 MG/DL (ref 65–99)
HBA1C MFR BLD: 6.8 % (ref 4.8–5.6)
HCT VFR BLD AUTO: 44.6 % (ref 34–46.6)
HDLC SERPL-MCNC: 54 MG/DL (ref 40–60)
HGB BLD-MCNC: 14.5 G/DL (ref 12–15.9)
IMM GRANULOCYTES # BLD AUTO: 0.03 10*3/MM3 (ref 0–0.05)
IMM GRANULOCYTES NFR BLD AUTO: 0.5 % (ref 0–0.5)
LDLC SERPL CALC-MCNC: 121 MG/DL (ref 0–100)
LYMPHOCYTES # BLD AUTO: 1.65 10*3/MM3 (ref 0.7–3.1)
LYMPHOCYTES NFR BLD AUTO: 26.4 % (ref 19.6–45.3)
MCH RBC QN AUTO: 28.4 PG (ref 26.6–33)
MCHC RBC AUTO-ENTMCNC: 32.5 G/DL (ref 31.5–35.7)
MCV RBC AUTO: 87.5 FL (ref 79–97)
MONOCYTES # BLD AUTO: 0.35 10*3/MM3 (ref 0.1–0.9)
MONOCYTES NFR BLD AUTO: 5.6 % (ref 5–12)
NEUTROPHILS # BLD AUTO: 4 10*3/MM3 (ref 1.7–7)
NEUTROPHILS NFR BLD AUTO: 64.1 % (ref 42.7–76)
NRBC BLD AUTO-RTO: 0 /100 WBC (ref 0–0.2)
PLATELET # BLD AUTO: 297 10*3/MM3 (ref 140–450)
POTASSIUM SERPL-SCNC: 4.8 MMOL/L (ref 3.5–5.2)
PROT SERPL-MCNC: 6.9 G/DL (ref 6–8.5)
RBC # BLD AUTO: 5.1 10*6/MM3 (ref 3.77–5.28)
SODIUM SERPL-SCNC: 139 MMOL/L (ref 136–145)
TRIGL SERPL-MCNC: 148 MG/DL (ref 0–150)
VLDLC SERPL CALC-MCNC: 26 MG/DL (ref 5–40)
WBC # BLD AUTO: 6.24 10*3/MM3 (ref 3.4–10.8)

## 2023-12-04 ENCOUNTER — TELEPHONE (OUTPATIENT)
Dept: FAMILY MEDICINE CLINIC | Facility: CLINIC | Age: 68
End: 2023-12-04
Payer: MEDICARE

## 2023-12-04 NOTE — TELEPHONE ENCOUNTER
Caller: Yi Garcia    Relationship: Self    Best call back number: [phone]    Which medication are you concerned about: TRULANCE    Who prescribed you this medication: DOCTOR JOYCE    What are your concerns: THE PATIENT STATES THAT THE DOCTOR WAS GOING TO CHANGE HER MEDICATION FORM LINZESS TO TRULANCE THE PHARMACY NEEDS A PRESCRIPTION FOR THE TRULANCE THE PATIENT STATES THAT SHE HAS RECEIVED MORE LINZESS AND NO TRULANCE

## 2023-12-06 ENCOUNTER — TELEPHONE (OUTPATIENT)
Dept: FAMILY MEDICINE CLINIC | Facility: CLINIC | Age: 68
End: 2023-12-06
Payer: MEDICARE

## 2023-12-06 NOTE — TELEPHONE ENCOUNTER
PATIENT HAS CALLED REQUESTING IF A PRESCRIPTION CAN BE SENT IN FOR DEXCOM G6 OVER PATCH SIZE 3.48 X 2.61 OVAL. PATIENT IS OUT OF PATCHES AND REQUESTING A PRESCRIPTION TO BE SENT INTO [facility].    CALL BACK NUMBER IS

## 2023-12-06 NOTE — TELEPHONE ENCOUNTER
Pt informed-pt to contact [facility] tomorrow and see what she can do to get the over patches

## 2024-01-10 DIAGNOSIS — E11.3553 CONTROLLED TYPE 2 DIABETES MELLITUS WITH STABLE PROLIFERATIVE RETINOPATHY OF BOTH EYES, WITH LONG-TERM CURRENT USE OF INSULIN: ICD-10-CM

## 2024-01-10 DIAGNOSIS — Z79.4 CONTROLLED TYPE 2 DIABETES MELLITUS WITH STABLE PROLIFERATIVE RETINOPATHY OF BOTH EYES, WITH LONG-TERM CURRENT USE OF INSULIN: ICD-10-CM

## 2024-01-11 DIAGNOSIS — G43.709 CHRONIC MIGRAINE WITHOUT AURA WITHOUT STATUS MIGRAINOSUS, NOT INTRACTABLE: ICD-10-CM

## 2024-01-11 DIAGNOSIS — F51.01 PRIMARY INSOMNIA: ICD-10-CM

## 2024-01-11 RX ORDER — PROCHLORPERAZINE 25 MG/1
SUPPOSITORY RECTAL DAILY
Qty: 1 EACH | Refills: 3 | Status: SHIPPED | OUTPATIENT
Start: 2024-01-11

## 2024-01-11 RX ORDER — TRAZODONE HYDROCHLORIDE 50 MG/1
TABLET ORAL
Qty: 180 TABLET | Refills: 3 | Status: SHIPPED | OUTPATIENT
Start: 2024-01-11

## 2024-01-11 RX ORDER — TOPIRAMATE 50 MG/1
50 TABLET, FILM COATED ORAL DAILY PRN
Qty: 90 TABLET | Refills: 3 | Status: SHIPPED | OUTPATIENT
Start: 2024-01-11

## 2024-01-12 DIAGNOSIS — F41.9 ANXIETY AND DEPRESSION: ICD-10-CM

## 2024-01-12 DIAGNOSIS — F32.A ANXIETY AND DEPRESSION: ICD-10-CM

## 2024-01-12 RX ORDER — DESVENLAFAXINE SUCCINATE 50 MG/1
50 TABLET, EXTENDED RELEASE ORAL DAILY
Qty: 30 TABLET | Refills: 11 | OUTPATIENT
Start: 2024-01-12

## 2024-01-20 DIAGNOSIS — G25.81 RLS (RESTLESS LEGS SYNDROME): ICD-10-CM

## 2024-01-23 RX ORDER — ROPINIROLE 1 MG/1
TABLET, FILM COATED ORAL
Qty: 90 TABLET | Refills: 3 | Status: SHIPPED | OUTPATIENT
Start: 2024-01-23

## 2024-02-01 DIAGNOSIS — I10 ESSENTIAL HYPERTENSION: ICD-10-CM

## 2024-02-01 RX ORDER — LOSARTAN POTASSIUM 100 MG/1
100 TABLET ORAL DAILY
Qty: 90 TABLET | Refills: 3 | Status: SHIPPED | OUTPATIENT
Start: 2024-02-01

## 2024-02-04 DIAGNOSIS — F32.A ANXIETY AND DEPRESSION: ICD-10-CM

## 2024-02-04 DIAGNOSIS — F41.9 ANXIETY AND DEPRESSION: ICD-10-CM

## 2024-02-05 RX ORDER — DESVENLAFAXINE SUCCINATE 50 MG/1
50 TABLET, EXTENDED RELEASE ORAL DAILY
Qty: 30 TABLET | Refills: 11 | OUTPATIENT
Start: 2024-02-05

## 2024-02-12 DIAGNOSIS — Z79.4 CONTROLLED TYPE 2 DIABETES MELLITUS WITH STABLE PROLIFERATIVE RETINOPATHY OF BOTH EYES, WITH LONG-TERM CURRENT USE OF INSULIN: ICD-10-CM

## 2024-02-12 DIAGNOSIS — E11.3553 CONTROLLED TYPE 2 DIABETES MELLITUS WITH STABLE PROLIFERATIVE RETINOPATHY OF BOTH EYES, WITH LONG-TERM CURRENT USE OF INSULIN: ICD-10-CM

## 2024-02-13 RX ORDER — SEMAGLUTIDE 1.34 MG/ML
INJECTION, SOLUTION SUBCUTANEOUS
Qty: 9 ML | Refills: 3 | Status: SHIPPED | OUTPATIENT
Start: 2024-02-13

## 2024-02-23 DIAGNOSIS — F41.9 ANXIETY AND DEPRESSION: ICD-10-CM

## 2024-02-23 DIAGNOSIS — F32.A ANXIETY AND DEPRESSION: ICD-10-CM

## 2024-02-27 RX ORDER — DESVENLAFAXINE SUCCINATE 50 MG/1
50 TABLET, EXTENDED RELEASE ORAL DAILY
Qty: 30 TABLET | Refills: 11 | Status: SHIPPED | OUTPATIENT
Start: 2024-02-27 | End: 2024-02-28 | Stop reason: SDUPTHER

## 2024-02-28 ENCOUNTER — OFFICE VISIT (OUTPATIENT)
Dept: FAMILY MEDICINE CLINIC | Facility: CLINIC | Age: 69
End: 2024-02-28
Payer: MEDICARE

## 2024-02-28 VITALS
OXYGEN SATURATION: 97 % | TEMPERATURE: 97.7 F | HEART RATE: 89 BPM | BODY MASS INDEX: 32.6 KG/M2 | WEIGHT: 184 LBS | SYSTOLIC BLOOD PRESSURE: 142 MMHG | HEIGHT: 63 IN | DIASTOLIC BLOOD PRESSURE: 72 MMHG | RESPIRATION RATE: 16 BRPM

## 2024-02-28 DIAGNOSIS — Z12.11 COLON CANCER SCREENING: ICD-10-CM

## 2024-02-28 DIAGNOSIS — Z79.4 CONTROLLED TYPE 2 DIABETES MELLITUS WITH STABLE PROLIFERATIVE RETINOPATHY OF BOTH EYES, WITH LONG-TERM CURRENT USE OF INSULIN: Primary | ICD-10-CM

## 2024-02-28 DIAGNOSIS — E11.3553 CONTROLLED TYPE 2 DIABETES MELLITUS WITH STABLE PROLIFERATIVE RETINOPATHY OF BOTH EYES, WITH LONG-TERM CURRENT USE OF INSULIN: Primary | ICD-10-CM

## 2024-02-28 DIAGNOSIS — R41.3 MEMORY CHANGE: ICD-10-CM

## 2024-02-28 DIAGNOSIS — F32.A ANXIETY AND DEPRESSION: ICD-10-CM

## 2024-02-28 DIAGNOSIS — F41.9 ANXIETY AND DEPRESSION: ICD-10-CM

## 2024-02-28 DIAGNOSIS — I10 PRIMARY HYPERTENSION: ICD-10-CM

## 2024-02-28 DIAGNOSIS — E78.2 MIXED HYPERLIPIDEMIA: Chronic | ICD-10-CM

## 2024-02-28 DIAGNOSIS — Z78.9 STATIN INTOLERANCE: ICD-10-CM

## 2024-02-28 RX ORDER — METFORMIN HYDROCHLORIDE 500 MG/1
1000 TABLET, EXTENDED RELEASE ORAL
Qty: 180 TABLET | Refills: 1 | Status: SHIPPED | OUTPATIENT
Start: 2024-02-28

## 2024-02-28 RX ORDER — HYDROCODONE BITARTRATE AND ACETAMINOPHEN 5; 325 MG/1; MG/1
TABLET ORAL SEE ADMIN INSTRUCTIONS
COMMUNITY
Start: 2024-02-12

## 2024-02-28 RX ORDER — NALOXEGOL OXALATE 25 MG/1
TABLET, FILM COATED ORAL
COMMUNITY
Start: 2024-01-01 | End: 2024-02-28

## 2024-02-28 RX ORDER — NIFEDIPINE 90 MG/1
90 TABLET, EXTENDED RELEASE ORAL DAILY
Qty: 90 TABLET | Refills: 1 | Status: SHIPPED | OUTPATIENT
Start: 2024-02-28 | End: 2024-02-28 | Stop reason: SDUPTHER

## 2024-02-28 RX ORDER — NIFEDIPINE 90 MG/1
90 TABLET, EXTENDED RELEASE ORAL DAILY
Qty: 90 TABLET | Refills: 1 | Status: SHIPPED | OUTPATIENT
Start: 2024-02-28

## 2024-02-28 RX ORDER — EZETIMIBE 10 MG/1
10 TABLET ORAL DAILY
Qty: 90 TABLET | Refills: 1 | Status: SHIPPED | OUTPATIENT
Start: 2024-02-28

## 2024-02-28 RX ORDER — LOSARTAN POTASSIUM 100 MG/1
100 TABLET ORAL DAILY
Qty: 90 TABLET | Refills: 1 | Status: SHIPPED | OUTPATIENT
Start: 2024-02-28

## 2024-02-28 RX ORDER — SEMAGLUTIDE 1.34 MG/ML
1 INJECTION, SOLUTION SUBCUTANEOUS WEEKLY
Qty: 9 ML | Refills: 1 | Status: SHIPPED | OUTPATIENT
Start: 2024-02-28

## 2024-02-28 RX ORDER — DESVENLAFAXINE SUCCINATE 50 MG/1
50 TABLET, EXTENDED RELEASE ORAL DAILY
Qty: 90 TABLET | Refills: 1 | Status: SHIPPED | OUTPATIENT
Start: 2024-02-28

## 2024-02-28 RX ORDER — LINACLOTIDE 145 UG/1
CAPSULE, GELATIN COATED ORAL
COMMUNITY
Start: 2023-11-26 | End: 2024-02-28

## 2024-02-28 RX ORDER — PROCHLORPERAZINE 25 MG/1
SUPPOSITORY RECTAL
Qty: 9 EACH | Refills: 5 | Status: SHIPPED | OUTPATIENT
Start: 2024-02-28

## 2024-02-28 RX ORDER — DONEPEZIL HYDROCHLORIDE 5 MG/1
5 TABLET, FILM COATED ORAL NIGHTLY
Qty: 90 TABLET | Refills: 1 | Status: SHIPPED | OUTPATIENT
Start: 2024-02-28

## 2024-02-28 NOTE — PROGRESS NOTES
Subjective   Yi Garcia is a 68 y.o. female.     Diabetes       Diabetes Mellitus Type II, Follow-up:   Yi Garcia is a 68 y.o. female who is here for follow-up of Type 2 diabetes mellitus.  Current symptoms/problems include none and have been stable. Patient is adherent with medications.  Known diabetic complications: none  Cardiovascular risk factors: diabetes mellitus, dyslipidemia, hypertension, and obesity (BMI >= 30 kg/m2)  Current diabetic medications include  lanuts 68, metformin 500 and ozempic 1 .   Current monitoring regimen: home blood tests - daily  Home blood sugar records: fasting range: doing ok  Any episodes of hypoglycemia? no  Eye exam current (within one year): yes  She is on ACE inhibitor or angiotensin II receptor blocker. Patient is on a statin.       Yi Garcia  is here for follow-up of hypertension of several years duration. She is not exercising and is not adherent to a low-salt diet. Patient does not check her blood pressure.   She is compliant with meds.        Yi Garcia returns today for follow up of Hyperlipidemia  Yi indicates her exercise level as not at all.  Diet: unchanged  Patient is compliant with medications   Any side effects to medications:   chest pain No myalgia No memory change No  Pt is due for labs      The following portions of the patient's history were reviewed and updated as appropriate: allergies, current medications, past family history, past medical history, past social history, past surgical history, and problem list.    Review of Systems   Constitutional: Negative.    Respiratory: Negative.     Cardiovascular: Negative.    Psychiatric/Behavioral: Negative.         Objective   Physical Exam  Vitals and nursing note reviewed.   Constitutional:       General: She is not in acute distress.     Appearance: Normal appearance. She is well-developed.   Cardiovascular:      Rate and Rhythm: Normal rate and regular rhythm.      Heart sounds: Normal  heart sounds.   Pulmonary:      Effort: Pulmonary effort is normal.      Breath sounds: Normal breath sounds.   Neurological:      Mental Status: She is alert and oriented to person, place, and time.   Psychiatric:         Mood and Affect: Mood normal.         Behavior: Behavior normal.         Thought Content: Thought content normal.         Judgment: Judgment normal.       Assessment & Plan   Diagnoses and all orders for this visit:    1. Controlled type 2 diabetes mellitus with stable proliferative retinopathy of both eyes, with long-term current use of insulin (Primary)  -     Continuous Blood Gluc Sensor (Dexcom G6 Sensor); Use Every 10 (Ten) Days.  Dispense: 9 each; Refill: 5  -     Insulin Glargine (LANTUS SOLOSTAR) 100 UNIT/ML injection pen; Inject 68 Units under the skin into the appropriate area as directed Daily. Disp quant suff  Dispense: 45 mL; Refill: 5  -     metFORMIN ER (GLUCOPHAGE-XR) 500 MG 24 hr tablet; Take 2 tablets by mouth Daily With Breakfast.  Dispense: 180 tablet; Refill: 1  -     Semaglutide, 1 MG/DOSE, (Ozempic, 1 MG/DOSE,) 4 MG/3ML solution pen-injector; Inject 1 mg under the skin into the appropriate area as directed 1 (One) Time Per Week.  Dispense: 9 mL; Refill: 1  -     CBC & Differential  -     Comprehensive Metabolic Panel  -     Hemoglobin A1c    2. Primary hypertension  -     losartan (COZAAR) 100 MG tablet; Take 1 tablet by mouth Daily.  Dispense: 90 tablet; Refill: 1  -     CBC & Differential  -     Comprehensive Metabolic Panel  -     NIFEdipine XL (PROCARDIA XL) 90 MG 24 hr tablet; Take 1 tablet by mouth Daily.  Dispense: 90 tablet; Refill: 1    3. Mixed hyperlipidemia  -     ezetimibe (ZETIA) 10 MG tablet; Take 1 tablet by mouth Daily.  Dispense: 90 tablet; Refill: 1  -     Comprehensive Metabolic Panel  -     Lipid Panel    4. Colon cancer screening  -     Ambulatory Referral For Screening Colonoscopy    5. Memory change  -     donepezil (Aricept) 5 MG tablet; Take 1  tablet by mouth Every Night.  Dispense: 90 tablet; Refill: 1    6. Anxiety and depression  -     desvenlafaxine (PRISTIQ) 50 MG 24 hr tablet; Take 1 tablet by mouth Daily.  Dispense: 90 tablet; Refill: 1    Other orders  -     Discontinue: NIFEdipine XL (PROCARDIA XL) 90 MG 24 hr tablet; Take 1 tablet by mouth Daily.  Dispense: 90 tablet; Refill: 1    Continue lantus but will increase metformin from 500 to 1000 to help with constipation.  May need less lantus with this change.  Continue ozempic, she does not want to stop this.  I did advise her it could make gastroparesis and constipation worse.  She ants to stay on this    No change in losartan 100 but will increase nifedipine from 60 to 90 for better BP control    Refilled zetia as she is statin intolerant and repatha was not covered.    Ok aricept for memory, low dose sent in.

## 2024-02-29 LAB
ALBUMIN SERPL-MCNC: 4.3 G/DL (ref 3.5–5.2)
ALBUMIN/GLOB SERPL: 1.7 G/DL
ALP SERPL-CCNC: 163 U/L (ref 39–117)
ALT SERPL-CCNC: 40 U/L (ref 1–33)
AST SERPL-CCNC: 31 U/L (ref 1–32)
BASOPHILS # BLD AUTO: 0.06 10*3/MM3 (ref 0–0.2)
BASOPHILS NFR BLD AUTO: 0.9 % (ref 0–1.5)
BILIRUB SERPL-MCNC: 0.4 MG/DL (ref 0–1.2)
BUN SERPL-MCNC: 14 MG/DL (ref 8–23)
BUN/CREAT SERPL: 23.7 (ref 7–25)
CALCIUM SERPL-MCNC: 9.6 MG/DL (ref 8.6–10.5)
CHLORIDE SERPL-SCNC: 102 MMOL/L (ref 98–107)
CHOLEST SERPL-MCNC: 206 MG/DL (ref 0–200)
CO2 SERPL-SCNC: 28.7 MMOL/L (ref 22–29)
CREAT SERPL-MCNC: 0.59 MG/DL (ref 0.57–1)
EGFRCR SERPLBLD CKD-EPI 2021: 99.5 ML/MIN/1.73
EOSINOPHIL # BLD AUTO: 0.11 10*3/MM3 (ref 0–0.4)
EOSINOPHIL NFR BLD AUTO: 1.7 % (ref 0.3–6.2)
ERYTHROCYTE [DISTWIDTH] IN BLOOD BY AUTOMATED COUNT: 12.4 % (ref 12.3–15.4)
GLOBULIN SER CALC-MCNC: 2.6 GM/DL
GLUCOSE SERPL-MCNC: 94 MG/DL (ref 65–99)
HBA1C MFR BLD: 7 % (ref 4.8–5.6)
HCT VFR BLD AUTO: 44.1 % (ref 34–46.6)
HDLC SERPL-MCNC: 48 MG/DL (ref 40–60)
HGB BLD-MCNC: 14.6 G/DL (ref 12–15.9)
IMM GRANULOCYTES # BLD AUTO: 0.03 10*3/MM3 (ref 0–0.05)
IMM GRANULOCYTES NFR BLD AUTO: 0.5 % (ref 0–0.5)
LDLC SERPL CALC-MCNC: 127 MG/DL (ref 0–100)
LYMPHOCYTES # BLD AUTO: 1.74 10*3/MM3 (ref 0.7–3.1)
LYMPHOCYTES NFR BLD AUTO: 26.4 % (ref 19.6–45.3)
MCH RBC QN AUTO: 28.7 PG (ref 26.6–33)
MCHC RBC AUTO-ENTMCNC: 33.1 G/DL (ref 31.5–35.7)
MCV RBC AUTO: 86.6 FL (ref 79–97)
MONOCYTES # BLD AUTO: 0.43 10*3/MM3 (ref 0.1–0.9)
MONOCYTES NFR BLD AUTO: 6.5 % (ref 5–12)
NEUTROPHILS # BLD AUTO: 4.21 10*3/MM3 (ref 1.7–7)
NEUTROPHILS NFR BLD AUTO: 64 % (ref 42.7–76)
NRBC BLD AUTO-RTO: 0 /100 WBC (ref 0–0.2)
PLATELET # BLD AUTO: 307 10*3/MM3 (ref 140–450)
POTASSIUM SERPL-SCNC: 4.6 MMOL/L (ref 3.5–5.2)
PROT SERPL-MCNC: 6.9 G/DL (ref 6–8.5)
RBC # BLD AUTO: 5.09 10*6/MM3 (ref 3.77–5.28)
SODIUM SERPL-SCNC: 140 MMOL/L (ref 136–145)
TRIGL SERPL-MCNC: 174 MG/DL (ref 0–150)
VLDLC SERPL CALC-MCNC: 31 MG/DL (ref 5–40)
WBC # BLD AUTO: 6.58 10*3/MM3 (ref 3.4–10.8)

## 2024-03-12 ENCOUNTER — HOSPITAL ENCOUNTER (OUTPATIENT)
Dept: MAMMOGRAPHY | Facility: HOSPITAL | Age: 69
Discharge: HOME | End: 2024-03-12
Admitting: FAMILY MEDICINE
Payer: MEDICARE

## 2024-03-12 DIAGNOSIS — Z12.31 SCREENING MAMMOGRAM FOR BREAST CANCER: ICD-10-CM

## 2024-03-12 PROCEDURE — 77067 SCR MAMMO BI INCL CAD: CPT

## 2024-03-12 PROCEDURE — 77063 BREAST TOMOSYNTHESIS BI: CPT

## 2024-03-19 DIAGNOSIS — E78.2 MIXED HYPERLIPIDEMIA: Primary | ICD-10-CM

## 2024-03-19 RX ORDER — PITAVASTATIN CALCIUM 1.04 MG/1
1 TABLET, FILM COATED ORAL NIGHTLY
Qty: 30 TABLET | Refills: 2 | Status: SHIPPED | OUTPATIENT
Start: 2024-03-19

## 2024-04-25 DIAGNOSIS — M51.26 HERNIATED LUMBAR INTERVERTEBRAL DISC: ICD-10-CM

## 2024-04-25 DIAGNOSIS — M96.1 POSTLAMINECTOMY SYNDROME OF LUMBAR REGION: ICD-10-CM

## 2024-04-25 NOTE — TELEPHONE ENCOUNTER
Caller: Yi Garcia JYOTI    Relationship: Self    Best call back number: [phone]     Requested Prescriptions:   Requested Prescriptions     Pending Prescriptions Disp Refills    traMADol (ULTRAM) 50 MG tablet 90 tablet 1     Sig: Take 1 tablet by mouth Every 8 (Eight) Hours As Needed for Moderate Pain.    90 DAY SUPPLY    Pharmacy where request should be sent: [address] [phone] [facility] [phone]      Last office visit with prescribing clinician: 2/28/2024   Last telemedicine visit with prescribing clinician: Visit date not found   Next office visit with prescribing clinician: 5/29/2024     Additional details provided by patient:     Does the patient have less than a 3 day supply:  [] Yes  [x] No    Would you like a call back once the refill request has been completed: [x] Yes [] No    If the office needs to give you a call back, can they leave a voicemail: [x] Yes [] No    Venancio Hylton Rep   04/25/24 13:56 EDT

## 2024-04-26 RX ORDER — TRAMADOL HYDROCHLORIDE 50 MG/1
50 TABLET ORAL EVERY 8 HOURS PRN
Qty: 90 TABLET | Refills: 1 | Status: SHIPPED | OUTPATIENT
Start: 2024-04-26

## 2024-06-03 DIAGNOSIS — I10 PRIMARY HYPERTENSION: ICD-10-CM

## 2024-06-03 RX ORDER — LOSARTAN POTASSIUM 100 MG/1
100 TABLET ORAL DAILY
Qty: 90 TABLET | Refills: 1 | Status: SHIPPED | OUTPATIENT
Start: 2024-06-03

## 2024-06-03 NOTE — TELEPHONE ENCOUNTER
Caller: Yi Garcia    Relationship: Self    Best call back number: [phone]    Requested Prescriptions:   Requested Prescriptions     Pending Prescriptions Disp Refills    losartan (COZAAR) 100 MG tablet 90 tablet 1     Sig: Take 1 tablet by mouth Daily.        Pharmacy where request should be sent: [facility] [address] [phone] [facility] [phone]      Last office visit with prescribing clinician: 2/28/2024   Last telemedicine visit with prescribing clinician: Visit date not found   Next office visit with prescribing clinician: Visit date not found     Additional details provided by patient:     Does the patient have less than a 3 day supply:  [x] Yes  [] No    Venancio Marr Rep   06/03/24 10:09 EDT

## 2024-06-20 ENCOUNTER — HOSPITAL ENCOUNTER (OUTPATIENT)
Dept: HOSPITAL 22 - LAB.DROPOF | Age: 69
Discharge: HOME | End: 2024-06-20
Payer: MEDICARE

## 2024-06-20 DIAGNOSIS — E78.5: ICD-10-CM

## 2024-06-20 DIAGNOSIS — E55.9: ICD-10-CM

## 2024-06-20 DIAGNOSIS — R53.83: Primary | ICD-10-CM

## 2024-06-20 DIAGNOSIS — I10: ICD-10-CM

## 2024-06-20 DIAGNOSIS — R73.09: ICD-10-CM

## 2024-06-20 LAB
25-OH VITAMIN D, TOTAL: 88.3 NG/ML (ref 30–100)
ALBUMIN LEVEL: 4.4 G/DL (ref 3.5–5)
ALBUMIN/GLOB SERPL: 1.3 {RATIO} (ref 1.1–1.8)
ALP ISO SERPL-ACNC: 186 U/L (ref 38–126)
ALT SERPLBLD-CCNC: 49 U/L (ref 12–78)
ANION GAP SERPL CALC-SCNC: 13.6 MEQ/L (ref 5–15)
AST SERPL QL: 45 U/L (ref 14–36)
BILIRUBIN,TOTAL: 0.6 MG/DL (ref 0.2–1.3)
BUN SERPL-MCNC: 18 MG/DL (ref 7–17)
CALCIUM SPEC-MCNC: 10.4 MG/DL (ref 8.4–10.2)
CHLORIDE SPEC-SCNC: 101 MMOL/L (ref 98–107)
CHOLEST SPEC-SCNC: 238 MG/DL (ref 140–200)
CO2 SERPL-SCNC: 31 MMOL/L (ref 22–30)
CREAT BLD-SCNC: 0.6 MG/DL (ref 0.52–1.04)
ESTIMATED GLOMERULAR FILT RATE: 100 ML/MIN (ref 60–?)
GFR (AFRICAN AMERICAN): 121 ML/MIN (ref 60–?)
GLOBULIN SER CALC-MCNC: 3.3 G/DL (ref 1.3–3.2)
GLUCOSE: 174 MG/DL (ref 74–100)
HBA1C MFR BLD: 8.1 % (ref 4–6)
HCT VFR BLD CALC: 47.9 % (ref 37–47)
HDLC SERPL-MCNC: 55 MG/DL (ref 40–60)
HGB BLD-MCNC: 15.1 G/DL (ref 12.2–16.2)
MCHC RBC-ENTMCNC: 31.5 G/DL (ref 31.8–35.4)
MCV RBC: 92.6 FL (ref 81–99)
MEAN CORPUSCULAR HEMOGLOBIN: 29.1 PG (ref 27–31.2)
PLATELET # BLD: 320 K/MM3 (ref 142–424)
POTASSIUM: 4.6 MMOL/L (ref 3.5–5.1)
PROT SERPL-MCNC: 7.7 G/DL (ref 6.3–8.2)
RBC # BLD AUTO: 5.17 M/MM3 (ref 4.2–5.4)
SODIUM SPEC-SCNC: 141 MMOL/L (ref 136–145)
TRIGLYCERIDES: 195 MG/DL (ref 30–150)
TSH SERPL-ACNC: 7.21 UIU/ML (ref 0.47–4.68)
URATE SERPL-SCNC: 2.8 MG/DL (ref 2.5–6.2)
VITAMIN B12: 760 PG/ML (ref 239–931)
WBC # BLD AUTO: 6.4 K/MM3 (ref 4.8–10.8)

## 2024-06-20 PROCEDURE — 83036 HEMOGLOBIN GLYCOSYLATED A1C: CPT

## 2024-06-20 PROCEDURE — 82306 VITAMIN D 25 HYDROXY: CPT

## 2024-06-20 PROCEDURE — 84550 ASSAY OF BLOOD/URIC ACID: CPT

## 2024-06-20 PROCEDURE — 82607 VITAMIN B-12: CPT

## 2024-06-20 PROCEDURE — 84443 ASSAY THYROID STIM HORMONE: CPT

## 2024-06-20 PROCEDURE — 80050 GENERAL HEALTH PANEL: CPT

## 2024-06-20 PROCEDURE — 80053 COMPREHEN METABOLIC PANEL: CPT

## 2024-06-20 PROCEDURE — 85651 RBC SED RATE NONAUTOMATED: CPT

## 2024-06-20 PROCEDURE — 86038 ANTINUCLEAR ANTIBODIES: CPT

## 2024-06-20 PROCEDURE — 86431 RHEUMATOID FACTOR QUANT: CPT

## 2024-06-20 PROCEDURE — 80061 LIPID PANEL: CPT

## 2024-06-20 PROCEDURE — 85025 COMPLETE CBC W/AUTO DIFF WBC: CPT

## 2024-06-21 LAB — RA LATEX TURBID.: <10 IU/ML (ref ?–14)

## 2024-07-01 RX ORDER — OMEPRAZOLE 40 MG/1
40 CAPSULE, DELAYED RELEASE ORAL DAILY
Qty: 90 CAPSULE | Refills: 1 | Status: SHIPPED | OUTPATIENT
Start: 2024-07-01

## 2024-07-13 LAB — ANTINUCLEAR ANTIBODIES, IFA: POSITIVE

## 2024-07-24 ENCOUNTER — HOSPITAL ENCOUNTER (OUTPATIENT)
Dept: HOSPITAL 22 - RT | Age: 69
Discharge: HOME | End: 2024-07-24
Payer: MEDICARE

## 2024-07-24 DIAGNOSIS — I73.9: Primary | ICD-10-CM

## 2024-07-24 PROCEDURE — 93925 LOWER EXTREMITY STUDY: CPT

## 2024-07-31 ENCOUNTER — HOSPITAL ENCOUNTER (EMERGENCY)
Age: 69
LOS: 1 days | Discharge: HOME | End: 2024-08-01
Payer: MEDICARE

## 2024-07-31 ENCOUNTER — HOSPITAL ENCOUNTER (OUTPATIENT)
Dept: HOSPITAL 22 - LAB | Age: 69
Discharge: HOME | End: 2024-07-31
Payer: MEDICARE

## 2024-07-31 VITALS
RESPIRATION RATE: 20 BRPM | OXYGEN SATURATION: 95 % | HEART RATE: 130 BPM | TEMPERATURE: 98.6 F | SYSTOLIC BLOOD PRESSURE: 186 MMHG | DIASTOLIC BLOOD PRESSURE: 102 MMHG

## 2024-07-31 VITALS — BODY MASS INDEX: 32.4 KG/M2

## 2024-07-31 DIAGNOSIS — E78.5: ICD-10-CM

## 2024-07-31 DIAGNOSIS — E11.65: ICD-10-CM

## 2024-07-31 DIAGNOSIS — T50.995A: ICD-10-CM

## 2024-07-31 DIAGNOSIS — Z20.1: ICD-10-CM

## 2024-07-31 DIAGNOSIS — Z79.84: ICD-10-CM

## 2024-07-31 DIAGNOSIS — I10: Primary | ICD-10-CM

## 2024-07-31 DIAGNOSIS — Z20.1: Primary | ICD-10-CM

## 2024-07-31 DIAGNOSIS — R00.0: ICD-10-CM

## 2024-07-31 DIAGNOSIS — R51.9: ICD-10-CM

## 2024-07-31 DIAGNOSIS — Z79.4: ICD-10-CM

## 2024-07-31 LAB
ALBUMIN LEVEL: 4.1 G/DL (ref 3.5–5)
ALBUMIN LEVEL: 4.2 G/DL (ref 3.5–5)
ALBUMIN/GLOB SERPL: 1.3 {RATIO} (ref 1.1–1.8)
ALBUMIN/GLOB SERPL: 1.4 {RATIO} (ref 1.1–1.8)
ALP ISO SERPL-ACNC: 172 U/L (ref 38–126)
ALP ISO SERPL-ACNC: 247 U/L (ref 38–126)
ALT SERPLBLD-CCNC: 81 U/L (ref 12–78)
ALT SERPLBLD-CCNC: 82 U/L (ref 12–78)
ANION GAP SERPL CALC-SCNC: 11.2 MEQ/L (ref 5–15)
ANION GAP SERPL CALC-SCNC: 11.4 MEQ/L (ref 5–15)
AST SERPL QL: 54 U/L (ref 14–36)
AST SERPL QL: 56 U/L (ref 14–36)
BILIRUBIN,TOTAL: 0.4 MG/DL (ref 0.2–1.3)
BILIRUBIN,TOTAL: 0.4 MG/DL (ref 0.2–1.3)
BUN SERPL-MCNC: 13 MG/DL (ref 7–17)
BUN SERPL-MCNC: 16 MG/DL (ref 7–17)
CALCIUM SPEC-MCNC: 9.5 MG/DL (ref 8.4–10.2)
CALCIUM SPEC-MCNC: 9.9 MG/DL (ref 8.4–10.2)
CHLORIDE SPEC-SCNC: 104 MMOL/L (ref 98–107)
CHLORIDE SPEC-SCNC: 105 MMOL/L (ref 98–107)
CO2 SERPL-SCNC: 25 MMOL/L (ref 22–30)
CO2 SERPL-SCNC: 29 MMOL/L (ref 22–30)
CREAT BLD-SCNC: 0.5 MG/DL (ref 0.52–1.04)
CREAT BLD-SCNC: 0.5 MG/DL (ref 0.52–1.04)
CREATININE CLEARANCE ESTIMATED: 73 ML/MIN (ref 50–200)
ESTIMATED GLOMERULAR FILT RATE: 123 ML/MIN (ref 60–?)
ESTIMATED GLOMERULAR FILT RATE: 123 ML/MIN (ref 60–?)
GFR (AFRICAN AMERICAN): 149 ML/MIN (ref 60–?)
GFR (AFRICAN AMERICAN): 149 ML/MIN (ref 60–?)
GLOBULIN SER CALC-MCNC: 3 G/DL (ref 1.3–3.2)
GLOBULIN SER CALC-MCNC: 3.2 G/DL (ref 1.3–3.2)
GLUCOSE: 107 MG/DL (ref 74–100)
GLUCOSE: 416 MG/DL (ref 74–100)
HCT VFR BLD CALC: 43.8 % (ref 37–47)
HCT VFR BLD CALC: 44.3 % (ref 37–47)
HGB BLD-MCNC: 14 G/DL (ref 12.2–16.2)
HGB BLD-MCNC: 15.6 G/DL (ref 12.2–16.2)
MAGNESIUM: 1.9 MG/DL (ref 1.6–2.3)
MCHC RBC-ENTMCNC: 31.9 G/DL (ref 31.8–35.4)
MCHC RBC-ENTMCNC: 35.2 G/DL (ref 31.8–35.4)
MCV RBC: 90 FL (ref 81–99)
MCV RBC: 93.7 FL (ref 81–99)
MEAN CORPUSCULAR HEMOGLOBIN: 29.9 PG (ref 27–31.2)
MEAN CORPUSCULAR HEMOGLOBIN: 31.7 PG (ref 27–31.2)
NT PRO BRAIN NATRIURETIC PEP.: < 20 PG/ML (ref 0–125)
PLATELET # BLD: 262 K/MM3 (ref 142–424)
PLATELET # BLD: 265 K/MM3 (ref 142–424)
POTASSIUM: 4.2 MMOL/L (ref 3.5–5.1)
POTASSIUM: 4.4 MMOL/L (ref 3.5–5.1)
PROT SERPL-MCNC: 7.2 G/DL (ref 6.3–8.2)
PROT SERPL-MCNC: 7.3 G/DL (ref 6.3–8.2)
RBC # BLD AUTO: 4.68 M/MM3 (ref 4.2–5.4)
RBC # BLD AUTO: 4.92 M/MM3 (ref 4.2–5.4)
SODIUM SPEC-SCNC: 137 MMOL/L (ref 136–145)
SODIUM SPEC-SCNC: 140 MMOL/L (ref 136–145)
T4 (THYROXINE): 11.2 UG/DL (ref 5.53–11)
TROPONIN I: < 0.01 NG/ML (ref 0–0.03)
TSH SERPL-ACNC: 0.81 UIU/ML (ref 0.47–4.68)
WBC # BLD AUTO: 6.4 K/MM3 (ref 4.8–10.8)
WBC # BLD AUTO: 8 K/MM3 (ref 4.8–10.8)

## 2024-07-31 PROCEDURE — 86480 TB TEST CELL IMMUN MEASURE: CPT

## 2024-07-31 PROCEDURE — 87636 SARSCOV2 & INF A&B AMP PRB: CPT

## 2024-07-31 PROCEDURE — 85025 COMPLETE CBC W/AUTO DIFF WBC: CPT

## 2024-07-31 PROCEDURE — 84436 ASSAY OF TOTAL THYROXINE: CPT

## 2024-07-31 PROCEDURE — 36415 COLL VENOUS BLD VENIPUNCTURE: CPT

## 2024-07-31 PROCEDURE — 80053 COMPREHEN METABOLIC PANEL: CPT

## 2024-07-31 PROCEDURE — 83735 ASSAY OF MAGNESIUM: CPT

## 2024-07-31 PROCEDURE — 93005 ELECTROCARDIOGRAM TRACING: CPT

## 2024-07-31 PROCEDURE — 96374 THER/PROPH/DIAG INJ IV PUSH: CPT

## 2024-07-31 PROCEDURE — 99285 EMERGENCY DEPT VISIT HI MDM: CPT

## 2024-07-31 PROCEDURE — 84484 ASSAY OF TROPONIN QUANT: CPT

## 2024-07-31 PROCEDURE — 70450 CT HEAD/BRAIN W/O DYE: CPT

## 2024-07-31 PROCEDURE — 70498 CT ANGIOGRAPHY NECK: CPT

## 2024-07-31 PROCEDURE — 71046 X-RAY EXAM CHEST 2 VIEWS: CPT

## 2024-07-31 PROCEDURE — 70496 CT ANGIOGRAPHY HEAD: CPT

## 2024-07-31 PROCEDURE — 80050 GENERAL HEALTH PANEL: CPT

## 2024-07-31 PROCEDURE — 71275 CT ANGIOGRAPHY CHEST: CPT

## 2024-07-31 PROCEDURE — 83880 ASSAY OF NATRIURETIC PEPTIDE: CPT

## 2024-07-31 PROCEDURE — 96361 HYDRATE IV INFUSION ADD-ON: CPT

## 2024-07-31 PROCEDURE — 84443 ASSAY THYROID STIM HORMONE: CPT

## 2024-07-31 PROCEDURE — 96375 TX/PRO/DX INJ NEW DRUG ADDON: CPT

## 2024-08-01 VITALS
DIASTOLIC BLOOD PRESSURE: 72 MMHG | HEART RATE: 90 BPM | OXYGEN SATURATION: 97 % | TEMPERATURE: 98.6 F | RESPIRATION RATE: 18 BRPM | SYSTOLIC BLOOD PRESSURE: 139 MMHG

## 2024-09-13 DIAGNOSIS — K59.04 CHRONIC IDIOPATHIC CONSTIPATION: ICD-10-CM

## 2024-09-13 DIAGNOSIS — E78.2 MIXED HYPERLIPIDEMIA: Chronic | ICD-10-CM

## 2024-09-13 RX ORDER — PLECANATIDE 3 MG/1
3 TABLET ORAL DAILY
Qty: 90 TABLET | Refills: 3 | OUTPATIENT
Start: 2024-09-13

## 2024-09-13 RX ORDER — EZETIMIBE 10 MG/1
10 TABLET ORAL DAILY
Qty: 90 TABLET | Refills: 3 | Status: SHIPPED | OUTPATIENT
Start: 2024-09-13

## 2024-10-07 ENCOUNTER — HOSPITAL ENCOUNTER (OUTPATIENT)
Dept: HOSPITAL 22 - RAD | Age: 69
Discharge: HOME | End: 2024-10-07
Payer: MEDICARE

## 2024-10-07 ENCOUNTER — HOSPITAL ENCOUNTER (OUTPATIENT)
Dept: HOSPITAL 22 - LAB.DROPOF | Age: 69
Discharge: HOME | End: 2024-10-07
Payer: MEDICARE

## 2024-10-07 DIAGNOSIS — D64.9: ICD-10-CM

## 2024-10-07 DIAGNOSIS — K31.84: ICD-10-CM

## 2024-10-07 DIAGNOSIS — E03.9: ICD-10-CM

## 2024-10-07 DIAGNOSIS — E11.65: Primary | ICD-10-CM

## 2024-10-07 DIAGNOSIS — K59.00: ICD-10-CM

## 2024-10-07 DIAGNOSIS — R06.02: Primary | ICD-10-CM

## 2024-10-07 DIAGNOSIS — M25.561: ICD-10-CM

## 2024-10-07 DIAGNOSIS — R05.1: ICD-10-CM

## 2024-10-07 DIAGNOSIS — R06.02: ICD-10-CM

## 2024-10-07 DIAGNOSIS — E78.2: ICD-10-CM

## 2024-10-07 DIAGNOSIS — R00.2: ICD-10-CM

## 2024-10-07 LAB
ALBUMIN LEVEL: 4.4 G/DL (ref 3.5–5)
ALBUMIN/GLOB SERPL: 1.5 {RATIO} (ref 1.1–1.8)
ALP ISO SERPL-ACNC: 188 U/L (ref 38–126)
ALT SERPLBLD-CCNC: 56 U/L (ref 12–78)
ANION GAP SERPL CALC-SCNC: 9.6 MEQ/L (ref 5–15)
AST SERPL QL: 51 U/L (ref 14–36)
BILIRUBIN,TOTAL: 0.5 MG/DL (ref 0.2–1.3)
BUN SERPL-MCNC: 13 MG/DL (ref 7–17)
CALCIUM SPEC-MCNC: 9.8 MG/DL (ref 8.4–10.2)
CHLORIDE SPEC-SCNC: 103 MMOL/L (ref 98–107)
CO2 SERPL-SCNC: 31 MMOL/L (ref 22–30)
CREAT BLD-SCNC: 0.4 MG/DL (ref 0.52–1.04)
ESTIMATED GLOMERULAR FILT RATE: 159 ML/MIN (ref 60–?)
FERRITIN SERPL-MCNC: 51.8 NG/ML (ref 11.1–264)
GFR (AFRICAN AMERICAN): 193 ML/MIN (ref 60–?)
GLOBULIN SER CALC-MCNC: 3 G/DL (ref 1.3–3.2)
GLUCOSE: 172 MG/DL (ref 74–100)
HBA1C MFR BLD: 8.6 % (ref 4–6)
HCT VFR BLD CALC: 44.9 % (ref 37–47)
HGB BLD-MCNC: 15.1 G/DL (ref 12.2–16.2)
MAGNESIUM: 1.8 MG/DL (ref 1.6–2.3)
MCHC RBC-ENTMCNC: 33.6 G/DL (ref 31.8–35.4)
MCV RBC: 87.6 FL (ref 81–99)
MEAN CORPUSCULAR HEMOGLOBIN: 29.5 PG (ref 27–31.2)
PLATELET # BLD: 253 K/MM3 (ref 142–424)
POTASSIUM: 4.6 MMOL/L (ref 3.5–5.1)
PROT SERPL-MCNC: 7.4 G/DL (ref 6.3–8.2)
RBC # BLD AUTO: 5.12 M/MM3 (ref 4.2–5.4)
SODIUM SPEC-SCNC: 139 MMOL/L (ref 136–145)
TSH SERPL-ACNC: 2.67 UIU/ML (ref 0.47–4.68)
VITAMIN B12: > 1000 PG/ML (ref 239–931)
WBC # BLD AUTO: 4.7 K/MM3 (ref 4.8–10.8)

## 2024-10-07 PROCEDURE — 83036 HEMOGLOBIN GLYCOSYLATED A1C: CPT

## 2024-10-07 PROCEDURE — 82728 ASSAY OF FERRITIN: CPT

## 2024-10-07 PROCEDURE — 82043 UR ALBUMIN QUANTITATIVE: CPT

## 2024-10-07 PROCEDURE — 83735 ASSAY OF MAGNESIUM: CPT

## 2024-10-07 PROCEDURE — 85025 COMPLETE CBC W/AUTO DIFF WBC: CPT

## 2024-10-07 PROCEDURE — 80050 GENERAL HEALTH PANEL: CPT

## 2024-10-07 PROCEDURE — 82607 VITAMIN B-12: CPT

## 2024-10-07 PROCEDURE — 82570 ASSAY OF URINE CREATININE: CPT

## 2024-10-07 PROCEDURE — 84443 ASSAY THYROID STIM HORMONE: CPT

## 2024-10-07 PROCEDURE — 71046 X-RAY EXAM CHEST 2 VIEWS: CPT

## 2024-10-07 PROCEDURE — 84481 FREE ASSAY (FT-3): CPT

## 2024-10-07 PROCEDURE — 73562 X-RAY EXAM OF KNEE 3: CPT

## 2024-10-07 PROCEDURE — 80053 COMPREHEN METABOLIC PANEL: CPT

## 2024-10-08 LAB — T3FREE SERPL-MCNC: 3.4 PG/ML (ref 2–4.4)

## 2024-10-15 ENCOUNTER — HOSPITAL ENCOUNTER (OUTPATIENT)
Dept: HOSPITAL 22 - RT | Age: 69
Discharge: HOME | End: 2024-10-15
Payer: MEDICARE

## 2024-10-15 DIAGNOSIS — I36.1: Primary | ICD-10-CM

## 2024-10-15 DIAGNOSIS — R06.09: ICD-10-CM

## 2024-10-15 PROCEDURE — 93306 TTE W/DOPPLER COMPLETE: CPT

## 2024-10-23 ENCOUNTER — HOSPITAL ENCOUNTER (OUTPATIENT)
Dept: HOSPITAL 22 - RT | Age: 69
Discharge: HOME | End: 2024-10-23
Payer: MEDICARE

## 2024-10-23 DIAGNOSIS — R05.1: ICD-10-CM

## 2024-10-23 DIAGNOSIS — R06.02: Primary | ICD-10-CM

## 2024-10-23 PROCEDURE — 94060 EVALUATION OF WHEEZING: CPT

## 2024-11-01 ENCOUNTER — HOSPITAL ENCOUNTER (OUTPATIENT)
Dept: HOSPITAL 22 - LAB | Age: 69
Discharge: HOME | End: 2024-11-01
Payer: MEDICARE

## 2024-11-01 DIAGNOSIS — E03.9: ICD-10-CM

## 2024-11-01 DIAGNOSIS — R73.9: ICD-10-CM

## 2024-11-01 DIAGNOSIS — R07.89: ICD-10-CM

## 2024-11-01 DIAGNOSIS — I51.89: Primary | ICD-10-CM

## 2024-11-01 DIAGNOSIS — E78.2: ICD-10-CM

## 2024-11-01 DIAGNOSIS — R60.0: ICD-10-CM

## 2024-11-01 DIAGNOSIS — R06.02: ICD-10-CM

## 2024-11-01 DIAGNOSIS — I10: ICD-10-CM

## 2024-11-01 DIAGNOSIS — R40.0: ICD-10-CM

## 2024-11-01 DIAGNOSIS — I50.30: ICD-10-CM

## 2024-11-01 DIAGNOSIS — R60.9: ICD-10-CM

## 2024-11-01 LAB
ALBUMIN LEVEL: 4.4 G/DL (ref 3.5–5)
ALP ISO SERPL-ACNC: 162 U/L (ref 38–126)
ALT SERPLBLD-CCNC: 72 U/L (ref 12–78)
ANION GAP SERPL CALC-SCNC: 12.7 MEQ/L (ref 5–15)
AST SERPL QL: 74 U/L (ref 14–36)
BILIRUB DIRECT SERPL-MCNC: 0.2 MG/DL (ref 0–0.4)
BILIRUB INDIRECT SERPL-MCNC: 0.7 MG/DL (ref 0–0.9)
BILIRUB INDIRECT SERPL-MCNC: 0.7 MG/DL (ref 0–1.1)
BILIRUBIN,TOTAL: 0.9 MG/DL (ref 0.2–1.3)
BUN SERPL-MCNC: 14 MG/DL (ref 7–17)
CALCIUM SPEC-MCNC: 9.7 MG/DL (ref 8.4–10.2)
CHLORIDE SPEC-SCNC: 103 MMOL/L (ref 98–107)
CHOLEST SPEC-SCNC: 267 MG/DL (ref 140–200)
CO2 SERPL-SCNC: 29 MMOL/L (ref 22–30)
CREAT BLD-SCNC: 0.5 MG/DL (ref 0.52–1.04)
ESTIMATED GLOMERULAR FILT RATE: 123 ML/MIN (ref 60–?)
GFR (AFRICAN AMERICAN): 149 ML/MIN (ref 60–?)
GLUCOSE: 176 MG/DL (ref 74–100)
HCT VFR BLD CALC: 47.9 % (ref 37–47)
HDLC SERPL-MCNC: 52 MG/DL (ref 40–60)
HGB BLD-MCNC: 15.7 G/DL (ref 12.2–16.2)
MCHC RBC-ENTMCNC: 32.9 G/DL (ref 31.8–35.4)
MCV RBC: 87.8 FL (ref 81–99)
MEAN CORPUSCULAR HEMOGLOBIN: 28.9 PG (ref 27–31.2)
PLATELET # BLD: 277 K/MM3 (ref 142–424)
POTASSIUM: 4.7 MMOL/L (ref 3.5–5.1)
PROT SERPL-MCNC: 7.5 G/DL (ref 6.3–8.2)
RBC # BLD AUTO: 5.45 M/MM3 (ref 4.2–5.4)
SODIUM SPEC-SCNC: 140 MMOL/L (ref 136–145)
T4 FREE SERPL-MCNC: 1.08 NG/DL (ref 0.78–2.19)
TRIGLYCERIDES: 186 MG/DL (ref 30–150)
TSH SERPL-ACNC: 1.88 UIU/ML (ref 0.47–4.68)
WBC # BLD AUTO: 7.7 K/MM3 (ref 4.8–10.8)

## 2024-11-01 PROCEDURE — 80061 LIPID PANEL: CPT

## 2024-11-01 PROCEDURE — 86335 IMMUNFIX E-PHORSIS/URINE/CSF: CPT

## 2024-11-01 PROCEDURE — 84155 ASSAY OF PROTEIN SERUM: CPT

## 2024-11-01 PROCEDURE — 84443 ASSAY THYROID STIM HORMONE: CPT

## 2024-11-01 PROCEDURE — 83883 ASSAY NEPHELOMETRY NOT SPEC: CPT

## 2024-11-01 PROCEDURE — 82784 ASSAY IGA/IGD/IGG/IGM EACH: CPT

## 2024-11-01 PROCEDURE — 84166 PROTEIN E-PHORESIS/URINE/CSF: CPT

## 2024-11-01 PROCEDURE — 36415 COLL VENOUS BLD VENIPUNCTURE: CPT

## 2024-11-01 PROCEDURE — 84156 ASSAY OF PROTEIN URINE: CPT

## 2024-11-01 PROCEDURE — 80048 BASIC METABOLIC PNL TOTAL CA: CPT

## 2024-11-01 PROCEDURE — 84439 ASSAY OF FREE THYROXINE: CPT

## 2024-11-01 PROCEDURE — 86334 IMMUNOFIX E-PHORESIS SERUM: CPT

## 2024-11-01 PROCEDURE — 80076 HEPATIC FUNCTION PANEL: CPT

## 2024-11-01 PROCEDURE — 84165 PROTEIN E-PHORESIS SERUM: CPT

## 2024-11-01 PROCEDURE — 85025 COMPLETE CBC W/AUTO DIFF WBC: CPT

## 2024-11-04 ENCOUNTER — HOSPITAL ENCOUNTER (OUTPATIENT)
Dept: HOSPITAL 22 - LAB | Age: 69
Discharge: HOME | End: 2024-11-04
Payer: MEDICARE

## 2024-11-04 DIAGNOSIS — R40.0: ICD-10-CM

## 2024-11-04 DIAGNOSIS — I51.89: ICD-10-CM

## 2024-11-04 DIAGNOSIS — E78.2: ICD-10-CM

## 2024-11-04 DIAGNOSIS — R60.0: ICD-10-CM

## 2024-11-04 DIAGNOSIS — I50.30: Primary | ICD-10-CM

## 2024-11-04 DIAGNOSIS — R73.9: ICD-10-CM

## 2024-11-04 DIAGNOSIS — I10: ICD-10-CM

## 2024-11-04 DIAGNOSIS — E03.9: ICD-10-CM

## 2024-11-04 DIAGNOSIS — R06.02: ICD-10-CM

## 2024-11-04 DIAGNOSIS — R07.89: ICD-10-CM

## 2024-11-04 LAB
ALBUMIN SERPL ELPH-MCNC: 3.5 G/DL (ref 2.9–4.4)
ALPHA1 GLOB SERPL ELPH-MCNC: 0.3 G/DL (ref 0–0.4)
ALPHA2 GLOB SERPL ELPH-MCNC: 1 G/DL (ref 0.4–1)
GAMMA GLOBULIN: 1.1 G/DL (ref 0.4–1.8)
PROT SERPL-MCNC: 7.6 G/DL (ref 6.3–8.2)

## 2024-11-04 PROCEDURE — 84156 ASSAY OF PROTEIN URINE: CPT

## 2024-11-04 PROCEDURE — 84155 ASSAY OF PROTEIN SERUM: CPT

## 2024-11-04 PROCEDURE — 36415 COLL VENOUS BLD VENIPUNCTURE: CPT

## 2024-11-04 PROCEDURE — 84166 PROTEIN E-PHORESIS/URINE/CSF: CPT

## 2024-11-05 LAB
ALBUMIN, U: 29.8 %
ALPHA-1-GLOBULIN, U: 4.1 %
ALPHA-2-GLOBULIN, U: 16.2 %
BETA GLOBULIN, U: 26.3 %
GAMMA GLOBULIN, U: 23.6 %
IMMUNOGLOBULIN A, QN: 264 MG/DL (ref 87–352)
IMMUNOGLOBULIN G, QN: 1091 MG/DL (ref 586–1602)
IMMUNOGLOBULIN M, QN: 164 MG/DL (ref 26–217)

## 2024-11-06 LAB
ALBUMIN, U: 24.8 %
ALPHA-1-GLOBULIN, U: 17.6 %
ALPHA-2-GLOBULIN, U: 17.6 %
BETA GLOBULIN, U: 22 %
GAMMA GLOBULIN, U: 18 %
M-SPIKE, MG/24 HR: (no result)
PROT,24HR CALCULATED: 148 MG/24 HR (ref 30–150)

## 2024-11-08 ENCOUNTER — HOSPITAL ENCOUNTER (OUTPATIENT)
Age: 69
Discharge: HOME | End: 2024-11-08
Payer: MEDICARE

## 2024-11-08 VITALS
DIASTOLIC BLOOD PRESSURE: 62 MMHG | RESPIRATION RATE: 16 BRPM | SYSTOLIC BLOOD PRESSURE: 140 MMHG | HEART RATE: 78 BPM | OXYGEN SATURATION: 95 %

## 2024-11-08 VITALS — BODY MASS INDEX: 34.4 KG/M2 | BODY MASS INDEX: 34.2 KG/M2

## 2024-11-08 VITALS
HEART RATE: 86 BPM | DIASTOLIC BLOOD PRESSURE: 67 MMHG | SYSTOLIC BLOOD PRESSURE: 103 MMHG | OXYGEN SATURATION: 97 % | RESPIRATION RATE: 18 BRPM

## 2024-11-08 VITALS
SYSTOLIC BLOOD PRESSURE: 140 MMHG | DIASTOLIC BLOOD PRESSURE: 85 MMHG | RESPIRATION RATE: 18 BRPM | HEART RATE: 79 BPM | OXYGEN SATURATION: 95 %

## 2024-11-08 VITALS
DIASTOLIC BLOOD PRESSURE: 90 MMHG | SYSTOLIC BLOOD PRESSURE: 174 MMHG | OXYGEN SATURATION: 94 % | RESPIRATION RATE: 18 BRPM | HEART RATE: 80 BPM

## 2024-11-08 DIAGNOSIS — R60.9: ICD-10-CM

## 2024-11-08 DIAGNOSIS — E78.2: ICD-10-CM

## 2024-11-08 DIAGNOSIS — I10: ICD-10-CM

## 2024-11-08 DIAGNOSIS — R06.02: ICD-10-CM

## 2024-11-08 DIAGNOSIS — R40.0: ICD-10-CM

## 2024-11-08 DIAGNOSIS — I50.30: Primary | ICD-10-CM

## 2024-11-08 DIAGNOSIS — R60.0: ICD-10-CM

## 2024-11-08 DIAGNOSIS — R07.89: ICD-10-CM

## 2024-11-08 DIAGNOSIS — R73.9: ICD-10-CM

## 2024-11-08 PROCEDURE — 75574 CT ANGIO HRT W/3D IMAGE: CPT

## 2024-11-20 ENCOUNTER — HOSPITAL ENCOUNTER (OUTPATIENT)
Dept: HOSPITAL 22 - RT | Age: 69
Discharge: HOME | End: 2024-11-20
Payer: MEDICARE

## 2024-11-20 DIAGNOSIS — R40.0: ICD-10-CM

## 2024-11-20 DIAGNOSIS — I11.0: ICD-10-CM

## 2024-11-20 DIAGNOSIS — I50.30: ICD-10-CM

## 2024-11-20 DIAGNOSIS — R60.0: Primary | ICD-10-CM

## 2024-11-20 DIAGNOSIS — R73.9: ICD-10-CM

## 2024-11-20 DIAGNOSIS — R07.89: ICD-10-CM

## 2024-11-20 DIAGNOSIS — E03.9: ICD-10-CM

## 2024-11-20 DIAGNOSIS — E78.2: ICD-10-CM

## 2024-11-20 DIAGNOSIS — R06.02: ICD-10-CM

## 2024-11-20 PROCEDURE — A9576 INJ PROHANCE MULTIPACK: HCPCS

## 2024-11-20 PROCEDURE — 75561 CARDIAC MRI FOR MORPH W/DYE: CPT

## 2024-11-20 PROCEDURE — 93970 EXTREMITY STUDY: CPT

## 2024-11-20 RX ADMIN — SODIUM CHLORIDE, PRESERVATIVE FREE 10 ML: 5 INJECTION INTRAVENOUS at 11:49

## 2024-11-20 RX ADMIN — GADOTERIDOL 19 ML: 279.3 INJECTION, SOLUTION INTRAVENOUS at 11:49

## 2024-11-27 LAB
LABORATORY REPORT: (no result)
PDF:: (no result)
PDF:: (no result)

## 2024-12-05 ENCOUNTER — HOSPITAL ENCOUNTER (OUTPATIENT)
Dept: HOSPITAL 22 - RAD | Age: 69
Discharge: HOME | End: 2024-12-05
Payer: MEDICARE

## 2024-12-05 DIAGNOSIS — R06.02: ICD-10-CM

## 2024-12-05 DIAGNOSIS — E03.9: ICD-10-CM

## 2024-12-05 DIAGNOSIS — R07.89: ICD-10-CM

## 2024-12-05 DIAGNOSIS — I11.0: ICD-10-CM

## 2024-12-05 DIAGNOSIS — R60.0: ICD-10-CM

## 2024-12-05 DIAGNOSIS — R73.9: ICD-10-CM

## 2024-12-05 DIAGNOSIS — I50.30: Primary | ICD-10-CM

## 2024-12-05 DIAGNOSIS — R40.0: ICD-10-CM

## 2024-12-05 DIAGNOSIS — E78.2: ICD-10-CM

## 2024-12-05 PROCEDURE — 78800 RP LOCLZJ TUM 1 AREA 1 D IMG: CPT

## 2024-12-05 RX ADMIN — SODIUM CHLORIDE, PRESERVATIVE FREE 10 ML: 5 INJECTION INTRAVENOUS at 09:40

## 2024-12-10 ENCOUNTER — HOSPITAL ENCOUNTER (OUTPATIENT)
Dept: HOSPITAL 22 - LAB | Age: 69
Discharge: HOME | End: 2024-12-10
Payer: MEDICARE

## 2024-12-10 DIAGNOSIS — I51.89: Primary | ICD-10-CM

## 2024-12-10 LAB
ANION GAP SERPL CALC-SCNC: 4.4 MEQ/L (ref 5–15)
BUN SERPL-MCNC: 24 MG/DL (ref 7–17)
CALCIUM SPEC-MCNC: 10.2 MG/DL (ref 8.4–10.2)
CHLORIDE SPEC-SCNC: 102 MMOL/L (ref 98–107)
CO2 SERPL-SCNC: 34 MMOL/L (ref 22–30)
CREAT BLD-SCNC: 0.6 MG/DL (ref 0.52–1.04)
ESTIMATED GLOMERULAR FILT RATE: 100 ML/MIN (ref 60–?)
GFR (AFRICAN AMERICAN): 121 ML/MIN (ref 60–?)
GLUCOSE: 138 MG/DL (ref 74–100)
HCT VFR BLD CALC: 43.9 % (ref 37–47)
HGB BLD-MCNC: 14.5 G/DL (ref 12.2–16.2)
MCHC RBC-ENTMCNC: 32.9 G/DL (ref 31.8–35.4)
MCV RBC: 86.4 FL (ref 81–99)
MEAN CORPUSCULAR HEMOGLOBIN: 28.4 PG (ref 27–31.2)
PLATELET # BLD: 246 K/MM3 (ref 142–424)
POTASSIUM: 4.4 MMOL/L (ref 3.5–5.1)
RBC # BLD AUTO: 5.09 M/MM3 (ref 4.2–5.4)
SODIUM SPEC-SCNC: 136 MMOL/L (ref 136–145)
WBC # BLD AUTO: 6.9 K/MM3 (ref 4.8–10.8)

## 2024-12-10 PROCEDURE — 85025 COMPLETE CBC W/AUTO DIFF WBC: CPT

## 2024-12-10 PROCEDURE — 36415 COLL VENOUS BLD VENIPUNCTURE: CPT

## 2024-12-10 PROCEDURE — 80048 BASIC METABOLIC PNL TOTAL CA: CPT

## 2025-02-07 ENCOUNTER — HOSPITAL ENCOUNTER (OUTPATIENT)
Dept: HOSPITAL 22 - LAB.DROPOF | Age: 70
Discharge: HOME | End: 2025-02-07
Payer: MEDICARE

## 2025-02-07 DIAGNOSIS — K76.0: ICD-10-CM

## 2025-02-07 DIAGNOSIS — E11.65: Primary | ICD-10-CM

## 2025-02-07 DIAGNOSIS — E03.9: ICD-10-CM

## 2025-02-07 DIAGNOSIS — E66.811: ICD-10-CM

## 2025-02-07 DIAGNOSIS — D64.9: ICD-10-CM

## 2025-02-07 DIAGNOSIS — K59.00: ICD-10-CM

## 2025-02-07 DIAGNOSIS — K31.84: ICD-10-CM

## 2025-02-07 DIAGNOSIS — E78.2: ICD-10-CM

## 2025-02-07 LAB
ALBUMIN LEVEL: 4.6 G/DL (ref 3.5–5)
ALBUMIN/GLOB SERPL: 1.7 {RATIO} (ref 1.1–1.8)
ALP ISO SERPL-ACNC: 195 U/L (ref 38–126)
ALT SERPLBLD-CCNC: 84 U/L (ref 12–78)
ANION GAP SERPL CALC-SCNC: 10.3 MEQ/L (ref 5–15)
AST SERPL QL: 63 U/L (ref 14–36)
BILIRUBIN,TOTAL: 0.5 MG/DL (ref 0.2–1.3)
BUN SERPL-MCNC: 14 MG/DL (ref 7–17)
CALCIUM SPEC-MCNC: 9.7 MG/DL (ref 8.4–10.2)
CHLORIDE SPEC-SCNC: 96 MMOL/L (ref 98–107)
CHOLEST SPEC-SCNC: 257 MG/DL (ref 140–200)
CO2 SERPL-SCNC: 36 MMOL/L (ref 22–30)
CREAT BLD-SCNC: 0.5 MG/DL (ref 0.52–1.04)
ESTIMATED GLOMERULAR FILT RATE: 123 ML/MIN (ref 60–?)
FERRITIN SERPL-MCNC: 64.8 NG/ML (ref 11.1–264)
GFR (AFRICAN AMERICAN): 149 ML/MIN (ref 60–?)
GLOBULIN SER CALC-MCNC: 2.7 G/DL (ref 1.3–3.2)
GLUCOSE: 231 MG/DL (ref 74–100)
HBA1C MFR BLD: 8.5 % (ref 4–6)
HDLC SERPL-MCNC: 46 MG/DL (ref 40–60)
POTASSIUM: 4.3 MMOL/L (ref 3.5–5.1)
PROT SERPL-MCNC: 7.3 G/DL (ref 6.3–8.2)
SODIUM SPEC-SCNC: 138 MMOL/L (ref 136–145)
T4 FREE SERPL-MCNC: 1.39 NG/DL (ref 0.78–2.19)
TRIGLYCERIDES: 144 MG/DL (ref 30–150)
TSH SERPL-ACNC: 2.55 UIU/ML (ref 0.47–4.68)
VITAMIN B12: > 1000 PG/ML (ref 239–931)

## 2025-02-07 PROCEDURE — 82728 ASSAY OF FERRITIN: CPT

## 2025-02-07 PROCEDURE — 80053 COMPREHEN METABOLIC PANEL: CPT

## 2025-02-07 PROCEDURE — 84439 ASSAY OF FREE THYROXINE: CPT

## 2025-02-07 PROCEDURE — 84481 FREE ASSAY (FT-3): CPT

## 2025-02-07 PROCEDURE — 83036 HEMOGLOBIN GLYCOSYLATED A1C: CPT

## 2025-02-07 PROCEDURE — 82043 UR ALBUMIN QUANTITATIVE: CPT

## 2025-02-07 PROCEDURE — 82570 ASSAY OF URINE CREATININE: CPT

## 2025-02-07 PROCEDURE — 82607 VITAMIN B-12: CPT

## 2025-02-07 PROCEDURE — 80061 LIPID PANEL: CPT

## 2025-02-07 PROCEDURE — 84443 ASSAY THYROID STIM HORMONE: CPT

## 2025-02-08 LAB — T3FREE SERPL-MCNC: 3.5 PG/ML (ref 2–4.4)

## 2025-03-03 ENCOUNTER — HOSPITAL ENCOUNTER (OUTPATIENT)
Dept: HOSPITAL 22 - LAB.DROPOF | Age: 70
Discharge: HOME | End: 2025-03-03
Payer: MEDICARE

## 2025-03-03 DIAGNOSIS — R13.10: ICD-10-CM

## 2025-03-03 DIAGNOSIS — R07.0: Primary | ICD-10-CM

## 2025-03-03 PROCEDURE — 87070 CULTURE OTHR SPECIMN AEROBIC: CPT

## 2025-03-11 ENCOUNTER — TRANSCRIBE ORDERS (OUTPATIENT)
Dept: ADMINISTRATIVE | Facility: HOSPITAL | Age: 70
End: 2025-03-11
Payer: MEDICARE

## 2025-03-11 DIAGNOSIS — Z12.31 VISIT FOR SCREENING MAMMOGRAM: Primary | ICD-10-CM

## 2025-03-18 ENCOUNTER — OFFICE VISIT (OUTPATIENT)
Age: 70
End: 2025-03-18
Payer: MEDICARE

## 2025-03-18 VITALS
SYSTOLIC BLOOD PRESSURE: 122 MMHG | BODY MASS INDEX: 32.96 KG/M2 | HEART RATE: 117 BPM | WEIGHT: 186 LBS | HEIGHT: 63 IN | OXYGEN SATURATION: 98 % | DIASTOLIC BLOOD PRESSURE: 68 MMHG

## 2025-03-18 DIAGNOSIS — E11.9 DM TYPE 2, NOT AT GOAL: Primary | ICD-10-CM

## 2025-03-18 DIAGNOSIS — K31.84 GASTROPARESIS: ICD-10-CM

## 2025-03-18 DIAGNOSIS — E11.65 TYPE 2 DIABETES MELLITUS WITH HYPERGLYCEMIA, WITH LONG-TERM CURRENT USE OF INSULIN: ICD-10-CM

## 2025-03-18 DIAGNOSIS — E11.3293 MILD NONPROLIFERATIVE DIABETIC RETINOPATHY OF BOTH EYES WITHOUT MACULAR EDEMA ASSOCIATED WITH TYPE 2 DIABETES MELLITUS: ICD-10-CM

## 2025-03-18 DIAGNOSIS — Z79.4 TYPE 2 DIABETES MELLITUS WITH HYPERGLYCEMIA, WITH LONG-TERM CURRENT USE OF INSULIN: ICD-10-CM

## 2025-03-18 PROCEDURE — 82570 ASSAY OF URINE CREATININE: CPT | Performed by: PHYSICIAN ASSISTANT

## 2025-03-18 PROCEDURE — 82043 UR ALBUMIN QUANTITATIVE: CPT | Performed by: PHYSICIAN ASSISTANT

## 2025-03-18 NOTE — PROGRESS NOTES
"     Office Note      Date: 2025  Patient Name: Yi Garcia  MRN: [id number]  : 1955    Chief Complaint   Patient presents with    Uncontrolled type 2 diabetes mellitus with hyperglycemia       History of Present Illness:   Yi Garcia is a 69 y.o. female who presents for Diabetes type 2. Diagnosed in: 2016. Treated in past with oral agents, insulin for 4-5 years ago. Current treatments: lantus, humalog. Number of insulin shots per day: 4. Checks blood sugar 288 times a day. Has low blood sugar: occasional. Aspirin use: No -   . Statin use: No -   . ACE-I/ARB use: Yes.     She was being managed by pcp for a few years, but gastroparesis and intolerance to other medications have made her dm difficult to control. Patient doses humalog before meals, sometimes after meals if blood glucose remains high, varies doses every day    Last eye exam: 3/18/2025    Subjective     Review of Systems   Endocrine: Positive for polydipsia and polyphagia. Negative for polyuria.         Diabetic Complications:  Eyes: yes, hx retinopathy both eyes, but stable currently  Kidneys: No  Feet:neuropathy due to hx ddd, but also may have a component of dm neuropathy as well  Heart: No  Gastroparesis    Diet and Exercise:  Meals per day: 2  Minutes of exercise per week: 0 mins.      The following portions of the patient's history were reviewed and updated as appropriate: allergies, current medications, past family history, past medical history, past social history, past surgical history, and problem list.    Objective     Visit Vitals  /68 (BP Location: Left arm, Patient Position: Sitting)   Pulse 117   Ht 158.8 cm (62.5\")   Wt 84.4 kg (186 lb)   LMP  (LMP Unknown)   SpO2 98%   BMI 33.48 kg/m²       Physical Exam:  Physical Exam  Vitals reviewed.   Constitutional:       Appearance: Normal appearance. She is obese.   HENT:      Head: Normocephalic and atraumatic.      Nose: Nose normal.   Eyes:      Conjunctiva/sclera: " Conjunctivae normal.   Cardiovascular:      Rate and Rhythm: Normal rate.      Pulses:           Dorsalis pedis pulses are 2+ on the right side and 2+ on the left side.        Posterior tibial pulses are 2+ on the right side and 2+ on the left side.   Pulmonary:      Effort: Pulmonary effort is normal.   Feet:      Right foot:      Protective Sensation: 8 sites tested.  4 sites sensed.      Skin integrity: Skin integrity normal.      Toenail Condition: Right toenails are normal.      Left foot:      Protective Sensation: 8 sites tested.  3 sites sensed.      Skin integrity: Skin integrity normal.      Toenail Condition: Left toenails are normal.   Skin:     General: Skin is warm and dry.   Neurological:      General: No focal deficit present.      Mental Status: She is alert and oriented to person, place, and time. Mental status is at baseline.   Psychiatric:         Mood and Affect: Mood normal.         Behavior: Behavior normal.         Thought Content: Thought content normal.         Judgment: Judgment normal.       Diabetic foot exam:   Monofilament test demonstrates decreased sensation    Labs:    HbA1c  3/3/25: at pcp, around 8.5    Hemoglobin A1C   Date Value Ref Range Status   02/28/2024 7.00 (H) 4.80 - 5.60 % Final     Comment:     Hemoglobin A1C Ranges:  Increased Risk for Diabetes  5.7% to 6.4%  Diabetes                     >= 6.5%  Diabetic Goal                < 7.0%     04/26/2022 9.5 % Final   07/20/2020 8.50 (H) 4.80 - 5.60 % Final   .    CMP  Lab Results   Component Value Date    GLUCOSE 94 02/28/2024    BUN 14 02/28/2024    CREATININE 0.59 02/28/2024     02/28/2024    K 4.6 02/28/2024     02/28/2024    CALCIUM 9.6 02/28/2024    PROTEINTOT 6.9 02/28/2024    ALBUMIN 4.3 02/28/2024    ALT 40 (H) 02/28/2024    AST 31 02/28/2024    ALKPHOS 163 (H) 02/28/2024    BILITOT 0.4 02/28/2024    GLOB 2.6 02/28/2024    AGRATIO 1.7 02/28/2024    BCR 23.7 02/28/2024    ANIONGAP 13.0 04/26/2022    EGFR  99.5 02/28/2024         Lipid Panel  Lab Results   Component Value Date    HDL Cholesterol 48 02/28/2024    HDL Cholesterol 39 (L) 01/24/2022    LDL Chol Calc (NIH) 127 (H) 02/28/2024    LDL/HDL Ratio 3.42 01/24/2022    Triglycerides 174 (H) 02/28/2024    Triglycerides 198 (H) 01/24/2022        TSH  Lab Results   Component Value Date    TSH 2.280 01/24/2022    FREET4 1.16 01/07/2019        Assessment / Plan      Assessment & Plan:  Diagnoses and all orders for this visit:    1. DM type 2, not at goal (Primary)  -     Microalbumin / Creatinine Urine Ratio - Urine, Clean Catch    2. Type 2 diabetes mellitus with hyperglycemia, with long-term current use of insulin  Assessment & Plan:  Diabetes is worsening recently with A1c uncontrolled a few weeks ago with pcp of above 8  -choice of medications is limited- intolerant to metformin, actos (rash), no GLP1 agonist due to gastroparesis, did not tolerate jardiance   Complicated by gastroparesis  Patient advised to:  Increase lantus to 70 units daily  Take 10 units before breakfast, 22 units before lunch, 30 units before evening meal  Only take sliding scale if greater than 300 three hours after taking short acting insulin  If you forget insulin take half of the dose of what you should have taken before eating  Call to request dexcom review in two weeks      3. Mild nonproliferative diabetic retinopathy of both eyes without macular edema associated with type 2 diabetes mellitus    4. Gastroparesis  Assessment & Plan:  Complicates care plan         Current Outpatient Medications   Medication Instructions    bethanechol (URECHOLINE) 50 mg, Oral, 2 Times Daily    Continuous Blood Gluc Sensor (Dexcom G6 Sensor) Not Applicable, Every 10 Days    Continuous Blood Gluc Transmit (Dexcom G6 Transmitter) misc Daily    desvenlafaxine (PRISTIQ) 50 mg, Oral, Daily    docusate sodium (COLACE) 100 mg, Oral, 2 Times Daily    donepezil (ARICEPT) 5 mg, Oral, Nightly    ezetimibe (ZETIA) 10  mg, Oral, Daily    furosemide (LASIX) 20 mg, Oral, As Needed    HYDROcodone-acetaminophen (NORCO) 5-325 MG per tablet Oral, See Admin Instructions, Take 1 tablet by mouth every 4-6 hours as needed for pain    Insulin Glargine (LANTUS SOLOSTAR) 68 Units, Subcutaneous, Daily, Disp quant suff    Insulin Lispro, 1 Unit Dial, (HumaLOG KwikPen) 100 UNIT/ML solution pen-injector Take 16 units before meals, correction 2:50 >150, MDD 60U    Insulin Pen Needle 32G X 4 MM misc Use daily with insulin up to 6 times per day    losartan (COZAAR) 100 mg, Oral, Daily    meloxicam (MOBIC) 7.5 mg, Oral, Daily    metFORMIN ER (GLUCOPHAGE-XR) 1,000 mg, Oral, Daily With Breakfast    metoclopramide (REGLAN) 5 mg, Oral, 3 Times Daily    NIFEdipine XL (PROCARDIA XL) 90 mg, Oral, Daily    omeprazole (PRILOSEC) 40 mg, Oral, Daily    Ozempic (1 MG/DOSE) 1 mg, Subcutaneous, Weekly    pitavastatin calcium (LIVALO) 1 mg, Oral, Nightly    POTASSIUM PO 1 tablet, Oral, Daily    rOPINIRole (REQUIP) 1 MG tablet TAKE 1 TABLET BY MOUTH AT NIGHT  TAKE 1 HOUR BEFORE BEDTIME    topiramate (TOPAMAX) 50 mg, Oral, Daily PRN    traMADol (ULTRAM) 50 mg, Oral, Every 8 Hours PRN    traZODone (DESYREL) 50 MG tablet TAKE 1 TO 2 TABLETS BY MOUTH AT  BEDTIME AS NEEDED      No follow-ups on file.    Electronically signed by: Omar Weiner PA-C  03/18/2025

## 2025-03-18 NOTE — ASSESSMENT & PLAN NOTE
Diabetes is worsening recently with A1c uncontrolled a few weeks ago with pcp of above 8  -choice of medications is limited- intolerant to metformin, actos (rash), no GLP1 agonist due to gastroparesis, did not tolerate jardiance   Complicated by gastroparesis  Patient advised to:  Increase lantus to 70 units daily  Take 10 units before breakfast, 22 units before lunch, 30 units before evening meal  Only take sliding scale if greater than 300 three hours after taking short acting insulin  If you forget insulin take half of the dose of what you should have taken before eating  Call to request dexcom review in two weeks

## 2025-03-19 ENCOUNTER — TELEPHONE (OUTPATIENT)
Age: 70
End: 2025-03-19
Payer: MEDICARE

## 2025-03-19 LAB
ALBUMIN UR-MCNC: 1.3 MG/DL
CREAT UR-MCNC: 113.4 MG/DL
MICROALBUMIN/CREAT UR: 11.5 MG/G (ref 0–29)

## 2025-03-20 LAB — EGFRCR SERPLBLD CKD-EPI 2021: 123 ML/MIN/1.73 (ref 60–?)

## 2025-03-21 ENCOUNTER — TELEPHONE (OUTPATIENT)
Dept: ENDOCRINOLOGY | Facility: CLINIC | Age: 70
End: 2025-03-21
Payer: MEDICARE

## 2025-03-21 ENCOUNTER — HOSPITAL ENCOUNTER (OUTPATIENT)
Dept: HOSPITAL 22 - LAB | Age: 70
Discharge: HOME | End: 2025-03-21
Payer: MEDICARE

## 2025-03-21 DIAGNOSIS — I10 PRIMARY HYPERTENSION: Primary | ICD-10-CM

## 2025-03-21 DIAGNOSIS — G47.33: ICD-10-CM

## 2025-03-21 DIAGNOSIS — I50.30: ICD-10-CM

## 2025-03-21 DIAGNOSIS — R06.02: ICD-10-CM

## 2025-03-21 DIAGNOSIS — E11.65: Primary | ICD-10-CM

## 2025-03-21 LAB
ALBUMIN LEVEL: 4.7 G/DL (ref 3.5–5)
ALBUMIN/GLOB SERPL: 1.5 {RATIO} (ref 1.1–1.8)
ALP ISO SERPL-ACNC: 162 U/L (ref 38–126)
ALT SERPLBLD-CCNC: 81 U/L (ref 12–78)
ANION GAP SERPL CALC-SCNC: 10.4 MEQ/L (ref 5–15)
ANION GAP SERPL CALC-SCNC: 9.4 MEQ/L (ref 5–15)
AST SERPL QL: 110 U/L (ref 14–36)
BILIRUBIN,TOTAL: 0.7 MG/DL (ref 0.2–1.3)
BUN SERPL-MCNC: 17 MG/DL (ref 7–17)
BUN SERPL-MCNC: 18 MG/DL (ref 7–17)
CALCIUM SPEC-MCNC: 10 MG/DL (ref 8.4–10.2)
CALCIUM SPEC-MCNC: 10 MG/DL (ref 8.4–10.2)
CHLORIDE SPEC-SCNC: 103 MMOL/L (ref 98–107)
CHLORIDE SPEC-SCNC: 103 MMOL/L (ref 98–107)
CO2 SERPL-SCNC: 32 MMOL/L (ref 22–30)
CO2 SERPL-SCNC: 32 MMOL/L (ref 22–30)
CREAT BLD-SCNC: 0.6 MG/DL (ref 0.52–1.04)
CREAT BLD-SCNC: 0.6 MG/DL (ref 0.52–1.04)
ESTIMATED GLOMERULAR FILT RATE: 100 ML/MIN (ref 60–?)
ESTIMATED GLOMERULAR FILT RATE: 100 ML/MIN (ref 60–?)
GFR (AFRICAN AMERICAN): 121 ML/MIN (ref 60–?)
GFR (AFRICAN AMERICAN): 121 ML/MIN (ref 60–?)
GLOBULIN SER CALC-MCNC: 3.1 G/DL (ref 1.3–3.2)
GLUCOSE: 175 MG/DL (ref 74–100)
GLUCOSE: 176 MG/DL (ref 74–100)
POTASSIUM: 4.4 MMOL/L (ref 3.5–5.1)
POTASSIUM: 4.4 MMOL/L (ref 3.5–5.1)
PROT SERPL-MCNC: 7.8 G/DL (ref 6.3–8.2)
SODIUM SPEC-SCNC: 140 MMOL/L (ref 136–145)
SODIUM SPEC-SCNC: 141 MMOL/L (ref 136–145)

## 2025-03-21 PROCEDURE — 36415 COLL VENOUS BLD VENIPUNCTURE: CPT

## 2025-03-21 PROCEDURE — 80053 COMPREHEN METABOLIC PANEL: CPT

## 2025-03-21 PROCEDURE — 80048 BASIC METABOLIC PNL TOTAL CA: CPT

## 2025-03-21 NOTE — TELEPHONE ENCOUNTER
PT CALLED TO SAY THAT HER OTHER DR DID NOT ORDER A CMP  CAN WE FAX AN ORDER TO  [facility]  ATTN CHARLES  [phone]      PTS NUMBER  [phone]

## 2025-04-14 ENCOUNTER — HOSPITAL ENCOUNTER (OUTPATIENT)
Dept: MAMMOGRAPHY | Facility: HOSPITAL | Age: 70
Discharge: HOME | End: 2025-04-14
Admitting: NURSE PRACTITIONER
Payer: MEDICARE

## 2025-04-14 DIAGNOSIS — Z12.31 VISIT FOR SCREENING MAMMOGRAM: ICD-10-CM

## 2025-04-14 PROCEDURE — 77063 BREAST TOMOSYNTHESIS BI: CPT

## 2025-04-14 PROCEDURE — 77067 SCR MAMMO BI INCL CAD: CPT

## 2025-04-15 ENCOUNTER — HOSPITAL ENCOUNTER (OUTPATIENT)
Dept: HOSPITAL 22 - LAB | Age: 70
Discharge: HOME | End: 2025-04-15
Payer: MEDICARE

## 2025-04-15 DIAGNOSIS — I50.30: ICD-10-CM

## 2025-04-15 DIAGNOSIS — I11.0: ICD-10-CM

## 2025-04-15 DIAGNOSIS — R73.9: Primary | ICD-10-CM

## 2025-04-15 DIAGNOSIS — R42: ICD-10-CM

## 2025-04-15 DIAGNOSIS — E03.9: ICD-10-CM

## 2025-04-15 DIAGNOSIS — E78.5: ICD-10-CM

## 2025-04-15 LAB
ANION GAP SERPL CALC-SCNC: 12.3 MEQ/L (ref 5–15)
BUN SERPL-MCNC: 16 MG/DL (ref 7–17)
CALCIUM SPEC-MCNC: 9.5 MG/DL (ref 8.4–10.2)
CHLORIDE SPEC-SCNC: 100 MMOL/L (ref 98–107)
CO2 SERPL-SCNC: 33 MMOL/L (ref 22–30)
ESTIMATED GLOMERULAR FILT RATE: 100 ML/MIN (ref 60–?)
GFR (AFRICAN AMERICAN): 121 ML/MIN (ref 60–?)
GLUCOSE: 212 MG/DL (ref 74–100)
HCT VFR BLD AUTO: 42.4 % (ref 37–47)
HGB BLD-MCNC: 13.7 G/DL (ref 12.2–16.2)
MAGNESIUM: 1.9 MG/DL (ref 1.6–2.3)
MCH RBC QN AUTO: 28.1 PG (ref 27–31.2)
MCHC RBC-ENTMCNC: 32.3 G/DL (ref 31.8–35.4)
MCV RBC: 87.1 FL (ref 81–99)
NUCLEATED RED BLOOD CELLS %: 0 %
PLATELET # BLD: 268 K/MM3 (ref 142–424)
POTASSIUM: 4.3 MMOL/L (ref 3.5–5.1)
RBC # BLD AUTO: 4.87 M/MM3 (ref 4.2–5.4)
SODIUM SPEC-SCNC: 141 MMOL/L (ref 136–145)
WBC # BLD AUTO: 6.7 K/MM3 (ref 4.8–10.8)

## 2025-04-15 PROCEDURE — 82043 UR ALBUMIN QUANTITATIVE: CPT

## 2025-04-15 PROCEDURE — 82570 ASSAY OF URINE CREATININE: CPT

## 2025-04-15 PROCEDURE — 85025 COMPLETE CBC W/AUTO DIFF WBC: CPT

## 2025-04-15 PROCEDURE — 80048 BASIC METABOLIC PNL TOTAL CA: CPT

## 2025-04-15 PROCEDURE — 36415 COLL VENOUS BLD VENIPUNCTURE: CPT

## 2025-04-15 PROCEDURE — 83735 ASSAY OF MAGNESIUM: CPT

## 2025-04-17 LAB — MICROALBUMIN UR-MCNC: 4.5 UG/ML

## 2025-04-21 ENCOUNTER — HOSPITAL ENCOUNTER (OUTPATIENT)
Dept: HOSPITAL 22 - CATHLAB | Age: 70
Discharge: HOME | End: 2025-04-21
Payer: MEDICARE

## 2025-04-21 VITALS
HEART RATE: 86 BPM | DIASTOLIC BLOOD PRESSURE: 72 MMHG | SYSTOLIC BLOOD PRESSURE: 125 MMHG | RESPIRATION RATE: 20 BRPM | OXYGEN SATURATION: 91 %

## 2025-04-21 VITALS
SYSTOLIC BLOOD PRESSURE: 151 MMHG | DIASTOLIC BLOOD PRESSURE: 90 MMHG | TEMPERATURE: 97.9 F | RESPIRATION RATE: 16 BRPM | HEART RATE: 99 BPM | OXYGEN SATURATION: 93 %

## 2025-04-21 VITALS
SYSTOLIC BLOOD PRESSURE: 127 MMHG | HEART RATE: 96 BPM | OXYGEN SATURATION: 93 % | RESPIRATION RATE: 20 BRPM | DIASTOLIC BLOOD PRESSURE: 80 MMHG

## 2025-04-21 VITALS
HEART RATE: 88 BPM | RESPIRATION RATE: 20 BRPM | DIASTOLIC BLOOD PRESSURE: 72 MMHG | OXYGEN SATURATION: 91 % | SYSTOLIC BLOOD PRESSURE: 120 MMHG

## 2025-04-21 VITALS
OXYGEN SATURATION: 96 % | SYSTOLIC BLOOD PRESSURE: 118 MMHG | RESPIRATION RATE: 20 BRPM | DIASTOLIC BLOOD PRESSURE: 73 MMHG | HEART RATE: 86 BPM

## 2025-04-21 VITALS
OXYGEN SATURATION: 90 % | RESPIRATION RATE: 20 BRPM | HEART RATE: 89 BPM | DIASTOLIC BLOOD PRESSURE: 67 MMHG | SYSTOLIC BLOOD PRESSURE: 118 MMHG

## 2025-04-21 DIAGNOSIS — Z79.899: ICD-10-CM

## 2025-04-21 DIAGNOSIS — Z79.4: ICD-10-CM

## 2025-04-21 DIAGNOSIS — E11.65: ICD-10-CM

## 2025-04-21 DIAGNOSIS — G47.33: ICD-10-CM

## 2025-04-21 DIAGNOSIS — R06.02: ICD-10-CM

## 2025-04-21 DIAGNOSIS — R60.9: ICD-10-CM

## 2025-04-21 DIAGNOSIS — I11.0: ICD-10-CM

## 2025-04-21 DIAGNOSIS — E78.5: ICD-10-CM

## 2025-04-21 DIAGNOSIS — I27.20: Primary | ICD-10-CM

## 2025-04-21 DIAGNOSIS — Z91.040: ICD-10-CM

## 2025-04-21 DIAGNOSIS — Z88.8: ICD-10-CM

## 2025-04-21 DIAGNOSIS — I50.30: ICD-10-CM

## 2025-04-21 LAB
ANION GAP SERPL CALC-SCNC: 11.8 MEQ/L (ref 5–15)
BUN SERPL-MCNC: 14 MG/DL (ref 7–17)
CALCIUM SPEC-MCNC: 9.4 MG/DL (ref 8.4–10.2)
CATHL VENOUS O2 SAT: 58.9 % (ref 75–80)
CHLORIDE SPEC-SCNC: 101 MMOL/L (ref 98–107)
CO2 SERPL-SCNC: 31 MMOL/L (ref 22–30)
CREATININE CLEARANCE ESTIMATED: 75 ML/MIN (ref 50–200)
ESTIMATED GLOMERULAR FILT RATE: 159 ML/MIN (ref 60–?)
GFR (AFRICAN AMERICAN): 193 ML/MIN (ref 60–?)
GLUCOSE: 196 MG/DL (ref 74–100)
HCT VFR BLD AUTO: 44.4 % (ref 37–47)
HGB BLD-MCNC: 14.7 G/DL (ref 12.2–16.2)
MCH RBC QN AUTO: 29.4 PG (ref 27–31.2)
MCHC RBC-ENTMCNC: 33.1 G/DL (ref 31.8–35.4)
MCV RBC: 88.8 FL (ref 81–99)
NUCLEATED RED BLOOD CELLS %: 0 %
PLATELET # BLD: 274 K/MM3 (ref 142–424)
POTASSIUM: 3.8 MMOL/L (ref 3.5–5.1)
SAO2 % BLDA: 64.6 % (ref 90–100)
SODIUM SPEC-SCNC: 140 MMOL/L (ref 136–145)
WBC # BLD AUTO: 6.9 K/MM3 (ref 4.8–10.8)

## 2025-04-21 PROCEDURE — 82810 BLOOD GASES O2 SAT ONLY: CPT

## 2025-04-21 PROCEDURE — 93451 RIGHT HEART CATH: CPT

## 2025-04-21 PROCEDURE — 99152 MOD SED SAME PHYS/QHP 5/>YRS: CPT

## 2025-04-21 PROCEDURE — C1894 INTRO/SHEATH, NON-LASER: HCPCS

## 2025-04-21 PROCEDURE — C1751 CATH, INF, PER/CENT/MIDLINE: HCPCS

## 2025-04-21 PROCEDURE — 80048 BASIC METABOLIC PNL TOTAL CA: CPT

## 2025-04-21 PROCEDURE — 99153 MOD SED SAME PHYS/QHP EA: CPT

## 2025-04-21 PROCEDURE — 85025 COMPLETE CBC W/AUTO DIFF WBC: CPT

## 2025-04-21 RX ADMIN — LIDOCAINE HYDROCHLORIDE 10 ML: 10 INJECTION, SOLUTION INFILTRATION; PERINEURAL at 10:36

## 2025-04-21 RX ADMIN — HEPARIN SODIUM 3000 UNIT: 200 INJECTION, SOLUTION INTRAVENOUS at 10:36

## 2025-04-21 RX ADMIN — FENTANYL CITRATE 50 MCG: 50 INJECTION, SOLUTION INTRAMUSCULAR; INTRAVENOUS at 10:37

## 2025-04-21 RX ADMIN — MIDAZOLAM HYDROCHLORIDE 1 MG: 1 INJECTION, SOLUTION INTRAMUSCULAR; INTRAVENOUS at 10:37

## 2025-04-21 RX ADMIN — SODIUM CHLORIDE 25 ML: 900 INJECTION, SOLUTION INTRAVENOUS at 10:36

## 2025-04-29 ENCOUNTER — OFFICE VISIT (OUTPATIENT)
Age: 70
End: 2025-04-29
Payer: MEDICARE

## 2025-04-29 VITALS
DIASTOLIC BLOOD PRESSURE: 76 MMHG | SYSTOLIC BLOOD PRESSURE: 134 MMHG | WEIGHT: 185 LBS | BODY MASS INDEX: 32.78 KG/M2 | HEIGHT: 63 IN | OXYGEN SATURATION: 97 % | HEART RATE: 96 BPM

## 2025-04-29 DIAGNOSIS — E11.65 TYPE 2 DIABETES MELLITUS WITH HYPERGLYCEMIA, WITH LONG-TERM CURRENT USE OF INSULIN: Primary | ICD-10-CM

## 2025-04-29 DIAGNOSIS — Z79.4 TYPE 2 DIABETES MELLITUS WITH HYPERGLYCEMIA, WITH LONG-TERM CURRENT USE OF INSULIN: Primary | ICD-10-CM

## 2025-04-29 DIAGNOSIS — I10 PRIMARY HYPERTENSION: ICD-10-CM

## 2025-04-29 DIAGNOSIS — E11.3293 MILD NONPROLIFERATIVE DIABETIC RETINOPATHY OF BOTH EYES WITHOUT MACULAR EDEMA ASSOCIATED WITH TYPE 2 DIABETES MELLITUS: ICD-10-CM

## 2025-04-29 NOTE — ASSESSMENT & PLAN NOTE
Diabetes is slightly improved, but still unstable and not at goal.  Blood glucose average over the last 14 days has been 195    -choice of medications is limited- intolerant to metformin, actos (rash), no GLP1 agonist due to gastroparesis, did not tolerate jardiance   Complicated by gastroparesis  Patient advised to:  Increase lantus to 80 units, increase by 2 units every 2 days until fasting is less than 110 every morning  Continue breakfast at 10 units  Increase lunch to 24 units  Increase evening meal to 36 units  Start Farxiga and bring bottle to next visit, so I can verify dose

## 2025-04-29 NOTE — ASSESSMENT & PLAN NOTE
Stable but not at goal of 120/70  Continue to monitor as patient is having cardiology workup and medications are changing frequently  May need titration in the future if not controlled by her new medications

## 2025-04-29 NOTE — PATIENT INSTRUCTIONS
Increase lantus to 80 units, increase by 2 units every 2 days until fasting is less than 110 every morning  Continue breakfast at 10 units  Increase lunch to 24 units  Increase evening meal to 36 units

## 2025-04-29 NOTE — PROGRESS NOTES
Office Note      Date: 2025  Patient Name: Yi Garcia  MRN: [id number]  : 1955    Chief Complaint   Patient presents with    Diabetes       History of Present Illness:   Yi Garcia is a 69 y.o. female who presents for Diabetes type 2. Diagnosed in: 2016. Treated in past with oral agents, insulin for 4-5 years ago. Current treatments: lantus 72 units, humalog 10 units, 20 lunch, 30 before dinner . Number of insulin shots per day: 4. Checks blood sugar 288 times a day. Has low blood sugar: occasional. Aspirin use: No -   . Statin use: No -   . ACE-I/ARB use: Yes.     Patient has stopped taking multiple insulin doses at different times and is only taking three times a day 30 minutes before eating. However, for the past three days she has started taking some humalog before bedtime because blood glucose levels overnight were staying high.    She was being managed by pcp for a few years, but gastroparesis and intolerance to other medications have made her dm difficult to control.     Changing diuretics a lot recently due to CHF, excessive fluid accumulation. Has also been started on zetia to further lower her cholesterol. She is still having cardiac workup that is ongoing. Cardiology has also prescribed Farxiga again, but she hasn't received from mail order yet. (she didn't tolerate Jardiance in the past, so we will check for side effects at next visit)    Last eye exam: 3/18/2025    Subjective     Review of Systems   Endocrine: Positive for polydipsia and polyphagia. Negative for polyuria.         Diabetic Complications:  Eyes: yes, hx retinopathy both eyes, but stable currently  Kidneys: No  Feet:neuropathy due to hx ddd, but also may have a component of dm neuropathy as well  Heart: No  Gastroparesis    Diet and Exercise:  Meals per day: 2  Minutes of exercise per week: 0 mins.      The following portions of the patient's history were reviewed and updated as appropriate: allergies, current  "medications, past family history, past medical history, past social history, past surgical history, and problem list.    Objective     Visit Vitals  /76 (BP Location: Left arm, Patient Position: Sitting, Cuff Size: Adult)   Pulse 96   Ht 158.8 cm (62.5\")   Wt 83.9 kg (185 lb)   LMP  (LMP Unknown)   SpO2 97%   BMI 33.30 kg/m²       Physical Exam:  Physical Exam  Vitals reviewed.   Constitutional:       Appearance: Normal appearance. She is obese.   HENT:      Head: Normocephalic and atraumatic.      Nose: Nose normal.   Eyes:      Conjunctiva/sclera: Conjunctivae normal.   Cardiovascular:      Rate and Rhythm: Normal rate.   Pulmonary:      Effort: Pulmonary effort is normal.   Skin:     General: Skin is warm and dry.   Neurological:      General: No focal deficit present.      Mental Status: She is alert and oriented to person, place, and time. Mental status is at baseline.   Psychiatric:         Mood and Affect: Mood normal.         Behavior: Behavior normal.         Thought Content: Thought content normal.         Judgment: Judgment normal.       Labs:    HbA1c  3/3/25: at pcp, around 8.5    Hemoglobin A1C   Date Value Ref Range Status   02/28/2024 7.00 (H) 4.80 - 5.60 % Final     Comment:     Hemoglobin A1C Ranges:  Increased Risk for Diabetes  5.7% to 6.4%  Diabetes                     >= 6.5%  Diabetic Goal                < 7.0%     04/26/2022 9.5 % Final   07/20/2020 8.50 (H) 4.80 - 5.60 % Final       CMP  3/21/25  Sodium 141 potassium 4.4 chloride 103 CO2 32 BUN 17 creatinine 0.6  glucose 176 calcium 10 total bilirubin 0.7  ALT 81 total protein 7.8 alk phos 162    Lab Results   Component Value Date    GLUCOSE 94 02/28/2024    BUN 14 02/28/2024    CREATININE 0.59 02/28/2024     02/28/2024    K 4.6 02/28/2024     02/28/2024    CALCIUM 9.6 02/28/2024    PROTEINTOT 6.9 02/28/2024    ALBUMIN 4.3 02/28/2024    ALT 40 (H) 02/28/2024    AST 31 02/28/2024    ALKPHOS 163 (H) 02/28/2024 "    BILITOT 0.4 02/28/2024    GLOB 2.6 02/28/2024    AGRATIO 1.7 02/28/2024    BCR 23.7 02/28/2024    ANIONGAP 13.0 04/26/2022    EGFR 123.0 02/07/2025         Lipid Panel  Lab Results   Component Value Date    HDL Cholesterol 48 02/28/2024    HDL Cholesterol 39 (L) 01/24/2022    LDL Chol Calc (NIH) 127 (H) 02/28/2024    LDL/HDL Ratio 3.42 01/24/2022    Triglycerides 174 (H) 02/28/2024    Triglycerides 198 (H) 01/24/2022        TSH  Lab Results   Component Value Date    TSH 2.280 01/24/2022    FREET4 1.16 01/07/2019      Reviewed Dexcom CGM data and discussed with patient.  Sensor glucose average 195 for the past 2 weeks, 44% time in range , 1% low 54-69, 0% very low <54, 37% high 180-250, 18% very high >250.    Interpretation: Patient is mostly hyperglycemic throughout the day with postprandial spikes, more so in the evening.      Assessment / Plan      Assessment & Plan:  Diagnoses and all orders for this visit:    1. Type 2 diabetes mellitus with hyperglycemia, with long-term current use of insulin (Primary)  Assessment & Plan:  Diabetes is slightly improved, but still unstable and not at goal.  Blood glucose average over the last 14 days has been 195    -choice of medications is limited- intolerant to metformin, actos (rash), no GLP1 agonist due to gastroparesis, did not tolerate jardiance   Complicated by gastroparesis  Patient advised to:  Increase lantus to 80 units, increase by 2 units every 2 days until fasting is less than 110 every morning  Continue breakfast at 10 units  Increase lunch to 24 units  Increase evening meal to 36 units  Start Farxiga and bring bottle to next visit, so I can verify dose        2. Mild nonproliferative diabetic retinopathy of both eyes without macular edema associated with type 2 diabetes mellitus    3. Primary hypertension  Assessment & Plan:  Stable but not at goal of 120/70  Continue to monitor as patient is having cardiology workup and medications are changing  frequently  May need titration in the future if not controlled by her new medications           Current Outpatient Medications   Medication Instructions    bethanechol (URECHOLINE) 50 mg, 2 Times Daily    Continuous Blood Gluc Sensor (Dexcom G6 Sensor) Not Applicable, Every 10 Days    Continuous Blood Gluc Transmit (Dexcom G6 Transmitter) misc Daily    desvenlafaxine (PRISTIQ) 50 mg, Oral, Daily    docusate sodium (COLACE) 100 mg, 2 Times Daily    donepezil (ARICEPT) 5 mg, Oral, Nightly    ezetimibe (ZETIA) 10 mg, Oral, Daily    furosemide (LASIX) 20 mg, As Needed    HYDROcodone-acetaminophen (NORCO) 5-325 MG per tablet See Admin Instructions    Insulin Glargine (LANTUS SOLOSTAR) 68 Units, Subcutaneous, Daily, Disp quant suff    Insulin Lispro, 1 Unit Dial, (HumaLOG KwikPen) 100 UNIT/ML solution pen-injector Take 16 units before meals, correction 2:50 >150, MDD 60U    Insulin Pen Needle 32G X 4 MM misc Use daily with insulin up to 6 times per day    losartan (COZAAR) 100 mg, Oral, Daily    metoclopramide (REGLAN) 5 mg, 3 Times Daily    NIFEdipine XL (PROCARDIA XL) 90 mg, Oral, Daily    omeprazole (PRILOSEC) 40 mg, Oral, Daily    pitavastatin calcium (LIVALO) 1 mg, Oral, Nightly    POTASSIUM PO 1 tablet, Daily    rOPINIRole (REQUIP) 1 MG tablet TAKE 1 TABLET BY MOUTH AT NIGHT  TAKE 1 HOUR BEFORE BEDTIME    topiramate (TOPAMAX) 50 mg, Oral, Daily PRN    traMADol (ULTRAM) 50 mg, Oral, Every 8 Hours PRN    traZODone (DESYREL) 50 MG tablet TAKE 1 TO 2 TABLETS BY MOUTH AT  BEDTIME AS NEEDED      No follow-ups on file.    Electronically signed by: Omar Weiner PA-C  04/29/2025

## 2025-05-07 ENCOUNTER — HOSPITAL ENCOUNTER (OUTPATIENT)
Dept: HOSPITAL 22 - LAB.DROPOF | Age: 70
Discharge: HOME | End: 2025-05-07
Payer: MEDICARE

## 2025-05-07 DIAGNOSIS — E03.9: ICD-10-CM

## 2025-05-07 DIAGNOSIS — E11.65: Primary | ICD-10-CM

## 2025-05-07 LAB
ALBUMIN LEVEL: 4.4 G/DL (ref 3.5–5)
ALBUMIN/GLOB SERPL: 1.5 {RATIO} (ref 1.1–1.8)
ALP ISO SERPL-ACNC: 166 U/L (ref 38–126)
ALT SERPLBLD-CCNC: 40 U/L (ref 12–78)
ANION GAP SERPL CALC-SCNC: 8.3 MEQ/L (ref 5–15)
AST SERPL QL: 43 U/L (ref 14–36)
BILIRUBIN,TOTAL: 0.5 MG/DL (ref 0.2–1.3)
BUN SERPL-MCNC: 16 MG/DL (ref 7–17)
CALCIUM SPEC-MCNC: 9.4 MG/DL (ref 8.4–10.2)
CHLORIDE SPEC-SCNC: 103 MMOL/L (ref 98–107)
CO2 SERPL-SCNC: 34 MMOL/L (ref 22–30)
ESTIMATED GLOMERULAR FILT RATE: 123 ML/MIN (ref 60–?)
GFR (AFRICAN AMERICAN): 149 ML/MIN (ref 60–?)
GLOBULIN SER CALC-MCNC: 2.9 G/DL (ref 1.3–3.2)
GLUCOSE: 110 MG/DL (ref 74–100)
HBA1C MFR BLD: 7.2 % (ref 4–6)
POTASSIUM: 4.3 MMOL/L (ref 3.5–5.1)
PROT SERPL-MCNC: 7.3 G/DL (ref 6.3–8.2)
SODIUM SPEC-SCNC: 141 MMOL/L (ref 136–145)
T4 FREE SERPL-MCNC: 1.21 NG/DL (ref 0.78–2.19)
TSH SERPL-ACNC: 2.89 UIU/ML (ref 0.47–4.68)

## 2025-05-07 PROCEDURE — 83735 ASSAY OF MAGNESIUM: CPT

## 2025-05-07 PROCEDURE — 84443 ASSAY THYROID STIM HORMONE: CPT

## 2025-05-07 PROCEDURE — 82043 UR ALBUMIN QUANTITATIVE: CPT

## 2025-05-07 PROCEDURE — 84439 ASSAY OF FREE THYROXINE: CPT

## 2025-05-07 PROCEDURE — 84481 FREE ASSAY (FT-3): CPT

## 2025-05-07 PROCEDURE — 83036 HEMOGLOBIN GLYCOSYLATED A1C: CPT

## 2025-05-07 PROCEDURE — 82570 ASSAY OF URINE CREATININE: CPT

## 2025-05-07 PROCEDURE — 80053 COMPREHEN METABOLIC PANEL: CPT

## 2025-05-26 NOTE — TELEPHONE ENCOUNTER
Pt called about the Glimpiride that you rx 7/12/19 she has not received the medicine I stated it was sent to the [facility] and she said she was not told that, because her other two medicines were sent to [facility] (which she states she has not received them as well) she will call [facility] but she was wondering where she has not started any of this medicine if she could reschedule her eye exam, she wants to know if not taking this medicine if not taking the medicine if it would effect her eye exam.    No indicators present